# Patient Record
Sex: MALE | Race: WHITE | NOT HISPANIC OR LATINO | Employment: FULL TIME | URBAN - METROPOLITAN AREA
[De-identification: names, ages, dates, MRNs, and addresses within clinical notes are randomized per-mention and may not be internally consistent; named-entity substitution may affect disease eponyms.]

---

## 2017-02-16 ENCOUNTER — OFFICE VISIT (OUTPATIENT)
Dept: INTERNAL MEDICINE | Facility: CLINIC | Age: 57
End: 2017-02-16

## 2017-02-16 VITALS
DIASTOLIC BLOOD PRESSURE: 60 MMHG | HEIGHT: 73 IN | BODY MASS INDEX: 29.42 KG/M2 | RESPIRATION RATE: 20 BRPM | SYSTOLIC BLOOD PRESSURE: 140 MMHG | WEIGHT: 222 LBS | TEMPERATURE: 97.1 F | HEART RATE: 84 BPM

## 2017-02-16 DIAGNOSIS — L98.9 SKIN LESION OF LEFT ARM: ICD-10-CM

## 2017-02-16 DIAGNOSIS — J45.50 UNCOMPLICATED SEVERE PERSISTENT ASTHMA: ICD-10-CM

## 2017-02-16 DIAGNOSIS — Z11.59 NEED FOR HEPATITIS C SCREENING TEST: ICD-10-CM

## 2017-02-16 DIAGNOSIS — R53.83 OTHER FATIGUE: ICD-10-CM

## 2017-02-16 DIAGNOSIS — R10.31 RIGHT LOWER QUADRANT ABDOMINAL PAIN: ICD-10-CM

## 2017-02-16 DIAGNOSIS — R06.09 DYSPNEA ON EXERTION: ICD-10-CM

## 2017-02-16 DIAGNOSIS — R07.9 CHEST PAIN, EXERTIONAL: ICD-10-CM

## 2017-02-16 DIAGNOSIS — Z00.00 ENCOUNTER FOR HEALTH MAINTENANCE EXAMINATION IN ADULT: Primary | ICD-10-CM

## 2017-02-16 DIAGNOSIS — E11.43 DIABETIC AUTONOMIC NEUROPATHY ASSOCIATED WITH TYPE 2 DIABETES MELLITUS (HCC): ICD-10-CM

## 2017-02-16 DIAGNOSIS — Z12.5 SCREENING FOR PROSTATE CANCER: ICD-10-CM

## 2017-02-16 DIAGNOSIS — Z23 NEED FOR TETANUS BOOSTER: ICD-10-CM

## 2017-02-16 PROBLEM — E11.40 DIABETIC NEUROPATHY (HCC): Status: ACTIVE | Noted: 2017-02-16

## 2017-02-16 PROBLEM — M19.079 ARTHRITIS OF ANKLE: Status: ACTIVE | Noted: 2017-02-16

## 2017-02-16 PROBLEM — E11.9 TYPE 2 DIABETES MELLITUS, WITHOUT LONG-TERM CURRENT USE OF INSULIN (HCC): Status: ACTIVE | Noted: 2017-02-16

## 2017-02-16 LAB
25(OH)D3 SERPL-MCNC: 10.8 NG/ML
ALBUMIN SERPL-MCNC: 4.1 G/DL (ref 3.2–4.8)
ALBUMIN/GLOB SERPL: 1.7 G/DL (ref 1.5–2.5)
ALP SERPL-CCNC: 75 U/L (ref 25–100)
ALT SERPL W P-5'-P-CCNC: 27 U/L (ref 7–40)
ANION GAP SERPL CALCULATED.3IONS-SCNC: 3 MMOL/L (ref 3–11)
ARTICHOKE IGE QN: 210 MG/DL (ref 0–130)
AST SERPL-CCNC: 19 U/L (ref 0–33)
BASOPHILS # BLD AUTO: 0.06 10*3/MM3 (ref 0–0.2)
BASOPHILS NFR BLD AUTO: 1 % (ref 0–1)
BILIRUB SERPL-MCNC: 1 MG/DL (ref 0.3–1.2)
BUN BLD-MCNC: 13 MG/DL (ref 9–23)
BUN/CREAT SERPL: 16.3 (ref 7–25)
CALCIUM SPEC-SCNC: 9.5 MG/DL (ref 8.7–10.4)
CHLORIDE SERPL-SCNC: 103 MMOL/L (ref 99–109)
CHOLEST SERPL-MCNC: 295 MG/DL (ref 0–200)
CO2 SERPL-SCNC: 32 MMOL/L (ref 20–31)
CREAT BLD-MCNC: 0.8 MG/DL (ref 0.6–1.3)
DEPRECATED RDW RBC AUTO: 41.3 FL (ref 37–54)
EOSINOPHIL # BLD AUTO: 0.41 10*3/MM3 (ref 0.1–0.3)
EOSINOPHIL NFR BLD AUTO: 6.7 % (ref 0–3)
ERYTHROCYTE [DISTWIDTH] IN BLOOD BY AUTOMATED COUNT: 12.2 % (ref 11.3–14.5)
EXPIRATION DATE: NORMAL
GFR SERPL CREATININE-BSD FRML MDRD: 100 ML/MIN/1.73
GLOBULIN UR ELPH-MCNC: 2.4 GM/DL
GLUCOSE BLD-MCNC: 206 MG/DL (ref 70–100)
HBA1C MFR BLD: 9.3 %
HCT VFR BLD AUTO: 42.1 % (ref 38.9–50.9)
HCV AB SER DONR QL: NORMAL
HDLC SERPL-MCNC: 43 MG/DL (ref 40–60)
HGB BLD-MCNC: 14.3 G/DL (ref 13.1–17.5)
IMM GRANULOCYTES # BLD: 0.02 10*3/MM3 (ref 0–0.03)
IMM GRANULOCYTES NFR BLD: 0.3 % (ref 0–0.6)
LYMPHOCYTES # BLD AUTO: 2.26 10*3/MM3 (ref 0.6–4.8)
LYMPHOCYTES NFR BLD AUTO: 37 % (ref 24–44)
Lab: NORMAL
MCH RBC QN AUTO: 31.4 PG (ref 27–31)
MCHC RBC AUTO-ENTMCNC: 34 G/DL (ref 32–36)
MCV RBC AUTO: 92.3 FL (ref 80–99)
MONOCYTES # BLD AUTO: 0.45 10*3/MM3 (ref 0–1)
MONOCYTES NFR BLD AUTO: 7.4 % (ref 0–12)
NEUTROPHILS # BLD AUTO: 2.91 10*3/MM3 (ref 1.5–8.3)
NEUTROPHILS NFR BLD AUTO: 47.6 % (ref 41–71)
NRBC BLD MANUAL-RTO: 0 /100 WBC (ref 0–0)
PLATELET # BLD AUTO: 284 10*3/MM3 (ref 150–450)
PMV BLD AUTO: 10.8 FL (ref 6–12)
POTASSIUM BLD-SCNC: 4.4 MMOL/L (ref 3.5–5.5)
PROT SERPL-MCNC: 6.5 G/DL (ref 5.7–8.2)
PSA SERPL-MCNC: 0.8 NG/ML (ref 0–4)
RBC # BLD AUTO: 4.56 10*6/MM3 (ref 4.2–5.76)
SODIUM BLD-SCNC: 138 MMOL/L (ref 132–146)
T4 FREE SERPL-MCNC: 1.29 NG/DL (ref 0.89–1.76)
TRIGL SERPL-MCNC: 241 MG/DL (ref 0–150)
TSH SERPL DL<=0.05 MIU/L-ACNC: 1.77 MIU/ML (ref 0.35–5.35)
VIT B12 BLD-MCNC: 272 PG/ML (ref 211–911)
WBC NRBC COR # BLD: 6.11 10*3/MM3 (ref 3.5–10.8)

## 2017-02-16 PROCEDURE — 83036 HEMOGLOBIN GLYCOSYLATED A1C: CPT | Performed by: INTERNAL MEDICINE

## 2017-02-16 PROCEDURE — 84443 ASSAY THYROID STIM HORMONE: CPT | Performed by: INTERNAL MEDICINE

## 2017-02-16 PROCEDURE — 90715 TDAP VACCINE 7 YRS/> IM: CPT | Performed by: INTERNAL MEDICINE

## 2017-02-16 PROCEDURE — 99386 PREV VISIT NEW AGE 40-64: CPT | Performed by: INTERNAL MEDICINE

## 2017-02-16 PROCEDURE — 85025 COMPLETE CBC W/AUTO DIFF WBC: CPT | Performed by: INTERNAL MEDICINE

## 2017-02-16 PROCEDURE — 93000 ELECTROCARDIOGRAM COMPLETE: CPT | Performed by: INTERNAL MEDICINE

## 2017-02-16 PROCEDURE — 90471 IMMUNIZATION ADMIN: CPT | Performed by: INTERNAL MEDICINE

## 2017-02-16 PROCEDURE — 80053 COMPREHEN METABOLIC PANEL: CPT | Performed by: INTERNAL MEDICINE

## 2017-02-16 PROCEDURE — 80061 LIPID PANEL: CPT | Performed by: INTERNAL MEDICINE

## 2017-02-16 PROCEDURE — 86803 HEPATITIS C AB TEST: CPT | Performed by: INTERNAL MEDICINE

## 2017-02-16 PROCEDURE — 84425 ASSAY OF VITAMIN B-1: CPT | Performed by: INTERNAL MEDICINE

## 2017-02-16 PROCEDURE — 84153 ASSAY OF PSA TOTAL: CPT | Performed by: INTERNAL MEDICINE

## 2017-02-16 PROCEDURE — 36415 COLL VENOUS BLD VENIPUNCTURE: CPT | Performed by: INTERNAL MEDICINE

## 2017-02-16 PROCEDURE — 84439 ASSAY OF FREE THYROXINE: CPT | Performed by: INTERNAL MEDICINE

## 2017-02-16 PROCEDURE — 99204 OFFICE O/P NEW MOD 45 MIN: CPT | Performed by: INTERNAL MEDICINE

## 2017-02-16 PROCEDURE — 82306 VITAMIN D 25 HYDROXY: CPT | Performed by: INTERNAL MEDICINE

## 2017-02-16 PROCEDURE — 82607 VITAMIN B-12: CPT | Performed by: INTERNAL MEDICINE

## 2017-02-16 RX ORDER — GABAPENTIN 100 MG/1
CAPSULE ORAL
Qty: 90 CAPSULE | Refills: 0 | Status: SHIPPED | OUTPATIENT
Start: 2017-02-16 | End: 2017-03-14 | Stop reason: SDUPTHER

## 2017-02-16 NOTE — PROGRESS NOTES
Subjective      History of Present Illness    The patient is establishing care.  He states he previously saw Dr. Noonan as his PCP, but has not been seen in approximately 2 years.  He states he has continued to take his medication however.    Diabetes Follow Up: The patient is here for follow-up of Diabetes, which he states was due to chronic steroid-induced starting approximately 2008.  The patient is adherent with his medication regimen.  He denies medication side effects.  Medication: Metformin and Glimepiride.    Interval events: The patient states he has not had labs done in over 2 years.  He does check his blood sugar at home.  He states it runs in the 170s fasting and in the 250s two hours postprandial.  He has occasional symptoms of hypoglycemia when his blood sugar goes below 90.  He states he does check his feet daily.  He states he has had neuropathy in his feet for several years, consisting of pain and tingling.  Recently, he has developed some in his hands and chest.   He denies foot ulcers.  He is not on medication for neuropathy, but does think he tried a high dose Neurontin in the past.  The patient states his last ophthalmology appointment was 4 years ago and did not show retinopathy.  He had an optometry appointment, which she states was normal, in January 2016.    Symptoms: The patient is complaining of chest pain and dyspnea on exertion for the past 2 months.  He states he has had definitely 2 and possibly 3 episodes of left-sided chest pain, lasting only a few seconds, occurring only with exertion.  No radiation.  The chest pain is associated with shortness of breath, lightheadedness, headache, and nausea.  No other associated symptoms.  The pain resolves with rest.  He has gained 12 pounds recently.  He is complaining of fatigue and headaches.  He denies palpitations, but states he has an occasional skipped beat.  He denies resting shortness of breath, orthopnea, lower extremity  edema, claudication, syncope, polyuria, polydipsia, dizziness, blurred vision, double vision, memory loss, and concentration problems.    Of note, the patient had a 2-D echocardiogram on 1/28/06 which showed mild right ventricular and left ventricular dilation, but no valve abnormality, and a normal ejection fraction.    Lifestyle: The patient states he does not have a healthy diet.  The patient states he bikes in the summer, but is currently not doing any exercise.  Smoking: The patient does not use tobacco.    Asthma Follow Up: The patient is here for follow-up of Asthma.  Medication: Singulair and Prednisone daily.    History: The patient states he has had significant Asthma since childhood.  He has been hospitalized multiple times for asthma exacerbations.  He states he has seen Dr. An in the past, but not for many years.  He states he tried various inhaled steroids, which did not work.  He states the Prednisone is the only thing that has really helped him.  He has been able to decrease his dosage from 20 mg daily to 10 mg daily.  However, he is using his Pro-air MDI 2-4 times daily.  He states he has approximately 2 flare ups per year.  Symptoms: Currently there is no wheezing, cough, or hemoptysis.    Darnell Garcia is a 56 y.o. male who presents for an Annual Physical.      PMH, PSH, SocHx, FamHx, Allergies, and Medications: Reviewed and updated.    Outpatient Medications Prior to Visit   Medication Sig Dispense Refill   • albuterol (PROVENTIL HFA;VENTOLIN HFA) 108 (90 BASE) MCG/ACT inhaler Inhale 2 puffs every 4 (four) hours as needed for wheezing.     • glimepiride (AMARYL) 4 MG tablet Take 4 mg by mouth 2 (Two) Times a Day.     • montelukast (SINGULAIR) 10 MG tablet Take 10 mg by mouth every night.     • predniSONE (DELTASONE) 5 MG tablet Take 5 mg by mouth daily.     • Naproxen Sodium (ALEVE) 220 MG capsule Take 2 tablets by mouth 2 (two) times a day as needed.       No facility-administered  medications prior to visit.        Immunization History   Administered Date(s) Administered   • Influenza, Quadrivalent 12/01/2016     Patient Active Problem List   Diagnosis   • Type 2 diabetes mellitus, without long-term current use of insulin   • Severe persistent asthma   • Diabetic neuropathy   • Arthritis of ankle         Health Habits:  Dental Exam. up to date  Eye Exam. up to date  Hearing Loss:  No  Exercise: 0 times/week.  Current exercise activities include: none  Diet: Not healthy  Multivitamin: Yes, irregularly    Safe Driving:  Yes  Seat Belt:  Yes  Bike Helmet:  Yes  Skin Screening:  Yes  Sunscreen: Yes  SBE / ANIBAL: No  Sexual Activity:  No (ED);  Birth Control:  Vasectomy  STD Prevention:  N/A    Last Pap: N/A  Last Mammogram:  N/A  Last DEXA Scan: N/A  Last Colonoscopy: 2010, normal per patient  Last PSA: ? (not sure if had)    Social:    Social History     Social History   • Marital status:      Spouse name: N/A   • Number of children: N/A   • Years of education: N/A     Occupational History   •  full time     Social History Main Topics   • Smoking status: Never Smoker   • Smokeless tobacco: None   • Alcohol use 10-12 cans of beer one day every other weekend   • Drug use: No   • Sexual activity: No (ED)     Other Topics Concern   • Not on file     Social History Narrative         Current Medical Providers:    Molly Garland MD (Internal Medicine / Pediatrics)    The [facility] providers who are involved in the care of this patient are listed above.         Review of Systems   Constitutional: Positive for fatigue and unexpected weight change. Negative for appetite change, chills and fever.        No weight gain or weight loss.  No night sweats.  No generalized pain.     HENT: Negative for congestion, ear pain, facial swelling, hearing loss, nosebleeds, postnasal drip, rhinorrhea, sinus pressure, sneezing, sore throat, tinnitus, trouble swallowing and voice change.         Reports  snoring.   Eyes: Positive for visual disturbance (occasional floaters). Negative for photophobia, pain, discharge, redness and itching.   Respiratory: Negative for cough, chest tightness, shortness of breath and wheezing.         Has shortness of breath with exertion,No chest congestion.  No hemoptysis. No orthopnea or PND.   Cardiovascular: Positive for chest pain. Negative for palpitations and leg swelling.        No claudication or syncope.   Gastrointestinal: Positive for abdominal pain ( right lower quadrant ×1 year) and nausea ( with chest pain). Negative for abdominal distention, blood in stool, constipation, diarrhea and vomiting.        No melena, heartburn, odynophagia, dysphagia, belching, or bloating.   Endocrine: Negative for cold intolerance, heat intolerance, polydipsia, polyphagia and polyuria.        No hair loss, but has dry skin. No generalized weakness.   Genitourinary: Negative for decreased urine volume, difficulty urinating, discharge, dysuria, flank pain, frequency, hematuria, scrotal swelling, testicular pain and urgency.        No nocturia, incomplete emptying, or incontinence.   Musculoskeletal: Negative for arthralgias, back pain, gait problem, joint swelling, myalgias, neck pain and neck stiffness.        No joint stiffness. Has chronic ankle pain.   Skin: Negative for rash.        New skin lesions on left arm, but no changes in skin lesions.   Neurological: Positive for light-headedness (with chest pain), numbness and headaches (recent). Negative for dizziness, tremors, speech difficulty and weakness.        Has tingling. No memory loss. No decreased concentration.   Hematological: Negative for adenopathy. Does not bruise/bleed easily.   Psychiatric/Behavioral: Positive for dysphoric mood. Negative for confusion, sleep disturbance and suicidal ideas. The patient is not nervous/anxious.         No depression.           Objective     Vitals:    02/16/17 0853   BP: 140/60   BP Location:  "Right arm   Pulse: 84   Resp: 20   Temp: 97.1 °F (36.2 °C)   TempSrc: Temporal Artery    Weight: 222 lb (101 kg)   Height: 73.25\" (186.1 cm)       Body mass index is 29.09 kg/(m^2).    Physical Exam   Constitutional:   Overweight.   HENT:   Head: Normocephalic and atraumatic.   Right Ear: Tympanic membrane and ear canal normal.   Left Ear: Tympanic membrane and ear canal normal.   Mouth/Throat: Oropharynx is clear and moist.   Fundi not clearly visualized.   Eyes: Conjunctivae and EOM are normal. Pupils are equal, round, and reactive to light.   Neck: Normal range of motion. Neck supple. No thyromegaly present.   Cardiovascular: Normal rate and regular rhythm.  Exam reveals no gallop and no friction rub.    No murmur heard.  Pulses:       Radial pulses are 2+ on the right side, and 2+ on the left side.        Dorsalis pedis pulses are 1+ on the right side, and 1+ on the left side.   Pulmonary/Chest: Effort normal. He has wheezes (diffuse). He has no rhonchi.   Abdominal: Soft. Bowel sounds are normal. He exhibits no distension and no mass. There is tenderness (right lower quadrant). There is no rebound, no guarding and no CVA tenderness. A hernia (large ventral) is present.   Normal rectal tone.  No rectal masses.   Genitourinary: Rectum normal, prostate normal and penis normal. Right testis shows no mass, no swelling and no tenderness. Left testis shows no mass, no swelling and no tenderness.   Musculoskeletal: Normal range of motion.    Darnell had a diabetic foot exam performed today.   During the foot exam he had a monofilament test performed (See form).  Lymphadenopathy:     He has no cervical adenopathy.        Right: No inguinal and no supraclavicular adenopathy present.        Left: No inguinal and no supraclavicular adenopathy present.   Neurological: He is alert. He has normal strength. No cranial nerve deficit. Coordination and gait normal.   DTRs 1+ and equal throughout.   Skin: No rash noted.   There are " several scaly, erythematous, slightly raised lesions on his left arm.  There are multiple benign-appearing on his back.   Psychiatric: He has a normal mood and affect.   Nursing note and vitals reviewed.        ECG 12 Lead  Date/Time: 2/16/2017 9:31 AM  Performed by: ANGELICA HI  Authorized by: ANGELICA HI   Comparison: not compared with previous ECG   Previous ECG: no previous ECG available  Rhythm: sinus rhythm  Ectopy comments: None  Rate: normal  Conduction: right bundle branch block  ST Segments: ST segments normal  T Waves: T waves normal  QRS axis: right  Other: no other findings  Clinical impression: abnormal ECG          Lab Results   Component Value Date    HGBA1C 9.3 02/16/2017       Assessment/Plan      Darnell was seen today for annual exam.    Diagnoses and all orders for this visit:    Encounter for health maintenance examination in adult    Diabetic autonomic neuropathy associated with type 2 diabetes mellitus  -     Lipid Panel  -     Comprehensive Metabolic Panel  -     Cancel: POC Microalbumin  -     Cancel: POC Urinalysis Dipstick, Multipro  -     POC Glycosylated Hemoglobin (Hb A1C)  -     metFORMIN (GLUCOPHAGE) 500 MG tablet; Take 2 tablets by mouth 2 (Two) Times a Day With Meals.  -     Ambulatory Referral to Ophthalmology  -     gabapentin (NEURONTIN) 100 MG capsule; Take 1-3 po qhs    Uncomplicated severe persistent asthma  -     Fluticasone Furoate-Vilanterol 100-25 MCG/INH aerosol powder ; Inhale 1 puff Daily.    Chest pain, exertional  -     ECG 12 Lead  -     Stress Test With Myocardial Perfusion - One Day; Future    Dyspnea on exertion    Other fatigue  -     CBC & Differential  -     Vitamin B12  -     T4, Free  -     TSH  -     Vitamin D 25 Hydroxy  -     Vitamin B1, Whole Blood  -     CBC Auto Differential    Skin lesion of left arm  -     Ambulatory Referral to Dermatology    Screening for prostate cancer  -     PSA    Need for hepatitis C screening test  -     Hepatitis C  Antibody    Need for tetanus booster  -     Tdap Vaccine Greater Than or Equal To 8yo IM    Right lower quadrant abdominal pain  -     CT Abdomen Pelvis With Contrast; Future      The patient was unable to provide a urine specimen today.    The patient was given a Zostavax information sheet and agrees to check on insurance coverage.    Return in about 2 weeks (around 3/2/2017) for Recheck- Diabetes.

## 2017-02-19 LAB — VIT B1 BLD-SCNC: 150.5 NMOL/L (ref 66.5–200)

## 2017-02-20 ENCOUNTER — TELEPHONE (OUTPATIENT)
Dept: INTERNAL MEDICINE | Facility: CLINIC | Age: 57
End: 2017-02-20

## 2017-02-20 NOTE — TELEPHONE ENCOUNTER
Naz notified Cardiology  will be calling to schedule appt.   She states they already called and he has an appt on Monday 2/ 27 /17

## 2017-02-20 NOTE — TELEPHONE ENCOUNTER
Joelle from Dr Dur[facility]  office at  called to notify Dr garland he had a abnormal Stress Test .   Dr Garland said he could schedule a follow up appt with Dr Durán.    Joelle will call him and schedule the appt.

## 2017-02-20 NOTE — TELEPHONE ENCOUNTER
Please call patient and let him know the cardiologist office called and just told us it was abnormal.  They will be calling him to set up an appointment.

## 2017-02-20 NOTE — TELEPHONE ENCOUNTER
----- Message from Silvia Holt sent at 2/20/2017  1:07 PM EST -----  Contact: [phone]  WIFE CALLED TO GET RESULTS OF STRESS TEST-WAS TOLD I WOULD SEND A MESSAGE BACK BUT WE DO NOT HAVE A RELEASE OF INFO FOR HER.  PTS NUMBER [phone]

## 2017-02-22 ENCOUNTER — APPOINTMENT (OUTPATIENT)
Dept: CT IMAGING | Facility: HOSPITAL | Age: 57
End: 2017-02-22
Attending: INTERNAL MEDICINE

## 2017-02-23 ENCOUNTER — HOSPITAL ENCOUNTER (OUTPATIENT)
Dept: CT IMAGING | Facility: HOSPITAL | Age: 57
Discharge: HOME | End: 2017-02-23
Attending: INTERNAL MEDICINE | Admitting: INTERNAL MEDICINE

## 2017-02-23 DIAGNOSIS — R10.31 RIGHT LOWER QUADRANT ABDOMINAL PAIN: ICD-10-CM

## 2017-02-23 PROCEDURE — 25510000001 DIATRIZOATE MEGLUMINE & SODIUM PER 1 ML: Performed by: INTERNAL MEDICINE

## 2017-02-23 PROCEDURE — 74177 CT ABD & PELVIS W/CONTRAST: CPT

## 2017-02-23 PROCEDURE — 0 IOPAMIDOL 61 % SOLUTION: Performed by: INTERNAL MEDICINE

## 2017-02-23 RX ADMIN — DIATRIZOATE MEGLUMINE AND DIATRIZOATE SODIUM 15 ML: 660; 100 LIQUID ORAL; RECTAL at 09:15

## 2017-02-23 RX ADMIN — IOPAMIDOL 99 ML: 612 INJECTION, SOLUTION INTRAVENOUS at 10:45

## 2017-03-02 ENCOUNTER — OFFICE VISIT (OUTPATIENT)
Dept: INTERNAL MEDICINE | Facility: CLINIC | Age: 57
End: 2017-03-02

## 2017-03-02 VITALS
HEART RATE: 72 BPM | SYSTOLIC BLOOD PRESSURE: 130 MMHG | TEMPERATURE: 97 F | RESPIRATION RATE: 20 BRPM | WEIGHT: 226 LBS | DIASTOLIC BLOOD PRESSURE: 80 MMHG | BODY MASS INDEX: 29.95 KG/M2 | HEIGHT: 73 IN

## 2017-03-02 DIAGNOSIS — E11.9 TYPE 2 DIABETES MELLITUS WITHOUT COMPLICATION, WITHOUT LONG-TERM CURRENT USE OF INSULIN (HCC): Primary | ICD-10-CM

## 2017-03-02 DIAGNOSIS — R07.89 OTHER CHEST PAIN: ICD-10-CM

## 2017-03-02 DIAGNOSIS — E78.49 OTHER HYPERLIPIDEMIA: ICD-10-CM

## 2017-03-02 PROBLEM — E55.9 VITAMIN D DEFICIENCY: Status: ACTIVE | Noted: 2017-03-02

## 2017-03-02 PROBLEM — E78.5 HYPERLIPIDEMIA: Status: ACTIVE | Noted: 2017-03-02

## 2017-03-02 PROBLEM — I25.10 CORONARY ARTERY DISEASE: Status: ACTIVE | Noted: 2017-03-02

## 2017-03-02 PROCEDURE — 99214 OFFICE O/P EST MOD 30 MIN: CPT | Performed by: INTERNAL MEDICINE

## 2017-03-02 PROCEDURE — 93000 ELECTROCARDIOGRAM COMPLETE: CPT | Performed by: INTERNAL MEDICINE

## 2017-03-02 RX ORDER — CLOPIDOGREL BISULFATE 75 MG/1
TABLET ORAL DAILY
COMMUNITY
Start: 2017-02-27 | End: 2017-06-16

## 2017-03-02 RX ORDER — ISOSORBIDE MONONITRATE 30 MG/1
TABLET, EXTENDED RELEASE ORAL DAILY
COMMUNITY
Start: 2017-02-27 | End: 2017-06-16

## 2017-03-02 RX ORDER — PREDNISONE 10 MG/1
10 TABLET ORAL DAILY
COMMUNITY
End: 2017-06-07 | Stop reason: SDUPTHER

## 2017-03-02 NOTE — PROGRESS NOTES
Subjective       Darnell Garcia is a 56 y.o. male.     Chief Complaint   Patient presents with   • Diabetes     2 week follow up   Care Plan         History obtained from the patient.      History of Present Illness     Diabetes Follow Up:     The patient is here for follow-up of Diabetes. The condition is unstable.  The patient is adherent with his medication regimen. He denies medication side effects.  Medication: Metformin and Glimepiride.  The patient is here for follow up of newly diagnosed Hyperlipidemia. The condition is unstable.   LDL goal is < 70.  Most recent , .  Medication: None.      Interval events: The patient was seen and 2/16/17 and hemoglobin A1c was 9.3.  Metformin and Neurontin were both started.  He has not been checking his blood sugar at home, stating he does not have test strips and lancets for his glucometer.  He states he does check his feet daily. He states he has had neuropathy in his feet and hands, consisting of pain and tingling.  He denies foot ulcers.  The patient states his last ophthalmology appointment was 4 years ago and did not show retinopathy. He had an optometry appointment, which she states was normal, in January 2016.    The patient was sent for a stress test for his exertional chest pain.  It was positive.  He saw Dr. Durán at [facility].  He was started on Plavix, Imdur, and Metoprolol.  He has a heart catheterization scheduled for tomorrow.  His exertional chest pain is unchanged, but he states he has not been exerting himself at all.  This morning, he woke up with a different type of chest pain.  He is not sure whether it is related to anxiety for his upcoming test.  The pain starts in his left axilla and radiates to the left chest.  It is rated as a 4 on a scale of 10.  He denies injury, to the area.  There is no cough, chest congestion, fever, or chills.  It  is not with associated nausea, vomiting, diaphoresis, lightheadedness, dizziness, or shortness of  breath.    Of note, the patient had a 2-D echocardiogram on 1/28/06 which showed mild right ventricular and left ventricular dilation, but no valve abnormality, and a normal ejection fraction.     Symptoms: new resting chest pain, 4 pound weight gain, stable exertional chest pain,  and dyspnea on exertion.  Unchanged  fatigue and headaches. He denies palpitations, but states he has an occasional skipped beat. He denies resting shortness of breath, orthopnea, lower extremity edema, claudication, syncope, polyuria, polydipsia, dizziness, blurred vision, double vision, memory loss, and concentration problems.      Lifestyle: The patient states he does not have a healthy diet. The patient states he bikes in the summer, but is currently not doing any exercise.  Smoking: The patient does not use tobacco.    Asthma Follow Up:  Last visit the patient was given Breo-Ellipta.  He feels it is helping and he is using his Albuterol inhaler less.    Vitamin D Deficiency Follow Up:  The patient is here for follow up of newly diagnosed Vitamin D Deficiency.    Last Vitamin D level on 2/16/17, Vitamin D 25, 10.8.  Symptoms:  Denies myalgias, arthralgias, paresthesias, balance problems, and bone pain.      Current Outpatient Prescriptions on File Prior to Visit   Medication Sig Dispense Refill   • albuterol (PROVENTIL HFA;VENTOLIN HFA) 108 (90 BASE) MCG/ACT inhaler Inhale 2 puffs every 4 (four) hours as needed for wheezing.     • Fluticasone Furoate-Vilanterol 100-25 MCG/INH aerosol powder  Inhale 1 puff Daily. 2 each 0   • gabapentin (NEURONTIN) 100 MG capsule Take 1-3 po qhs (Patient taking differently: Take 2 po qhs) 90 capsule 0   • glimepiride (AMARYL) 4 MG tablet Take 4 mg by mouth 2 (Two) Times a Day.     • metFORMIN (GLUCOPHAGE) 500 MG tablet Take 2 tablets by mouth 2 (Two) Times a Day With Meals. 180 tablet 1   • montelukast (SINGULAIR) 10 MG tablet Take 10 mg by mouth every night.     • [DISCONTINUED] predniSONE (DELTASONE)  "5 MG tablet Take 5 mg by mouth daily.       No current facility-administered medications on file prior to visit.          The following portions of the patient's history were reviewed and updated as appropriate: allergies, current medications, past family history, past medical history, past social history, past surgical history and problem list.    Review of Systems   Constitutional: Positive for fatigue. Negative for chills, fever and unexpected weight change.   Eyes: Negative for visual disturbance.   Respiratory: Negative for cough, shortness of breath and wheezing.    Cardiovascular: Positive for chest pain. Negative for palpitations and leg swelling.        Stable ARAMBULA, but no  orthopnea or claudication.   Gastrointestinal: Negative for abdominal pain, blood in stool, constipation, diarrhea, nausea and vomiting.        Denies melena.     Endocrine: Negative for polydipsia and polyuria.   Musculoskeletal: Negative for arthralgias and myalgias.   Neurological: Positive for headaches. Negative for dizziness, syncope and light-headedness.        No memory issues.   Psychiatric/Behavioral: Negative for decreased concentration.         Objective       Blood pressure 130/80, pulse 72, temperature 97 °F (36.1 °C), temperature source Temporal Artery , resp. rate 20, height 73\" (185.4 cm), weight 226 lb (103 kg).        Physical Exam   Constitutional:   Overweight.     Neck: Normal range of motion. Neck supple. Carotid bruit is not present.   Cardiovascular: Normal rate, regular rhythm, normal heart sounds and intact distal pulses.  Exam reveals no gallop and no friction rub.    No murmur heard.  No peripheral edema.   Pulmonary/Chest: Effort normal and breath sounds normal.   Abdominal: Soft. Bowel sounds are normal. He exhibits no distension, no abdominal bruit and no mass. There is no hepatosplenomegaly. There is no tenderness.   Psychiatric: He has a normal mood and affect.   Nursing note and vitals reviewed.      ECG " 12 Lead  Date/Time: 3/2/2017 10:17 AM  Performed by: ANGELICA HI  Authorized by: ANGELICA HI   Comparison: compared with previous ECG   Comparison to previous ECG: Unchanged.  Rhythm: sinus rhythm  Rate: normal  Conduction: right bundle branch block  ST Segments: ST segments normal  T Waves: T waves normal  QRS axis: right  Other findings comments: [facility]  Clinical impression: abnormal ECG          Assessment / Plan:    Darnell was seen today for diabetes.    Diagnoses and all orders for this visit:    Type 2 diabetes mellitus without complication, without long-term current use of insulin    Other hyperlipidemia    Other chest pain    Other orders  -     ECG 12 Lead      The patient was sent to [facility] ER by ambulance.  I informed Dr. Durán's office.  Unable to complete Care Plan.    Return for will schedule after heart cath.

## 2017-03-14 DIAGNOSIS — E11.43 DIABETIC AUTONOMIC NEUROPATHY ASSOCIATED WITH TYPE 2 DIABETES MELLITUS (HCC): ICD-10-CM

## 2017-03-14 RX ORDER — GABAPENTIN 100 MG/1
CAPSULE ORAL
Qty: 90 CAPSULE | Refills: 1 | Status: SHIPPED | OUTPATIENT
Start: 2017-03-14 | End: 2017-05-09 | Stop reason: SDUPTHER

## 2017-03-20 RX ORDER — GLIMEPIRIDE 4 MG/1
4 TABLET ORAL 2 TIMES DAILY
Qty: 60 TABLET | Refills: 5 | Status: SHIPPED | OUTPATIENT
Start: 2017-03-20 | End: 2017-10-06 | Stop reason: SDUPTHER

## 2017-04-03 ENCOUNTER — TELEPHONE (OUTPATIENT)
Dept: INTERNAL MEDICINE | Facility: CLINIC | Age: 57
End: 2017-04-03

## 2017-04-03 DIAGNOSIS — J45.50 UNCOMPLICATED SEVERE PERSISTENT ASTHMA: ICD-10-CM

## 2017-04-03 NOTE — TELEPHONE ENCOUNTER
----- Message from Ivis Guadarrama sent at 4/3/2017 11:36 AM EDT -----  [facility] [phone]  PT WAS GIVEN A SAMPLE OF BREO INHALER AND DID REALLY WELL ON THIS AND PT NEEDS A SCRIPT FOR THIS CALLED [facility] TO [facility]

## 2017-05-09 DIAGNOSIS — E11.43 DIABETIC AUTONOMIC NEUROPATHY ASSOCIATED WITH TYPE 2 DIABETES MELLITUS (HCC): ICD-10-CM

## 2017-05-09 RX ORDER — GABAPENTIN 100 MG/1
CAPSULE ORAL
Qty: 90 CAPSULE | Refills: 11 | Status: SHIPPED | OUTPATIENT
Start: 2017-05-09 | End: 2017-11-16 | Stop reason: SDUPTHER

## 2017-06-07 ENCOUNTER — TELEPHONE (OUTPATIENT)
Dept: INTERNAL MEDICINE | Facility: CLINIC | Age: 57
End: 2017-06-07

## 2017-06-07 RX ORDER — PREDNISONE 10 MG/1
10 TABLET ORAL DAILY
Qty: 30 TABLET | Refills: 2 | Status: SHIPPED | OUTPATIENT
Start: 2017-06-07 | End: 2017-06-16 | Stop reason: SDUPTHER

## 2017-06-07 NOTE — TELEPHONE ENCOUNTER
Okay to call in #[phone] with 2 refills.  However, he is due for his follow-up, fasting labs.  Please schedule an appointment for him.

## 2017-06-07 NOTE — TELEPHONE ENCOUNTER
----- Message from Nelia Melissa sent at 6/7/2017  8:58 AM EDT -----  PT NEEDS PREDNISONE 10MG- HE HAS BEEN TAKING IT FOR ABOUT 10 YEARS FROM OTHER PROVIDER. HE NEEDS IT WRITTEN- IF YOU ARE NOT OK WITH THAT, HE CAN COME IN TO BE SEEN. HE SAYS NOTHING IS WRONG, BUT HE TAKES IT FOR ASTHMA. HE HAS BEEN 5 DAYS WITHOUT AND IS FEELING THE EFFECTS.     [facility]: [facility]    PATIENT: [phone]

## 2017-06-07 NOTE — TELEPHONE ENCOUNTER
Rx sent to pharm. LMOV for return call. Office # given. Just need to let pt know and schedule a f/u appt for him.

## 2017-06-15 ENCOUNTER — OFFICE VISIT (OUTPATIENT)
Dept: INTERNAL MEDICINE | Facility: CLINIC | Age: 57
End: 2017-06-15

## 2017-06-15 VITALS
SYSTOLIC BLOOD PRESSURE: 122 MMHG | WEIGHT: 224 LBS | DIASTOLIC BLOOD PRESSURE: 74 MMHG | BODY MASS INDEX: 29.69 KG/M2 | TEMPERATURE: 98.2 F | HEIGHT: 73 IN | HEART RATE: 80 BPM | RESPIRATION RATE: 20 BRPM

## 2017-06-15 DIAGNOSIS — E78.49 OTHER HYPERLIPIDEMIA: ICD-10-CM

## 2017-06-15 DIAGNOSIS — R22.41 FOOT MASS, RIGHT: ICD-10-CM

## 2017-06-15 DIAGNOSIS — Z23 NEED FOR PNEUMOCOCCAL VACCINE: ICD-10-CM

## 2017-06-15 DIAGNOSIS — M79.671 RIGHT FOOT PAIN: ICD-10-CM

## 2017-06-15 DIAGNOSIS — J45.50 UNCOMPLICATED SEVERE PERSISTENT ASTHMA: ICD-10-CM

## 2017-06-15 DIAGNOSIS — E55.9 VITAMIN D DEFICIENCY: ICD-10-CM

## 2017-06-15 DIAGNOSIS — E11.9 TYPE 2 DIABETES MELLITUS WITHOUT COMPLICATION, WITHOUT LONG-TERM CURRENT USE OF INSULIN (HCC): Primary | ICD-10-CM

## 2017-06-15 LAB
25(OH)D3 SERPL-MCNC: 13.1 NG/ML
A/C: NORMAL
ALBUMIN SERPL-MCNC: 4.3 G/DL (ref 3.2–4.8)
ALBUMIN/GLOB SERPL: 1.8 G/DL (ref 1.5–2.5)
ALP SERPL-CCNC: 112 U/L (ref 25–100)
ALT SERPL W P-5'-P-CCNC: 26 U/L (ref 7–40)
ANION GAP SERPL CALCULATED.3IONS-SCNC: 3 MMOL/L (ref 3–11)
ARTICHOKE IGE QN: 134 MG/DL (ref 0–130)
AST SERPL-CCNC: 22 U/L (ref 0–33)
BILIRUB SERPL-MCNC: 1 MG/DL (ref 0.3–1.2)
BUN BLD-MCNC: 14 MG/DL (ref 9–23)
BUN/CREAT SERPL: 17.5 (ref 7–25)
CALCIUM SPEC-SCNC: 9.5 MG/DL (ref 8.7–10.4)
CHLORIDE SERPL-SCNC: 102 MMOL/L (ref 99–109)
CHOLEST SERPL-MCNC: 208 MG/DL (ref 0–200)
CLARITY, POC: CLEAR
CO2 SERPL-SCNC: 33 MMOL/L (ref 20–31)
COLOR UR: YELLOW
CREAT BLD-MCNC: 0.8 MG/DL (ref 0.6–1.3)
EXPIRATION DATE: ABNORMAL
EXPIRATION DATE: NORMAL
EXPIRATION DATE: NORMAL
GFR SERPL CREATININE-BSD FRML MDRD: 100 ML/MIN/1.73
GLOBULIN UR ELPH-MCNC: 2.4 GM/DL
GLUCOSE BLD-MCNC: 285 MG/DL (ref 70–100)
GLUCOSE UR STRIP-MCNC: ABNORMAL MG/DL
HBA1C MFR BLD: 11 %
HDLC SERPL-MCNC: 40 MG/DL (ref 40–60)
KETONES UR QL: NEGATIVE
LEUKOCYTE EST, POC: NEGATIVE
Lab: ABNORMAL
Lab: NORMAL
Lab: NORMAL
NITRITE UR-MCNC: NEGATIVE MG/ML
PH UR: 5.5 [PH] (ref 5–8)
POC CREATININE URINE: 200
POC MICROALBUMIN URINE: 30
POTASSIUM BLD-SCNC: 4.2 MMOL/L (ref 3.5–5.5)
PROT SERPL-MCNC: 6.7 G/DL (ref 5.7–8.2)
PROT UR STRIP-MCNC: NEGATIVE MG/DL
PROT/CREAT UR: 200 MG/G CREA
RBC # UR STRIP: NEGATIVE /UL
SODIUM BLD-SCNC: 138 MMOL/L (ref 132–146)
SP GR UR: 1.01 (ref 1–1.03)
TRIGL SERPL-MCNC: 188 MG/DL (ref 0–150)

## 2017-06-15 PROCEDURE — 80053 COMPREHEN METABOLIC PANEL: CPT | Performed by: INTERNAL MEDICINE

## 2017-06-15 PROCEDURE — 82044 UR ALBUMIN SEMIQUANTITATIVE: CPT | Performed by: INTERNAL MEDICINE

## 2017-06-15 PROCEDURE — 80061 LIPID PANEL: CPT | Performed by: INTERNAL MEDICINE

## 2017-06-15 PROCEDURE — 81002 URINALYSIS NONAUTO W/O SCOPE: CPT | Performed by: INTERNAL MEDICINE

## 2017-06-15 PROCEDURE — 99214 OFFICE O/P EST MOD 30 MIN: CPT | Performed by: INTERNAL MEDICINE

## 2017-06-15 PROCEDURE — 82306 VITAMIN D 25 HYDROXY: CPT | Performed by: INTERNAL MEDICINE

## 2017-06-15 PROCEDURE — 83036 HEMOGLOBIN GLYCOSYLATED A1C: CPT | Performed by: INTERNAL MEDICINE

## 2017-06-15 PROCEDURE — 90732 PPSV23 VACC 2 YRS+ SUBQ/IM: CPT | Performed by: INTERNAL MEDICINE

## 2017-06-15 PROCEDURE — 90471 IMMUNIZATION ADMIN: CPT | Performed by: INTERNAL MEDICINE

## 2017-06-15 PROCEDURE — 36415 COLL VENOUS BLD VENIPUNCTURE: CPT | Performed by: INTERNAL MEDICINE

## 2017-06-15 RX ORDER — LISINOPRIL 10 MG/1
10 TABLET ORAL DAILY
Qty: 30 TABLET | Refills: 5 | Status: SHIPPED | OUTPATIENT
Start: 2017-06-15 | End: 2017-11-16 | Stop reason: SDUPTHER

## 2017-06-15 NOTE — PROGRESS NOTES
Subjective       Darnell Garcia is a 57 y.o. male.     Chief Complaint   Patient presents with   • Follow-up   • Diabetes       History obtained from the patient.      History of Present Illness     Diabetes Follow Up: The patient is here for follow-up of Diabetes, which he states was due to chronic steroid-induced starting approximately 2008, which has been unstable.  Medication: Metformin and Glimepiride  Hyperlipidemia Follow-up: The patient is here for follow-up of Hyperlipidemia, which has been unstable.    His LDL goal is less than 70.  Last LDL on 2/16/17 was 241, Triglycerides 295.    Medication: Atorvastatin.  Coronary Artery Disease follow-up: The patient is here for follow-up of Coronary Artery Disease, which has been stable.    Medications: Metoprolol and Atorvastatin.  The patient is adherent with his medication regimen. He denies medication side effects.     Interval events:   At his visit on 2/16/17, the patient was complaining of chest pain and dyspnea.  He had a stress test on 2/27/17, per Dr. Durán, which was positive.  He was admitted to [facility] on 2/27/17.  He was started on Metoprolol, Atorvastatin, Plavix and Imdur.  Plan was to do a heart catheterization while in the hospital, but the patient opted to do it as an outpatient.  He had a cardiac catheterization on 3/3/17 which showed moderate nonobstructive coronary artery disease with a 30-50% mid LAD lesion.  Medical management was advised.  He last saw Dr. Durán for follow-up on 3/21/17, and was doing well.   He was taken off the Plavix and Imdur at that time.       The patient's last Hemoglobin A1c on 2/16/17 was 9.3.  He does check his blood sugar at home. He states it runs in the 170s fasting and in the 250s two hours postprandial. He denies episodes  of hypoglycemia.   He states he does check his feet daily. He states he has had neuropathy in his feet for several years, consisting of pain and tingling, more recently in his  hands.   He denies foot ulcers. He was started on Neurontin 2/16/17. The patient states his last eye appointment was 2 months ago, at [facility], and did not show retinopathy.      Symptoms: He has lost 2 pounds recently.  Complains of polyuria.  He denies chest pain, shortness of breath, palpitations,  orthopnea, lower extremity edema, claudication, syncope,  polydipsia, dizziness, fatigue, lightheadedness, dizziness, headache, blurred vision, double vision, memory loss, and concentration problems.      Lifestyle: The patient states he is doing better with a healthy diet. The patient states he bikes in the summer, but is currently not doing any exercise, due to his foot pain.  Smoking: The patient does not use tobacco.     Asthma Follow Up: The patient is here for follow-up of Asthma.  Medication: Breo-Ellipta, Singulair,  and Prednisone daily.   Interval events: Since starting the Breo Ellipta, he has only needed his Pro-air MDI 2-4 times per month.    Symptoms: Currently there is no shortness of breath, wheezing, cough, or hemoptysis.    Vitamin D Deficiency Follow-up:  The patient is here for follow-up of Vitamin D Deficiency, diagnosed in February 2017.    Last Vitamin D level on 2/16/17, 10.8.    Symptoms: Denies myalgias, arthralgias, and paresthesias.    Medications: Multivitamin daily.    The patient is complaining of a 2-3 year history of right foot pain.  He states he has to walk on the right lateral side of his foot because he feels something in the arch, and it is too painful to walk flat-footed.  Overall, the symptoms are getting worse.   He denies edema or warmth, but does have redness and tenderness to palpation on the right lateral aspect of his foot.  He denies previous injury or trauma.  He states he saw a podiatrist in the past, and was told he just needs different shoes.  He states he has never had an x-ray of the foot.    Current Outpatient Prescriptions on File Prior to Visit   Medication  Sig Dispense Refill   • albuterol (PROVENTIL HFA;VENTOLIN HFA) 108 (90 BASE) MCG/ACT inhaler Inhale 2 puffs every 4 (four) hours as needed for wheezing.     • clopidogrel (PLAVIX) 75 MG tablet Daily.     • Fluticasone Furoate-Vilanterol (BREO ELLIPTA) 100-25 MCG/INH aerosol powder  Inhale 1 puff Daily. 1 each 5   • gabapentin (NEURONTIN) 100 MG capsule TAKE ONE TO THREE CAPSULES BY MOUTH EVERY NIGHT AT BEDTIME 90 capsule 11   • glimepiride (AMARYL) 4 MG tablet Take 1 tablet by mouth 2 (Two) Times a Day. 60 tablet 5   • isosorbide mononitrate (IMDUR) 30 MG 24 hr tablet Daily.     • metFORMIN (GLUCOPHAGE) 500 MG tablet Take 2 tablets by mouth 2 (Two) Times a Day With Meals. 180 tablet 1   • metoprolol tartrate (LOPRESSOR) 25 MG tablet 2 (Two) Times a Day.     • montelukast (SINGULAIR) 10 MG tablet Take 10 mg by mouth every night.     • predniSONE (DELTASONE) 10 MG tablet Take 1 tablet by mouth Daily. 30 tablet 2     No current facility-administered medications on file prior to visit.          The following portions of the patient's history were reviewed and updated as appropriate: allergies, current medications, past family history, past medical history, past social history, past surgical history and problem list.    Review of Systems   Constitutional: Negative for chills, fatigue, fever and unexpected weight change.   Eyes: Negative for visual disturbance.   Respiratory: Negative for cough, shortness of breath and wheezing.         Denies hemoptysis.   Cardiovascular: Negative for chest pain, palpitations and leg swelling.        No ARAMBULA, orthopnea, or claudication.   Endocrine: Positive for polyuria. Negative for polydipsia.   Musculoskeletal: Negative for arthralgias and myalgias.   Neurological: Negative for dizziness, syncope, light-headedness and headaches.        No memory issues.  Stable numbness and tingling in the hands and feet.   Psychiatric/Behavioral: Negative for decreased concentration.  "        Objective       Blood pressure 122/74, pulse 80, temperature 98.2 °F (36.8 °C), temperature source Temporal Artery , resp. rate 20, height 73\" (185.4 cm), weight 224 lb (102 kg).      Physical Exam   Constitutional:   Overweight.   Neck: Normal range of motion. Neck supple. Carotid bruit is not present. No thyromegaly present.   Cardiovascular: Normal rate, regular rhythm, normal heart sounds and intact distal pulses.  Exam reveals no gallop and no friction rub.    No murmur heard.  No peripheral edema.   Pulmonary/Chest: Effort normal and breath sounds normal.   Abdominal: Soft. Bowel sounds are normal. He exhibits no distension, no abdominal bruit and no mass. There is no hepatosplenomegaly. There is no tenderness.   Exam difficult due to obesity.   Musculoskeletal:   There is a firm, erythematous, tender mass on the lateral aspect of the right foot.   Psychiatric: He has a normal mood and affect.   Nursing note and vitals reviewed.       Results for orders placed or performed in visit on 06/15/17   POC Glycosylated Hemoglobin (Hb A1C)   Result Value Ref Range    Hemoglobin A1C 11.0 %    Lot Number [id number]     Expiration Date 11-18    POC Urinalysis Dipstick, Multipro   Result Value Ref Range    Color Yellow Yellow, Straw, Dark Yellow, Nelia    Clarity, UA Clear Clear    Glucose,  mg/dL (A) Negative, 1000 mg/dL (3+) mg/dL    Ketones, UA Negative Negative    Specific Gravity  1.015 1.005 - 1.030    Blood, UA Negative Negative    pH, Urine 5.5 5.0 - 8.0    Protein, POC Negative Negative mg/dL    Leukocytes Negative Negative    Nitrite, UA Negative Negative    Protein/Creatinine Ratio, Urine 200.0 mg/G Crea    Lot Number [id number]     Expiration Date 10-17    POC Microalbumin   Result Value Ref Range    Microalbumin, Urine 30     Creatinine, Urine 200     A/C <30mg/g NORMAL     Lot Number [id number]     Expiration Date 4-30-18          Assessment / Plan:    Darnell was seen today for follow-up and " diabetes.    Diagnoses and all orders for this visit:    Type 2 diabetes mellitus without complication, without long-term current use of insulin  -     Comprehensive Metabolic Panel  -     POC Glycosylated Hemoglobin (Hb A1C)  -     POC Urinalysis Dipstick, Multipro  -     POC Microalbumin  -     lisinopril (PRINIVIL,ZESTRIL) 10 MG tablet; Take 1 tablet by mouth Daily.    Other hyperlipidemia  -     Lipid Panel    Uncomplicated severe persistent asthma    Vitamin D deficiency  -     Vitamin D 25 Hydroxy    Foot mass, right  -     Ambulatory Referral to Orthopedic Surgery  -     MRI foot right w contrast; Future    Right foot pain  -     Ambulatory Referral to Orthopedic Surgery  -     MRI foot right w contrast; Future    Need for pneumococcal vaccine  -     Pneumococcal Polysaccharide Vaccine 23-Valent Greater Than or Equal To 1yo Subcutaneous / IM      Return in about 3 months (around 9/15/2017) for Recheck-Diabetes fasting.

## 2017-06-16 ENCOUNTER — TELEPHONE (OUTPATIENT)
Dept: INTERNAL MEDICINE | Facility: CLINIC | Age: 57
End: 2017-06-16

## 2017-06-16 DIAGNOSIS — E78.49 OTHER HYPERLIPIDEMIA: Primary | ICD-10-CM

## 2017-06-16 DIAGNOSIS — J45.30 MILD PERSISTENT ASTHMA WITHOUT COMPLICATION: ICD-10-CM

## 2017-06-16 RX ORDER — RANITIDINE 150 MG/1
150 CAPSULE ORAL 2 TIMES DAILY
Qty: 60 CAPSULE | Refills: 11
Start: 2017-06-16 | End: 2018-06-16

## 2017-06-16 RX ORDER — ATORVASTATIN CALCIUM 40 MG/1
40 TABLET, FILM COATED ORAL DAILY
Qty: 30 TABLET | Refills: 5 | Status: SHIPPED | OUTPATIENT
Start: 2017-06-16 | End: 2017-11-16

## 2017-06-16 RX ORDER — PREDNISONE 10 MG/1
5 TABLET ORAL DAILY
Qty: 30 TABLET | Refills: 2
Start: 2017-06-16 | End: 2017-11-16 | Stop reason: SDUPTHER

## 2017-06-16 NOTE — TELEPHONE ENCOUNTER
Please send a copy of the patient's recent labs to Dr. Durán, [facility].  Also inform him I have started the patient on Lisinopril 10 mg daily for his Diabetes and increased his Atorvastatin to 40 mg daily.

## 2017-06-16 NOTE — TELEPHONE ENCOUNTER
Dr. Garland please clarify, the message states that you started patient on Lisinopril 10mg daily for his diabetes. Thank you.

## 2017-06-16 NOTE — TELEPHONE ENCOUNTER
I have faxed over labs and notification of medication changes to Dr. Durán's office at the number provided by the . [phone] Fax)

## 2017-06-20 ENCOUNTER — TELEPHONE (OUTPATIENT)
Dept: INTERNAL MEDICINE | Facility: CLINIC | Age: 57
End: 2017-06-20

## 2017-06-20 DIAGNOSIS — M79.671 RIGHT FOOT PAIN: Primary | ICD-10-CM

## 2017-06-20 NOTE — TELEPHONE ENCOUNTER
----- Message from Brit Giang sent at 6/20/2017 11:06 AM EDT -----  Concerning pts foot MRI,they said they couldn't order it just with contrast, it would have to be with and without or just without. Can you reenter order?

## 2017-06-28 ENCOUNTER — HOSPITAL ENCOUNTER (OUTPATIENT)
Dept: MRI IMAGING | Facility: HOSPITAL | Age: 57
Discharge: HOME | End: 2017-06-28
Attending: INTERNAL MEDICINE | Admitting: INTERNAL MEDICINE

## 2017-06-28 DIAGNOSIS — M79.671 RIGHT FOOT PAIN: ICD-10-CM

## 2017-06-28 PROCEDURE — A9577 INJ MULTIHANCE: HCPCS | Performed by: INTERNAL MEDICINE

## 2017-06-28 PROCEDURE — 0 GADOBENATE DIMEGLUMINE 529 MG/ML SOLUTION: Performed by: INTERNAL MEDICINE

## 2017-06-28 PROCEDURE — 73720 MRI LWR EXTREMITY W/O&W/DYE: CPT

## 2017-06-28 RX ADMIN — GADOBENATE DIMEGLUMINE 20 ML: 529 INJECTION, SOLUTION INTRAVENOUS at 11:45

## 2017-08-07 ENCOUNTER — OFFICE VISIT (OUTPATIENT)
Dept: INTERNAL MEDICINE | Facility: CLINIC | Age: 57
End: 2017-08-07

## 2017-08-07 VITALS
DIASTOLIC BLOOD PRESSURE: 64 MMHG | TEMPERATURE: 97.9 F | WEIGHT: 220 LBS | RESPIRATION RATE: 18 BRPM | BODY MASS INDEX: 29.03 KG/M2 | HEART RATE: 78 BPM | SYSTOLIC BLOOD PRESSURE: 122 MMHG

## 2017-08-07 DIAGNOSIS — H93.13 TINNITUS, BILATERAL: Primary | ICD-10-CM

## 2017-08-07 PROCEDURE — 99214 OFFICE O/P EST MOD 30 MIN: CPT | Performed by: INTERNAL MEDICINE

## 2017-08-07 PROCEDURE — 92552 PURE TONE AUDIOMETRY AIR: CPT | Performed by: INTERNAL MEDICINE

## 2017-08-07 NOTE — PROGRESS NOTES
Subjective       Darnell Garcia is a 57 y.o. male.     Chief Complaint   Patient presents with   • Tinnitus     bilateral       History obtained from the patient.      Tinnitus   This is a new problem. Episode onset: 2-3 weeks ago, woke with it,  bilateral. The problem occurs constantly. The problem has been gradually worsening. Pertinent negatives include no abdominal pain, arthralgias, chest pain, chills, congestion, coughing, fatigue, fever, headaches, joint swelling, myalgias, nausea, neck pain, rash, sore throat, swollen glands, vertigo, visual change, vomiting or weakness. Nothing aggravates the symptoms. Treatments tried: alcohol  The treatment provided no relief.      The patient denies new medication.    The following portions of the patient's history were reviewed and updated as appropriate: allergies, current medications, past family history, past medical history, past social history, past surgical history and problem list.      Review of Systems   Constitutional: Negative for chills, fatigue and fever.   HENT: Positive for tinnitus. Negative for congestion, ear discharge, ear pain, postnasal drip, rhinorrhea, sinus pressure, sore throat and voice change.         He denies head trauma.   Eyes: Negative for pain, discharge, redness, itching and visual disturbance.   Respiratory: Negative for cough, shortness of breath and wheezing.    Cardiovascular: Negative for chest pain.   Gastrointestinal: Negative for abdominal pain, diarrhea, nausea and vomiting.   Musculoskeletal: Positive for neck stiffness (not new, but slightly worse). Negative for arthralgias, joint swelling, myalgias and neck pain.   Skin: Negative for rash.   Neurological: Negative for dizziness, vertigo, weakness, light-headedness and headaches.        Stable numbness and tingling in the right ankle.         Blood pressure 122/64, pulse 78, temperature 97.9 °F (36.6 °C), temperature source Temporal Artery , resp. rate 18, weight 220 lb (99.8  kg).      Objective     Physical Exam   Constitutional: He is oriented to person, place, and time. He appears well-developed and well-nourished.   HENT:   Right Ear: Tympanic membrane, external ear and ear canal normal.   Left Ear: Tympanic membrane, external ear and ear canal normal.   Mouth/Throat: Oropharynx is clear and moist.   Eyes: Conjunctivae and EOM are normal. Pupils are equal, round, and reactive to light.   Neck: Normal range of motion. Neck supple.   Cardiovascular: Normal rate, regular rhythm, normal heart sounds and intact distal pulses.    No murmur heard.  Pulmonary/Chest: Effort normal and breath sounds normal.   Musculoskeletal: Normal range of motion. He exhibits no edema.   Lymphadenopathy:     He has no cervical adenopathy.   Neurological: He is alert and oriented to person, place, and time. He has normal strength and normal reflexes. No cranial nerve deficit. He displays a negative Romberg sign. Coordination and gait normal.   Skin: No rash noted.   Psychiatric: He has a normal mood and affect.   Nursing note and vitals reviewed.        Assessment/Plan   Darnell was seen today for tinnitus.    Diagnoses and all orders for this visit:    Tinnitus, bilateral  -     MRI Brain With & Without Contrast; Future      Return for Next scheduled follow up.

## 2017-08-14 DIAGNOSIS — E11.43 DIABETIC AUTONOMIC NEUROPATHY ASSOCIATED WITH TYPE 2 DIABETES MELLITUS (HCC): ICD-10-CM

## 2017-08-16 ENCOUNTER — HOSPITAL ENCOUNTER (OUTPATIENT)
Dept: MRI IMAGING | Facility: HOSPITAL | Age: 57
Discharge: HOME | End: 2017-08-16
Attending: INTERNAL MEDICINE | Admitting: INTERNAL MEDICINE

## 2017-08-16 DIAGNOSIS — H93.13 TINNITUS, BILATERAL: ICD-10-CM

## 2017-08-16 PROCEDURE — A9577 INJ MULTIHANCE: HCPCS | Performed by: INTERNAL MEDICINE

## 2017-08-16 PROCEDURE — 70553 MRI BRAIN STEM W/O & W/DYE: CPT

## 2017-08-16 PROCEDURE — 0 GADOBENATE DIMEGLUMINE 529 MG/ML SOLUTION: Performed by: INTERNAL MEDICINE

## 2017-08-16 RX ADMIN — GADOBENATE DIMEGLUMINE 20 ML: 529 INJECTION, SOLUTION INTRAVENOUS at 12:45

## 2017-09-21 RX ORDER — PREDNISONE 10 MG/1
TABLET ORAL
Qty: 30 TABLET | Refills: 0 | Status: SHIPPED | OUTPATIENT
Start: 2017-09-21 | End: 2017-10-25 | Stop reason: SDUPTHER

## 2017-09-21 NOTE — TELEPHONE ENCOUNTER
Ok for # 30 no refill.  The patient canceled his fasting follow up appointment with me on 9/15/17.  Please reschedule.

## 2017-10-06 RX ORDER — GLIMEPIRIDE 4 MG/1
TABLET ORAL
Qty: 60 TABLET | Refills: 4 | Status: SHIPPED | OUTPATIENT
Start: 2017-10-06 | End: 2018-04-06 | Stop reason: SDUPTHER

## 2017-10-25 ENCOUNTER — LAB (OUTPATIENT)
Dept: INTERNAL MEDICINE | Facility: CLINIC | Age: 57
End: 2017-10-25

## 2017-10-25 ENCOUNTER — TELEPHONE (OUTPATIENT)
Dept: INTERNAL MEDICINE | Facility: CLINIC | Age: 57
End: 2017-10-25

## 2017-10-25 DIAGNOSIS — E78.49 OTHER HYPERLIPIDEMIA: ICD-10-CM

## 2017-10-25 LAB
ALBUMIN SERPL-MCNC: 4.2 G/DL (ref 3.2–4.8)
ALBUMIN/GLOB SERPL: 1.7 G/DL (ref 1.5–2.5)
ALP SERPL-CCNC: 97 U/L (ref 25–100)
ALT SERPL W P-5'-P-CCNC: 16 U/L (ref 7–40)
ANION GAP SERPL CALCULATED.3IONS-SCNC: 3 MMOL/L (ref 3–11)
ARTICHOKE IGE QN: 176 MG/DL (ref 0–130)
AST SERPL-CCNC: 13 U/L (ref 0–33)
BILIRUB SERPL-MCNC: 1.2 MG/DL (ref 0.3–1.2)
BUN BLD-MCNC: 12 MG/DL (ref 9–23)
BUN/CREAT SERPL: 15 (ref 7–25)
CALCIUM SPEC-SCNC: 9.5 MG/DL (ref 8.7–10.4)
CHLORIDE SERPL-SCNC: 99 MMOL/L (ref 99–109)
CHOLEST SERPL-MCNC: 294 MG/DL (ref 0–200)
CO2 SERPL-SCNC: 34 MMOL/L (ref 20–31)
CREAT BLD-MCNC: 0.8 MG/DL (ref 0.6–1.3)
GFR SERPL CREATININE-BSD FRML MDRD: 100 ML/MIN/1.73
GLOBULIN UR ELPH-MCNC: 2.5 GM/DL
GLUCOSE BLD-MCNC: 228 MG/DL (ref 70–100)
HDLC SERPL-MCNC: 45 MG/DL (ref 40–60)
POTASSIUM BLD-SCNC: 4.7 MMOL/L (ref 3.5–5.5)
PROT SERPL-MCNC: 6.7 G/DL (ref 5.7–8.2)
SODIUM BLD-SCNC: 136 MMOL/L (ref 132–146)
TRIGL SERPL-MCNC: 439 MG/DL (ref 0–150)

## 2017-10-25 PROCEDURE — 36415 COLL VENOUS BLD VENIPUNCTURE: CPT | Performed by: INTERNAL MEDICINE

## 2017-10-25 PROCEDURE — 80061 LIPID PANEL: CPT | Performed by: INTERNAL MEDICINE

## 2017-10-25 PROCEDURE — 80053 COMPREHEN METABOLIC PANEL: CPT | Performed by: INTERNAL MEDICINE

## 2017-10-25 RX ORDER — PREDNISONE 10 MG/1
TABLET ORAL
Qty: 30 TABLET | Refills: 0 | Status: SHIPPED | OUTPATIENT
Start: 2017-10-25 | End: 2017-11-16

## 2017-10-25 NOTE — TELEPHONE ENCOUNTER
----- Message from Lorri Crawford sent at 10/24/2017 12:56 PM EDT -----  10-24-17  Patients wife was notified 2 weeks ago and stated patient would be in for labs .  These labs are back from June do you want me to d/c or send a reminder letter?    ----- Message -----     From: SYSTEM     Sent: 10/24/2017  12:08 AM       To: [facility]

## 2017-10-25 NOTE — TELEPHONE ENCOUNTER
LMOVM to return call.     Rx sent to pharmacy    PT needs to schedule appt see note from Dr Garland

## 2017-10-25 NOTE — TELEPHONE ENCOUNTER
Spoke with Darnell   . He is moving to [facility] next week and could not make an appt at this time.    He states they may establish care in [place] , he is going to discuss with his wife and call back.  Advised he will need to follow up for any additional refills on medication .  Verb understanding given .

## 2017-10-25 NOTE — TELEPHONE ENCOUNTER
----- Message from Ivis Guadarrama sent at 10/25/2017  9:05 AM EDT -----  [phone]  REFILL ON PREDNISONE 10 MG AND 5 MG   MARION FULLER

## 2017-10-25 NOTE — TELEPHONE ENCOUNTER
Ok to call in Prednisone 10 mg, 1/2 daily # 30 no refill.  Please have him schedule a follow up appointment with me for his Diabetes, non-fasting, within the next month.

## 2017-11-16 ENCOUNTER — OFFICE VISIT (OUTPATIENT)
Dept: INTERNAL MEDICINE | Facility: CLINIC | Age: 57
End: 2017-11-16

## 2017-11-16 VITALS
OXYGEN SATURATION: 98 % | HEART RATE: 74 BPM | SYSTOLIC BLOOD PRESSURE: 128 MMHG | RESPIRATION RATE: 14 BRPM | BODY MASS INDEX: 28.1 KG/M2 | HEIGHT: 73 IN | TEMPERATURE: 97.5 F | WEIGHT: 212 LBS | DIASTOLIC BLOOD PRESSURE: 70 MMHG

## 2017-11-16 DIAGNOSIS — E78.49 OTHER HYPERLIPIDEMIA: ICD-10-CM

## 2017-11-16 DIAGNOSIS — E11.9 TYPE 2 DIABETES MELLITUS WITHOUT COMPLICATION, WITHOUT LONG-TERM CURRENT USE OF INSULIN (HCC): ICD-10-CM

## 2017-11-16 DIAGNOSIS — I25.10 CORONARY ARTERY DISEASE INVOLVING NATIVE CORONARY ARTERY WITHOUT ANGINA PECTORIS, UNSPECIFIED WHETHER NATIVE OR TRANSPLANTED HEART: Primary | ICD-10-CM

## 2017-11-16 DIAGNOSIS — E55.9 VITAMIN D DEFICIENCY: ICD-10-CM

## 2017-11-16 DIAGNOSIS — J45.30 MILD PERSISTENT ASTHMA WITHOUT COMPLICATION: ICD-10-CM

## 2017-11-16 DIAGNOSIS — E11.43 DIABETIC AUTONOMIC NEUROPATHY ASSOCIATED WITH TYPE 2 DIABETES MELLITUS (HCC): ICD-10-CM

## 2017-11-16 DIAGNOSIS — J45.50 SEVERE PERSISTENT ASTHMA WITHOUT COMPLICATION: ICD-10-CM

## 2017-11-16 PROCEDURE — 99204 OFFICE O/P NEW MOD 45 MIN: CPT | Performed by: INTERNAL MEDICINE

## 2017-11-16 RX ORDER — MONTELUKAST SODIUM 10 MG/1
10 TABLET ORAL NIGHTLY
Qty: 90 TABLET | Refills: 0 | Status: SHIPPED | OUTPATIENT
Start: 2017-11-16 | End: 2018-02-18 | Stop reason: SDUPTHER

## 2017-11-16 RX ORDER — GABAPENTIN 100 MG/1
100 CAPSULE ORAL 3 TIMES DAILY
Qty: 270 CAPSULE | Refills: 0 | Status: SHIPPED | OUTPATIENT
Start: 2017-11-16 | End: 2017-12-22 | Stop reason: SDUPTHER

## 2017-11-16 RX ORDER — LISINOPRIL 10 MG/1
10 TABLET ORAL DAILY
Qty: 90 TABLET | Refills: 0 | Status: SHIPPED | OUTPATIENT
Start: 2017-11-16 | End: 2018-03-14 | Stop reason: SDUPTHER

## 2017-11-16 RX ORDER — PREDNISONE 1 MG/1
5 TABLET ORAL DAILY
Qty: 90 TABLET | Refills: 0 | Status: SHIPPED | OUTPATIENT
Start: 2017-11-16 | End: 2018-02-18 | Stop reason: SDUPTHER

## 2017-11-16 NOTE — PROGRESS NOTES
Subjective   Darnell Garcia is a 57 y.o. male.     Chief Complaint   Patient presents with   • Establish Care       History of Present Illness   Patient here to establish care.  Patient has multiple medical problems.  Coronary artery disease denies any chest pain or short of breath.  Asthma stable on medication and the steroid.  Diabetic neuropathy on medication refill today.  Diabetes out of control patient ran on medication for long time.  Hyperlipidemia out of control patient ran on medication.  Vitamin D low 15.  GERD stable medication.  Ankle arthritis patient was seen by orthopedist.patient ran out all medications for long time.    Current Outpatient Prescriptions:   •  albuterol (PROVENTIL HFA;VENTOLIN HFA) 108 (90 BASE) MCG/ACT inhaler, Inhale 2 puffs every 4 (four) hours as needed for wheezing., Disp: , Rfl:   •  BREO ELLIPTA 100-25 MCG/INH aerosol powder , INHALE ONE DOSE BY MOUTH DAILY, Disp: 1 each, Rfl: 4  •  gabapentin (NEURONTIN) 100 MG capsule, Take 1 capsule by mouth 3 (Three) Times a Day., Disp: 270 capsule, Rfl: 0  •  glimepiride (AMARYL) 4 MG tablet, TAKE ONE TABLET BY MOUTH TWICE A DAY, Disp: 60 tablet, Rfl: 4  •  lisinopril (PRINIVIL,ZESTRIL) 10 MG tablet, Take 1 tablet by mouth Daily., Disp: 90 tablet, Rfl: 0  •  metFORMIN (GLUCOPHAGE) 1000 MG tablet, Take 1 tablet by mouth 2 (Two) Times a Day With Meals., Disp: 180 tablet, Rfl: 0  •  metoprolol tartrate (LOPRESSOR) 25 MG tablet, Take 1 tablet by mouth Daily., Disp: 90 tablet, Rfl: 0  •  montelukast (SINGULAIR) 10 MG tablet, Take 1 tablet by mouth Every Night., Disp: 90 tablet, Rfl: 0  •  predniSONE (DELTASONE) 5 MG tablet, Take 1 tablet by mouth Daily., Disp: 90 tablet, Rfl: 0  •  ranitidine (ZANTAC) 150 MG capsule, Take 1 capsule by mouth 2 (Two) Times a Day., Disp: 60 capsule, Rfl: 11    The following portions of the patient's history were reviewed and updated as appropriate: allergies, current medications, past family history, past medical  history, past social history, past surgical history and problem list.    Review of Systems   Constitutional: Negative.    Respiratory: Negative.    Cardiovascular: Negative.    Gastrointestinal: Negative.    Musculoskeletal: Negative.    Skin: Negative.    Neurological: Negative.    Psychiatric/Behavioral: Negative.        Objective   Physical Exam   Constitutional: He is oriented to person, place, and time. He appears well-nourished.   Neck: Neck supple.   Cardiovascular: Normal rate, regular rhythm and normal heart sounds.    Pulmonary/Chest: Effort normal and breath sounds normal.   Abdominal: Bowel sounds are normal.   Neurological: He is alert and oriented to person, place, and time.   Skin: Skin is warm.   Psychiatric: He has a normal mood and affect.       All tests have been reviewed.    Assessment/Plan   Diagnoses and all orders for this visit:    Coronary artery disease resume ASA 81    Mild persistent asthma without complication  -     predniSONE (DELTASONE) 5 MG tablet; Take 1 tablet by mouth Daily.    Diabetic autonomic neuropathy associated with type 2 diabetes mellitus  -     metFORMIN (GLUCOPHAGE) 1000 MG tablet; Take 1 tablet by mouth 2 (Two) Times a Day With Meals.  -     gabapentin (NEURONTIN) 100 MG capsule; Take 1 capsule by mouth 3 (Three) Times a Day.    Type 2 diabetes mellitus without complication, without long-term current use of insulin  -     lisinopril (PRINIVIL,ZESTRIL) 10 MG tablet; Take 1 tablet by mouth Daily. continue    Other hyperlipidemia cotninue med, refill    Vitamin D deficiency continue med    Other orders  -     montelukast (SINGULAIR) 10 MG tablet; Take 1 tablet by mouth Every Night.  -     metoprolol tartrate (LOPRESSOR) 25 MG tablet; Take 1 tablet by mouth Daily.    Right ankle arthritis not candidate for surgery by ortho due to other med problems    GERD continue med             resume and refill all medications we will repeat blood tests in 3 months.  Follow-up in 3  months

## 2017-11-20 ENCOUNTER — TELEPHONE (OUTPATIENT)
Dept: INTERNAL MEDICINE | Facility: CLINIC | Age: 57
End: 2017-11-20

## 2017-12-22 ENCOUNTER — OFFICE VISIT (OUTPATIENT)
Dept: INTERNAL MEDICINE | Facility: CLINIC | Age: 57
End: 2017-12-22

## 2017-12-22 VITALS — HEART RATE: 111 BPM | OXYGEN SATURATION: 96 % | TEMPERATURE: 99 F

## 2017-12-22 VITALS — DIASTOLIC BLOOD PRESSURE: 50 MMHG | SYSTOLIC BLOOD PRESSURE: 124 MMHG

## 2017-12-22 DIAGNOSIS — J40 BRONCHITIS: Primary | ICD-10-CM

## 2017-12-22 DIAGNOSIS — E11.43 DIABETIC AUTONOMIC NEUROPATHY ASSOCIATED WITH TYPE 2 DIABETES MELLITUS (HCC): ICD-10-CM

## 2017-12-22 DIAGNOSIS — R11.2 NAUSEA AND VOMITING, INTRACTABILITY OF VOMITING NOT SPECIFIED, UNSPECIFIED VOMITING TYPE: ICD-10-CM

## 2017-12-22 LAB
EXPIRATION DATE: NORMAL
FLUAV AG NPH QL: NORMAL
FLUBV AG NPH QL: NORMAL
INTERNAL CONTROL: NORMAL
Lab: NORMAL

## 2017-12-22 PROCEDURE — 99213 OFFICE O/P EST LOW 20 MIN: CPT | Performed by: INTERNAL MEDICINE

## 2017-12-22 PROCEDURE — 87804 INFLUENZA ASSAY W/OPTIC: CPT | Performed by: NURSE PRACTITIONER

## 2017-12-22 PROCEDURE — 96372 THER/PROPH/DIAG INJ SC/IM: CPT | Performed by: NURSE PRACTITIONER

## 2017-12-22 PROCEDURE — 99213 OFFICE O/P EST LOW 20 MIN: CPT | Performed by: NURSE PRACTITIONER

## 2017-12-22 RX ORDER — ONDANSETRON 4 MG/1
4 TABLET, ORALLY DISINTEGRATING ORAL EVERY 8 HOURS PRN
Qty: 12 TABLET | Refills: 0 | Status: SHIPPED | OUTPATIENT
Start: 2017-12-22 | End: 2019-12-06

## 2017-12-22 RX ORDER — CEFTRIAXONE 1 G/1
1 INJECTION, POWDER, FOR SOLUTION INTRAMUSCULAR; INTRAVENOUS ONCE
Status: COMPLETED | OUTPATIENT
Start: 2017-12-22 | End: 2017-12-22

## 2017-12-22 RX ADMIN — CEFTRIAXONE 1 G: 1 INJECTION, POWDER, FOR SOLUTION INTRAMUSCULAR; INTRAVENOUS at 18:54

## 2017-12-22 NOTE — PROGRESS NOTES
Chief Complaint / Reason:      Chief Complaint   Patient presents with   • Cough   • Asthma   • Sore Throat       Subjective     HPI  Patient presents today with complaints of cough and sore throat and asthma exacerbation.  Vital signs are stable with exception of low-grade fever and increased heart rate.  He has been taking shallow breaths and not taking deep breaths.  He states he has tried Mucinex, his inhaler along with over-the-counter NyQuil and DayQuil.  The medications have provided only minimal relief.  He stated that the symptoms started within the last week or so and have progressively worsened and he feels miserable.  He states that he has coughed so hard that he has thrown up and feels nauseous every time he coughs.  He states that he threw up some bile.  He has not taken anything for the nausea.  He denies chest pain, heart palpitations or diarrhea or constipation.  He has not take any of his medications this morning and does not recall his blood sugar.  He does complain of a headache but he feels that it is sinus related.  He denies vision changes or any neurological changes.  History taken from: patient    PMH/FH/Social History were reviewed and updated appropriately in the electronic medical record.     Review of Systems:   Review of Systems   Constitutional: Positive for appetite change, chills and fatigue.   HENT: Positive for congestion, ear pain, sinus pain, sinus pressure and sore throat.    Respiratory: Positive for cough, chest tightness and wheezing.    Cardiovascular: Negative.  Negative for palpitations.   Gastrointestinal: Negative.    Skin: Negative.    Neurological: Positive for headaches.   Hematological: Negative for adenopathy.     All other systems were reviewed and are negative.  Exceptions are noted in the subjective or above.      Objective     Vital Signs  Vitals:    12/22/17 1103   BP: 124/50       There is no height or weight on file to calculate BMI.    Physical Exam    Constitutional: He is oriented to person, place, and time. He appears well-developed and well-nourished. He appears distressed.   HENT:   Head: Normocephalic.   Right Ear: External ear normal.   Left Ear: External ear normal.   Mouth/Throat: Mucous membranes are dry. Posterior oropharyngeal erythema present.   Cardiovascular: Regular rhythm, normal heart sounds and intact distal pulses.  Tachycardia present.    Pulmonary/Chest: Effort normal and breath sounds normal. He exhibits no tenderness.   Abdominal: Soft. Bowel sounds are normal.   Lymphadenopathy:     He has no cervical adenopathy.   Neurological: He is alert and oriented to person, place, and time.   Skin: Skin is warm and dry.   Psychiatric: He has a normal mood and affect. His behavior is normal. Judgment and thought content normal.   Nursing note and vitals reviewed.       Results Review:    I reviewed the patient's new clinical results.   Office Visit on 12/22/2017   Component Date Value Ref Range Status   • Rapid Influenza A Ag 12/22/2017 neg   Final   • Rapid Influenza B Ag 12/22/2017 neg   Final   • Internal Control 12/22/2017 Passed  Passed Final   • Lot Number 12/22/2017 [id number]   Final   • Expiration Date 12/22/2017 09/30/2019   Final         Medication Review:     Current Outpatient Prescriptions:   •  albuterol (PROVENTIL HFA;VENTOLIN HFA) 108 (90 BASE) MCG/ACT inhaler, Inhale 2 puffs every 4 (four) hours as needed for wheezing., Disp: , Rfl:   •  BREO ELLIPTA 100-25 MCG/INH aerosol powder , INHALE ONE DOSE BY MOUTH DAILY, Disp: 1 each, Rfl: 4  •  gabapentin (NEURONTIN) 100 MG capsule, Take 1 capsule by mouth 3 (Three) Times a Day., Disp: 270 capsule, Rfl: 0  •  glimepiride (AMARYL) 4 MG tablet, TAKE ONE TABLET BY MOUTH TWICE A DAY, Disp: 60 tablet, Rfl: 4  •  lisinopril (PRINIVIL,ZESTRIL) 10 MG tablet, Take 1 tablet by mouth Daily., Disp: 90 tablet, Rfl: 0  •  metFORMIN (GLUCOPHAGE) 1000 MG tablet, Take 1 tablet by mouth 2 (Two) Times a Day  With Meals., Disp: 180 tablet, Rfl: 0  •  metoprolol tartrate (LOPRESSOR) 25 MG tablet, Take 1 tablet by mouth Daily., Disp: 90 tablet, Rfl: 0  •  montelukast (SINGULAIR) 10 MG tablet, Take 1 tablet by mouth Every Night., Disp: 90 tablet, Rfl: 0  •  ondansetron ODT (ZOFRAN ODT) 4 MG disintegrating tablet, Take 1 tablet by mouth Every 8 (Eight) Hours As Needed for Nausea or Vomiting., Disp: 12 tablet, Rfl: 0  •  predniSONE (DELTASONE) 5 MG tablet, Take 1 tablet by mouth Daily., Disp: 90 tablet, Rfl: 0  •  ranitidine (ZANTAC) 150 MG capsule, Take 1 capsule by mouth 2 (Two) Times a Day., Disp: 60 capsule, Rfl: 11    Current Facility-Administered Medications:   •  cefTRIAXone (ROCEPHIN) injection 1 g, 1 g, Intramuscular, Once, KEITH Lazo    Assessment/Plan   Darnell was seen today for cough, asthma and sore throat.    Diagnoses and all orders for this visit:    Bronchitis  •  cefTRIAXone (ROCEPHIN) injection 1 g, 1 g, Intramuscular, Once, KEITH Lazo  Recommend patient cough and deep breathe.  Advised him to increase water intake and use humidification.  Discussed worsening signs and symptoms with patient.  Recommend patient use inhaler as prescribed and he is currently on prednisone daily 5 mg.  Nausea and vomiting, intractability of vomiting not specified, unspecified vomiting type  -     ondansetron ODT (ZOFRAN ODT) 4 MG disintegrating tablet; Take 1 tablet by mouth Every 8 (Eight) Hours As Needed for Nausea or Vomiting.      Return if symptoms worsen or fail to improve.    KEITH Lazo  12/22/2017

## 2017-12-22 NOTE — PROGRESS NOTES
Chief Complaint   Patient presents with   • Cough     congestion, fever, light headed, weakness, body aches, SOA, and headaches X 3 days.      Subjective   Darnell Garcia is a 57 y.o. male.     Cough   This is a new problem. The current episode started in the past 7 days. The problem has been waxing and waning. The problem occurs every few minutes. The cough is productive of sputum. Associated symptoms include a fever, headaches, myalgias, nasal congestion and shortness of breath.        The following portions of the patient's history were reviewed and updated as appropriate: allergies, current medications, past family history, past medical history, past social history, past surgical history and problem list.    Review of Systems   Constitutional: Positive for fever.   Respiratory: Positive for cough and shortness of breath.    Musculoskeletal: Positive for myalgias.   Neurological: Positive for headaches.       Objective   Pulse 111  Temp 99 °F (37.2 °C)  SpO2 96%  There is no height or weight on file to calculate BMI.  Physical Exam    Assessment/Plan   Darnell Garcia is here today and the following problems have been addressed:      There are no diagnoses linked to this encounter.    Please note that portions of this note were completed with a voice recognition program.  Efforts were made to edit dictation, but occasionally words are mistranscribed.

## 2017-12-26 RX ORDER — GABAPENTIN 100 MG/1
100 CAPSULE ORAL 3 TIMES DAILY
Qty: 270 CAPSULE | Refills: 0 | OUTPATIENT
Start: 2017-12-26 | End: 2018-06-01 | Stop reason: SDUPTHER

## 2018-01-12 ENCOUNTER — OFFICE VISIT (OUTPATIENT)
Dept: INTERNAL MEDICINE | Facility: CLINIC | Age: 58
End: 2018-01-12

## 2018-01-12 VITALS
WEIGHT: 211 LBS | HEART RATE: 66 BPM | RESPIRATION RATE: 14 BRPM | TEMPERATURE: 97 F | BODY MASS INDEX: 27.96 KG/M2 | DIASTOLIC BLOOD PRESSURE: 70 MMHG | HEIGHT: 73 IN | OXYGEN SATURATION: 98 % | SYSTOLIC BLOOD PRESSURE: 130 MMHG

## 2018-01-12 DIAGNOSIS — E55.9 VITAMIN D DEFICIENCY: ICD-10-CM

## 2018-01-12 DIAGNOSIS — J45.30 MILD PERSISTENT ASTHMA WITHOUT COMPLICATION: ICD-10-CM

## 2018-01-12 DIAGNOSIS — I25.10 CORONARY ARTERY DISEASE INVOLVING NATIVE CORONARY ARTERY WITHOUT ANGINA PECTORIS, UNSPECIFIED WHETHER NATIVE OR TRANSPLANTED HEART: ICD-10-CM

## 2018-01-12 DIAGNOSIS — E78.49 OTHER HYPERLIPIDEMIA: ICD-10-CM

## 2018-01-12 DIAGNOSIS — J45.50 SEVERE PERSISTENT ASTHMA WITHOUT COMPLICATION: ICD-10-CM

## 2018-01-12 DIAGNOSIS — E11.9 TYPE 2 DIABETES MELLITUS WITHOUT COMPLICATION, WITHOUT LONG-TERM CURRENT USE OF INSULIN (HCC): ICD-10-CM

## 2018-01-12 DIAGNOSIS — M19.90 OSTEOARTHRITIS, UNSPECIFIED OSTEOARTHRITIS TYPE, UNSPECIFIED SITE: ICD-10-CM

## 2018-01-12 DIAGNOSIS — L08.9 ABRASION OF FINGER WITH INFECTION, INITIAL ENCOUNTER: Primary | ICD-10-CM

## 2018-01-12 DIAGNOSIS — M19.079 ARTHRITIS OF ANKLE: ICD-10-CM

## 2018-01-12 DIAGNOSIS — S60.419A ABRASION OF FINGER WITH INFECTION, INITIAL ENCOUNTER: Primary | ICD-10-CM

## 2018-01-12 PROCEDURE — 99214 OFFICE O/P EST MOD 30 MIN: CPT | Performed by: INTERNAL MEDICINE

## 2018-01-12 RX ORDER — CEPHALEXIN 500 MG/1
500 CAPSULE ORAL 3 TIMES DAILY
Qty: 21 CAPSULE | Refills: 0 | Status: SHIPPED | OUTPATIENT
Start: 2018-01-12 | End: 2018-03-14

## 2018-01-12 RX ORDER — CELECOXIB 200 MG/1
200 CAPSULE ORAL DAILY
Qty: 30 CAPSULE | Refills: 1 | Status: SHIPPED | OUTPATIENT
Start: 2018-01-12 | End: 2018-03-20 | Stop reason: SDUPTHER

## 2018-01-12 NOTE — PROGRESS NOTES
Subjective   Darnell Garcia is a 57 y.o. male.     Chief Complaint   Patient presents with   • Hand Pain     right hand pain - cant close hand all the way. - middle finger infection.        Hand Pain    Incident onset: more than a month.    Patient here for follow-up.  Complaining a month right hand joints pain gradually getting worse patient has range of motion limitation.  Also in the same time had a right middle finger infection thought to be splint related but no foreign body discovered.  Patient states pus drained some time.  Now local area pain erythematous mildly swollen.  Asthma stable medication.  Hand joints pain worse in after steroid decreased to 5 mg from 10 mg.  Diabetes on medication await for blood tests.  Hyperlipidemia on medication need a blood test next time.  CAD 30% blockage per patient unable to take ASA      Current Outpatient Prescriptions:   •  albuterol (PROVENTIL HFA;VENTOLIN HFA) 108 (90 BASE) MCG/ACT inhaler, Inhale 2 puffs every 4 (four) hours as needed for wheezing., Disp: , Rfl:   •  BREO ELLIPTA 100-25 MCG/INH aerosol powder , INHALE ONE DOSE BY MOUTH DAILY, Disp: 1 each, Rfl: 4  •  gabapentin (NEURONTIN) 100 MG capsule, Take 1 capsule by mouth 3 (Three) Times a Day., Disp: 270 capsule, Rfl: 0  •  glimepiride (AMARYL) 4 MG tablet, TAKE ONE TABLET BY MOUTH TWICE A DAY, Disp: 60 tablet, Rfl: 4  •  lisinopril (PRINIVIL,ZESTRIL) 10 MG tablet, Take 1 tablet by mouth Daily., Disp: 90 tablet, Rfl: 0  •  metFORMIN (GLUCOPHAGE) 500 MG tablet, , Disp: , Rfl:   •  metoprolol tartrate (LOPRESSOR) 25 MG tablet, Take 1 tablet by mouth Daily., Disp: 90 tablet, Rfl: 0  •  montelukast (SINGULAIR) 10 MG tablet, Take 1 tablet by mouth Every Night., Disp: 90 tablet, Rfl: 0  •  ondansetron ODT (ZOFRAN ODT) 4 MG disintegrating tablet, Take 1 tablet by mouth Every 8 (Eight) Hours As Needed for Nausea or Vomiting., Disp: 12 tablet, Rfl: 0  •  predniSONE (DELTASONE) 5 MG tablet, Take 1 tablet by mouth  Daily., Disp: 90 tablet, Rfl: 0  •  ranitidine (ZANTAC) 150 MG capsule, Take 1 capsule by mouth 2 (Two) Times a Day., Disp: 60 capsule, Rfl: 11    The following portions of the patient's history were reviewed and updated as appropriate: allergies, current medications, past family history, past medical history, past social history, past surgical history and problem list.    Review of Systems   Constitutional: Negative.    Respiratory: Negative.    Cardiovascular: Negative.    Gastrointestinal: Negative.    Musculoskeletal: Negative.    Skin: Positive for wound.   Neurological: Negative.    Psychiatric/Behavioral: Negative.        Objective   Physical Exam   Constitutional: He is oriented to person, place, and time. He appears well-nourished.   Neck: Neck supple.   Cardiovascular: Normal rate, regular rhythm and normal heart sounds.    Pulmonary/Chest: Effort normal and breath sounds normal.   Abdominal: Bowel sounds are normal.   Neurological: He is alert and oriented to person, place, and time.   Skin: Skin is warm. There is erythema.   Psychiatric: He has a normal mood and affect.       All tests have been reviewed.    Assessment/Plan   There are no diagnoses linked to this encounter.          Coronary artery disease patient is allergic to medicine aspirin.  Continue cholesterol medicine     Mild persistent asthma without complication predniSONE (DELTASONE) 5 MG tablet; Take 1 tablet by mouth Daily. Breo. continue     Diabetic autonomic neuropathy associated with DM, continue meds     Type 2 diabetes mellitus without complication, without long-term current use of insulin  -     lisinopril (PRINIVIL,ZESTRIL) 10 MG tablet; Take 1 tablet by mouth Daily. continue     hyperlipidemia cotninue med,      Vitamin D deficiency continue med    Middle finger infection start keflex and rubbing alcohol, refer to hand surgeon, patient declined to go to emergency room.     Right ankle arthritis not candidate for surgery by ortho  due to other med problems    OA hands start celebrex     GERD continue med    4 week after labs

## 2018-01-17 DIAGNOSIS — M79.641 RIGHT HAND PAIN: Primary | ICD-10-CM

## 2018-01-22 ENCOUNTER — OFFICE VISIT (OUTPATIENT)
Dept: ORTHOPEDIC SURGERY | Facility: CLINIC | Age: 58
End: 2018-01-22

## 2018-01-22 VITALS — HEIGHT: 74 IN | BODY MASS INDEX: 27.46 KG/M2 | RESPIRATION RATE: 18 BRPM | WEIGHT: 214 LBS

## 2018-01-22 DIAGNOSIS — S61.209A FINGER, OPEN WOUNDS, COMPLICATED, INITIAL ENCOUNTER: Primary | ICD-10-CM

## 2018-01-22 PROCEDURE — 99204 OFFICE O/P NEW MOD 45 MIN: CPT | Performed by: ORTHOPAEDIC SURGERY

## 2018-01-22 PROCEDURE — 29130 APPL FINGER SPLINT STATIC: CPT | Performed by: ORTHOPAEDIC SURGERY

## 2018-01-22 NOTE — PROGRESS NOTES
Subjective   Patient ID: Darnell Garcia is a 57 y.o. male  Finger Injury and Pain of the Right Hand and Consult (Is being seen today at the request of Dr. Carlos Bella MD. Patient reports 6/10 right long finger pain x's 5 weeks after he reportedly found a splinter. Patient was seen by PCP one week ago and was started on Keflex 500mg TID x's 7 days beginning on 1/12/18.)             History of Present Illness  Right long finger with poorly delayed healing wound after he attempted to remove the foreign body unsuccessfully due to continued poor healing seen for evaluation.  Has been taking Keflex with slight improvement, some pain does radiate down the flexor tendon sheath but no swelling redness or pain with range of motion.  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  Has been applying both peroxide and alcohol  with soaking to the fingertip.  No recent drainage, redness minimal, and has not noticed any redness along the flexor tendon sheath.      Review of Systems   Constitutional: Negative for diaphoresis, fever and unexpected weight change.   HENT: Negative for dental problem and sore throat.    Eyes: Negative for visual disturbance.   Respiratory: Negative for shortness of breath.    Cardiovascular: Negative for chest pain.   Gastrointestinal: Negative for abdominal pain, constipation, diarrhea, nausea and vomiting.   Genitourinary: Negative for difficulty urinating and frequency.   Neurological: Negative for headaches.   Hematological: Does not bruise/bleed easily.   All other systems reviewed and are negative.      Past Medical History:   Diagnosis Date   • Asthma    • Diabetes mellitus    • History of varicella    • Neuropathy         Past Surgical History:   Procedure Laterality Date   • NASAL SEPTUM SURGERY  2009   • VASECTOMY  1988   • VENTRAL HERNIA REPAIR      2007 and 2009 (mesh placed)   • WISDOM TOOTH EXTRACTION         Family History   Problem  "Relation Age of Onset   • Breast cancer Mother    • Osteoarthritis Mother    • Coronary artery disease Father    • Heart attack Father      late 60's   • Lymphoma Son    • Leukemia Son        Social History     Social History   • Marital status:      Spouse name: N/A   • Number of children: N/A   • Years of education: N/A     Occupational History   •       Social History Main Topics   • Smoking status: Never Smoker   • Smokeless tobacco: Never Used   • Alcohol use 6.0 oz/week     10 Cans of beer per week      Comment: one day every other weekend   • Drug use: No   • Sexual activity: No     Other Topics Concern   • Not on file     Social History Narrative       I have reviewed all of the above social hx, family hx, surgical hx, medications, allergies & ROS and confirm that it is accurate.    Allergies   Allergen Reactions   • Aspirin Shortness Of Breath       Objective   Resp 18  Ht 188 cm (74\")  Wt 97.1 kg (214 lb)  BMI 27.48 kg/m2   Physical Exam  Constitutional: Patient is oriented to person, place, and time. Patient appears well-developed and well-nourished.   HENT:Head: Normocephalic and atraumatic.   Eyes: EOM are normal. Pupils are equal, round, and reactive to light.   Neck: Normal range of motion. Neck supple.   Cardiovascular: Normal rate.    Pulmonary/Chest: Effort normal and breath sounds normal.   Abdominal: Soft.   Neurological: Patient is alert and oriented to person, place, and time.   Skin: Skin is warm and dry.   Psychiatric: Patient has a normal mood and affect.   Nursing note and vitals reviewed.       Ortho Exam   Right long finger with eschar measuring 4-5 mm at the very fingertip nail appears intact with no erythema, no pain with DIP or PIP joint range of motion, flexor tendon sheath nontender non-erythematous no pain with passive range of motion of the finger, good capillary refill the fingertip    Assessment/Plan   Review of Radiographic Studies:    Radiographic images today " of affected area I personally viewed and showed no sign of acute fracture or dislocation.  Radiographs confirm no metallic foreign body at fingertip    Dorsal static splint applied to the long fingertip  Date/Time: 1/22/2018 3:29 PM  Performed by: JAQUAN TIDWELL  Authorized by: JAQUAN TIDWELL   Consent: Verbal consent obtained.  Risks and benefits: risks, benefits and alternatives were discussed  Consent given by: patient  Patient understanding: patient states understanding of the procedure being performed  Patient identity confirmed: verbally with patient  Location details: right long finger  Splint type: static finger  Post-procedure: The splinted body part was neurovascularly unchanged following the procedure.  Patient tolerance: Patient tolerated the procedure well with no immediate complications           Darnell was seen today for consult, finger injury and pain.    Diagnoses and all orders for this visit:    Finger, open wounds, complicated, initial encounter    Other orders  -     Splint Application     Orthopedic activities reviewed and patient expressed appreciation and Risk, benefits, and merits of treatment alternatives reviewed with the patient and questions answered      Recommendations/Plan:   Work/Activity Status: May perform usual activities as tolerated    Patient agreeable to call or return sooner for any concerns.         No surgical intervention indicated at this time, no indication of flexor tenosynovitis or flexor tendon sheath infection    Impression:  Small eschar with healing wound fingertip delayed healing secondary to diabetes and repeated application of alcohol and peroxide  Plan:  Local wound care instructions given for Hibiclens daily soaks followed by iodine application, continue use of splinting, return when necessary

## 2018-02-09 ENCOUNTER — OFFICE VISIT (OUTPATIENT)
Dept: ORTHOPEDIC SURGERY | Facility: CLINIC | Age: 58
End: 2018-02-09

## 2018-02-09 VITALS — HEIGHT: 74 IN | BODY MASS INDEX: 27.47 KG/M2 | WEIGHT: 214.07 LBS | RESPIRATION RATE: 16 BRPM

## 2018-02-09 DIAGNOSIS — S61.209A FINGER, OPEN WOUNDS, COMPLICATED, INITIAL ENCOUNTER: Primary | ICD-10-CM

## 2018-02-09 PROCEDURE — 10060 I&D ABSCESS SIMPLE/SINGLE: CPT | Performed by: PHYSICIAN ASSISTANT

## 2018-02-09 RX ORDER — CEPHALEXIN 500 MG/1
500 CAPSULE ORAL 3 TIMES DAILY
Qty: 30 CAPSULE | Refills: 0 | Status: SHIPPED | OUTPATIENT
Start: 2018-02-09 | End: 2018-03-14

## 2018-02-09 RX ORDER — SULFAMETHOXAZOLE AND TRIMETHOPRIM 400; 80 MG/1; MG/1
1 TABLET ORAL 2 TIMES DAILY
Qty: 20 TABLET | Refills: 0 | Status: SHIPPED | OUTPATIENT
Start: 2018-02-09 | End: 2018-03-14

## 2018-02-09 NOTE — PROGRESS NOTES
Subjective   Patient ID: Darnell Garcia is a 57 y.o.  male   Follow-up of the Right Hand (Right 3rd finger follow up.)       History of Present Illness    Patient presents for evaluation of drainage and redness to the right third finger.  Patient initially had a splinter in his right finger tip of his third finger.  He noticed this one month prior.  He was seen by his primary care physician who placed him on cephalexin 3 times a day for 7 days and then referred him to orthopedics.  He completed antibiotics and has been performing Hibiclens soaks daily.  He states over the last 2-3 days he has noticed yellow-green drainage coming from the scab to the end of his right third finger.  He states he has had this scab for the last 8 weeks.    Past Medical History:   Diagnosis Date   • Asthma    • Diabetes mellitus    • History of varicella    • Neuropathy         Past Surgical History:   Procedure Laterality Date   • NASAL SEPTUM SURGERY  2009   • VASECTOMY  1988   • VENTRAL HERNIA REPAIR      2007 and 2009 (mesh placed)   • WISDOM TOOTH EXTRACTION          Family History   Problem Relation Age of Onset   • Breast cancer Mother    • Osteoarthritis Mother    • Coronary artery disease Father    • Heart attack Father      late 60's   • Lymphoma Son    • Leukemia Son         Social History     Social History   • Marital status:      Spouse name: N/A   • Number of children: N/A   • Years of education: N/A     Occupational History   •       Social History Main Topics   • Smoking status: Never Smoker   • Smokeless tobacco: Never Used   • Alcohol use 6.0 oz/week     10 Cans of beer per week      Comment: one day every other weekend   • Drug use: No   • Sexual activity: No     Other Topics Concern   • Not on file     Social History Narrative       Allergies   Allergen Reactions   • Aspirin Shortness Of Breath       Review of Systems   Constitutional: Positive for activity change. Negative for appetite change, chills,  "diaphoresis, fatigue and fever.   HENT: Negative.    Eyes: Negative.    Respiratory: Negative.    Cardiovascular: Negative.    Gastrointestinal: Negative.    Endocrine: Negative.    Genitourinary: Negative.    Musculoskeletal: Positive for arthralgias. Negative for back pain, gait problem, joint swelling, myalgias, neck pain and neck stiffness.   Skin: Negative.    Allergic/Immunologic: Negative.    Neurological: Negative.    Hematological: Negative.    Psychiatric/Behavioral: Negative.          Objective   Resp 16  Ht 188 cm (74.02\")  Wt 97.1 kg (214 lb 1.1 oz)  BMI 27.47 kg/m2    Physical Exam   Constitutional: He is oriented to person, place, and time. He appears well-nourished.   Pulmonary/Chest: No respiratory distress.   Musculoskeletal:        Right wrist: He exhibits normal range of motion, no tenderness, no bony tenderness, no swelling, no effusion, no crepitus and no deformity.        Right hand: He exhibits normal range of motion, no tenderness, normal capillary refill and no swelling. Normal sensation noted.        Hands:  Neurological: He is alert and oriented to person, place, and time.   Psychiatric: He has a normal mood and affect. His behavior is normal.   Vitals reviewed.    Ortho Exam    Neurologic Exam     Mental Status   Oriented to person, place, and time.     Ortho Exam    He is able to flex and extend at all ip joints of the right 3rd finger.   NO redness or ttp along the flexor or extensor tendon.      Assessment/Plan    Independent Review of Radiographic Studies:    No new imaging done today.  Laboratory and Other Studies:  No new results reviewed today.       Incision & Drainage  Date/Time: 2/9/2018 3:44 PM  Performed by: SHELLY PAPPAS  Authorized by: SHELLY PAPPAS   Consent: Verbal consent obtained.  Risks and benefits: risks, benefits and alternatives were discussed  Consent given by: patient  Patient understanding: patient states understanding of the procedure being " "performed  Site marked: the operative site was marked  Patient identity confirmed: verbally with patient  Time out: Immediately prior to procedure a \"time out\" was called to verify the correct patient, procedure, equipment, support staff and site/side marked as required.  Type: abscess  Body area: upper extremity  Anesthesia: digital block    Anesthesia:  Anesthetic total: 4 mL  Scalpel size: 11  Incision type: single straight  Complexity: simple  Drainage: purulent and  serosanguinous  Drainage amount: scant  Wound treatment: wound left open  Packing material: Vaseline gauze  Patient tolerance: Patient tolerated the procedure well with no immediate complications      I was able to remove the eschar. He tolerated this well. This was NOT deep to involve bone.     This was copious irrigated with NS  [x]  No procedures were performed in office today.    Darnell was seen today for follow-up.    Diagnoses and all orders for this visit:    Finger, open wounds, complicated, initial encounter    Other orders  -     cephalexin (KEFLEX) 500 MG capsule; Take 1 capsule by mouth 3 (Three) Times a Day.  -     sulfamethoxazole-trimethoprim (BACTRIM) 400-80 MG tablet; Take 1 tablet by mouth 2 (Two) Times a Day.  -     Incision & Drainage      Orthopedic activities reviewed and patient expressed appreciation  Discussion of orthopedic goals  Risk, benefits, and merits of treatment alternatives reviewed with the patient and questions answered  Watch for signs and symptoms of infection  Wound care instructions given    Recommendations/Plan:  Exercise, medications, injections, other patient advice, and return appointment as noted.  Patient is encouraged to call or return for any issues or concerns.     Patient to follow as soon as he get back into town.   He is going out of town next week   He is advised to keep the finger covered when not at home.  I do not feel he has an infection to the tendon sheath, therefore, we will hold off on " MRI.  Patient agreeable to call or return sooner for any concerns.

## 2018-02-18 DIAGNOSIS — J45.30 MILD PERSISTENT ASTHMA WITHOUT COMPLICATION: ICD-10-CM

## 2018-02-19 RX ORDER — MONTELUKAST SODIUM 10 MG/1
TABLET ORAL
Qty: 30 TABLET | Refills: 0 | Status: SHIPPED | OUTPATIENT
Start: 2018-02-19 | End: 2018-03-22 | Stop reason: SDUPTHER

## 2018-02-19 RX ORDER — PREDNISONE 1 MG/1
TABLET ORAL
Qty: 30 TABLET | Refills: 0 | Status: SHIPPED | OUTPATIENT
Start: 2018-02-19 | End: 2018-03-14

## 2018-03-01 DIAGNOSIS — E78.49 OTHER HYPERLIPIDEMIA: ICD-10-CM

## 2018-03-01 RX ORDER — ATORVASTATIN CALCIUM 40 MG/1
TABLET, FILM COATED ORAL
Qty: 30 TABLET | Refills: 4 | OUTPATIENT
Start: 2018-03-01

## 2018-03-10 LAB
25(OH)D3+25(OH)D2 SERPL-MCNC: 15 NG/ML
ALBUMIN SERPL-MCNC: 4 G/DL (ref 3.5–5)
ALBUMIN/GLOB SERPL: 1.5 G/DL (ref 1–2)
ALP SERPL-CCNC: 98 U/L (ref 38–126)
ALT SERPL-CCNC: 27 U/L (ref 13–69)
APPEARANCE UR: CLEAR
AST SERPL-CCNC: 14 U/L (ref 15–46)
BASOPHILS # BLD AUTO: 0.06 10*3/MM3 (ref 0–0.2)
BASOPHILS NFR BLD AUTO: 1.1 % (ref 0–2.5)
BILIRUB SERPL-MCNC: 1.5 MG/DL (ref 0.2–1.3)
BILIRUB UR QL STRIP: NEGATIVE
BUN SERPL-MCNC: 21 MG/DL (ref 7–20)
BUN/CREAT SERPL: 26.3 (ref 6.3–21.9)
CALCIUM SERPL-MCNC: 9.7 MG/DL (ref 8.4–10.2)
CHLORIDE SERPL-SCNC: 100 MMOL/L (ref 98–107)
CHOLEST SERPL-MCNC: 243 MG/DL (ref 0–199)
CK SERPL-CCNC: 44 U/L (ref 30–170)
CO2 SERPL-SCNC: 27 MMOL/L (ref 26–30)
COLOR UR: YELLOW
CREAT SERPL-MCNC: 0.8 MG/DL (ref 0.6–1.3)
EOSINOPHIL # BLD AUTO: 0.36 10*3/MM3 (ref 0–0.7)
EOSINOPHIL NFR BLD AUTO: 6.7 % (ref 0–7)
ERYTHROCYTE [DISTWIDTH] IN BLOOD BY AUTOMATED COUNT: 13.2 % (ref 11.5–14.5)
GFR SERPLBLD CREATININE-BSD FMLA CKD-EPI: 100 ML/MIN/1.73
GFR SERPLBLD CREATININE-BSD FMLA CKD-EPI: 121 ML/MIN/1.73
GLOBULIN SER CALC-MCNC: 2.6 GM/DL
GLUCOSE SERPL-MCNC: 226 MG/DL (ref 74–98)
GLUCOSE UR QL: ABNORMAL
HBA1C MFR BLD: 9.2 %
HCT VFR BLD AUTO: 39.4 % (ref 42–52)
HDLC SERPL-MCNC: 44 MG/DL (ref 40–60)
HGB BLD-MCNC: 12.9 G/DL (ref 14–18)
HGB UR QL STRIP: NEGATIVE
IMM GRANULOCYTES # BLD: 0.03 10*3/MM3 (ref 0–0.06)
IMM GRANULOCYTES NFR BLD: 0.6 % (ref 0–0.6)
KETONES UR QL STRIP: NEGATIVE
LDLC SERPL CALC-MCNC: 153 MG/DL (ref 0–99)
LEUKOCYTE ESTERASE UR QL STRIP: NEGATIVE
LYMPHOCYTES # BLD AUTO: 1.77 10*3/MM3 (ref 0.6–3.4)
LYMPHOCYTES NFR BLD AUTO: 33.1 % (ref 10–50)
MCH RBC QN AUTO: 30.6 PG (ref 27–31)
MCHC RBC AUTO-ENTMCNC: 32.7 G/DL (ref 30–37)
MCV RBC AUTO: 93.4 FL (ref 80–94)
MICROALBUMIN UR-MCNC: 9.5 UG/ML
MONOCYTES # BLD AUTO: 0.41 10*3/MM3 (ref 0–0.9)
MONOCYTES NFR BLD AUTO: 7.7 % (ref 0–12)
NEUTROPHILS # BLD AUTO: 2.72 10*3/MM3 (ref 2–6.9)
NEUTROPHILS NFR BLD AUTO: 50.8 % (ref 37–80)
NITRITE UR QL STRIP: NEGATIVE
NRBC BLD AUTO-RTO: 0 /100 WBC (ref 0–0)
PH UR STRIP: 5.5 [PH] (ref 5–8)
PLATELET # BLD AUTO: 287 10*3/MM3 (ref 130–400)
POTASSIUM SERPL-SCNC: 5.2 MMOL/L (ref 3.5–5.1)
PROT SERPL-MCNC: 6.6 G/DL (ref 6.3–8.2)
PROT UR QL STRIP: NEGATIVE
PSA SERPL-MCNC: 0.92 NG/ML (ref 0.06–4)
RBC # BLD AUTO: 4.22 10*6/MM3 (ref 4.7–6.1)
SODIUM SERPL-SCNC: 139 MMOL/L (ref 137–145)
SP GR UR: 1.02 (ref 1–1.03)
TRIGL SERPL-MCNC: 228 MG/DL
TSH SERPL DL<=0.005 MIU/L-ACNC: 2.03 MIU/ML (ref 0.47–4.68)
UROBILINOGEN UR STRIP-MCNC: ABNORMAL MG/DL
VLDLC SERPL CALC-MCNC: 45.6 MG/DL
WBC # BLD AUTO: 5.35 10*3/MM3 (ref 4.8–10.8)

## 2018-03-14 ENCOUNTER — OFFICE VISIT (OUTPATIENT)
Dept: INTERNAL MEDICINE | Facility: CLINIC | Age: 58
End: 2018-03-14

## 2018-03-14 VITALS
DIASTOLIC BLOOD PRESSURE: 78 MMHG | SYSTOLIC BLOOD PRESSURE: 138 MMHG | HEART RATE: 72 BPM | BODY MASS INDEX: 28.23 KG/M2 | OXYGEN SATURATION: 98 % | TEMPERATURE: 97.5 F | WEIGHT: 213 LBS | HEIGHT: 73 IN | RESPIRATION RATE: 14 BRPM

## 2018-03-14 DIAGNOSIS — E78.49 OTHER HYPERLIPIDEMIA: ICD-10-CM

## 2018-03-14 DIAGNOSIS — M19.90 OSTEOARTHRITIS, UNSPECIFIED OSTEOARTHRITIS TYPE, UNSPECIFIED SITE: ICD-10-CM

## 2018-03-14 DIAGNOSIS — M19.079 ARTHRITIS OF ANKLE: ICD-10-CM

## 2018-03-14 DIAGNOSIS — J45.30 MILD PERSISTENT ASTHMA WITHOUT COMPLICATION: ICD-10-CM

## 2018-03-14 DIAGNOSIS — E55.9 VITAMIN D DEFICIENCY: ICD-10-CM

## 2018-03-14 DIAGNOSIS — I25.10 CORONARY ARTERY DISEASE INVOLVING NATIVE CORONARY ARTERY WITHOUT ANGINA PECTORIS, UNSPECIFIED WHETHER NATIVE OR TRANSPLANTED HEART: Primary | ICD-10-CM

## 2018-03-14 DIAGNOSIS — J45.50 SEVERE PERSISTENT ASTHMA WITHOUT COMPLICATION: ICD-10-CM

## 2018-03-14 DIAGNOSIS — E11.9 TYPE 2 DIABETES MELLITUS WITHOUT COMPLICATION, WITHOUT LONG-TERM CURRENT USE OF INSULIN (HCC): ICD-10-CM

## 2018-03-14 DIAGNOSIS — E11.49 OTHER DIABETIC NEUROLOGICAL COMPLICATION ASSOCIATED WITH TYPE 2 DIABETES MELLITUS (HCC): ICD-10-CM

## 2018-03-14 PROBLEM — S60.419A FINGER ABRASION, INFECTED: Status: RESOLVED | Noted: 2018-01-12 | Resolved: 2018-03-14

## 2018-03-14 PROBLEM — L08.9 FINGER ABRASION, INFECTED: Status: RESOLVED | Noted: 2018-01-12 | Resolved: 2018-03-14

## 2018-03-14 PROCEDURE — 99214 OFFICE O/P EST MOD 30 MIN: CPT | Performed by: INTERNAL MEDICINE

## 2018-03-14 RX ORDER — ATORVASTATIN CALCIUM 20 MG/1
20 TABLET, FILM COATED ORAL DAILY
Qty: 30 TABLET | Refills: 3 | Status: SHIPPED | OUTPATIENT
Start: 2018-03-14 | End: 2018-09-17 | Stop reason: SDUPTHER

## 2018-03-14 RX ORDER — LISINOPRIL 20 MG/1
20 TABLET ORAL DAILY
Qty: 30 TABLET | Refills: 3 | Status: SHIPPED | OUTPATIENT
Start: 2018-03-14 | End: 2021-02-03

## 2018-03-14 RX ORDER — LISINOPRIL 10 MG/1
10 TABLET ORAL DAILY
Qty: 90 TABLET | Refills: 0 | Status: SHIPPED | OUTPATIENT
Start: 2018-03-14 | End: 2018-03-14 | Stop reason: SDUPTHER

## 2018-03-14 NOTE — PROGRESS NOTES
Subjective   Darnell Garcia is a 57 y.o. male.     Chief Complaint   Patient presents with   • Follow-up   • Labs Only   • Ankle Problem     discuss       History of Present Illness   Patient here for follow-up.  Coronary artery disease without chest pain or short of breath.  Blood pressure stable now.  Asthma stable medication.  Diabetes out of control A1c 9.2.  Patient is taking metformin 500 mg twice a day and good a bride 4 mg daily.  Hyperlipidemia  patient is not taking medicine.  Vitamin D low.  GERD stable medication. Ankle pain    Current Outpatient Prescriptions:   •  albuterol (PROVENTIL HFA;VENTOLIN HFA) 108 (90 BASE) MCG/ACT inhaler, Inhale 2 puffs every 4 (four) hours as needed for wheezing., Disp: , Rfl:   •  BREO ELLIPTA 100-25 MCG/INH aerosol powder , INHALE ONE DOSE BY MOUTH DAILY, Disp: 1 each, Rfl: 4  •  celecoxib (CELEBREX) 200 MG capsule, Take 1 capsule by mouth Daily., Disp: 30 capsule, Rfl: 1  •  gabapentin (NEURONTIN) 100 MG capsule, Take 1 capsule by mouth 3 (Three) Times a Day., Disp: 270 capsule, Rfl: 0  •  glimepiride (AMARYL) 4 MG tablet, TAKE ONE TABLET BY MOUTH TWICE A DAY, Disp: 60 tablet, Rfl: 4  •  lisinopril (PRINIVIL,ZESTRIL) 10 MG tablet, Take 1 tablet by mouth Daily., Disp: 90 tablet, Rfl: 0  •  metFORMIN (GLUCOPHAGE) 500 MG tablet, Take 1 tablet by mouth 2 (Two) Times a Day With Meals., Disp: 180 tablet, Rfl: 3  •  metoprolol tartrate (LOPRESSOR) 25 MG tablet, TAKE ONE TABLET BY MOUTH DAILY, Disp: 30 tablet, Rfl: 5  •  montelukast (SINGULAIR) 10 MG tablet, TAKE ONE TABLET BY MOUTH ONCE NIGHTLY, Disp: 30 tablet, Rfl: 0  •  ranitidine (ZANTAC) 150 MG capsule, Take 1 capsule by mouth 2 (Two) Times a Day., Disp: 60 capsule, Rfl: 11  •  ondansetron ODT (ZOFRAN ODT) 4 MG disintegrating tablet, Take 1 tablet by mouth Every 8 (Eight) Hours As Needed for Nausea or Vomiting., Disp: 12 tablet, Rfl: 0    The following portions of the patient's history were reviewed and updated as  "appropriate: allergies, current medications, past family history, past medical history, past social history, past surgical history and problem list.    Review of Systems   Constitutional: Negative.    Respiratory: Negative.    Cardiovascular: Negative.    Gastrointestinal: Negative.    Musculoskeletal: Negative.    Skin: Negative.    Neurological: Negative.    Psychiatric/Behavioral: Negative.        Objective   Physical Exam   Constitutional: He is oriented to person, place, and time. He appears well-nourished.   Neck: Neck supple.   Cardiovascular: Normal rate, regular rhythm and normal heart sounds.    Pulmonary/Chest: Effort normal and breath sounds normal.   Abdominal: Bowel sounds are normal.   Neurological: He is alert and oriented to person, place, and time.   Skin: Skin is warm.   Psychiatric: He has a normal mood and affect.       All tests have been reviewed.    Assessment/Plan   Diagnoses and all orders for this visit:    Type 2 diabetes mellitus without complication, without long-term current use of insulin  -     lisinopril (PRINIVIL,ZESTRIL) 10 MG tablet; Take 1 tablet by mouth Daily.    Other orders  -     metFORMIN (GLUCOPHAGE) 500 MG tablet; Take 1 tablet by mouth 2 (Two) Times a Day With Meals.               Coronary artery disease patient is allergic to medicine aspirin.  start atorvastatin 40--     Mild persistent asthma without complication predniSONE (DELTASONE) 5 MG tablet; Take 1 tablet by mouth Daily. Breo. continue     Diabetic autonomic neuropathy associated with DM, continue meds     Type 2 diabetes mellitus increase metformin to 1g bid, start januvia 100mg--  -     lisinopril (PRINIVIL,ZESTRIL) 10 MG tablet; increase to 20mg     hyperlipidemia start med     Vitamin D deficiency continue med increase to 2000iu     Right ankle arthritis not candidate for surgery by ortho due to other med problems, refer to dr mccarthy for second opinion--     OA hands continue celebrex     GERD continue " med     8 week after labs

## 2018-03-20 RX ORDER — CELECOXIB 200 MG/1
CAPSULE ORAL
Qty: 30 CAPSULE | Refills: 0 | Status: SHIPPED | OUTPATIENT
Start: 2018-03-20 | End: 2018-09-10 | Stop reason: SDUPTHER

## 2018-03-22 DIAGNOSIS — J45.30 MILD PERSISTENT ASTHMA WITHOUT COMPLICATION: ICD-10-CM

## 2018-03-22 RX ORDER — MONTELUKAST SODIUM 10 MG/1
TABLET ORAL
Qty: 30 TABLET | Refills: 0 | Status: SHIPPED | OUTPATIENT
Start: 2018-03-22 | End: 2018-04-28 | Stop reason: SDUPTHER

## 2018-03-22 RX ORDER — PREDNISONE 1 MG/1
TABLET ORAL
Qty: 30 TABLET | Refills: 0 | Status: SHIPPED | OUTPATIENT
Start: 2018-03-22 | End: 2018-05-06 | Stop reason: SDUPTHER

## 2018-03-27 ENCOUNTER — OFFICE VISIT (OUTPATIENT)
Dept: INTERNAL MEDICINE | Facility: CLINIC | Age: 58
End: 2018-03-27

## 2018-03-27 VITALS
WEIGHT: 209 LBS | DIASTOLIC BLOOD PRESSURE: 60 MMHG | TEMPERATURE: 97.6 F | SYSTOLIC BLOOD PRESSURE: 122 MMHG | HEART RATE: 88 BPM | BODY MASS INDEX: 27.7 KG/M2 | HEIGHT: 73 IN | RESPIRATION RATE: 14 BRPM | OXYGEN SATURATION: 97 %

## 2018-03-27 DIAGNOSIS — J45.909 UNCOMPLICATED ASTHMA, UNSPECIFIED ASTHMA SEVERITY, UNSPECIFIED WHETHER PERSISTENT: Primary | ICD-10-CM

## 2018-03-27 DIAGNOSIS — J40 BRONCHITIS: ICD-10-CM

## 2018-03-27 PROCEDURE — 99213 OFFICE O/P EST LOW 20 MIN: CPT | Performed by: INTERNAL MEDICINE

## 2018-03-27 RX ORDER — BENZONATATE 200 MG/1
200 CAPSULE ORAL 3 TIMES DAILY PRN
Qty: 45 CAPSULE | Refills: 0 | Status: SHIPPED | OUTPATIENT
Start: 2018-03-27 | End: 2019-10-03

## 2018-03-27 RX ORDER — AZITHROMYCIN 500 MG/1
500 TABLET, FILM COATED ORAL DAILY
Qty: 5 TABLET | Refills: 0 | Status: SHIPPED | OUTPATIENT
Start: 2018-03-27 | End: 2018-10-17

## 2018-03-27 RX ORDER — PREDNISONE 10 MG/1
TABLET ORAL
Qty: 48 EACH | Refills: 0 | Status: SHIPPED | OUTPATIENT
Start: 2018-03-27 | End: 2018-10-17

## 2018-03-27 NOTE — PROGRESS NOTES
Subjective   Darnell Garcia is a 57 y.o. male.     Chief Complaint   Patient presents with   • Cough   • Shortness of Breath       Cough   This is a new problem. The current episode started in the past 7 days. The problem has been gradually worsening. The problem occurs constantly. The cough is productive of purulent sputum. Associated symptoms include chills, a fever, nasal congestion, rhinorrhea, shortness of breath and wheezing. The symptoms are aggravated by lying down. Treatments tried: amox. The treatment provided no relief. His past medical history is significant for asthma.   Shortness of Breath   Associated symptoms include a fever, rhinorrhea and wheezing. His past medical history is significant for asthma.          Current Outpatient Prescriptions:   •  albuterol (PROVENTIL HFA;VENTOLIN HFA) 108 (90 BASE) MCG/ACT inhaler, Inhale 2 puffs every 4 (four) hours as needed for wheezing., Disp: , Rfl:   •  atorvastatin (LIPITOR) 20 MG tablet, Take 1 tablet by mouth Daily., Disp: 30 tablet, Rfl: 3  •  BREO ELLIPTA 100-25 MCG/INH aerosol powder , INHALE ONE DOSE BY MOUTH DAILY, Disp: 1 each, Rfl: 4  •  celecoxib (CeleBREX) 200 MG capsule, TAKE ONE CAPSULE BY MOUTH DAILY, Disp: 30 capsule, Rfl: 0  •  gabapentin (NEURONTIN) 100 MG capsule, Take 1 capsule by mouth 3 (Three) Times a Day., Disp: 270 capsule, Rfl: 0  •  glimepiride (AMARYL) 4 MG tablet, TAKE ONE TABLET BY MOUTH TWICE A DAY, Disp: 60 tablet, Rfl: 4  •  lisinopril (PRINIVIL,ZESTRIL) 20 MG tablet, Take 1 tablet by mouth Daily., Disp: 30 tablet, Rfl: 3  •  metFORMIN (GLUCOPHAGE) 1000 MG tablet, Take 1 tablet by mouth 2 (Two) Times a Day With Meals., Disp: 60 tablet, Rfl: 3  •  metoprolol tartrate (LOPRESSOR) 25 MG tablet, TAKE ONE TABLET BY MOUTH DAILY, Disp: 30 tablet, Rfl: 5  •  montelukast (SINGULAIR) 10 MG tablet, TAKE ONE TABLET BY MOUTH ONCE NIGHTLY, Disp: 30 tablet, Rfl: 0  •  ondansetron ODT (ZOFRAN ODT) 4 MG disintegrating tablet, Take 1 tablet by  mouth Every 8 (Eight) Hours As Needed for Nausea or Vomiting., Disp: 12 tablet, Rfl: 0  •  predniSONE (DELTASONE) 5 MG tablet, TAKE ONE TABLET BY MOUTH DAILY, Disp: 30 tablet, Rfl: 0  •  ranitidine (ZANTAC) 150 MG capsule, Take 1 capsule by mouth 2 (Two) Times a Day., Disp: 60 capsule, Rfl: 11  •  azithromycin (ZITHROMAX) 500 MG tablet, Take 1 tablet by mouth Daily., Disp: 5 tablet, Rfl: 0  •  benzonatate (TESSALON) 200 MG capsule, Take 1 capsule by mouth 3 (Three) Times a Day As Needed for Cough., Disp: 45 capsule, Rfl: 0  •  linagliptin (TRADJENTA) 5 MG tablet tablet, Take 1 tablet by mouth Daily., Disp: 30 tablet, Rfl: 3  •  PredniSONE (DELTASONE) 10 MG (48) tablet pack, Use as directed, Disp: 48 each, Rfl: 0    The following portions of the patient's history were reviewed and updated as appropriate: allergies, current medications, past family history, past medical history, past social history, past surgical history and problem list.    Review of Systems   Constitutional: Positive for chills and fever.   HENT: Positive for rhinorrhea.    Respiratory: Positive for cough, shortness of breath and wheezing.    Cardiovascular: Negative.    Gastrointestinal: Negative.    Musculoskeletal: Negative.    Skin: Negative.    Neurological: Negative.    Psychiatric/Behavioral: Negative.        Objective   Physical Exam   Constitutional: He is oriented to person, place, and time. He appears well-developed and well-nourished.   Neck: Neck supple.   Cardiovascular: Normal rate, regular rhythm and normal heart sounds.    Pulmonary/Chest: Effort normal. He has wheezes.   Abdominal: Soft. Bowel sounds are normal.   Neurological: He is alert and oriented to person, place, and time.   Psychiatric: He has a normal mood and affect. His behavior is normal.       All tests have been reviewed.    Assessment/Plan   Diagnoses and all orders for this visit:    Uncomplicated asthma, unspecified asthma severity, unspecified whether  persistent    Bronchitis    Other orders  -     PredniSONE (DELTASONE) 10 MG (48) tablet pack; Use as directed  -     azithromycin (ZITHROMAX) 500 MG tablet; Take 1 tablet by mouth Daily.  -     benzonatate (TESSALON) 200 MG capsule; Take 1 capsule by mouth 3 (Three) Times a Day As Needed for Cough.

## 2018-04-02 ENCOUNTER — TELEPHONE (OUTPATIENT)
Dept: INTERNAL MEDICINE | Facility: CLINIC | Age: 58
End: 2018-04-02

## 2018-04-02 RX ORDER — SULFAMETHOXAZOLE AND TRIMETHOPRIM 800; 160 MG/1; MG/1
1 TABLET ORAL 2 TIMES DAILY
Qty: 14 TABLET | Refills: 0 | Status: SHIPPED | OUTPATIENT
Start: 2018-04-02 | End: 2018-10-17

## 2018-04-02 NOTE — TELEPHONE ENCOUNTER
Patient called stating that he is having the same symptoms as what he was seen for last week. He has finished his Z-VALERIA and is wondering if another round of antibiotics can be called in. If not is there something else he can take to help him get better.

## 2018-04-06 RX ORDER — GLIMEPIRIDE 4 MG/1
TABLET ORAL
Qty: 60 TABLET | Refills: 4 | Status: SHIPPED | OUTPATIENT
Start: 2018-04-06 | End: 2018-10-02 | Stop reason: SDUPTHER

## 2018-04-17 RX ORDER — CELECOXIB 200 MG/1
CAPSULE ORAL
Qty: 30 CAPSULE | Refills: 0 | Status: SHIPPED | OUTPATIENT
Start: 2018-04-17 | End: 2018-05-06 | Stop reason: SDUPTHER

## 2018-04-20 ENCOUNTER — OFFICE VISIT (OUTPATIENT)
Dept: ORTHOPEDIC SURGERY | Facility: CLINIC | Age: 58
End: 2018-04-20

## 2018-04-20 VITALS
WEIGHT: 210.98 LBS | HEART RATE: 101 BPM | HEIGHT: 72 IN | BODY MASS INDEX: 28.58 KG/M2 | DIASTOLIC BLOOD PRESSURE: 91 MMHG | SYSTOLIC BLOOD PRESSURE: 150 MMHG

## 2018-04-20 DIAGNOSIS — I99.8 VASCULAR CALCIFICATION: ICD-10-CM

## 2018-04-20 DIAGNOSIS — M19.079 ARTHRITIS OF ANKLE: ICD-10-CM

## 2018-04-20 DIAGNOSIS — R52 PAIN: Primary | ICD-10-CM

## 2018-04-20 DIAGNOSIS — E11.49 OTHER DIABETIC NEUROLOGICAL COMPLICATION ASSOCIATED WITH TYPE 2 DIABETES MELLITUS (HCC): ICD-10-CM

## 2018-04-20 PROCEDURE — 99204 OFFICE O/P NEW MOD 45 MIN: CPT | Performed by: ORTHOPAEDIC SURGERY

## 2018-04-20 RX ORDER — SITAGLIPTIN 100 MG/1
TABLET, FILM COATED ORAL
COMMUNITY
Start: 2018-04-06 | End: 2018-10-17

## 2018-04-20 NOTE — PROGRESS NOTES
"NEW PATIENT    Patient: Darnell Garcia  : 1960    Primary Care Provider: Carlos Bella MD    Requesting Provider: As above    Pain of the Right Ankle and Pain of the Left Ankle      History    Chief Complaint: Right ankle pain    History of Present Illness: This is a pleasant 58-year-old gentleman here with a complex problem.  He has a long history of bilateral cavovarus deformity.  He reports that in the past and never stopped him from doing anything, he played soccer, he always wore down the outside of his shoes.  Over the past several years she's had increasing pain in the right.  Over the past year is gotten much worse, and he finds it very difficult to walk unless he is wearing an [place] brace.  He wears a sports brace on the left side, but that is not as painful.  He was seen by podiatry about a year and a half ago, they recommended the [place] brace.  An MRI was done at that time which showed longitudinal changes in the peroneal tendons, and probable ATFL tear.  His pain has gotten more significant over the year, and he is very tired and painful at the end of a long day.  He is wondering if something can be done to \"fix\" the problem.  He works as a , part of his job is sitting at a desk, but part of his job is walking long distances.  He has much more pain on those days.  He has been diabetic for about 15-20 years, since being treated with steroids.  He is not certain what his most recent hemoglobin A1c was, he reports it might be 7, or it might be 9.  He is been unable to exercise because of the ankle pain.    Current Outpatient Prescriptions on File Prior to Visit   Medication Sig Dispense Refill   • albuterol (PROVENTIL HFA;VENTOLIN HFA) 108 (90 BASE) MCG/ACT inhaler Inhale 2 puffs every 4 (four) hours as needed for wheezing.     • atorvastatin (LIPITOR) 20 MG tablet Take 1 tablet by mouth Daily. 30 tablet 3   • azithromycin (ZITHROMAX) 500 MG tablet Take 1 tablet by mouth Daily. " 5 tablet 0   • benzonatate (TESSALON) 200 MG capsule Take 1 capsule by mouth 3 (Three) Times a Day As Needed for Cough. 45 capsule 0   • BREO ELLIPTA 100-25 MCG/INH aerosol powder  INHALE ONE DOSE BY MOUTH DAILY 1 each 4   • celecoxib (CeleBREX) 200 MG capsule TAKE ONE CAPSULE BY MOUTH DAILY 30 capsule 0   • celecoxib (CeleBREX) 200 MG capsule TAKE ONE CAPSULE BY MOUTH DAILY 30 capsule 0   • gabapentin (NEURONTIN) 100 MG capsule Take 1 capsule by mouth 3 (Three) Times a Day. 270 capsule 0   • glimepiride (AMARYL) 4 MG tablet TAKE ONE TABLET BY MOUTH TWICE A DAY 60 tablet 4   • linagliptin (TRADJENTA) 5 MG tablet tablet Take 1 tablet by mouth Daily. 30 tablet 3   • lisinopril (PRINIVIL,ZESTRIL) 20 MG tablet Take 1 tablet by mouth Daily. 30 tablet 3   • metFORMIN (GLUCOPHAGE) 1000 MG tablet Take 1 tablet by mouth 2 (Two) Times a Day With Meals. 60 tablet 3   • metoprolol tartrate (LOPRESSOR) 25 MG tablet TAKE ONE TABLET BY MOUTH DAILY 30 tablet 5   • montelukast (SINGULAIR) 10 MG tablet TAKE ONE TABLET BY MOUTH ONCE NIGHTLY 30 tablet 0   • ondansetron ODT (ZOFRAN ODT) 4 MG disintegrating tablet Take 1 tablet by mouth Every 8 (Eight) Hours As Needed for Nausea or Vomiting. 12 tablet 0   • PredniSONE (DELTASONE) 10 MG (48) tablet pack Use as directed 48 each 0   • predniSONE (DELTASONE) 5 MG tablet TAKE ONE TABLET BY MOUTH DAILY 30 tablet 0   • ranitidine (ZANTAC) 150 MG capsule Take 1 capsule by mouth 2 (Two) Times a Day. 60 capsule 11   • sulfamethoxazole-trimethoprim (BACTRIM DS,SEPTRA DS) 800-160 MG per tablet Take 1 tablet by mouth 2 (Two) Times a Day. 14 tablet 0     No current facility-administered medications on file prior to visit.       Allergies   Allergen Reactions   • Aspirin Shortness Of Breath      Past Medical History:   Diagnosis Date   • Asthma    • Diabetes mellitus    • History of varicella    • Neuropathy      Past Surgical History:   Procedure Laterality Date   • NASAL SEPTUM SURGERY  2009   •  "VASECTOMY  1988   • VENTRAL HERNIA REPAIR      2007 and 2009 (mesh placed)   • WISDOM TOOTH EXTRACTION       Family History   Problem Relation Age of Onset   • Breast cancer Mother    • Osteoarthritis Mother    • Coronary artery disease Father    • Heart attack Father      late 60's   • Lymphoma Son    • Leukemia Son       Social History     Social History   • Marital status:      Spouse name: N/A   • Number of children: N/A   • Years of education: N/A     Occupational History   •       Social History Main Topics   • Smoking status: Never Smoker   • Smokeless tobacco: Never Used   • Alcohol use 6.0 oz/week     10 Cans of beer per week      Comment: one day every other weekend   • Drug use: No   • Sexual activity: No     Other Topics Concern   • Not on file     Social History Narrative   • No narrative on file        Review of Systems   Constitutional: Positive for activity change and fatigue.   HENT: Positive for sneezing and tinnitus.    Eyes: Negative.    Respiratory: Positive for cough and wheezing.    Cardiovascular: Negative.    Gastrointestinal: Negative.    Endocrine: Negative.    Genitourinary: Negative.    Musculoskeletal: Positive for arthralgias (ankle pain), gait problem, neck pain and neck stiffness.   Skin: Negative.    Allergic/Immunologic: Negative.    Neurological: Positive for weakness and numbness.   Hematological: Negative.    Psychiatric/Behavioral: Negative.        The following portions of the patient's history were reviewed and updated as appropriate: allergies, current medications, past family history, past medical history, past social history, past surgical history and problem list.    Physical Exam:   /91   Pulse 101   Ht 182.9 cm (72\")   Wt 95.7 kg (210 lb 15.7 oz)   BMI 28.61 kg/m²   GENERAL: Body habitus: normal weight for height    Lower extremity edema: Left: 1+ pitting; Right: 1+ pitting    Varicose veins:  Left: mild; Right: mild    Gait: antalgic     Mental " Status:  awake and alert; oriented to person, place, and time    Voice:  clear  SKIN:  Normal and warm and dry    Hair Growth:  Right:normal; Left:  normal  NAILS: Toenails: normal  HEENT: Head: Normocephalic, atraumatic,  without obvious abnormality.  eye: normal external eye, no icterus  ears: normal external ears  nose: normal external nose  pharynx: dental hygiene adequate  PULM:  Repiratory effort normal  CV:  Dorsalis Pedis:  Right: 2+; Left:2+    Posterior Tibial: Right:1+; Left:1+    Capillary Refill:  Brisk  MSK:  Hand:right handed and sensation intact      Tibia:  Right:  non tender; Left:  non tender      Ankle:  Right: Severe varus deformity of the right ankle, not correctable.  Tender to palpation over the lateral border of the foot, due to overload.  Callus on the fifth metatarsal base.  No pre-ulcerous lesions.  No signs of Charcot.  Moderate cavus arch.  Ankle tender to palpation circumferentially.  Tender over the peroneal tendons.; Left:  Nontender in the left ankle, less severe varus, similar cavus      Foot:  Right:  See above; Left:  See above      NEURO: Heel Walking:  Right:  unable to test; Left:  unable to test    Toe Walking:  Right:  unable to test; Left:  unable to test     [place] 5.07 monofilament test: Intact on the right foot, but patchy decrease on the left    Lower extremity sensation: See above     Reflexes:  Biceps:  Right:  1+; Left:  1+           Quads:  Right:  0; Left:  0           Ankle:  Right:  0; Left:  0      Calf Atrophy:none    Motor Function: all 5/5 pain with testing of the right peroneal's         Medical Decision Making    Data Review:   ordered and reviewed x-rays today, reviewed radiology images and reviewed radiology results    Assessment and Plan/ Diagnosis/Treatment options:   1. . Vascular calcification  He has vascular calcification on his x-rays, before considering any surgery at all he needs baseline noninvasive vascular studies  - Doppler Ankle  Brachial Index Multi Level CAR; Future    2. Arthritis of ankle  Primary problem is varus deformity of the ankle with arthritis and asymmetric cartilage wear.  We discussed ankle arthritis in detail.  I explained cartilage loss etc.  I explained that I do many ankle fusions, and that's what I would have to offer the patient.  The only nonoperative treatment that may help is to try the PTB brace, given that the [place] brace helps.  However he thinks she's past more bracing.  I explained ankle fusion.  I explained that the downside is that the other joints develop arthritis once the ankle is fused.  We talked about the limitations after surgery, the fact that a rocker-bottom shoe often helps.  We also talked about ankle replacement.  He is light weight, and that would put him more and the category of candidate for ankle replacement.  However, he is only 58 which makes him a little bit less of a candidate.  I explained that I do not do ankle replacements, I believe they should be done by an orthopedist with a long track record of doing them.  I think it would be in his best interests to see Dr. Davion Kilgore in [place] for an opinion as to whether he is a candidate for ankle replacement.  If he is not, and wishes to proceed with fusion, I would be happy to do this for him.  I would need to see him again and discuss.  He will see Dr. Kilgore, as above we will also get noninvasive vascular studies.  I also explained that for any surgery his hemoglobin A1c needs to be 7 or less  - Ambulatory Referral to Orthopedic Surgery    2. Other diabetic neurological complication associated with type 2 diabetes mellitus  He does have some patchy decrease in sensation, no pre-ulcerous lesions

## 2018-04-30 RX ORDER — MONTELUKAST SODIUM 10 MG/1
TABLET ORAL
Qty: 30 TABLET | Refills: 3 | Status: SHIPPED | OUTPATIENT
Start: 2018-04-30 | End: 2018-09-10 | Stop reason: SDUPTHER

## 2018-05-06 DIAGNOSIS — J45.30 MILD PERSISTENT ASTHMA WITHOUT COMPLICATION: ICD-10-CM

## 2018-05-07 RX ORDER — PREDNISONE 1 MG/1
TABLET ORAL
Qty: 30 TABLET | Refills: 0 | Status: SHIPPED | OUTPATIENT
Start: 2018-05-07 | End: 2018-06-08 | Stop reason: SDUPTHER

## 2018-05-07 RX ORDER — CELECOXIB 200 MG/1
CAPSULE ORAL
Qty: 30 CAPSULE | Refills: 0 | Status: SHIPPED | OUTPATIENT
Start: 2018-05-07 | End: 2018-06-01 | Stop reason: SDUPTHER

## 2018-06-01 DIAGNOSIS — E11.43 DIABETIC AUTONOMIC NEUROPATHY ASSOCIATED WITH TYPE 2 DIABETES MELLITUS (HCC): ICD-10-CM

## 2018-06-01 RX ORDER — CELECOXIB 200 MG/1
CAPSULE ORAL
Qty: 30 CAPSULE | Refills: 0 | Status: SHIPPED | OUTPATIENT
Start: 2018-06-01 | End: 2018-07-23 | Stop reason: SDUPTHER

## 2018-06-08 DIAGNOSIS — J45.30 MILD PERSISTENT ASTHMA WITHOUT COMPLICATION: ICD-10-CM

## 2018-06-08 RX ORDER — PREDNISONE 1 MG/1
TABLET ORAL
Qty: 30 TABLET | Refills: 0 | Status: SHIPPED | OUTPATIENT
Start: 2018-06-08 | End: 2018-07-10 | Stop reason: SDUPTHER

## 2018-07-10 DIAGNOSIS — J45.30 MILD PERSISTENT ASTHMA WITHOUT COMPLICATION: ICD-10-CM

## 2018-07-10 RX ORDER — GABAPENTIN 100 MG/1
CAPSULE ORAL
Qty: 90 CAPSULE | Refills: 0 | Status: SHIPPED | OUTPATIENT
Start: 2018-07-10 | End: 2018-10-17 | Stop reason: SDUPTHER

## 2018-07-11 RX ORDER — PREDNISONE 1 MG/1
TABLET ORAL
Qty: 30 TABLET | Refills: 0 | Status: SHIPPED | OUTPATIENT
Start: 2018-07-11 | End: 2018-08-16 | Stop reason: SDUPTHER

## 2018-07-23 RX ORDER — CELECOXIB 200 MG/1
CAPSULE ORAL
Qty: 30 CAPSULE | Refills: 0 | Status: SHIPPED | OUTPATIENT
Start: 2018-07-23 | End: 2018-10-17

## 2018-07-31 RX ORDER — SITAGLIPTIN 100 MG/1
TABLET, FILM COATED ORAL
Qty: 30 TABLET | Refills: 1 | Status: SHIPPED | OUTPATIENT
Start: 2018-07-31 | End: 2019-09-27

## 2018-08-16 ENCOUNTER — TELEPHONE (OUTPATIENT)
Dept: INTERNAL MEDICINE | Facility: CLINIC | Age: 58
End: 2018-08-16

## 2018-08-16 DIAGNOSIS — J45.30 MILD PERSISTENT ASTHMA WITHOUT COMPLICATION: ICD-10-CM

## 2018-08-16 RX ORDER — PREDNISONE 1 MG/1
TABLET ORAL
Qty: 30 TABLET | Refills: 0 | Status: SHIPPED | OUTPATIENT
Start: 2018-08-16 | End: 2018-09-17 | Stop reason: SDUPTHER

## 2018-08-16 RX ORDER — ALBUTEROL SULFATE 90 UG/1
2 AEROSOL, METERED RESPIRATORY (INHALATION) EVERY 4 HOURS PRN
Qty: 18 G | Refills: 5 | Status: SHIPPED | OUTPATIENT
Start: 2018-08-16 | End: 2019-09-27 | Stop reason: SDUPTHER

## 2018-08-16 NOTE — TELEPHONE ENCOUNTER
Advised that one of his medications has been refused. Did not specify which medication. Requesting a call back.

## 2018-09-10 RX ORDER — CELECOXIB 200 MG/1
200 CAPSULE ORAL DAILY
Qty: 30 CAPSULE | Refills: 0 | Status: SHIPPED | OUTPATIENT
Start: 2018-09-10 | End: 2018-10-12 | Stop reason: SDUPTHER

## 2018-09-10 RX ORDER — MONTELUKAST SODIUM 10 MG/1
10 TABLET ORAL NIGHTLY
Qty: 30 TABLET | Refills: 0 | Status: SHIPPED | OUTPATIENT
Start: 2018-09-10 | End: 2018-10-12 | Stop reason: SDUPTHER

## 2018-09-17 DIAGNOSIS — J45.30 MILD PERSISTENT ASTHMA WITHOUT COMPLICATION: ICD-10-CM

## 2018-09-17 RX ORDER — ATORVASTATIN CALCIUM 20 MG/1
20 TABLET, FILM COATED ORAL DAILY
Qty: 30 TABLET | Refills: 3 | Status: SHIPPED | OUTPATIENT
Start: 2018-09-17 | End: 2019-10-03 | Stop reason: ALTCHOICE

## 2018-09-17 RX ORDER — PREDNISONE 1 MG/1
5 TABLET ORAL DAILY
Qty: 30 TABLET | Refills: 0 | Status: SHIPPED | OUTPATIENT
Start: 2018-09-17 | End: 2018-10-17 | Stop reason: SDUPTHER

## 2018-10-02 RX ORDER — GLIMEPIRIDE 4 MG/1
4 TABLET ORAL 2 TIMES DAILY
Qty: 180 TABLET | Refills: 3 | Status: SHIPPED | OUTPATIENT
Start: 2018-10-02 | End: 2019-10-11 | Stop reason: SDUPTHER

## 2018-10-12 RX ORDER — MONTELUKAST SODIUM 10 MG/1
TABLET ORAL
Qty: 30 TABLET | Refills: 0 | Status: SHIPPED | OUTPATIENT
Start: 2018-10-12 | End: 2018-11-06 | Stop reason: SDUPTHER

## 2018-10-12 RX ORDER — SITAGLIPTIN 100 MG/1
TABLET, FILM COATED ORAL
Qty: 30 TABLET | Refills: 0 | Status: SHIPPED | OUTPATIENT
Start: 2018-10-12 | End: 2018-11-06 | Stop reason: SDUPTHER

## 2018-10-12 RX ORDER — CELECOXIB 200 MG/1
CAPSULE ORAL
Qty: 30 CAPSULE | Refills: 0 | Status: SHIPPED | OUTPATIENT
Start: 2018-10-12 | End: 2018-11-06 | Stop reason: SDUPTHER

## 2018-10-17 ENCOUNTER — OFFICE VISIT (OUTPATIENT)
Dept: INTERNAL MEDICINE | Facility: CLINIC | Age: 58
End: 2018-10-17

## 2018-10-17 VITALS
SYSTOLIC BLOOD PRESSURE: 142 MMHG | BODY MASS INDEX: 29.8 KG/M2 | DIASTOLIC BLOOD PRESSURE: 80 MMHG | HEIGHT: 72 IN | OXYGEN SATURATION: 97 % | HEART RATE: 64 BPM | WEIGHT: 220 LBS

## 2018-10-17 DIAGNOSIS — J45.30 MILD PERSISTENT ASTHMA WITHOUT COMPLICATION: ICD-10-CM

## 2018-10-17 DIAGNOSIS — I99.8 VASCULAR CALCIFICATION: ICD-10-CM

## 2018-10-17 DIAGNOSIS — E55.9 VITAMIN D DEFICIENCY: ICD-10-CM

## 2018-10-17 DIAGNOSIS — E78.49 OTHER HYPERLIPIDEMIA: ICD-10-CM

## 2018-10-17 DIAGNOSIS — E11.49 OTHER DIABETIC NEUROLOGICAL COMPLICATION ASSOCIATED WITH TYPE 2 DIABETES MELLITUS (HCC): ICD-10-CM

## 2018-10-17 DIAGNOSIS — I25.10 CORONARY ARTERY DISEASE INVOLVING NATIVE CORONARY ARTERY WITHOUT ANGINA PECTORIS, UNSPECIFIED WHETHER NATIVE OR TRANSPLANTED HEART: Primary | ICD-10-CM

## 2018-10-17 DIAGNOSIS — E11.9 TYPE 2 DIABETES MELLITUS WITHOUT COMPLICATION, WITHOUT LONG-TERM CURRENT USE OF INSULIN (HCC): ICD-10-CM

## 2018-10-17 DIAGNOSIS — M19.079 ARTHRITIS OF ANKLE: ICD-10-CM

## 2018-10-17 DIAGNOSIS — M19.90 OSTEOARTHRITIS, UNSPECIFIED OSTEOARTHRITIS TYPE, UNSPECIFIED SITE: ICD-10-CM

## 2018-10-17 PROBLEM — J45.50 SEVERE PERSISTENT ASTHMA: Status: RESOLVED | Noted: 2017-02-16 | Resolved: 2018-10-17

## 2018-10-17 PROBLEM — J40 BRONCHITIS: Status: RESOLVED | Noted: 2018-03-27 | Resolved: 2018-10-17

## 2018-10-17 PROCEDURE — 90674 CCIIV4 VAC NO PRSV 0.5 ML IM: CPT | Performed by: INTERNAL MEDICINE

## 2018-10-17 PROCEDURE — 90471 IMMUNIZATION ADMIN: CPT | Performed by: INTERNAL MEDICINE

## 2018-10-17 PROCEDURE — 99214 OFFICE O/P EST MOD 30 MIN: CPT | Performed by: INTERNAL MEDICINE

## 2018-10-17 RX ORDER — GABAPENTIN 300 MG/1
300 CAPSULE ORAL
Qty: 90 CAPSULE | Refills: 2 | Status: SHIPPED | OUTPATIENT
Start: 2018-10-17 | End: 2019-09-27 | Stop reason: SDUPTHER

## 2018-10-17 RX ORDER — PREDNISONE 1 MG/1
5 TABLET ORAL DAILY
Qty: 90 TABLET | Refills: 3 | Status: SHIPPED | OUTPATIENT
Start: 2018-10-17 | End: 2019-11-09 | Stop reason: SDUPTHER

## 2018-10-17 RX ORDER — HYDROCODONE BITARTRATE AND ACETAMINOPHEN 5; 325 MG/1; MG/1
1 TABLET ORAL EVERY 8 HOURS PRN
Refills: 0 | COMMUNITY
Start: 2018-09-26 | End: 2019-09-27

## 2018-10-17 RX ORDER — AMOXICILLIN AND CLAVULANATE POTASSIUM 875; 125 MG/1; MG/1
1 TABLET, FILM COATED ORAL 2 TIMES DAILY
Qty: 14 TABLET | Refills: 0 | Status: SHIPPED | OUTPATIENT
Start: 2018-10-17 | End: 2018-11-06 | Stop reason: SDUPTHER

## 2018-10-17 RX ORDER — GABAPENTIN 300 MG/1
300 CAPSULE ORAL
Refills: 2 | COMMUNITY
Start: 2018-09-17 | End: 2018-10-17 | Stop reason: SDUPTHER

## 2018-10-17 NOTE — PROGRESS NOTES
Subjective   Darnell Garcia is a 58 y.o. male.     Chief Complaint   Patient presents with   • Pain     right side of jaw - gland        History of Present Illness   Patient here for follow-up.  Diabetes is stable medication.  Coronary artery disease stable medication patient yet to do blood tests after Lipitor.  Also complains right side of throat pain especially initiate swallow first time.  Milder tenderness submandibular area.  Diabetes stable medication diabetic neuropathy refill medicine today.  Vitamin D low increased her medicine need to blood tests.  Right ankle arthritis status post surgery doing well.  The GERD stable medication.    Current Outpatient Prescriptions:   •  albuterol (PROVENTIL HFA;VENTOLIN HFA) 108 (90 Base) MCG/ACT inhaler, Inhale 2 puffs Every 4 (Four) Hours As Needed for Wheezing., Disp: 18 g, Rfl: 5  •  atorvastatin (LIPITOR) 20 MG tablet, Take 1 tablet by mouth Daily., Disp: 30 tablet, Rfl: 3  •  benzonatate (TESSALON) 200 MG capsule, Take 1 capsule by mouth 3 (Three) Times a Day As Needed for Cough., Disp: 45 capsule, Rfl: 0  •  BREO ELLIPTA 100-25 MCG/INH aerosol powder , INHALE ONE DOSE BY MOUTH DAILY, Disp: 100 puff, Rfl: 3  •  celecoxib (CeleBREX) 200 MG capsule, TAKE ONE CAPSULE BY MOUTH EVERY DAY, Disp: 30 capsule, Rfl: 0  •  gabapentin (NEURONTIN) 300 MG capsule, Take 300 mg by mouth every night at bedtime., Disp: , Rfl: 2  •  glimepiride (AMARYL) 4 MG tablet, Take 1 tablet by mouth 2 (Two) Times a Day., Disp: 180 tablet, Rfl: 3  •  HYDROcodone-acetaminophen (NORCO) 5-325 MG per tablet, Take 1 tablet by mouth Every 8 (Eight) Hours As Needed. for pain, Disp: , Rfl: 0  •  JANUVIA 100 MG tablet, TAKE ONE TABLET BY MOUTH DAILY, Disp: 30 tablet, Rfl: 1  •  JANUVIA 100 MG tablet, TAKE ONE TABLET BY MOUTH EVERY DAY, Disp: 30 tablet, Rfl: 0  •  linagliptin (TRADJENTA) 5 MG tablet tablet, Take 1 tablet by mouth Daily., Disp: 30 tablet, Rfl: 3  •  lisinopril (PRINIVIL,ZESTRIL) 20 MG  tablet, Take 1 tablet by mouth Daily., Disp: 30 tablet, Rfl: 3  •  metFORMIN (GLUCOPHAGE) 1000 MG tablet, Take 1 tablet by mouth 2 (Two) Times a Day With Meals., Disp: 180 tablet, Rfl: 3  •  metoprolol tartrate (LOPRESSOR) 25 MG tablet, TAKE ONE TABLET BY MOUTH DAILY, Disp: 30 tablet, Rfl: 5  •  montelukast (SINGULAIR) 10 MG tablet, TAKE ONE TABLET BY MOUTH AT BEDTIME, Disp: 30 tablet, Rfl: 0  •  ondansetron ODT (ZOFRAN ODT) 4 MG disintegrating tablet, Take 1 tablet by mouth Every 8 (Eight) Hours As Needed for Nausea or Vomiting., Disp: 12 tablet, Rfl: 0  •  predniSONE (DELTASONE) 5 MG tablet, Take 1 tablet by mouth Daily., Disp: 90 tablet, Rfl: 3    The following portions of the patient's history were reviewed and updated as appropriate: allergies, current medications, past family history, past medical history, past social history, past surgical history and problem list.    Review of Systems   Constitutional: Negative.    HENT:        Right submandibular near angle tenderness   Respiratory: Negative.    Cardiovascular: Negative.    Gastrointestinal: Negative.    Musculoskeletal: Positive for arthralgias.   Skin: Negative.    Neurological: Negative.    Psychiatric/Behavioral: Negative.        Objective   Physical Exam   Constitutional: He is oriented to person, place, and time. He appears well-nourished.   HENT:   Right submandibular angle tenderness   Neck: Neck supple.   Cardiovascular: Normal rate, regular rhythm and normal heart sounds.    Pulmonary/Chest: Effort normal and breath sounds normal.   Abdominal: Bowel sounds are normal.   Musculoskeletal: He exhibits tenderness.   Neurological: He is alert and oriented to person, place, and time.   Skin: Skin is warm.   Psychiatric: He has a normal mood and affect.       All tests have been reviewed.    Assessment/Plan   Diagnoses and all orders for this visit:    Mild persistent asthma without complication  -     predniSONE (DELTASONE) 5 MG tablet; Take 1 tablet by  mouth Daily.    Other orders  -     gabapentin (NEURONTIN) 300 MG capsule; Take 300 mg by mouth every night at bedtime.  -     HYDROcodone-acetaminophen (NORCO) 5-325 MG per tablet; Take 1 tablet by mouth Every 8 (Eight) Hours As Needed. for pain  -     metFORMIN (GLUCOPHAGE) 1000 MG tablet; Take 1 tablet by mouth 2 (Two) Times a Day With Meals.  -     gabapentin (NEURONTIN) 300 MG capsule; Take 1 capsule by mouth every night at bedtime.  -     Flucelvax Quad=>4Years (Vial)              Type 2 diabetes mellitus without complication, without long-term current use of insulin     Coronary artery disease patient is allergic to medicine aspirin.  started atorvastatin 40, do lab--    Right throat pain , try augmentin if no better ref ENT--     Mild persistent asthma without complication predniSONE (DELTASONE) 5 MG tablet; Take 1 tablet by mouth Daily. Breo. continue     Diabetic autonomic neuropathy associated with DM, continue meds     Type 2 diabetes mellitus increase metformin to 1g bid, started januvia 100mg do lab--     hyperlipidemia started med do lab     Vitamin D deficiency continue med increased to 2000iu, do lab      Right ankle arthritis s/p surgery     OA hands continue celebrex     GERD continue med     4 week after labs

## 2018-11-01 ENCOUNTER — TELEPHONE (OUTPATIENT)
Dept: INTERNAL MEDICINE | Facility: CLINIC | Age: 58
End: 2018-11-01

## 2018-11-01 DIAGNOSIS — J32.9 SINUSITIS, UNSPECIFIED CHRONICITY, UNSPECIFIED LOCATION: Primary | ICD-10-CM

## 2018-11-01 NOTE — TELEPHONE ENCOUNTER
Patient wants referral to ENT he is already on a new antibiotic from surgery and that is not helping either

## 2018-11-01 NOTE — TELEPHONE ENCOUNTER
Patient left voicemail asking for return call from nurse. States that the anitbiotic that was prescribed to him is not working and he is wondering if there is anything else that he can take. He would also like a referral to a ENT. Please advise.

## 2018-11-01 NOTE — TELEPHONE ENCOUNTER
I assume patient is having sinusitis.  I have not seen patient for this. I placed order for ENT referral as requested by patient. I called to ask more info about patient's illness but no one picked up phone. I left voice mail for him to see me if no better.

## 2018-11-06 RX ORDER — CELECOXIB 200 MG/1
CAPSULE ORAL
Qty: 30 CAPSULE | Refills: 2 | Status: SHIPPED | OUTPATIENT
Start: 2018-11-06 | End: 2019-02-08 | Stop reason: SDUPTHER

## 2018-11-06 RX ORDER — MONTELUKAST SODIUM 10 MG/1
TABLET ORAL
Qty: 30 TABLET | Refills: 2 | Status: SHIPPED | OUTPATIENT
Start: 2018-11-06 | End: 2019-02-08 | Stop reason: SDUPTHER

## 2018-11-06 RX ORDER — SITAGLIPTIN 100 MG/1
TABLET, FILM COATED ORAL
Qty: 30 TABLET | Refills: 2 | Status: SHIPPED | OUTPATIENT
Start: 2018-11-06 | End: 2019-02-08 | Stop reason: SDUPTHER

## 2018-11-06 RX ORDER — AMOXICILLIN AND CLAVULANATE POTASSIUM 875; 125 MG/1; MG/1
TABLET, FILM COATED ORAL
Qty: 14 TABLET | Refills: 2 | Status: SHIPPED | OUTPATIENT
Start: 2018-11-06 | End: 2019-10-03

## 2018-11-21 ENCOUNTER — TELEPHONE (OUTPATIENT)
Dept: INTERNAL MEDICINE | Facility: CLINIC | Age: 58
End: 2018-11-21

## 2018-11-21 NOTE — TELEPHONE ENCOUNTER
Pts wife left  11/21/18 at 12:25PM requesting a refill on pts breo inhaler, we haven't sent this RX before, pt is completely out. Please advise?

## 2019-02-08 RX ORDER — SITAGLIPTIN 100 MG/1
TABLET, FILM COATED ORAL
Qty: 30 TABLET | Refills: 2 | Status: SHIPPED | OUTPATIENT
Start: 2019-02-08 | End: 2021-02-03

## 2019-02-08 RX ORDER — MONTELUKAST SODIUM 10 MG/1
TABLET ORAL
Qty: 30 TABLET | Refills: 2 | Status: SHIPPED | OUTPATIENT
Start: 2019-02-08 | End: 2019-05-25 | Stop reason: SDUPTHER

## 2019-02-08 RX ORDER — CELECOXIB 200 MG/1
CAPSULE ORAL
Qty: 30 CAPSULE | Refills: 2 | Status: SHIPPED | OUTPATIENT
Start: 2019-02-08 | End: 2019-05-25 | Stop reason: SDUPTHER

## 2019-05-27 RX ORDER — MONTELUKAST SODIUM 10 MG/1
TABLET ORAL
Qty: 30 TABLET | Refills: 2 | Status: SHIPPED | OUTPATIENT
Start: 2019-05-27 | End: 2019-08-11 | Stop reason: SDUPTHER

## 2019-05-27 RX ORDER — CELECOXIB 200 MG/1
CAPSULE ORAL
Qty: 30 CAPSULE | Refills: 2 | Status: SHIPPED | OUTPATIENT
Start: 2019-05-27 | End: 2019-08-11 | Stop reason: SDUPTHER

## 2019-07-22 RX ORDER — GABAPENTIN 300 MG/1
CAPSULE ORAL
Qty: 90 CAPSULE | OUTPATIENT
Start: 2019-07-22

## 2019-07-22 NOTE — TELEPHONE ENCOUNTER
Attempted to call pt, no answer no vm. Sent pt a [email or web address] message for him to schedule an appointment for refills.

## 2019-07-30 RX ORDER — GABAPENTIN 300 MG/1
CAPSULE ORAL
Qty: 90 CAPSULE | OUTPATIENT
Start: 2019-07-30

## 2019-08-13 RX ORDER — MONTELUKAST SODIUM 10 MG/1
TABLET ORAL
Qty: 30 TABLET | Refills: 2 | Status: SHIPPED | OUTPATIENT
Start: 2019-08-13 | End: 2019-12-02 | Stop reason: SDUPTHER

## 2019-08-13 RX ORDER — CELECOXIB 200 MG/1
CAPSULE ORAL
Qty: 30 CAPSULE | Refills: 2 | Status: SHIPPED | OUTPATIENT
Start: 2019-08-13 | End: 2019-12-26

## 2019-09-26 ENCOUNTER — LAB (OUTPATIENT)
Dept: LAB | Facility: HOSPITAL | Age: 59
End: 2019-09-26

## 2019-09-26 DIAGNOSIS — I25.118 ATHEROSCLEROSIS OF NATIVE CORONARY ARTERY WITH OTHER FORM OF ANGINA PECTORIS, UNSPECIFIED WHETHER NATIVE OR TRANSPLANTED HEART (HCC): Primary | ICD-10-CM

## 2019-09-26 LAB
25(OH)D3 SERPL-MCNC: 17.7 NG/ML (ref 30–100)
ALBUMIN SERPL-MCNC: 4.1 G/DL (ref 3.5–5.2)
ALBUMIN/GLOB SERPL: 1.3 G/DL
ALP SERPL-CCNC: 121 U/L (ref 39–117)
ALT SERPL W P-5'-P-CCNC: 48 U/L (ref 1–41)
ANION GAP SERPL CALCULATED.3IONS-SCNC: 14.6 MMOL/L (ref 5–15)
AST SERPL-CCNC: 29 U/L (ref 1–40)
BILIRUB SERPL-MCNC: 0.8 MG/DL (ref 0.2–1.2)
BUN BLD-MCNC: 12 MG/DL (ref 6–20)
BUN/CREAT SERPL: 13.6 (ref 7–25)
CALCIUM SPEC-SCNC: 9.1 MG/DL (ref 8.6–10.5)
CHLORIDE SERPL-SCNC: 100 MMOL/L (ref 98–107)
CHOLEST SERPL-MCNC: 202 MG/DL (ref 0–200)
CK SERPL-CCNC: 31 U/L (ref 20–200)
CO2 SERPL-SCNC: 23.4 MMOL/L (ref 22–29)
CREAT BLD-MCNC: 0.88 MG/DL (ref 0.76–1.27)
GFR SERPL CREATININE-BSD FRML MDRD: 89 ML/MIN/1.73
GLOBULIN UR ELPH-MCNC: 3.2 GM/DL
GLUCOSE BLD-MCNC: 230 MG/DL (ref 65–99)
HBA1C MFR BLD: 9.6 % (ref 4.8–5.6)
HCV AB SER DONR QL: NORMAL
HDLC SERPL-MCNC: 28 MG/DL (ref 40–60)
LDLC SERPL CALC-MCNC: ABNORMAL MG/DL
LDLC/HDLC SERPL: ABNORMAL {RATIO}
POTASSIUM BLD-SCNC: 4.9 MMOL/L (ref 3.5–5.2)
PROT SERPL-MCNC: 7.3 G/DL (ref 6–8.5)
SODIUM BLD-SCNC: 138 MMOL/L (ref 136–145)
TRIGL SERPL-MCNC: 440 MG/DL (ref 0–150)
TSH SERPL DL<=0.05 MIU/L-ACNC: 1.48 UIU/ML (ref 0.27–4.2)
VLDLC SERPL-MCNC: ABNORMAL MG/DL

## 2019-09-26 PROCEDURE — 83721 ASSAY OF BLOOD LIPOPROTEIN: CPT | Performed by: INTERNAL MEDICINE

## 2019-09-26 PROCEDURE — 36415 COLL VENOUS BLD VENIPUNCTURE: CPT | Performed by: INTERNAL MEDICINE

## 2019-09-26 PROCEDURE — 82550 ASSAY OF CK (CPK): CPT | Performed by: INTERNAL MEDICINE

## 2019-09-26 PROCEDURE — 83036 HEMOGLOBIN GLYCOSYLATED A1C: CPT | Performed by: INTERNAL MEDICINE

## 2019-09-26 PROCEDURE — 80061 LIPID PANEL: CPT | Performed by: INTERNAL MEDICINE

## 2019-09-26 PROCEDURE — 84153 ASSAY OF PSA TOTAL: CPT | Performed by: INTERNAL MEDICINE

## 2019-09-26 PROCEDURE — 86803 HEPATITIS C AB TEST: CPT | Performed by: INTERNAL MEDICINE

## 2019-09-26 PROCEDURE — 84443 ASSAY THYROID STIM HORMONE: CPT | Performed by: INTERNAL MEDICINE

## 2019-09-26 PROCEDURE — 82306 VITAMIN D 25 HYDROXY: CPT

## 2019-09-26 PROCEDURE — 80053 COMPREHEN METABOLIC PANEL: CPT | Performed by: INTERNAL MEDICINE

## 2019-09-27 ENCOUNTER — TELEPHONE (OUTPATIENT)
Dept: INTERNAL MEDICINE | Facility: CLINIC | Age: 59
End: 2019-09-27

## 2019-09-27 ENCOUNTER — OFFICE VISIT (OUTPATIENT)
Dept: INTERNAL MEDICINE | Facility: CLINIC | Age: 59
End: 2019-09-27

## 2019-09-27 VITALS
BODY MASS INDEX: 29.5 KG/M2 | WEIGHT: 217.8 LBS | SYSTOLIC BLOOD PRESSURE: 118 MMHG | HEIGHT: 72 IN | HEART RATE: 62 BPM | RESPIRATION RATE: 18 BRPM | TEMPERATURE: 97.1 F | DIASTOLIC BLOOD PRESSURE: 62 MMHG | OXYGEN SATURATION: 97 %

## 2019-09-27 DIAGNOSIS — J45.909 UNCOMPLICATED ASTHMA, UNSPECIFIED ASTHMA SEVERITY, UNSPECIFIED WHETHER PERSISTENT: ICD-10-CM

## 2019-09-27 DIAGNOSIS — M19.90 OSTEOARTHRITIS, UNSPECIFIED OSTEOARTHRITIS TYPE, UNSPECIFIED SITE: ICD-10-CM

## 2019-09-27 DIAGNOSIS — E78.49 OTHER HYPERLIPIDEMIA: Primary | ICD-10-CM

## 2019-09-27 DIAGNOSIS — M19.079 ARTHRITIS OF ANKLE: ICD-10-CM

## 2019-09-27 DIAGNOSIS — E11.9 TYPE 2 DIABETES MELLITUS WITHOUT COMPLICATION, WITHOUT LONG-TERM CURRENT USE OF INSULIN (HCC): ICD-10-CM

## 2019-09-27 DIAGNOSIS — E55.9 VITAMIN D DEFICIENCY: ICD-10-CM

## 2019-09-27 DIAGNOSIS — I99.8 VASCULAR CALCIFICATION: ICD-10-CM

## 2019-09-27 DIAGNOSIS — I25.10 CORONARY ARTERY DISEASE INVOLVING NATIVE CORONARY ARTERY WITHOUT ANGINA PECTORIS, UNSPECIFIED WHETHER NATIVE OR TRANSPLANTED HEART: ICD-10-CM

## 2019-09-27 DIAGNOSIS — E11.49 OTHER DIABETIC NEUROLOGICAL COMPLICATION ASSOCIATED WITH TYPE 2 DIABETES MELLITUS (HCC): ICD-10-CM

## 2019-09-27 LAB
ARTICHOKE IGE QN: 103 MG/DL (ref 0–100)
PSA SERPL-MCNC: 0.8 NG/ML (ref 0–4)

## 2019-09-27 PROCEDURE — 99214 OFFICE O/P EST MOD 30 MIN: CPT | Performed by: INTERNAL MEDICINE

## 2019-09-27 RX ORDER — RANITIDINE HCL 75 MG
TABLET ORAL EVERY 12 HOURS SCHEDULED
COMMUNITY
End: 2021-02-03

## 2019-09-27 RX ORDER — ALBUTEROL SULFATE 90 UG/1
2 AEROSOL, METERED RESPIRATORY (INHALATION) EVERY 4 HOURS PRN
Qty: 18 G | Refills: 5 | Status: SHIPPED | OUTPATIENT
Start: 2019-09-27 | End: 2020-06-19

## 2019-09-27 RX ORDER — NITROGLYCERIN 0.4 MG/1
0.4 TABLET SUBLINGUAL
Qty: 12 TABLET | Refills: 1 | Status: SHIPPED | OUTPATIENT
Start: 2019-09-27 | End: 2019-11-01 | Stop reason: SDUPTHER

## 2019-09-27 RX ORDER — CLOPIDOGREL BISULFATE 75 MG/1
75 TABLET ORAL DAILY
Qty: 30 TABLET | Refills: 1 | Status: SHIPPED | OUTPATIENT
Start: 2019-09-27 | End: 2019-11-24 | Stop reason: SDUPTHER

## 2019-09-27 RX ORDER — GABAPENTIN 300 MG/1
300 CAPSULE ORAL
Qty: 90 CAPSULE | Refills: 2 | Status: SHIPPED | OUTPATIENT
Start: 2019-09-27 | End: 2019-12-13 | Stop reason: SDUPTHER

## 2019-09-27 NOTE — TELEPHONE ENCOUNTER
Patient called and stated that she missed a call from our office and wanted to know what the call was about. Patient was seen today by Dr. Bella at 11:15am.    Please advise.

## 2019-09-27 NOTE — PROGRESS NOTES
Subjective   Darnell Garcia is a 59 y.o. male.     Chief Complaint   Patient presents with   • Med Refill     refill on gabapentin, pt states he also has been coughing about a week he states.        History of Present Illness   Patient here for follow-up.  Patient skipped to my appointment last year.  Blood test recently showed her triglycerides very high.  A1c 9.6.  Coronary artery disease is stable.  Asthma stable on medication.  Ankle arthritis recently surgery doing well.  Patient yet to start cholesterol medicine.  Patient states that he had a stress test done 1/2 years ago showed unremarkable test    Current Outpatient Medications:   •  albuterol sulfate  (90 Base) MCG/ACT inhaler, Inhale 2 puffs Every 4 (Four) Hours As Needed for Wheezing., Disp: 18 g, Rfl: 5  •  amoxicillin-clavulanate (AUGMENTIN) 875-125 MG per tablet, TAKE ONE TABLET BY MOUTH TWICE DAILY, Disp: 14 tablet, Rfl: 2  •  atorvastatin (LIPITOR) 20 MG tablet, Take 1 tablet by mouth Daily., Disp: 30 tablet, Rfl: 3  •  benzonatate (TESSALON) 200 MG capsule, Take 1 capsule by mouth 3 (Three) Times a Day As Needed for Cough., Disp: 45 capsule, Rfl: 0  •  BREO ELLIPTA 100-25 MCG/INH inhaler, INHALE ONE PUFF BY MOUTH EVERY DAY, Disp: 60 each, Rfl: 3  •  celecoxib (CeleBREX) 200 MG capsule, TAKE ONE CAPSULE BY MOUTH EVERY DAY, Disp: 30 capsule, Rfl: 2  •  gabapentin (NEURONTIN) 300 MG capsule, Take 1 capsule by mouth every night at bedtime., Disp: 90 capsule, Rfl: 2  •  glimepiride (AMARYL) 4 MG tablet, Take 1 tablet by mouth 2 (Two) Times a Day., Disp: 180 tablet, Rfl: 3  •  JANUVIA 100 MG tablet, TAKE ONE TABLET BY MOUTH EVERY DAY, Disp: 30 tablet, Rfl: 2  •  lisinopril (PRINIVIL,ZESTRIL) 20 MG tablet, Take 1 tablet by mouth Daily., Disp: 30 tablet, Rfl: 3  •  metFORMIN (GLUCOPHAGE) 1000 MG tablet, Take 1 tablet by mouth 2 (Two) Times a Day With Meals., Disp: 180 tablet, Rfl: 3  •  metoprolol tartrate (LOPRESSOR) 25 MG tablet, TAKE ONE TABLET BY  MOUTH DAILY, Disp: 30 tablet, Rfl: 5  •  montelukast (SINGULAIR) 10 MG tablet, TAKE ONE TABLET BY MOUTH AT BEDTIME, Disp: 30 tablet, Rfl: 2  •  ondansetron ODT (ZOFRAN ODT) 4 MG disintegrating tablet, Take 1 tablet by mouth Every 8 (Eight) Hours As Needed for Nausea or Vomiting., Disp: 12 tablet, Rfl: 0  •  predniSONE (DELTASONE) 5 MG tablet, Take 1 tablet by mouth Daily., Disp: 90 tablet, Rfl: 3  •  raNITIdine (ZANTAC) 75 MG tablet, Every 12 (Twelve) Hours., Disp: , Rfl:   •  Empagliflozin (JARDIANCE) 25 MG tablet, Take 25 mg by mouth Daily., Disp: 30 tablet, Rfl: 3  •  Fluticasone Furoate-Vilanterol (BREO ELLIPTA) 100-25 MCG/INH inhaler, Breo Ellipta 100 mcg-25 mcg/dose powder for inhalation, Disp: , Rfl:     The following portions of the patient's history were reviewed and updated as appropriate: allergies, current medications, past family history, past medical history, past social history, past surgical history and problem list.    Review of Systems   Constitutional: Negative.    Respiratory: Negative.    Cardiovascular: Negative.    Gastrointestinal: Negative.    Musculoskeletal: Negative.    Skin: Negative.    Neurological: Negative.    Psychiatric/Behavioral: Negative.        Objective   Physical Exam   Constitutional: He is oriented to person, place, and time. He appears well-developed and well-nourished.   Neck: Neck supple.   Cardiovascular: Normal rate, regular rhythm and normal heart sounds.   Pulmonary/Chest: Effort normal and breath sounds normal.   Abdominal: Bowel sounds are normal.   Neurological: He is alert and oriented to person, place, and time.   Skin: Skin is warm.   Psychiatric: He has a normal mood and affect.       All tests have been reviewed.    Assessment/Plan   Diagnoses and all orders for this visit:    Other hyperlipidemia    Coronary artery disease involving native coronary artery without angina pectoris, unspecified whether native or transplanted heart    Reviewed the stress test he  was done February 2017 showed the patient had significant blockages for the cardiologist who recommended medication therapy versus catheterization.  At that time patient chose medication therapy.  Somehow patient is not taking any medication refill recommended by cardiologist.  I would refer patient back to cardiologist in the meantime.  Nitroglycerin and Plavix recommended by cardiologist.  -     CK  -     Comprehensive Metabolic Panel  -     Lipid Panel    Uncomplicated asthma, unspecified asthma severity, unspecified whether persistent    Vitamin D deficiency  -     Vitamin D 25 Hydroxy    Type 2 diabetes mellitus without complication, without long-term current use of insulin (CMS/[facility])  -     Empagliflozin (JARDIANCE) 25 MG tablet; Take 25 mg by mouth Daily.  -     Hemoglobin A1c    Other diabetic neurological complication associated with type 2 diabetes mellitus (CMS/[facility])  -     Compliance Drug Analysis, Ur - Urine, Clean Catch  -     gabapentin (NEURONTIN) 300 MG capsule; Take 1 capsule by mouth every night at bedtime.    Arthritis of ankle    Osteoarthritis, unspecified osteoarthritis type, unspecified site    Vascular calcification    Other orders  -     albuterol sulfate  (90 Base) MCG/ACT inhaler; Inhale 2 puffs Every 4 (Four) Hours As Needed for Wheezing.  -     raNITIdine (ZANTAC) 75 MG tablet; Every 12 (Twelve) Hours.  -     Fluticasone Furoate-Vilanterol (BREO ELLIPTA) 100-25 MCG/INH inhaler; Breo Ellipta 100 mcg-25 mcg/dose powder for inhalation    2 mo after labs    Called [facility] nitroglycerin and a cardiology referral.

## 2019-09-27 NOTE — TELEPHONE ENCOUNTER
PATIENT CALLED BACK WANTING AN UPDATE HE SAID HE RECEIVED A TRANSCRIBED MESSAGE THE SUPPOSEDLY FROM [facility] STATING THEY NEEDED TO TALK ABOUT STRESS TEST THAT IT WAS REALLY BAD WITH SIGNIFICANT BLOCKAGE TO SEE CARDIOLOGY IMMEDIATELY. BUT PATIENT WAS UNDER THE IMPRESSION THAT HE WAS FINE AND THAT HIS ISSUES WAS BEING TREATED WITH MEDICINE PLEASE ADVISE.

## 2019-09-30 ENCOUNTER — CLINICAL SUPPORT (OUTPATIENT)
Dept: INTERNAL MEDICINE | Facility: CLINIC | Age: 59
End: 2019-09-30

## 2019-09-30 ENCOUNTER — HOSPITAL ENCOUNTER (EMERGENCY)
Facility: HOSPITAL | Age: 59
Discharge: HOME | End: 2019-09-30
Attending: EMERGENCY MEDICINE | Admitting: EMERGENCY MEDICINE

## 2019-09-30 ENCOUNTER — APPOINTMENT (OUTPATIENT)
Dept: GENERAL RADIOLOGY | Facility: HOSPITAL | Age: 59
End: 2019-09-30

## 2019-09-30 ENCOUNTER — TELEPHONE (OUTPATIENT)
Dept: INTERNAL MEDICINE | Facility: CLINIC | Age: 59
End: 2019-09-30

## 2019-09-30 VITALS
HEART RATE: 64 BPM | HEIGHT: 72 IN | SYSTOLIC BLOOD PRESSURE: 130 MMHG | OXYGEN SATURATION: 95 % | TEMPERATURE: 97.9 F | RESPIRATION RATE: 20 BRPM | BODY MASS INDEX: 28.64 KG/M2 | WEIGHT: 211.42 LBS | DIASTOLIC BLOOD PRESSURE: 64 MMHG

## 2019-09-30 DIAGNOSIS — R07.9 CHEST PAIN, UNSPECIFIED TYPE: Primary | ICD-10-CM

## 2019-09-30 LAB
ALBUMIN SERPL-MCNC: 4.2 G/DL (ref 3.5–5.2)
ALBUMIN/GLOB SERPL: 1.3 G/DL
ALP SERPL-CCNC: 119 U/L (ref 39–117)
ALT SERPL W P-5'-P-CCNC: 34 U/L (ref 1–41)
ANION GAP SERPL CALCULATED.3IONS-SCNC: 17 MMOL/L (ref 5–15)
AST SERPL-CCNC: 20 U/L (ref 1–40)
BASOPHILS # BLD AUTO: 0.07 10*3/MM3 (ref 0–0.2)
BASOPHILS NFR BLD AUTO: 1 % (ref 0–1.5)
BILIRUB SERPL-MCNC: 0.7 MG/DL (ref 0.2–1.2)
BUN BLD-MCNC: 20 MG/DL (ref 6–20)
BUN/CREAT SERPL: 21.5 (ref 7–25)
CALCIUM SPEC-SCNC: 9.9 MG/DL (ref 8.6–10.5)
CHLORIDE SERPL-SCNC: 102 MMOL/L (ref 98–107)
CO2 SERPL-SCNC: 23 MMOL/L (ref 22–29)
CREAT BLD-MCNC: 0.93 MG/DL (ref 0.76–1.27)
DEPRECATED RDW RBC AUTO: 45.3 FL (ref 37–54)
EOSINOPHIL # BLD AUTO: 0.17 10*3/MM3 (ref 0–0.4)
EOSINOPHIL NFR BLD AUTO: 2.4 % (ref 0.3–6.2)
ERYTHROCYTE [DISTWIDTH] IN BLOOD BY AUTOMATED COUNT: 13.3 % (ref 12.3–15.4)
GFR SERPL CREATININE-BSD FRML MDRD: 83 ML/MIN/1.73
GLOBULIN UR ELPH-MCNC: 3.3 GM/DL
GLUCOSE BLD-MCNC: 132 MG/DL (ref 65–99)
HCT VFR BLD AUTO: 36.8 % (ref 37.5–51)
HGB BLD-MCNC: 11.9 G/DL (ref 13–17.7)
HOLD SPECIMEN: NORMAL
HOLD SPECIMEN: NORMAL
IMM GRANULOCYTES # BLD AUTO: 0.03 10*3/MM3 (ref 0–0.05)
IMM GRANULOCYTES NFR BLD AUTO: 0.4 % (ref 0–0.5)
LYMPHOCYTES # BLD AUTO: 3.75 10*3/MM3 (ref 0.7–3.1)
LYMPHOCYTES NFR BLD AUTO: 53.1 % (ref 19.6–45.3)
MCH RBC QN AUTO: 30.4 PG (ref 26.6–33)
MCHC RBC AUTO-ENTMCNC: 32.3 G/DL (ref 31.5–35.7)
MCV RBC AUTO: 94.1 FL (ref 79–97)
MONOCYTES # BLD AUTO: 0.83 10*3/MM3 (ref 0.1–0.9)
MONOCYTES NFR BLD AUTO: 11.8 % (ref 5–12)
NEUTROPHILS # BLD AUTO: 2.21 10*3/MM3 (ref 1.7–7)
NEUTROPHILS NFR BLD AUTO: 31.3 % (ref 42.7–76)
NRBC BLD AUTO-RTO: 0 /100 WBC (ref 0–0.2)
PLATELET # BLD AUTO: 293 10*3/MM3 (ref 140–450)
PMV BLD AUTO: 9.3 FL (ref 6–12)
POTASSIUM BLD-SCNC: 4.4 MMOL/L (ref 3.5–5.2)
PROT SERPL-MCNC: 7.5 G/DL (ref 6–8.5)
RBC # BLD AUTO: 3.91 10*6/MM3 (ref 4.14–5.8)
SODIUM BLD-SCNC: 142 MMOL/L (ref 136–145)
TROPONIN I SERPL-MCNC: 0.01 NG/ML (ref 0–0.05)
TROPONIN T SERPL-MCNC: <0.01 NG/ML (ref 0–0.03)
TROPONIN T SERPL-MCNC: <0.01 NG/ML (ref 0–0.03)
WBC NRBC COR # BLD: 7.06 10*3/MM3 (ref 3.4–10.8)
WHOLE BLOOD HOLD SPECIMEN: NORMAL
WHOLE BLOOD HOLD SPECIMEN: NORMAL

## 2019-09-30 PROCEDURE — 96374 THER/PROPH/DIAG INJ IV PUSH: CPT

## 2019-09-30 PROCEDURE — 93005 ELECTROCARDIOGRAM TRACING: CPT

## 2019-09-30 PROCEDURE — 71045 X-RAY EXAM CHEST 1 VIEW: CPT

## 2019-09-30 PROCEDURE — 25010000002 METHYLPREDNISOLONE PER 125 MG: Performed by: EMERGENCY MEDICINE

## 2019-09-30 PROCEDURE — 84484 ASSAY OF TROPONIN QUANT: CPT

## 2019-09-30 PROCEDURE — 99283 EMERGENCY DEPT VISIT LOW MDM: CPT

## 2019-09-30 PROCEDURE — 80053 COMPREHEN METABOLIC PANEL: CPT

## 2019-09-30 PROCEDURE — 85025 COMPLETE CBC W/AUTO DIFF WBC: CPT

## 2019-09-30 PROCEDURE — 84484 ASSAY OF TROPONIN QUANT: CPT | Performed by: EMERGENCY MEDICINE

## 2019-09-30 RX ORDER — METHYLPREDNISOLONE SODIUM SUCCINATE 125 MG/2ML
125 INJECTION, POWDER, LYOPHILIZED, FOR SOLUTION INTRAMUSCULAR; INTRAVENOUS ONCE
Status: COMPLETED | OUTPATIENT
Start: 2019-09-30 | End: 2019-09-30

## 2019-09-30 RX ORDER — SODIUM CHLORIDE 0.9 % (FLUSH) 0.9 %
10 SYRINGE (ML) INJECTION AS NEEDED
Status: DISCONTINUED | OUTPATIENT
Start: 2019-09-30 | End: 2019-09-30 | Stop reason: HOSPADM

## 2019-09-30 RX ADMIN — METHYLPREDNISOLONE SODIUM SUCCINATE 125 MG: 125 INJECTION, POWDER, FOR SOLUTION INTRAMUSCULAR; INTRAVENOUS at 06:34

## 2019-09-30 NOTE — TELEPHONE ENCOUNTER
Patient's wife (Naz on RAM[place]) called and stated that she received a voicemail about having the patient complete a urine test, but she stated the patient had a urine test in our office on Friday, 09/27/19. Wife would like a phone call to clarify what labs might be needed for the patient.    Please call and advise.

## 2019-09-30 NOTE — TELEPHONE ENCOUNTER
I don't see any telephone encounters that someone has called this pt, there is also no [facility] in this office. Do you know how his stress test looked?

## 2019-09-30 NOTE — TELEPHONE ENCOUNTER
Patients wife called in inquiring about her husbands referral to a cardiologist. I provided her the referral information, but she requested her  see someone in [place], and preferably through [facility] since she works at [employer]. Patients wife would also like to speak with nurse.

## 2019-10-03 ENCOUNTER — CONSULT (OUTPATIENT)
Dept: CARDIOLOGY | Facility: CLINIC | Age: 59
End: 2019-10-03

## 2019-10-03 VITALS
DIASTOLIC BLOOD PRESSURE: 68 MMHG | OXYGEN SATURATION: 98 % | HEART RATE: 76 BPM | SYSTOLIC BLOOD PRESSURE: 120 MMHG | WEIGHT: 209 LBS | BODY MASS INDEX: 28.31 KG/M2 | HEIGHT: 72 IN

## 2019-10-03 DIAGNOSIS — I25.118 CORONARY ARTERY DISEASE OF NATIVE HEART WITH STABLE ANGINA PECTORIS, UNSPECIFIED VESSEL OR LESION TYPE (HCC): Primary | ICD-10-CM

## 2019-10-03 DIAGNOSIS — E78.2 MIXED HYPERLIPIDEMIA: ICD-10-CM

## 2019-10-03 DIAGNOSIS — I20.8 STABLE ANGINA PECTORIS (HCC): ICD-10-CM

## 2019-10-03 DIAGNOSIS — E11.69 TYPE 2 DIABETES MELLITUS WITH OTHER SPECIFIED COMPLICATION, WITHOUT LONG-TERM CURRENT USE OF INSULIN (HCC): ICD-10-CM

## 2019-10-03 PROCEDURE — 99244 OFF/OP CNSLTJ NEW/EST MOD 40: CPT | Performed by: INTERNAL MEDICINE

## 2019-10-03 RX ORDER — ATORVASTATIN CALCIUM 40 MG/1
40 TABLET, FILM COATED ORAL DAILY
Qty: 30 TABLET | Refills: 11 | Status: SHIPPED | OUTPATIENT
Start: 2019-10-03 | End: 2021-02-03 | Stop reason: SDUPTHER

## 2019-10-03 NOTE — PROGRESS NOTES
[facility] OP Consult Note    Darnell Garcia  [id number]  10/03/2019    Referred By: Carlos Bella MD    Chief Complaint: Recurrent chest pain    History of Present Illness:   Mr. Darnell Garcia is a 59 y.o. male who presents to the Cardiology Clinic for evaluation of chest pain.  His past medical history is significant for dyslipidemia, asthma, and type 2 diabetes mellitus complicated by diabetic neuropathy.  He has a past cardiac history significant for coronary artery disease.  In 2017, the patient presented for evaluation of chest pain.  He subsequently had an abnormal pharmacologic MPS, revealed a a small reversible perfusion defect in the basal inferior wall possibly representing attenuation artifact.  He subsequently underwent coronary angiography in 3/17, which revealed a 30 to 50% stenosis in the mid LAD.  He was started on metoprolol 25 mg p.o. twice daily as an antianginal and for PVC suppression, and on review of records the patient appears to have done well with metoprolol with improvement in his symptoms.  His most recent medical history is significant for presentation to the emergency department on 9/30/2019 for evaluation of chest pain.  An ECG at that time did not show any acute ischemic changes.  His troponins were trended, and remain negative.  He was subsequently discharged home.  He presents to the cardiology clinic today as a referral for evaluation of recurrent chest pain.  Patient reports a long history of intermittent episodes of chest pain.  He describes his chest pain as dull substernal and left anterior chest discomfort.  He notes intermittent radiation to his left shoulder.  The episodes of chest discomfort appear to occur randomly.  He does report intermittent episodes with exertion, however his exertional symptoms are inconsistent.  He is currently reporting 3-4 episodes per day.  The episodes typically last several minutes, before resolving spontaneously.   "He denies any associated dyspnea or diaphoresis.  Currently denies significant palpitations.  Reports that his symptoms have been ongoing and are not significantly changed compared to the time of his coronary angiography in 2017.  He has previously discontinued his metoprolol for unclear reasons, possibly due to \"taking too many medications.\"  He remains on Plavix, however does not take a daily aspirin due to a possible allergy noted in childhood.  He is unclear as to the symptoms related to his aspirin allergy.    Past Cardiac Testin. Last Coronary Angio: 3/2/2017   1.  Mild to moderate single-vessel disease involving 30 to 50% stenosis in the mid LAD   2.  No significant disease in the LCx, left main, or RCA.   3.  Normal left ventricular systolic function, LVEF 55-60%   4.  LVEDP 50 mmHg  2. Prior Stress Testing: Pharmacologic MPS 2017   1.  Small sized reversible perfusion defect involving the basal inferior wall, which in the presence of normal wall motion and gastrointestinal uptake may represent attenuation artifact.  3. Last Echo: None  4. Prior Holter Monitor: None    Review of Systems:   Review of Systems   Constitutional: Negative for activity change, appetite change, chills, diaphoresis, fatigue, fever, unexpected weight gain and unexpected weight loss.   Respiratory: Positive for chest tightness and wheezing. Negative for cough and shortness of breath.    Cardiovascular: Positive for chest pain. Negative for palpitations and leg swelling.   Gastrointestinal: Negative for abdominal pain, anal bleeding, blood in stool and GERD.   Endocrine: Negative for cold intolerance and heat intolerance.   Genitourinary: Negative for hematuria.   Neurological: Negative for dizziness, syncope, weakness and light-headedness.   Hematological: Does not bruise/bleed easily.   Psychiatric/Behavioral: Negative for depressed mood and stress. The patient is nervous/anxious.        Past Medical History:   Past " Medical History:   Diagnosis Date   • Arthritis    • Asthma    • Diabetes mellitus (CMS/HCC)    • GERD (gastroesophageal reflux disease)    • History of varicella    • Neuropathy        Past Surgical History:   Past Surgical History:   Procedure Laterality Date   • NASAL SEPTUM SURGERY  2009   • VASECTOMY  1988   • VENTRAL HERNIA REPAIR      2007 and 2009 (mesh placed)   • WISDOM TOOTH EXTRACTION         Family History:   Family History   Problem Relation Age of Onset   • Breast cancer Mother    • Osteoarthritis Mother    • Arthritis Mother    • Coronary artery disease Father    • Heart attack Father         late 60's   • Lymphoma Son    • Leukemia Son        Social History:   Social History     Socioeconomic History   • Marital status:      Spouse name: Not on file   • Number of children: Not on file   • Years of education: Not on file   • Highest education level: Not on file   Occupational History   • Occupation:    Tobacco Use   • Smoking status: Never Smoker   • Smokeless tobacco: Never Used   Substance and Sexual Activity   • Alcohol use: Yes     Alcohol/week: 6.0 oz     Types: 10 Cans of beer per week     Comment: one day every other weekend   • Drug use: No   • Sexual activity: No     Partners: Female       Medications:     Current Outpatient Medications:   •  albuterol sulfate  (90 Base) MCG/ACT inhaler, Inhale 2 puffs Every 4 (Four) Hours As Needed for Wheezing., Disp: 18 g, Rfl: 5  •  BREO ELLIPTA 100-25 MCG/INH inhaler, INHALE ONE PUFF BY MOUTH EVERY DAY, Disp: 60 each, Rfl: 3  •  celecoxib (CeleBREX) 200 MG capsule, TAKE ONE CAPSULE BY MOUTH EVERY DAY, Disp: 30 capsule, Rfl: 2  •  clopidogrel (PLAVIX) 75 MG tablet, Take 1 tablet by mouth Daily., Disp: 30 tablet, Rfl: 1  •  Empagliflozin (JARDIANCE) 25 MG tablet, Take 25 mg by mouth Daily., Disp: 30 tablet, Rfl: 3  •  gabapentin (NEURONTIN) 300 MG capsule, Take 1 capsule by mouth every night at bedtime., Disp: 90 capsule, Rfl: 2  •   "glimepiride (AMARYL) 4 MG tablet, Take 1 tablet by mouth 2 (Two) Times a Day., Disp: 180 tablet, Rfl: 3  •  lisinopril (PRINIVIL,ZESTRIL) 20 MG tablet, Take 1 tablet by mouth Daily., Disp: 30 tablet, Rfl: 3  •  metFORMIN (GLUCOPHAGE) 1000 MG tablet, Take 1 tablet by mouth 2 (Two) Times a Day With Meals., Disp: 180 tablet, Rfl: 3  •  montelukast (SINGULAIR) 10 MG tablet, TAKE ONE TABLET BY MOUTH AT BEDTIME, Disp: 30 tablet, Rfl: 2  •  nitroglycerin (NITROSTAT) 0.4 MG SL tablet, Place 1 tablet under the tongue Every 5 (Five) Minutes As Needed for Chest Pain. Take no more than 3 doses in 15 minutes., Disp: 12 tablet, Rfl: 1  •  predniSONE (DELTASONE) 5 MG tablet, Take 1 tablet by mouth Daily., Disp: 90 tablet, Rfl: 3  •  raNITIdine (ZANTAC) 75 MG tablet, Every 12 (Twelve) Hours., Disp: , Rfl:   •  atorvastatin (LIPITOR) 40 MG tablet, Take 1 tablet by mouth Daily., Disp: 30 tablet, Rfl: 11  •  JANUVIA 100 MG tablet, TAKE ONE TABLET BY MOUTH EVERY DAY, Disp: 30 tablet, Rfl: 2  •  metoprolol tartrate (LOPRESSOR) 25 MG tablet, Take 1 tablet by mouth 2 (Two) Times a Day., Disp: 60 tablet, Rfl: 2  •  ondansetron ODT (ZOFRAN ODT) 4 MG disintegrating tablet, Take 1 tablet by mouth Every 8 (Eight) Hours As Needed for Nausea or Vomiting., Disp: 12 tablet, Rfl: 0    Allergies:   Allergies   Allergen Reactions   • Aspirin Shortness Of Breath       Physical Exam:  Vital Signs:   Vitals:    10/03/19 0926 10/03/19 0934 10/03/19 0935   BP: 130/72 116/68 120/68   BP Location: Right arm Left arm Right arm   Patient Position: Sitting Sitting Standing   Cuff Size: Adult Adult Adult   Pulse: 76     SpO2: 98%     Weight: 94.8 kg (209 lb)     Height: 182.9 cm (72\")         Physical Exam   Constitutional: He is oriented to person, place, and time. He appears well-developed and well-nourished. No distress.   HENT:   Head: Normocephalic and atraumatic.   Moist Mucous Membranes.    Eyes: EOM are normal. Pupils are equal, round, and reactive to " light. No scleral icterus.   Neck: No tracheal deviation present.   Cardiovascular: Normal rate, regular rhythm, normal heart sounds and intact distal pulses. Exam reveals no gallop and no friction rub.   No murmur heard.  Normal JVD.   Pulmonary/Chest: Effort normal. No stridor. No respiratory distress. He has wheezes. He has no rales. He exhibits no tenderness.   Abdominal: Soft. Bowel sounds are normal. He exhibits no distension. There is no tenderness. There is no rebound and no guarding.   Musculoskeletal: Normal range of motion. He exhibits no edema.   Lymphadenopathy:     He has no cervical adenopathy.   Neurological: He is alert and oriented to person, place, and time.   Skin: Skin is warm and dry. He is not diaphoretic. No erythema.   Psychiatric: His behavior is normal.   Anxious.       Results Review:   I reviewed the patient's new clinical results.  I personally viewed and interpreted the patient's EKG/Telemetry data  Labs reviewed from 9/19:  1.  Troponin x3-negative  2.  Lipid profile: Total cholesterol 202, triglycerides 440, HDL 28, unable to calculate LDL  3.  Hemoglobin A1c 9.6%  4.  CMP: Glucose 132, BUN 20, creatinine 0.93, sodium 142, potassium 4.4, chloride 102, CO2 23  5.  CBC: WBC 7, hemoglobin 11.9, platelets 293    ECG 9/30/2019: Sinus rhythm at 79 bpm.  Incomplete right bundle branch block.  Nonspecific ST and T wave abnormality.    Assessment / Plan:     1.  Coronary artery disease / chest pain  --Known history of single-vessel coronary artery disease involving 30-50% stenosis in the mid LAD on [facility] in 2017  --History of mildly abnormal MPS in 2017 with a small reversible inferior defect, possibly attenuation artifact  --Multiple cardiovascular risk factors, with risk for progression of CAD  --Current symptoms have both typical and atypical features, however do not appear to be significantly changed compared to the time of his [facility] in 2017  --ECG without new or ischemic changes compared to  2017  --Given the patient's risk factors and known CAD, will repeat pharmacologic MPS (due to inability to exercise secondary to knee pain) to evaluate for ischemia contributing to current symptoms  --Will restart metoprolol as antianginal  --Continue Plavix, given possible history of aspirin allergy  --Will increase to high intensity statin dosing  --Will schedule follow-up in 1 month to review results of his MPS follow-up symptoms, or sooner if indicated based on the results of his MPS    2.  Dyslipidemia  --Recent lipid profile with total cholesterol 202, triglycerides 440, HDL 28  --Increase atorvastatin to 40 mg nightly    3.  Type 2 diabetes mellitus   --Uncontrolled, with most recent HbA1c 9.6%  --Given CAD, would consider transitioning to Jardiance, however will defer to PCP      Preventative Cardiology:   Tobacco Cessation: N/A  Obstructive Sleep Apnea Screening: Deffered  AAA Screening: N/A  Peripheral Arterial Disease Screening: N/A    Follow Up:   Return in about 1 month (around 11/3/2019).    Thank you for allowing me to participate in the care of your patient. Please to not hesitate to contact me with additional questions or concerns.       RANDY Ríos MD  Interventional Cardiology   10/03/2019  9:31 AM

## 2019-10-04 LAB — DRUGS UR: NORMAL

## 2019-10-11 ENCOUNTER — HOSPITAL ENCOUNTER (OUTPATIENT)
Dept: NUCLEAR MEDICINE | Facility: HOSPITAL | Age: 59
Discharge: HOME | End: 2019-10-11

## 2019-10-11 DIAGNOSIS — I20.8 STABLE ANGINA PECTORIS (HCC): ICD-10-CM

## 2019-10-11 DIAGNOSIS — I25.118 CORONARY ARTERY DISEASE OF NATIVE HEART WITH STABLE ANGINA PECTORIS, UNSPECIFIED VESSEL OR LESION TYPE (HCC): ICD-10-CM

## 2019-10-11 LAB
BH CV STRESS COMMENTS STAGE 1: NORMAL
BH CV STRESS DOSE REGADENOSON STAGE 1: 0.4
BH CV STRESS DURATION MIN STAGE 1: 0
BH CV STRESS DURATION SEC STAGE 1: 10
BH CV STRESS PROTOCOL 1: NORMAL
BH CV STRESS RECOVERY BP: NORMAL MMHG
BH CV STRESS RECOVERY HR: 65 BPM
BH CV STRESS STAGE 1: 1
LV EF NUC BP: 77 %
MAXIMAL PREDICTED HEART RATE: 161 BPM
PERCENT MAX PREDICTED HR: 42.86 %
STRESS BASELINE BP: NORMAL MMHG
STRESS BASELINE HR: 61 BPM
STRESS PERCENT HR: 50 %
STRESS POST PEAK BP: NORMAL MMHG
STRESS POST PEAK HR: 69 BPM
STRESS TARGET HR: 137 BPM

## 2019-10-11 PROCEDURE — 25010000002 REGADENOSON 0.4 MG/5ML SOLUTION: Performed by: INTERNAL MEDICINE

## 2019-10-11 PROCEDURE — A9500 TC99M SESTAMIBI: HCPCS | Performed by: INTERNAL MEDICINE

## 2019-10-11 PROCEDURE — 93017 CV STRESS TEST TRACING ONLY: CPT

## 2019-10-11 PROCEDURE — 0 TECHNETIUM SESTAMIBI: Performed by: INTERNAL MEDICINE

## 2019-10-11 PROCEDURE — 93018 CV STRESS TEST I&R ONLY: CPT | Performed by: INTERNAL MEDICINE

## 2019-10-11 PROCEDURE — 78452 HT MUSCLE IMAGE SPECT MULT: CPT

## 2019-10-11 PROCEDURE — 78452 HT MUSCLE IMAGE SPECT MULT: CPT | Performed by: INTERNAL MEDICINE

## 2019-10-11 RX ADMIN — TECHNETIUM TC 99M SESTAMIBI 1 DOSE: 1 INJECTION INTRAVENOUS at 08:27

## 2019-10-11 RX ADMIN — TECHNETIUM TC 99M SESTAMIBI 1 DOSE: 1 INJECTION INTRAVENOUS at 06:55

## 2019-10-11 RX ADMIN — REGADENOSON 0.4 MG: 0.08 INJECTION, SOLUTION INTRAVENOUS at 08:27

## 2019-10-14 RX ORDER — GLIMEPIRIDE 4 MG/1
TABLET ORAL
Qty: 180 TABLET | Refills: 0 | Status: SHIPPED | OUTPATIENT
Start: 2019-10-14 | End: 2020-01-24

## 2019-10-15 ENCOUNTER — TELEPHONE (OUTPATIENT)
Dept: CARDIOLOGY | Facility: CLINIC | Age: 59
End: 2019-10-15

## 2019-10-15 NOTE — TELEPHONE ENCOUNTER
----- Message from Ravin Ríos MD sent at 10/14/2019  3:41 PM EDT -----  Please let the patient now his stress test did not show any evidence of significant blockage in his heart function looks normal.  We will discuss the results in more detail at the time of his follow-up appointment.    Thanks.

## 2019-11-01 RX ORDER — NITROGLYCERIN 0.4 MG/1
TABLET SUBLINGUAL
Qty: 25 TABLET | Refills: 2 | Status: SHIPPED | OUTPATIENT
Start: 2019-11-01

## 2019-11-09 DIAGNOSIS — J45.30 MILD PERSISTENT ASTHMA WITHOUT COMPLICATION: ICD-10-CM

## 2019-11-11 RX ORDER — PREDNISONE 1 MG/1
TABLET ORAL
Qty: 90 TABLET | Refills: 3 | Status: SHIPPED | OUTPATIENT
Start: 2019-11-11 | End: 2020-11-23

## 2019-11-25 RX ORDER — CLOPIDOGREL BISULFATE 75 MG/1
TABLET ORAL
Qty: 30 TABLET | Refills: 1 | Status: SHIPPED | OUTPATIENT
Start: 2019-11-25 | End: 2020-01-28

## 2019-12-02 RX ORDER — MONTELUKAST SODIUM 10 MG/1
TABLET ORAL
Qty: 30 TABLET | Refills: 2 | Status: SHIPPED | OUTPATIENT
Start: 2019-12-02 | End: 2020-03-05

## 2019-12-06 ENCOUNTER — OFFICE VISIT (OUTPATIENT)
Dept: INTERNAL MEDICINE | Facility: CLINIC | Age: 59
End: 2019-12-06

## 2019-12-06 VITALS
HEIGHT: 72 IN | TEMPERATURE: 97.4 F | RESPIRATION RATE: 18 BRPM | WEIGHT: 212.4 LBS | HEART RATE: 60 BPM | SYSTOLIC BLOOD PRESSURE: 102 MMHG | DIASTOLIC BLOOD PRESSURE: 72 MMHG | BODY MASS INDEX: 28.77 KG/M2 | OXYGEN SATURATION: 96 %

## 2019-12-06 DIAGNOSIS — I25.10 CORONARY ARTERY DISEASE INVOLVING NATIVE CORONARY ARTERY WITHOUT ANGINA PECTORIS, UNSPECIFIED WHETHER NATIVE OR TRANSPLANTED HEART: Primary | ICD-10-CM

## 2019-12-06 DIAGNOSIS — E78.2 MIXED HYPERLIPIDEMIA: ICD-10-CM

## 2019-12-06 DIAGNOSIS — E11.49 OTHER DIABETIC NEUROLOGICAL COMPLICATION ASSOCIATED WITH TYPE 2 DIABETES MELLITUS (HCC): ICD-10-CM

## 2019-12-06 DIAGNOSIS — N40.1 BENIGN PROSTATIC HYPERPLASIA WITH LOWER URINARY TRACT SYMPTOMS, SYMPTOM DETAILS UNSPECIFIED: ICD-10-CM

## 2019-12-06 DIAGNOSIS — E11.69 TYPE 2 DIABETES MELLITUS WITH OTHER SPECIFIED COMPLICATION, WITHOUT LONG-TERM CURRENT USE OF INSULIN (HCC): ICD-10-CM

## 2019-12-06 DIAGNOSIS — E55.9 VITAMIN D DEFICIENCY: ICD-10-CM

## 2019-12-06 DIAGNOSIS — J45.909 UNCOMPLICATED ASTHMA, UNSPECIFIED ASTHMA SEVERITY, UNSPECIFIED WHETHER PERSISTENT: ICD-10-CM

## 2019-12-06 DIAGNOSIS — M19.079 ARTHRITIS OF ANKLE: ICD-10-CM

## 2019-12-06 DIAGNOSIS — J40 BRONCHITIS: ICD-10-CM

## 2019-12-06 DIAGNOSIS — I99.8 VASCULAR CALCIFICATION: ICD-10-CM

## 2019-12-06 DIAGNOSIS — M19.90 OSTEOARTHRITIS, UNSPECIFIED OSTEOARTHRITIS TYPE, UNSPECIFIED SITE: ICD-10-CM

## 2019-12-06 PROCEDURE — 99214 OFFICE O/P EST MOD 30 MIN: CPT | Performed by: INTERNAL MEDICINE

## 2019-12-06 RX ORDER — BENZONATATE 200 MG/1
200 CAPSULE ORAL 3 TIMES DAILY PRN
Qty: 40 CAPSULE | Refills: 0 | Status: SHIPPED | OUTPATIENT
Start: 2019-12-06 | End: 2021-02-03

## 2019-12-06 RX ORDER — GUAIFENESIN AND CODEINE PHOSPHATE 100; 10 MG/5ML; MG/5ML
5 SOLUTION ORAL 3 TIMES DAILY PRN
Qty: 118 ML | Refills: 0 | Status: SHIPPED | OUTPATIENT
Start: 2019-12-06 | End: 2021-02-03

## 2019-12-06 RX ORDER — DOXYCYCLINE HYCLATE 100 MG/1
100 CAPSULE ORAL 2 TIMES DAILY
Qty: 20 CAPSULE | Refills: 0 | Status: SHIPPED | OUTPATIENT
Start: 2019-12-06 | End: 2021-02-03

## 2019-12-06 NOTE — PROGRESS NOTES
Subjective   Darnell Garcia is a 59 y.o. male.     Chief Complaint   Patient presents with   • Follow-up     2 mo f/u labs    • Cough     pt states he has been coughing, and he has a lot of congestion in his lungs. onset couple wks.        History of Present Illness   Patient here for follow-up.  Diabetes is stable on medication.  History of asthma recently developed coughing for 2 weeks white sputum wheezing no significant short of breath.  No fever chills no significant sinus congestion.  Cough worse at nighttime.  Coronary artery disease on Plavix.  Hyperlipidemia stable on medication.  BPH symptoms stable.    Current Outpatient Medications:   •  albuterol sulfate  (90 Base) MCG/ACT inhaler, Inhale 2 puffs Every 4 (Four) Hours As Needed for Wheezing., Disp: 18 g, Rfl: 5  •  atorvastatin (LIPITOR) 40 MG tablet, Take 1 tablet by mouth Daily., Disp: 30 tablet, Rfl: 11  •  BREO ELLIPTA 100-25 MCG/INH inhaler, INHALE 1 PUFF BY MOUTH EVERY DAY, Disp: 60 each, Rfl: 3  •  celecoxib (CeleBREX) 200 MG capsule, TAKE ONE CAPSULE BY MOUTH EVERY DAY, Disp: 30 capsule, Rfl: 2  •  clopidogrel (PLAVIX) 75 MG tablet, TAKE ONE TABLET BY MOUTH EVERY DAY, Disp: 30 tablet, Rfl: 1  •  Empagliflozin (JARDIANCE) 25 MG tablet, Take 25 mg by mouth Daily., Disp: 30 tablet, Rfl: 3  •  gabapentin (NEURONTIN) 300 MG capsule, Take 1 capsule by mouth every night at bedtime., Disp: 90 capsule, Rfl: 2  •  glimepiride (AMARYL) 4 MG tablet, TAKE ONE TABLET BY MOUTH TWICE DAILY, Disp: 180 tablet, Rfl: 0  •  JANUVIA 100 MG tablet, TAKE ONE TABLET BY MOUTH EVERY DAY, Disp: 30 tablet, Rfl: 2  •  lisinopril (PRINIVIL,ZESTRIL) 20 MG tablet, Take 1 tablet by mouth Daily., Disp: 30 tablet, Rfl: 3  •  metFORMIN (GLUCOPHAGE) 1000 MG tablet, Take 1 tablet by mouth 2 (Two) Times a Day With Meals., Disp: 180 tablet, Rfl: 3  •  metoprolol tartrate (LOPRESSOR) 25 MG tablet, Take 1 tablet by mouth 2 (Two) Times a Day., Disp: 60 tablet, Rfl: 2  •  montelukast  (SINGULAIR) 10 MG tablet, TAKE ONE TABLET BY MOUTH AT BEDTIME, Disp: 30 tablet, Rfl: 2  •  nitroglycerin (NITROSTAT) 0.4 MG SL tablet, Place 1 tablet under the tongue Every FIVE Minutes As Needed for Chest Pain. Take no more than 3 doses in 15 minutes., Disp: 25 tablet, Rfl: 2  •  predniSONE (DELTASONE) 5 MG tablet, TAKE ONE TABLET BY MOUTH EVERY DAY, Disp: 90 tablet, Rfl: 3  •  raNITIdine (ZANTAC) 75 MG tablet, Every 12 (Twelve) Hours., Disp: , Rfl:   •  benzonatate (TESSALON) 200 MG capsule, Take 1 capsule by mouth 3 (Three) Times a Day As Needed for Cough., Disp: 40 capsule, Rfl: 0  •  doxycycline (VIBRAMYCIN) 100 MG capsule, Take 1 capsule by mouth 2 (Two) Times a Day., Disp: 20 capsule, Rfl: 0    The following portions of the patient's history were reviewed and updated as appropriate: allergies, current medications, past family history, past medical history, past social history, past surgical history and problem list.    Review of Systems   Constitutional: Negative.    Respiratory: Negative.    Cardiovascular: Negative.    Gastrointestinal: Negative.    Musculoskeletal: Negative.    Skin: Negative.    Neurological: Negative.    Psychiatric/Behavioral: Negative.        Objective   Physical Exam   Constitutional: He is oriented to person, place, and time. He appears well-nourished.   Neck: Neck supple.   Cardiovascular: Normal rate, regular rhythm and normal heart sounds.   Pulmonary/Chest: Effort normal and breath sounds normal.   Abdominal: Bowel sounds are normal.   Neurological: He is alert and oriented to person, place, and time.   Skin: Skin is warm.   Psychiatric: He has a normal mood and affect.       All tests have been reviewed.    Assessment/Plan   Diagnoses and all orders for this visit:    Coronary artery disease involving native coronary artery without angina pectoris, unspecified whether native or transplanted heart continue Plavix  -     CBC & Differential    Mixed hyperlipidemia continue  medication  -     Comprehensive Metabolic Panel  -     Lipid Panel  -     CK  -     TSH    Vascular calcification continue cholesterol medicine    Uncomplicated asthma, unspecified asthma severity, unspecified whether persistent continue inhaler    Vitamin D deficiency continue supplement  -     Vitamin D 25 Hydroxy    Type 2 diabetes mellitus with other specified complication, without long-term current use of insulin (CMS/[facility]) continue medication  -     Hemoglobin A1c    Other diabetic neurological complication associated with type 2 diabetes mellitus (CMS/[facility]) continue medication    Arthritis of ankle    Osteoarthritis, unspecified osteoarthritis type, unspecified site    Benign prostatic hyperplasia with lower urinary tract symptoms, symptom details unspecified  -     PSA DIAGNOSTIC    Bronchitis initiate doxycycline, inhaler  -     benzonatate (TESSALON) 200 MG capsule; Take 1 capsule by mouth 3 (Three) Times a Day As Needed for Cough. Cont inhaler, tylenol, robitussin with codeine all      2 mo after labs.

## 2019-12-13 ENCOUNTER — TELEPHONE (OUTPATIENT)
Dept: INTERNAL MEDICINE | Facility: CLINIC | Age: 59
End: 2019-12-13

## 2019-12-13 DIAGNOSIS — E11.49 OTHER DIABETIC NEUROLOGICAL COMPLICATION ASSOCIATED WITH TYPE 2 DIABETES MELLITUS (HCC): ICD-10-CM

## 2019-12-13 RX ORDER — GABAPENTIN 300 MG/1
300 CAPSULE ORAL
Qty: 90 CAPSULE | Refills: 2 | Status: SHIPPED | OUTPATIENT
Start: 2019-12-13 | End: 2020-06-15

## 2019-12-13 NOTE — TELEPHONE ENCOUNTER
Patient called requesting a refill, but is going out of town so needs to have approval to pick it up early.     Pt call back  [phone]  [facility] confirmed

## 2019-12-26 RX ORDER — CELECOXIB 200 MG/1
CAPSULE ORAL
Qty: 30 CAPSULE | Refills: 2 | Status: SHIPPED | OUTPATIENT
Start: 2019-12-26 | End: 2020-04-06

## 2020-01-24 RX ORDER — GLIMEPIRIDE 4 MG/1
TABLET ORAL
Qty: 180 TABLET | Refills: 0 | Status: SHIPPED | OUTPATIENT
Start: 2020-01-24 | End: 2020-05-12 | Stop reason: SDUPTHER

## 2020-01-26 DIAGNOSIS — E11.9 TYPE 2 DIABETES MELLITUS WITHOUT COMPLICATION, WITHOUT LONG-TERM CURRENT USE OF INSULIN (HCC): ICD-10-CM

## 2020-01-28 RX ORDER — CLOPIDOGREL BISULFATE 75 MG/1
TABLET ORAL
Qty: 90 TABLET | Refills: 3 | Status: SHIPPED | OUTPATIENT
Start: 2020-01-28 | End: 2021-02-03 | Stop reason: SDUPTHER

## 2020-01-28 RX ORDER — EMPAGLIFLOZIN 25 MG/1
TABLET, FILM COATED ORAL
Qty: 90 TABLET | Refills: 3 | Status: SHIPPED | OUTPATIENT
Start: 2020-01-28 | End: 2021-02-03 | Stop reason: SDUPTHER

## 2020-03-05 RX ORDER — MONTELUKAST SODIUM 10 MG/1
TABLET ORAL
Qty: 30 TABLET | Refills: 2 | Status: SHIPPED | OUTPATIENT
Start: 2020-03-05 | End: 2020-05-18

## 2020-04-06 RX ORDER — CELECOXIB 200 MG/1
CAPSULE ORAL
Qty: 30 CAPSULE | Refills: 2 | Status: SHIPPED | OUTPATIENT
Start: 2020-04-06 | End: 2020-07-06

## 2020-05-12 RX ORDER — GLIMEPIRIDE 4 MG/1
4 TABLET ORAL 2 TIMES DAILY
Qty: 180 TABLET | Refills: 0 | Status: SHIPPED | OUTPATIENT
Start: 2020-05-12 | End: 2020-09-02

## 2020-05-12 NOTE — TELEPHONE ENCOUNTER
Patient's wife called and stated that refills are needed for the following prescription(s):    glimepiride (AMARYL) 4 MG tablet    Pharmacy: [facility]  PH: [phone]  FX: [phone]    Patient's wife callback:  [phone]    Please advise.

## 2020-05-18 RX ORDER — MONTELUKAST SODIUM 10 MG/1
TABLET ORAL
Qty: 30 TABLET | Refills: 2 | Status: SHIPPED | OUTPATIENT
Start: 2020-05-18 | End: 2020-08-24

## 2020-06-12 DIAGNOSIS — E11.49 OTHER DIABETIC NEUROLOGICAL COMPLICATION ASSOCIATED WITH TYPE 2 DIABETES MELLITUS (HCC): ICD-10-CM

## 2020-06-15 RX ORDER — GABAPENTIN 300 MG/1
CAPSULE ORAL
Qty: 90 CAPSULE | Refills: 2 | Status: SHIPPED | OUTPATIENT
Start: 2020-06-15 | End: 2021-02-03

## 2020-06-19 RX ORDER — ALBUTEROL SULFATE 90 UG/1
AEROSOL, METERED RESPIRATORY (INHALATION)
Qty: 8.5 G | Refills: 5 | Status: SHIPPED | OUTPATIENT
Start: 2020-06-19 | End: 2023-03-22 | Stop reason: SDUPTHER

## 2020-07-06 RX ORDER — CELECOXIB 200 MG/1
CAPSULE ORAL
Qty: 30 CAPSULE | Refills: 2 | Status: SHIPPED | OUTPATIENT
Start: 2020-07-06 | End: 2020-09-28

## 2020-08-24 RX ORDER — MONTELUKAST SODIUM 10 MG/1
TABLET ORAL
Qty: 30 TABLET | Refills: 2 | Status: SHIPPED | OUTPATIENT
Start: 2020-08-24 | End: 2020-11-23

## 2020-08-27 RX ORDER — GLIMEPIRIDE 4 MG/1
TABLET ORAL
Qty: 180 TABLET | Refills: 0 | OUTPATIENT
Start: 2020-08-27

## 2020-09-02 RX ORDER — GLIMEPIRIDE 4 MG/1
TABLET ORAL
Qty: 60 TABLET | Refills: 0 | Status: SHIPPED | OUTPATIENT
Start: 2020-09-02 | End: 2020-10-02

## 2020-09-28 RX ORDER — CELECOXIB 200 MG/1
CAPSULE ORAL
Qty: 30 CAPSULE | Refills: 2 | Status: SHIPPED | OUTPATIENT
Start: 2020-09-28 | End: 2020-12-30

## 2020-09-28 RX ORDER — ATORVASTATIN CALCIUM 40 MG/1
TABLET, FILM COATED ORAL
Qty: 30 TABLET | Refills: 11 | OUTPATIENT
Start: 2020-09-28

## 2020-10-01 ENCOUNTER — EPISODE CHANGES (OUTPATIENT)
Dept: CASE MANAGEMENT | Facility: OTHER | Age: 60
End: 2020-10-01

## 2020-10-02 NOTE — TELEPHONE ENCOUNTER
PATIENTS WIFE CALLED TO CHECK THE STATUS OF THIS REFILL. STATED THAT THE PATIENT IS GOING OUT OF TOWN Sunday.     PHARMACY- [facility] [phone]    PLEASE CALL AND ADVISE -[phone]

## 2020-10-03 RX ORDER — GLIMEPIRIDE 4 MG/1
4 TABLET ORAL
Qty: 30 TABLET | Refills: 0 | Status: SHIPPED | OUTPATIENT
Start: 2020-10-03 | End: 2020-11-06

## 2020-10-21 ENCOUNTER — TELEPHONE (OUTPATIENT)
Dept: CARDIOLOGY | Facility: CLINIC | Age: 60
End: 2020-10-21

## 2020-10-21 NOTE — TELEPHONE ENCOUNTER
Fax was received from pharmacy stating that Pt needs refills on Lipitor. Pt has not been seen in over a year. LVM for Pt stating that he needs an appt or can get refills from PCP. Pharmacy was notified also.

## 2020-11-06 RX ORDER — GLIMEPIRIDE 4 MG/1
TABLET ORAL
Qty: 20 TABLET | Refills: 0 | Status: SHIPPED | OUTPATIENT
Start: 2020-11-06 | End: 2020-11-24

## 2020-11-09 ENCOUNTER — TELEPHONE (OUTPATIENT)
Dept: INTERNAL MEDICINE | Facility: CLINIC | Age: 60
End: 2020-11-09

## 2020-11-09 NOTE — TELEPHONE ENCOUNTER
Left detailed vm with patient and encouraged him to give the office a return call so we can get him scheduled since it has been 12/6/2019 that Dr. Bella has seen patient.    Hub if patient calls, please schedule him as soon as possible.

## 2020-11-21 DIAGNOSIS — J45.30 MILD PERSISTENT ASTHMA WITHOUT COMPLICATION: ICD-10-CM

## 2020-11-23 RX ORDER — MONTELUKAST SODIUM 10 MG/1
TABLET ORAL
Qty: 30 TABLET | Refills: 2 | Status: SHIPPED | OUTPATIENT
Start: 2020-11-23 | End: 2021-02-03 | Stop reason: SDUPTHER

## 2020-11-23 RX ORDER — PREDNISONE 1 MG/1
TABLET ORAL
Qty: 90 TABLET | Refills: 3 | Status: SHIPPED | OUTPATIENT
Start: 2020-11-23 | End: 2021-05-20

## 2020-11-24 RX ORDER — GLIMEPIRIDE 4 MG/1
TABLET ORAL
Qty: 20 TABLET | Refills: 0 | Status: SHIPPED | OUTPATIENT
Start: 2020-11-24 | End: 2021-02-03 | Stop reason: SDUPTHER

## 2020-12-23 RX ORDER — GLIMEPIRIDE 4 MG/1
TABLET ORAL
Qty: 90 TABLET | Refills: 3 | OUTPATIENT
Start: 2020-12-23

## 2020-12-23 NOTE — TELEPHONE ENCOUNTER
Spoke with patient and informed that he will need an appointment before refills can be given. He said that he would call back to schedule once he checks his calendar.

## 2020-12-30 RX ORDER — CELECOXIB 200 MG/1
CAPSULE ORAL
Qty: 30 CAPSULE | Refills: 2 | Status: SHIPPED | OUTPATIENT
Start: 2020-12-30 | End: 2021-02-03 | Stop reason: SDUPTHER

## 2021-02-03 ENCOUNTER — HOSPITAL ENCOUNTER (INPATIENT)
Facility: HOSPITAL | Age: 61
LOS: 3 days | Discharge: HOME | End: 2021-02-06
Attending: EMERGENCY MEDICINE | Admitting: FAMILY MEDICINE

## 2021-02-03 ENCOUNTER — APPOINTMENT (OUTPATIENT)
Dept: GENERAL RADIOLOGY | Facility: HOSPITAL | Age: 61
End: 2021-02-03

## 2021-02-03 ENCOUNTER — OFFICE VISIT (OUTPATIENT)
Dept: INTERNAL MEDICINE | Facility: CLINIC | Age: 61
End: 2021-02-03

## 2021-02-03 VITALS
WEIGHT: 212.4 LBS | SYSTOLIC BLOOD PRESSURE: 124 MMHG | DIASTOLIC BLOOD PRESSURE: 90 MMHG | TEMPERATURE: 98.4 F | BODY MASS INDEX: 28.77 KG/M2 | HEIGHT: 72 IN | HEART RATE: 138 BPM | OXYGEN SATURATION: 96 %

## 2021-02-03 DIAGNOSIS — M19.90 OSTEOARTHRITIS, UNSPECIFIED OSTEOARTHRITIS TYPE, UNSPECIFIED SITE: ICD-10-CM

## 2021-02-03 DIAGNOSIS — E55.9 VITAMIN D DEFICIENCY: Primary | ICD-10-CM

## 2021-02-03 DIAGNOSIS — I48.91 ATRIAL FIBRILLATION, UNSPECIFIED TYPE (HCC): ICD-10-CM

## 2021-02-03 DIAGNOSIS — I48.91 ATRIAL FIBRILLATION WITH RVR (HCC): Primary | ICD-10-CM

## 2021-02-03 DIAGNOSIS — I25.10 CORONARY ARTERY DISEASE INVOLVING NATIVE CORONARY ARTERY WITHOUT ANGINA PECTORIS, UNSPECIFIED WHETHER NATIVE OR TRANSPLANTED HEART: ICD-10-CM

## 2021-02-03 DIAGNOSIS — E78.2 MIXED HYPERLIPIDEMIA: ICD-10-CM

## 2021-02-03 DIAGNOSIS — M19.079 ARTHRITIS OF ANKLE: ICD-10-CM

## 2021-02-03 DIAGNOSIS — E11.9 TYPE 2 DIABETES MELLITUS WITHOUT COMPLICATION, WITHOUT LONG-TERM CURRENT USE OF INSULIN (HCC): ICD-10-CM

## 2021-02-03 DIAGNOSIS — N40.1 BENIGN PROSTATIC HYPERPLASIA WITH LOWER URINARY TRACT SYMPTOMS, SYMPTOM DETAILS UNSPECIFIED: ICD-10-CM

## 2021-02-03 DIAGNOSIS — E11.49 OTHER DIABETIC NEUROLOGICAL COMPLICATION ASSOCIATED WITH TYPE 2 DIABETES MELLITUS (HCC): ICD-10-CM

## 2021-02-03 DIAGNOSIS — J45.909 UNCOMPLICATED ASTHMA, UNSPECIFIED ASTHMA SEVERITY, UNSPECIFIED WHETHER PERSISTENT: ICD-10-CM

## 2021-02-03 DIAGNOSIS — M54.2 NECK PAIN: ICD-10-CM

## 2021-02-03 PROBLEM — Z79.52 LONG TERM CURRENT USE OF SYSTEMIC STEROIDS: Status: ACTIVE | Noted: 2021-02-03

## 2021-02-03 PROBLEM — J40 BRONCHITIS: Status: RESOLVED | Noted: 2018-03-27 | Resolved: 2021-02-03

## 2021-02-03 LAB
ALBUMIN SERPL-MCNC: 4.2 G/DL (ref 3.5–5.2)
ALBUMIN/GLOB SERPL: 1.6 G/DL
ALP SERPL-CCNC: 96 U/L (ref 39–117)
ALT SERPL W P-5'-P-CCNC: 9 U/L (ref 1–41)
ANION GAP SERPL CALCULATED.3IONS-SCNC: 11.9 MMOL/L (ref 5–15)
AST SERPL-CCNC: 10 U/L (ref 1–40)
BASOPHILS # BLD AUTO: 0.04 10*3/MM3 (ref 0–0.2)
BASOPHILS NFR BLD AUTO: 0.7 % (ref 0–1.5)
BILIRUB SERPL-MCNC: 1.1 MG/DL (ref 0–1.2)
BUN SERPL-MCNC: 14 MG/DL (ref 8–23)
BUN/CREAT SERPL: 13.7 (ref 7–25)
CALCIUM SPEC-SCNC: 9.4 MG/DL (ref 8.6–10.5)
CHLORIDE SERPL-SCNC: 97 MMOL/L (ref 98–107)
CO2 SERPL-SCNC: 24.1 MMOL/L (ref 22–29)
CREAT SERPL-MCNC: 1.02 MG/DL (ref 0.76–1.27)
DEPRECATED RDW RBC AUTO: 40.2 FL (ref 37–54)
EOSINOPHIL # BLD AUTO: 0.12 10*3/MM3 (ref 0–0.4)
EOSINOPHIL NFR BLD AUTO: 2.2 % (ref 0.3–6.2)
ERYTHROCYTE [DISTWIDTH] IN BLOOD BY AUTOMATED COUNT: 12 % (ref 12.3–15.4)
GFR SERPL CREATININE-BSD FRML MDRD: 74 ML/MIN/1.73
GLOBULIN UR ELPH-MCNC: 2.7 GM/DL
GLUCOSE BLDC GLUCOMTR-MCNC: 161 MG/DL (ref 70–130)
GLUCOSE SERPL-MCNC: 330 MG/DL (ref 65–99)
HCT VFR BLD AUTO: 40.6 % (ref 37.5–51)
HGB BLD-MCNC: 14.2 G/DL (ref 13–17.7)
HOLD SPECIMEN: NORMAL
HOLD SPECIMEN: NORMAL
IMM GRANULOCYTES # BLD AUTO: 0.01 10*3/MM3 (ref 0–0.05)
IMM GRANULOCYTES NFR BLD AUTO: 0.2 % (ref 0–0.5)
LYMPHOCYTES # BLD AUTO: 1.58 10*3/MM3 (ref 0.7–3.1)
LYMPHOCYTES NFR BLD AUTO: 29 % (ref 19.6–45.3)
MAGNESIUM SERPL-MCNC: 2.1 MG/DL (ref 1.6–2.4)
MCH RBC QN AUTO: 31.8 PG (ref 26.6–33)
MCHC RBC AUTO-ENTMCNC: 35 G/DL (ref 31.5–35.7)
MCV RBC AUTO: 91 FL (ref 79–97)
MONOCYTES # BLD AUTO: 0.24 10*3/MM3 (ref 0.1–0.9)
MONOCYTES NFR BLD AUTO: 4.4 % (ref 5–12)
NEUTROPHILS NFR BLD AUTO: 3.46 10*3/MM3 (ref 1.7–7)
NEUTROPHILS NFR BLD AUTO: 63.5 % (ref 42.7–76)
NRBC BLD AUTO-RTO: 0 /100 WBC (ref 0–0.2)
PLATELET # BLD AUTO: 225 10*3/MM3 (ref 140–450)
PMV BLD AUTO: 9.7 FL (ref 6–12)
POTASSIUM SERPL-SCNC: 4.7 MMOL/L (ref 3.5–5.2)
PROT SERPL-MCNC: 6.9 G/DL (ref 6–8.5)
RBC # BLD AUTO: 4.46 10*6/MM3 (ref 4.14–5.8)
SARS-COV-2 RNA PNL SPEC NAA+PROBE: NOT DETECTED
SODIUM SERPL-SCNC: 133 MMOL/L (ref 136–145)
TROPONIN T SERPL-MCNC: <0.01 NG/ML (ref 0–0.03)
TSH SERPL DL<=0.05 MIU/L-ACNC: 1.19 UIU/ML (ref 0.27–4.2)
WBC # BLD AUTO: 5.45 10*3/MM3 (ref 3.4–10.8)
WHOLE BLOOD HOLD SPECIMEN: NORMAL
WHOLE BLOOD HOLD SPECIMEN: NORMAL

## 2021-02-03 PROCEDURE — 25010000002 DIGOXIN PER 500 MCG: Performed by: EMERGENCY MEDICINE

## 2021-02-03 PROCEDURE — 87635 SARS-COV-2 COVID-19 AMP PRB: CPT | Performed by: EMERGENCY MEDICINE

## 2021-02-03 PROCEDURE — G0378 HOSPITAL OBSERVATION PER HR: HCPCS

## 2021-02-03 PROCEDURE — 93005 ELECTROCARDIOGRAM TRACING: CPT | Performed by: NURSE PRACTITIONER

## 2021-02-03 PROCEDURE — 93005 ELECTROCARDIOGRAM TRACING: CPT | Performed by: INTERNAL MEDICINE

## 2021-02-03 PROCEDURE — 85025 COMPLETE CBC W/AUTO DIFF WBC: CPT | Performed by: EMERGENCY MEDICINE

## 2021-02-03 PROCEDURE — 71045 X-RAY EXAM CHEST 1 VIEW: CPT

## 2021-02-03 PROCEDURE — 25010000002 ENOXAPARIN PER 10 MG: Performed by: EMERGENCY MEDICINE

## 2021-02-03 PROCEDURE — 63710000001 PREDNISONE PER 5 MG: Performed by: NURSE PRACTITIONER

## 2021-02-03 PROCEDURE — 99254 IP/OBS CNSLTJ NEW/EST MOD 60: CPT | Performed by: INTERNAL MEDICINE

## 2021-02-03 PROCEDURE — 80053 COMPREHEN METABOLIC PANEL: CPT | Performed by: EMERGENCY MEDICINE

## 2021-02-03 PROCEDURE — 99215 OFFICE O/P EST HI 40 MIN: CPT | Performed by: INTERNAL MEDICINE

## 2021-02-03 PROCEDURE — 84443 ASSAY THYROID STIM HORMONE: CPT | Performed by: NURSE PRACTITIONER

## 2021-02-03 PROCEDURE — 93005 ELECTROCARDIOGRAM TRACING: CPT | Performed by: EMERGENCY MEDICINE

## 2021-02-03 PROCEDURE — 99222 1ST HOSP IP/OBS MODERATE 55: CPT | Performed by: NURSE PRACTITIONER

## 2021-02-03 PROCEDURE — 94640 AIRWAY INHALATION TREATMENT: CPT

## 2021-02-03 PROCEDURE — 84484 ASSAY OF TROPONIN QUANT: CPT | Performed by: EMERGENCY MEDICINE

## 2021-02-03 PROCEDURE — 82962 GLUCOSE BLOOD TEST: CPT

## 2021-02-03 PROCEDURE — 83735 ASSAY OF MAGNESIUM: CPT | Performed by: NURSE PRACTITIONER

## 2021-02-03 PROCEDURE — 99284 EMERGENCY DEPT VISIT MOD MDM: CPT

## 2021-02-03 RX ORDER — CLOPIDOGREL BISULFATE 75 MG/1
75 TABLET ORAL DAILY
Status: DISCONTINUED | OUTPATIENT
Start: 2021-02-03 | End: 2021-02-03

## 2021-02-03 RX ORDER — SODIUM CHLORIDE 0.9 % (FLUSH) 0.9 %
10 SYRINGE (ML) INJECTION EVERY 12 HOURS SCHEDULED
Status: DISCONTINUED | OUTPATIENT
Start: 2021-02-03 | End: 2021-02-06 | Stop reason: HOSPADM

## 2021-02-03 RX ORDER — CELECOXIB 200 MG/1
200 CAPSULE ORAL DAILY
Qty: 90 CAPSULE | Refills: 1 | Status: SHIPPED | OUTPATIENT
Start: 2021-02-03 | End: 2021-02-06 | Stop reason: HOSPADM

## 2021-02-03 RX ORDER — EMPAGLIFLOZIN 25 MG/1
1 TABLET, FILM COATED ORAL DAILY
Qty: 90 TABLET | Refills: 1 | Status: SHIPPED | OUTPATIENT
Start: 2021-02-03 | End: 2021-07-22

## 2021-02-03 RX ORDER — GLIMEPIRIDE 4 MG/1
4 TABLET ORAL
Qty: 90 TABLET | Refills: 1 | Status: SHIPPED | OUTPATIENT
Start: 2021-02-03 | End: 2021-07-22

## 2021-02-03 RX ORDER — MONTELUKAST SODIUM 10 MG/1
10 TABLET ORAL
Qty: 90 TABLET | Refills: 1 | Status: SHIPPED | OUTPATIENT
Start: 2021-02-03 | End: 2021-08-05

## 2021-02-03 RX ORDER — ONDANSETRON 2 MG/ML
4 INJECTION INTRAMUSCULAR; INTRAVENOUS EVERY 6 HOURS PRN
Status: DISCONTINUED | OUTPATIENT
Start: 2021-02-03 | End: 2021-02-06 | Stop reason: HOSPADM

## 2021-02-03 RX ORDER — SODIUM CHLORIDE 0.9 % (FLUSH) 0.9 %
10 SYRINGE (ML) INJECTION AS NEEDED
Status: DISCONTINUED | OUTPATIENT
Start: 2021-02-03 | End: 2021-02-06 | Stop reason: HOSPADM

## 2021-02-03 RX ORDER — ATORVASTATIN CALCIUM 40 MG/1
40 TABLET, FILM COATED ORAL DAILY
Qty: 90 TABLET | Refills: 1 | Status: SHIPPED | OUTPATIENT
Start: 2021-02-03 | End: 2021-07-22

## 2021-02-03 RX ORDER — ACETAMINOPHEN 325 MG/1
650 TABLET ORAL EVERY 4 HOURS PRN
Status: DISCONTINUED | OUTPATIENT
Start: 2021-02-03 | End: 2021-02-06 | Stop reason: HOSPADM

## 2021-02-03 RX ORDER — DEXTROSE MONOHYDRATE 25 G/50ML
25 INJECTION, SOLUTION INTRAVENOUS
Status: DISCONTINUED | OUTPATIENT
Start: 2021-02-03 | End: 2021-02-06 | Stop reason: HOSPADM

## 2021-02-03 RX ORDER — ONDANSETRON 4 MG/1
4 TABLET, FILM COATED ORAL EVERY 6 HOURS PRN
Status: DISCONTINUED | OUTPATIENT
Start: 2021-02-03 | End: 2021-02-06 | Stop reason: HOSPADM

## 2021-02-03 RX ORDER — BUDESONIDE AND FORMOTEROL FUMARATE DIHYDRATE 160; 4.5 UG/1; UG/1
2 AEROSOL RESPIRATORY (INHALATION)
Status: DISCONTINUED | OUTPATIENT
Start: 2021-02-03 | End: 2021-02-06 | Stop reason: HOSPADM

## 2021-02-03 RX ORDER — PREDNISONE 1 MG/1
5 TABLET ORAL DAILY
Status: DISCONTINUED | OUTPATIENT
Start: 2021-02-03 | End: 2021-02-06 | Stop reason: HOSPADM

## 2021-02-03 RX ORDER — ALBUTEROL SULFATE 90 UG/1
2 AEROSOL, METERED RESPIRATORY (INHALATION) EVERY 4 HOURS PRN
Status: DISCONTINUED | OUTPATIENT
Start: 2021-02-03 | End: 2021-02-06 | Stop reason: HOSPADM

## 2021-02-03 RX ORDER — GABAPENTIN 300 MG/1
300 CAPSULE ORAL NIGHTLY
Status: DISCONTINUED | OUTPATIENT
Start: 2021-02-03 | End: 2021-02-06 | Stop reason: HOSPADM

## 2021-02-03 RX ORDER — NICOTINE POLACRILEX 4 MG
1 LOZENGE BUCCAL
Status: DISCONTINUED | OUTPATIENT
Start: 2021-02-03 | End: 2021-02-06 | Stop reason: HOSPADM

## 2021-02-03 RX ORDER — CLOPIDOGREL BISULFATE 75 MG/1
75 TABLET ORAL DAILY
Qty: 90 TABLET | Refills: 1 | Status: SHIPPED | OUTPATIENT
Start: 2021-02-03 | End: 2021-02-06 | Stop reason: HOSPADM

## 2021-02-03 RX ORDER — DIGOXIN 0.25 MG/ML
125 INJECTION INTRAMUSCULAR; INTRAVENOUS ONCE
Status: COMPLETED | OUTPATIENT
Start: 2021-02-03 | End: 2021-02-03

## 2021-02-03 RX ORDER — ATORVASTATIN CALCIUM 40 MG/1
40 TABLET, FILM COATED ORAL DAILY
Status: DISCONTINUED | OUTPATIENT
Start: 2021-02-03 | End: 2021-02-06 | Stop reason: HOSPADM

## 2021-02-03 RX ORDER — CHOLECALCIFEROL (VITAMIN D3) 125 MCG
5 CAPSULE ORAL NIGHTLY PRN
Status: DISCONTINUED | OUTPATIENT
Start: 2021-02-03 | End: 2021-02-06 | Stop reason: HOSPADM

## 2021-02-03 RX ADMIN — SODIUM CHLORIDE 1000 ML: 9 INJECTION, SOLUTION INTRAVENOUS at 14:43

## 2021-02-03 RX ADMIN — DILTIAZEM HYDROCHLORIDE 5 MG/HR: 100 INJECTION, POWDER, LYOPHILIZED, FOR SOLUTION INTRAVENOUS at 13:51

## 2021-02-03 RX ADMIN — ACETAMINOPHEN 650 MG: 325 TABLET, FILM COATED ORAL at 18:38

## 2021-02-03 RX ADMIN — ATORVASTATIN CALCIUM 40 MG: 40 TABLET, FILM COATED ORAL at 18:34

## 2021-02-03 RX ADMIN — ENOXAPARIN SODIUM 100 MG: 100 INJECTION SUBCUTANEOUS at 15:00

## 2021-02-03 RX ADMIN — SODIUM CHLORIDE, PRESERVATIVE FREE 10 ML: 5 INJECTION INTRAVENOUS at 22:27

## 2021-02-03 RX ADMIN — BUDESONIDE AND FORMOTEROL FUMARATE DIHYDRATE 2 PUFF: 160; 4.5 AEROSOL RESPIRATORY (INHALATION) at 21:33

## 2021-02-03 RX ADMIN — PREDNISONE 5 MG: 5 TABLET ORAL at 18:34

## 2021-02-03 RX ADMIN — DIGOXIN 125 MCG: 0.25 INJECTION INTRAMUSCULAR; INTRAVENOUS at 14:58

## 2021-02-03 RX ADMIN — GABAPENTIN 300 MG: 300 CAPSULE ORAL at 22:26

## 2021-02-03 NOTE — ED NOTES
was called at this time for Dr. Mar. No answer awaiting call back.     [facility]  02/03/21 1735

## 2021-02-03 NOTE — ED NOTES
At this time Dr. Gan called back for Dr. Mar. Call transferred.      Agatha Carrizales  02/03/21 1944

## 2021-02-03 NOTE — ED PROVIDER NOTES
Subjective   60-year-old male presents to the ED with a chief complaint of abnormal EKG.  The patient was at his primary care physician's office where they noticed that his heart rate was high.  They did an EKG and noticed that he was in A. fib with RVR so they sent him to the ED to be evaluated.  The patient does note that he has chronic shortness of breath and it has not been any worse lately than it normally is.  He also has chest tightness/chest pressure that is present more often than not.  He has a history of coronary artery disease and states that he had some testing last year and they told him that his CAD was not enough to need a stent.  They started him on Plavix at that time.  He presents to the ED today after having the outpatient EKG show atrial fibrillation with rapid ventricular response and a rate 139 bpm.  He states that he is unsure how long his heart has been beating fast secondary to not really paying attention to the pressure or shortness of breath.  No cough.  No nausea vomiting diarrhea or abdominal pain.  No other complaints at this time.          Review of Systems   Respiratory: Positive for chest tightness and shortness of breath.    Cardiovascular: Positive for palpitations.   All other systems reviewed and are negative.      Past Medical History:   Diagnosis Date   • Arthritis    • Asthma    • Diabetes mellitus (CMS/[facility])    • GERD (gastroesophageal reflux disease)    • History of varicella    • Neuropathy        Allergies   Allergen Reactions   • Aspirin Shortness Of Breath       Past Surgical History:   Procedure Laterality Date   • NASAL SEPTUM SURGERY  2009   • VASECTOMY  1988   • VENTRAL HERNIA REPAIR      2007 and 2009 (mesh placed)   • WISDOM TOOTH EXTRACTION         Family History   Problem Relation Age of Onset   • Breast cancer Mother    • Osteoarthritis Mother    • Arthritis Mother    • Coronary artery disease Father    • Heart attack Father         late 60's   • Lymphoma Son     • Leukemia Son        Social History     Socioeconomic History   • Marital status:      Spouse name: Not on file   • Number of children: Not on file   • Years of education: Not on file   • Highest education level: Not on file   Occupational History   • Occupation:    Tobacco Use   • Smoking status: Never Smoker   • Smokeless tobacco: Never Used   Substance and Sexual Activity   • Alcohol use: Yes     Alcohol/week: 10.0 standard drinks     Types: 10 Cans of beer per week     Comment: one day every other weekend   • Drug use: No   • Sexual activity: Never     Partners: Female           Objective   Physical Exam  Vitals signs and nursing note reviewed.   Constitutional:       General: He is not in acute distress.     Appearance: He is well-developed. He is not diaphoretic.   HENT:      Head: Normocephalic and atraumatic.      Nose: Nose normal.   Eyes:      Conjunctiva/sclera: Conjunctivae normal.      Pupils: Pupils are equal, round, and reactive to light.   Cardiovascular:      Rate and Rhythm: Tachycardia present. Rhythm irregular.      Pulses: Normal pulses.   Pulmonary:      Effort: Pulmonary effort is normal. No respiratory distress.      Breath sounds: Normal breath sounds.   Abdominal:      General: There is no distension.      Palpations: Abdomen is soft.      Tenderness: There is no abdominal tenderness.   Musculoskeletal:         General: No deformity.   Neurological:      Mental Status: He is alert and oriented to person, place, and time.      Cranial Nerves: No cranial nerve deficit.      Coordination: Coordination normal.         Procedures           ED Course  ED Course as of Feb 04 2012 Wed Feb 03, 2021   1329 EKG interpreted by me.  Atrial fibrillation rapid ventricular response.  Rate of 139.  Nonspecific ST segment depressions.  Abnormal EKG     [CG]   1456 Spoke with Dr. Ríos, cardiology on-call.  Recommends dose of digoxin.  Also recommends Lovenox.  Will evaluate this afternoon  if resting in the morning.  If the patient does not convert.  Plan will be for GENESIS and cardioversion tomorrow    [CG]      ED Course User Index  [CG] Lakhwinder Mar DO           PULSE OXIMETRY INTERPRETATION  Patient had a pulse ox of 97% on room air. This is a normal pulse oximetry reading.                                [facility]  Critical Care  Performed by: Lakhwinder Mar DO  Authorized by: Lakhwinder Mar DO     Critical care provider statement:     Critical care time (minutes): 38    Critical care time was exclusive of:  Separately billable procedures and treating other patients    Critical care was necessary to treat or prevent imminent or life-threatening deterioration of the following conditions: Atrial fibrillation with rapid ventricular response, rapid heart rate, cardiac drips, multiple reevaluations and consultations    Critical care was time spent personally by me on the following activities:  Ordering and performing treatments and interventions, development of treatment plan with patient or surrogate, discussions with consultants, evaluation of patient's response to treatment, examination of patient, ordering and review of laboratory studies, ordering and review of radiographic studies, pulse oximetry, re-evaluation of patient's condition and review of old charts    60-year-old male presented to the ED with rapid heart rate noticed at his primary care physician's office.  EKG there an EKG on arrival here shows atrial fibrillation with rapid ventricular response.  Patient was started on Cardizem drip immediately.  Laboratories also reassuring.  Despite increasing doses on the Cardizem the patient's heart rate did not improve.  Discussed with Dr. Ríos, cardiology on-call, please see ED course note above.  Discussed the case with Dr. Gan.  Patient be admitted for further evaluation medical management.      Final diagnoses:   Atrial fibrillation with RVR (CMS/HCC)            Lakhwinder Mar,  DO  02/04/21 2012

## 2021-02-03 NOTE — CONSULTS
[facility] Cardiology Consult Note    Darnell Garcia  1960  [id number]  02/03/21    Requesting Physician: Lakhwinder Mar DO    Chief Complaint: Tachycardia    History of Present Illness:   Mr. Darnell Garcia is a 60 y.o. male who is being seen by Cardiology for evaluation of atrial fibrillation with rapid ventricular rates.  The patient has a past medical history significant for type 2 diabetes mellitus, GERD, and asthma.  He does not have any significant past cardiac history. He has a past cardiac history significant for coronary artery disease.  In 2017, the patient presented for evaluation of chest pain.  He subsequently had an abnormal pharmacologic MPS, revealed a a small reversible perfusion defect in the basal inferior wall possibly representing attenuation artifact.  He subsequently underwent coronary angiography in 3/17, which revealed a 30 to 50% stenosis in the mid LAD.  Most recently, the patient was evaluated in the cardiology clinic in 10/19 for chest pain.  At that time, a repeat pharmacologic MPS showed no evidence of inducible ischemia.  He presented to primary care physician's office today for evaluation of shortness of breath.  The patient reports chronic exertional dyspnea, however he has noted worsening of his dyspnea over the past month.  An ECG obtained in his PCPs office revealed atrial fibrillation with rapid ventricular rates.  He was subsequent sent to the emergency department for further evaluation.  He denies any palpitations or sensation of tachycardia.  He does report mild substernal chest discomfort, which is also chronic and present at the time of his last MPS in 2019.  He denies any recent worsening of his chest pain.  No association with exertion.  He denies any orthopnea, PND, or lower extremity swelling.  No other specific complaints at this time.    An ECG upon presentation emergency department showed atrial fibrillation with a ventricular rate of 139 bpm, as  well as a right bundle branch block.  He was noted to have T wave inversion in inferior leads, consistent with prior ECGs.  An initial laboratory evaluation including a troponin level was unremarkable.  He was given a bolus of diltiazem as well as started on a diltiazem drip, without any significant improvement in his heart rate.  He was also given a dose of digoxin, however he remains tachycardic with heart rates in the 130s.  Cardiology has been consulted for further recommendations and management.    Prior Cardiac Testin. Last Coronary Angio: 3/2/2017              1.  Mild to moderate single-vessel disease involving 30 to 50% stenosis in the mid LAD              2.  No significant disease in the LCx, left main, or RCA.              3.  Normal left ventricular systolic function, LVEF 55-60%              4.  LVEDP 50 mmHg  2. Prior Stress Testing: Pharmacologic    1.  MPS 2017               1.  Small sized reversible perfusion defect involving the basal inferior wall, which in the presence of normal wall motion and gastrointestinal uptake may represent attenuation artifact.   2.  MPS 10/19    1.  No evidence of inducible ischemia  3. Last Echo: None  4. Prior Holter Monitor: None    Review of Systems:   Review of Systems   Constitutional: Negative for activity change, appetite change, chills, diaphoresis, fatigue, fever, unexpected weight gain and unexpected weight loss.   Eyes: Negative for blurred vision and double vision.   Respiratory: Positive for shortness of breath. Negative for cough, chest tightness and wheezing.    Cardiovascular: Positive for chest pain. Negative for palpitations and leg swelling.   Gastrointestinal: Negative for abdominal pain, anal bleeding, blood in stool and GERD.   Endocrine: Negative for cold intolerance and heat intolerance.   Genitourinary: Negative for hematuria.   Neurological: Negative for dizziness, syncope, weakness and light-headedness.   Hematological: Does not  bruise/bleed easily.   Psychiatric/Behavioral: Negative for depressed mood and stress. The patient is not nervous/anxious.        Past Medical History:   Past Medical History:   Diagnosis Date   • Arthritis    • Asthma    • Diabetes mellitus (CMS/HCC)    • GERD (gastroesophageal reflux disease)    • History of varicella    • Neuropathy        Past Surgical History:   Past Surgical History:   Procedure Laterality Date   • NASAL SEPTUM SURGERY  2009   • VASECTOMY  1988   • VENTRAL HERNIA REPAIR      2007 and 2009 (mesh placed)   • WISDOM TOOTH EXTRACTION         Family History:   Family History   Problem Relation Age of Onset   • Breast cancer Mother    • Osteoarthritis Mother    • Arthritis Mother    • Coronary artery disease Father    • Heart attack Father         late 60's   • Lymphoma Son    • Leukemia Son        Social History:   Social History     Socioeconomic History   • Marital status:      Spouse name: Not on file   • Number of children: Not on file   • Years of education: Not on file   • Highest education level: Not on file   Occupational History   • Occupation:    Tobacco Use   • Smoking status: Never Smoker   • Smokeless tobacco: Never Used   Substance and Sexual Activity   • Alcohol use: Yes     Alcohol/week: 10.0 standard drinks     Types: 10 Cans of beer per week     Comment: one day every other weekend   • Drug use: No   • Sexual activity: Never     Partners: Female       Medications:     Current Facility-Administered Medications:   •  dilTIAZem (CARDIZEM) 100 mg in 100 mL NS infusion (ADV), 5-15 mg/hr, Intravenous, Continuous, Lakhwinder Mar DO, Last Rate: 15 mL/hr at 02/03/21 1529, 15 mg/hr at 02/03/21 1529  •  sodium chloride 0.9 % flush 10 mL, 10 mL, Intravenous, PRN, Lakhwinder Mar, DO    Allergies:   Allergies   Allergen Reactions   • Aspirin Shortness Of Breath       Physical Exam:  Vital Signs:   Vitals:    02/03/21 1317 02/03/21 1442 02/03/21 1529   BP: 113/85 106/84 122/96  "  BP Location: Left arm Left arm    Patient Position: Sitting Sitting    Pulse: (!) 138 (!) 141 (!) 137   Resp: 16 16 16   Temp: 98.5 °F (36.9 °C)     TempSrc: Oral     SpO2: 97% 95% 96%   Weight: 96.2 kg (212 lb)     Height: 182.9 cm (72\")         Physical Exam  Vitals signs and nursing note reviewed.   Constitutional:       General: He is not in acute distress.     Appearance: Normal appearance. He is well-developed. He is not diaphoretic.   HENT:      Head: Normocephalic and atraumatic.   Eyes:      General: No scleral icterus.     Pupils: Pupils are equal, round, and reactive to light.   Neck:      Musculoskeletal: Neck supple.      Trachea: No tracheal deviation.   Cardiovascular:      Rate and Rhythm: Tachycardia present. Rhythm irregular.      Heart sounds: Normal heart sounds. No murmur. No friction rub. No gallop.       Comments: Normal JVD.  Pulmonary:      Effort: Pulmonary effort is normal. No respiratory distress.      Breath sounds: Normal breath sounds. No stridor. No wheezing or rales.   Chest:      Chest wall: No tenderness.   Abdominal:      General: Bowel sounds are normal. There is no distension.      Palpations: Abdomen is soft.      Tenderness: There is no abdominal tenderness. There is no guarding or rebound.   Musculoskeletal: Normal range of motion.         General: No swelling.   Lymphadenopathy:      Cervical: No cervical adenopathy.   Skin:     General: Skin is warm and dry.      Findings: No erythema.   Neurological:      General: No focal deficit present.      Mental Status: He is alert and oriented to person, place, and time.   Psychiatric:         Mood and Affect: Mood normal.         Behavior: Behavior normal.         Results Review:   Results from last 7 days   Lab Units 02/03/21  1324   SODIUM mmol/L 133*   POTASSIUM mmol/L 4.7   CHLORIDE mmol/L 97*   CO2 mmol/L 24.1   BUN mg/dL 14   CREATININE mg/dL 1.02   CALCIUM mg/dL 9.4   BILIRUBIN mg/dL 1.1   ALK PHOS U/L 96   ALT (SGPT) U/L " 9   AST (SGOT) U/L 10   GLUCOSE mg/dL 330*     Results from last 7 days   Lab Units 02/03/21  1324   TROPONIN T ng/mL <0.010     Results from last 7 days   Lab Units 02/03/21  1324   WBC 10*3/mm3 5.45   HEMOGLOBIN g/dL 14.2   HEMATOCRIT % 40.6   PLATELETS 10*3/mm3 225         Results from last 7 days   Lab Units 02/03/21  1324   MAGNESIUM mg/dL 2.1           I personally viewed and interpreted the patient's EKG/Telemetry data     Assessment / Plan:     1.  Atrial fibrillation with rapid ventricular rates  --Newly diagnosed atrial fibrillation, however onset unclear with the possibility of patient being in A. fib for approximately in 1 month  --Ventricular rate remains uncontrolled despite diltiazem and digoxin  --Suspect atrial fibrillation likely contributing to dyspnea  --Discussed the risks and benefits of GENESIS with cardioversion, and the patient is agreeable to proceed.  Will make n.p.o. and plan for GENESIS with cardioversion tomorrow  --Will consider initiation of sotalol following cardioversion for rhythm control  --[id number] score is 2, will transition from Lovenox to Eliquis tomorrow for CVA prophylaxis  --TSH is pending    2.  Coronary artery disease  --Known history of single-vessel coronary artery disease involving 30-50% stenosis in the mid LAD on [facility] in 2017  --MPS in 2019 with no evidence of inducible ischemia at that time  --No acute ischemic changes on ECG, and troponin remains within normal limits with no evidence of ACS  --Will restart metoprolol as antianginal  --Previously on Plavix given possible history of aspirin allergy  --Discontinue Plavix, given starting Eliquis  --Continue high intensity statin     3. Dyslipidemia  --Continue high intensity statin     3.  Type 2 diabetes mellitus   --Management per primary service        Thank you for allowing me to participate in the care of your patient. Please do not hesitate to contact me with additional questions or concerns.     RANDY Ríos,  MD  Interventional Cardiology   02/03/21  16:08 EST

## 2021-02-03 NOTE — H&P
[facility]      Name:  Darnell Garcia   Age:  60 y.o.  Sex:  male  :  1960  MRN:  [id number]   Visit Number:  [id number]  Admission Date:  2/3/2021  Date Of Service:  21  Primary Care Physician:  Carlos Bella MD    Chief Complaint:     Sent by PCP for abnormal EKG    History Of Presenting Illness:      Patient is a 60 year old male who presented to the ED today after going to a follow-up appointment with his PCP for medication refills.  He was noted to have tachycardia and a subsequent EKG found patient to be in Atrial Fibrillation with RVR.  He was sent to the ED for further workup.  Patient states that he is always somewhat short of breath due to asthma and also has chest tightness routinely.  Patient does state that he has had increased chest pressure from his usual over the past few weeks.  Patient also relays a cough worse than usual asthma cough with clear sputum.  Patient denies any feeling of palpitations or heart racing.  Patient tells me that he has seen a cardiologist in the past at [facility] and was told he had CAD but was not necessary to have a stent placed at time of evaluation.  He was started on Plavix at that time.  Patient is unsure how long his heart has been out of rhythm due to underlying asthma symptoms at baseline.  Patient with health history significant for asthma (which he is on chronic steroids for, and has been for at least 20 years), Diabetes Mellitus (which he tells me is steroid induced) and neuropathy from previous ankle surgeries.  Patient is a nonsmoker and drinks alcohol occasionally on the weekends.   with grown children.  His wife is present at the bedside.    Work up in the ED significant for EKG noted with Atrial Fibrillation with RVR at rate of 139.   CBC is unremarkable.  CMP noted glucose-330.  CXR with no acute processes noted.  Patient was placed on Cardizem drip and given a dose of Digoxin IV.  Full  Anticoagulation with Lovenox 100mg.  Hospitalist service to admit patient with Cardiology following.  Dr. Ríos has been consulted in the ED.     Review Of Systems:     General ROS: Patient denies any fevers, chills or loss of consciousness. Complains of generalized weakness.   Psychological ROS: No history of any hallucinations and delusions.  Ophthalmic ROS: No history of any diplopia or transient loss of vision.  ENT ROS: No history of sore throat, nasal congestion or ear pain.   Allergy and Immunology ROS: No history of rash or itching.  Hematological and Lymphatic ROS: No history of neck swelling or easy bleeding.  Endocrine ROS: No history of any recent unintentional weight gain or loss.  Respiratory ROS: + cough or shortness of breath.   Cardiovascular ROS: + chest pain/ pressure    Gastrointestinal ROS: No history of nausea and vomiting. Denies any abdominal pain. No diarrhea.   GenitoUrinary ROS: No history of dysuria or hematuria.  Musculoskeletal ROS: No muscle pain. No calf pain. Complains of chronic back pain.   Neurological ROS: No history of any focal weakness. No loss of consciousness. Denies any numbness.  Dermatological ROS: No history of any redness or pruritis.     Past Medical History:  Reviewed    Past Medical History:   Diagnosis Date   • Arthritis    • Asthma    • Diabetes mellitus (CMS/HCC)    • GERD (gastroesophageal reflux disease)    • History of varicella    • Neuropathy        Past Surgical history:  Reviewed    Past Surgical History:   Procedure Laterality Date   • NASAL SEPTUM SURGERY  2009   • VASECTOMY  1988   • VENTRAL HERNIA REPAIR      2007 and 2009 (mesh placed)   • WISDOM TOOTH EXTRACTION         Social History:  Reviewed    Social History     Socioeconomic History   • Marital status:      Spouse name: Not on file   • Number of children: Not on file   • Years of education: Not on file   • Highest education level: Not on file   Occupational History   • Occupation:     Tobacco Use   • Smoking status: Never Smoker   • Smokeless tobacco: Never Used   Substance and Sexual Activity   • Alcohol use: Yes     Alcohol/week: 10.0 standard drinks     Types: 10 Cans of beer per week     Comment: one day every other weekend   • Drug use: No   • Sexual activity: Never     Partners: Female       Family History:  Reviewed    Family History   Problem Relation Age of Onset   • Breast cancer Mother    • Osteoarthritis Mother    • Arthritis Mother    • Coronary artery disease Father    • Heart attack Father         late 60's   • Lymphoma Son    • Leukemia Son        Allergies:  Reviewed    Aspirin    Home Medications:    Prior to Admission Medications     Prescriptions Last Dose Informant Patient Reported? Taking?    ALBUTEROL SULFATE  (90 Base) MCG/ACT inhaler   No No    INHALE TWO PUFFS BY MOUTH EVERY 4 HOURS AS NEEDED FOR WHEEZING    atorvastatin (LIPITOR) 40 MG tablet   No No    Take 1 tablet by mouth Daily.    celecoxib (CeleBREX) 200 MG capsule   No No    Take 1 capsule by mouth Daily.    clopidogrel (PLAVIX) 75 MG tablet   No No    Take 1 tablet by mouth Daily.    Empagliflozin (Jardiance) 25 MG tablet   No No    Take 25 mg by mouth Daily.    Fluticasone Furoate-Vilanterol (BREO ELLIPTA) 100-25 MCG/INH inhaler   No No    Inhale 1 puff by mouth once daily    glimepiride (AMARYL) 4 MG tablet   No No    Take 1 tablet by mouth Every Morning Before Breakfast.    metFORMIN (GLUCOPHAGE) 1000 MG tablet   No No    Take 1 tablet by mouth 2 (Two) Times a Day With Meals.    montelukast (SINGULAIR) 10 MG tablet   No No    Take 1 tablet by mouth every night at bedtime.    nitroglycerin (NITROSTAT) 0.4 MG SL tablet   No No    Place 1 tablet under the tongue Every FIVE Minutes As Needed for Chest Pain. Take no more than 3 doses in 15 minutes.    predniSONE (DELTASONE) 5 MG tablet   No No    TAKE ONE TABLET BY MOUTH EVERY DAY             ED Medications:    Medications   sodium chloride 0.9 % flush 10  mL (has no administration in time range)   dilTIAZem (CARDIZEM) 100 mg in 100 mL NS infusion (ADV) (15 mg/hr Intravenous Rate/Dose Change 2/3/21 1529)   dilTIAZem (CARDIZEM) bolus from bag 1 mg/mL 10 mg (10 mg Intravenous Bolus from Bag 2/3/21 1350)   sodium chloride 0.9 % bolus 1,000 mL (1,000 mL Intravenous New Bag 2/3/21 1443)   digoxin (LANOXIN) injection 125 mcg (125 mcg Intravenous Given 2/3/21 1458)   enoxaparin (LOVENOX) syringe 100 mg (100 mg Subcutaneous Given 2/3/21 1500)       Vital Signs:    Temp:  [98.4 °F (36.9 °C)-98.5 °F (36.9 °C)] 98.5 °F (36.9 °C)  Heart Rate:  [137-141] 137  Resp:  [16] 16  BP: (106-124)/(84-96) 122/96        02/03/21  1317   Weight: 96.2 kg (212 lb)       Body mass index is 28.75 kg/m².    Physical Exam:    General Appearance:  Alert and cooperative, not in any acute distress, resting in bed with wife present.   Head:  Atraumatic and normocephalic, without obvious abnormality.   Eyes:          Conjunctivae and sclerae normal, no Icterus. No pallor. Extraocular movements are within normal limits.   Ears:  Ears appear intact with no abnormalities noted.   Throat: No oral lesions, no thrush, oral mucosa moist.   Neck: Supple, trachea midline   Back:   No kyphoscoliosis present. No tenderness to palpation,   range of motion normal.   Lungs:   Chest shape is normal. Breath sounds heard bilaterally equally.  No crackles or wheezing. No Pleural rub or bronchial breathing.   Heart:  Tachycardia noted, Irregular rhythm, no murmur, no gallop, no rub. No JVD.   Abdomen:   Normal bowel sounds, no masses, no organomegaly. Soft, nontender, nondistended, no guarding, no rebound tenderness.   Extremities: Moves all extremities, no edema, no cyanosis, no clubbing.   Pulses: Pulses palpable and equal bilaterally.   Skin: No bleeding, bruising or rash.   Neurologic: Alert and oriented x 3. Moves all four limbs equally. No tremors. No facial asymmetry.     Laboratory data:    I have reviewed the  labs done in the emergency room.    Results from last 7 days   Lab Units 02/03/21  1324   SODIUM mmol/L 133*   POTASSIUM mmol/L 4.7   CHLORIDE mmol/L 97*   CO2 mmol/L 24.1   BUN mg/dL 14   CREATININE mg/dL 1.02   CALCIUM mg/dL 9.4   BILIRUBIN mg/dL 1.1   ALK PHOS U/L 96   ALT (SGPT) U/L 9   AST (SGOT) U/L 10   GLUCOSE mg/dL 330*     Results from last 7 days   Lab Units 02/03/21  1324   WBC 10*3/mm3 5.45   HEMOGLOBIN g/dL 14.2   HEMATOCRIT % 40.6   PLATELETS 10*3/mm3 225         Results from last 7 days   Lab Units 02/03/21  1324   TROPONIN T ng/mL <0.010                           Invalid input(s): USDES,  BLOODU, NITRITITE, BACT, EP  Pain Management Panel     Pain Management Panel 6/15/2017    CREATININE               EKG:      Atrial Fibrillation with RVR at rate of 139    Radiology:    Imaging Results (Last 72 Hours)     Procedure Component Value Units Date/Time    XR Chest 1 View [[id number]] Collected: 02/03/21 1337     Updated: 02/03/21 1339    Narrative:      PROCEDURE: XR CHEST 1 VW-     HISTORY: Chest Pain Triage Protocol     COMPARISON: None.     FINDINGS: The heart is normal in size. The mediastinum is unremarkable.  The lungs are clear. There is no pneumothorax.  There are no acute  osseous abnormalities.       Impression:      No acute cardiopulmonary process.     Continued followup is recommended.     This report was finalized on 2/3/2021 1:37 PM by Walter Harrison M.D..          Assessment:    Atrial Fibrillation with RVR, present on admission  Asthma  Chronic Steroid Use  Diabetes Mellitus      Plan:    Admit patient to the Hospitalist service with Cardiology consulting.  Continue patient on Cardizem drip on admission.  Continue full anticoagulation with Lovenox 100mg daily.  NPO after midnight.  Added on Magnesium and TSH labs today.  Will recheck basic labs tomorrow.  Patient and spouse updated on plan of care and agreeable.  Monitor vital signs, pain, and provide supportive care as  ordered.  Telemetry monitoring.  Hold antidiabetics, check A1C, sliding scale for coverage with addition of long acting tomorrow if needed.        Advance Care Planning   ACP discussion was held with the patient during this visit. Patient has an advance directive in EMR which is still valid.       Nancy Gay, KEITH  02/03/21  16:03 EST    Dictated utilizing Dragon dictation.

## 2021-02-03 NOTE — PROGRESS NOTES
Subjective   Darnell Garcia is a 60 y.o. male.     Chief Complaint   Patient presents with   • Med Refill     pt not been seen in 1 year, pt requesting med refills        History of Present Illness   Patient presents for follow up. Patient states he has not had his A1C checked in over a year and has not been checking his glucose at home. Complains of continued symptoms of neuropathy but denies any changes in his symptoms. Patient complains of chronic neck pain that has worsened over the last couple of weeks. He takes tylenol daily with mild improvement of symptoms. Pain is worsened with movement. He denies any injury or trauma. Asthma has been controlled with medications. Pt states he has run out of his Breo inhaler and is no longer able to afford the cost. Pain in ankles has improved since surgery. Benign prostatic hyperplasia, patient has no urinary complaints at this time.        Current Outpatient Medications:   •  ALBUTEROL SULFATE  (90 Base) MCG/ACT inhaler, INHALE TWO PUFFS BY MOUTH EVERY 4 HOURS AS NEEDED FOR WHEEZING, Disp: 8.5 g, Rfl: 5  •  atorvastatin (LIPITOR) 40 MG tablet, Take 1 tablet by mouth Daily., Disp: 30 tablet, Rfl: 11  •  celecoxib (CeleBREX) 200 MG capsule, TAKE ONE CAPSULE BY MOUTH EVERY DAY, Disp: 30 capsule, Rfl: 2  •  clopidogrel (PLAVIX) 75 MG tablet, TAKE ONE TABLET BY MOUTH EVERY DAY, Disp: 90 tablet, Rfl: 3  •  Fluticasone Furoate-Vilanterol (BREO ELLIPTA) 100-25 MCG/INH inhaler, Inhale 1 puff by mouth once daily, Disp: 60 each, Rfl: 5  •  glimepiride (AMARYL) 4 MG tablet, TAKE ONE TABLET BY MOUTH EVERY MORNING BEFORE BREAKFAST, Disp: 20 tablet, Rfl: 0  •  JARDIANCE 25 MG tablet, TAKE ONE TABLET BY MOUTH EVERY DAY, Disp: 90 tablet, Rfl: 3  •  metFORMIN (GLUCOPHAGE) 1000 MG tablet, TAKE ONE TABLET BY MOUTH TWICE DAILY WITH MEALS, Disp: 180 tablet, Rfl: 3  •  montelukast (SINGULAIR) 10 MG tablet, TAKE ONE TABLET BY MOUTH AT BEDTIME, Disp: 30 tablet, Rfl: 2  •  nitroglycerin  (NITROSTAT) 0.4 MG SL tablet, Place 1 tablet under the tongue Every FIVE Minutes As Needed for Chest Pain. Take no more than 3 doses in 15 minutes., Disp: 25 tablet, Rfl: 2  •  predniSONE (DELTASONE) 5 MG tablet, TAKE ONE TABLET BY MOUTH EVERY DAY, Disp: 90 tablet, Rfl: 3    The following portions of the patient's history were reviewed and updated as appropriate: allergies, current medications, past family history, past medical history, past social history, past surgical history and problem list.    Review of Systems   Constitutional: Negative for chills and fever.   Respiratory: Negative for cough and shortness of breath.    Cardiovascular: Negative for chest pain and palpitations.   Gastrointestinal: Negative for abdominal pain, constipation, diarrhea and vomiting.   Musculoskeletal: Positive for neck pain.   Neurological: Positive for headaches. Negative for dizziness and light-headedness.       Objective   Physical Exam  HENT:      Head: Normocephalic.   Neck:      Musculoskeletal: Normal range of motion.   Cardiovascular:      Rate and Rhythm: Tachycardia present. Rhythm irregular.   Pulmonary:      Effort: Pulmonary effort is normal.      Breath sounds: Normal breath sounds.   Abdominal:      Palpations: Abdomen is soft.   Skin:     General: Skin is warm.   Neurological:      Mental Status: He is alert and oriented to person, place, and time.   Psychiatric:         Mood and Affect: Mood normal.         Behavior: Behavior normal.         All tests have been reviewed.    Assessment/Plan   Diagnoses and all orders for this visit:    Type 2 diabetes mellitus without complication, without long-term current use of insulin (CMS/[facility]) will get bloodwork and schedule follow up      Coronary artery disease involving native coronary artery without angina pectoris, unspecified whether native or transplanted heart continue Plavix  -     CBC & Differential     Mixed hyperlipidemia continue medication  -     Comprehensive  Metabolic Panel  -     Lipid Panel  -     CK  -     TSH     Vascular calcification continue cholesterol medicine     Uncomplicated asthma, unspecified asthma severity, unspecified whether persistent continue inhaler     Vitamin D deficiency continue supplement  -     Vitamin D 25 Hydroxy     Type 2 diabetes mellitus with other specified complication, without long-term current use of insulin (CMS/[facility]) continue medication  -     Hemoglobin A1c     Other diabetic neurological complication associated with type 2 diabetes mellitus (CMS/[facility]) continue medication     Arthritis of ankle improved with ankle surgery     Neck pain will get xray, continue tylenol for pain control    Osteoarthritis, unspecified osteoarthritis type, unspecified site     Benign prostatic hyperplasia with lower urinary tract symptoms, symptom details unspecified  -     PSA DIAGNOSTIC    Due to patients tachycardia on arrival EKG was performed showing atrial fibrillation with rapid ventricular response. Patient was instructed to call his wife and go to the ER for treatment to lower his heart rate.  Patient refused to go with EMS and wife came to pick him up.  Called the ER update the patient condition    EGD plus charting More than 40 minutes   2 weeks after labs      ECG 12 Lead    Date/Time: 2/3/2021 12:59 PM  Performed by: Carlos Bella MD  Authorized by: Carlos Bella MD   Comparison: not compared with previous ECG   Rhythm: atrial fibrillation  Rate: tachycardic  Conduction: right bundle branch block  Q waves: II and III    ST Segments: ST segments normal  T Waves: T waves normal  QRS axis: normal  Other: no other findings    Clinical impression: abnormal EKG

## 2021-02-04 ENCOUNTER — ANESTHESIA (OUTPATIENT)
Dept: CARDIOLOGY | Facility: HOSPITAL | Age: 61
End: 2021-02-04

## 2021-02-04 ENCOUNTER — ANESTHESIA EVENT (OUTPATIENT)
Dept: CARDIOLOGY | Facility: HOSPITAL | Age: 61
End: 2021-02-04

## 2021-02-04 ENCOUNTER — APPOINTMENT (OUTPATIENT)
Dept: CARDIOLOGY | Facility: HOSPITAL | Age: 61
End: 2021-02-04

## 2021-02-04 LAB
ALBUMIN SERPL-MCNC: 3.9 G/DL (ref 3.5–5.2)
ALBUMIN/GLOB SERPL: 1.6 G/DL
ALP SERPL-CCNC: 88 U/L (ref 39–117)
ALT SERPL W P-5'-P-CCNC: 8 U/L (ref 1–41)
ANION GAP SERPL CALCULATED.3IONS-SCNC: 12.2 MMOL/L (ref 5–15)
AST SERPL-CCNC: <5 U/L (ref 1–40)
BASOPHILS # BLD AUTO: 0.06 10*3/MM3 (ref 0–0.2)
BASOPHILS NFR BLD AUTO: 1 % (ref 0–1.5)
BILIRUB SERPL-MCNC: 0.8 MG/DL (ref 0–1.2)
BUN SERPL-MCNC: 18 MG/DL (ref 8–23)
BUN/CREAT SERPL: 17.8 (ref 7–25)
CALCIUM SPEC-SCNC: 9.1 MG/DL (ref 8.6–10.5)
CHLORIDE SERPL-SCNC: 101 MMOL/L (ref 98–107)
CO2 SERPL-SCNC: 25.8 MMOL/L (ref 22–29)
CREAT SERPL-MCNC: 1.01 MG/DL (ref 0.76–1.27)
DEPRECATED RDW RBC AUTO: 41.1 FL (ref 37–54)
EOSINOPHIL # BLD AUTO: 0.17 10*3/MM3 (ref 0–0.4)
EOSINOPHIL NFR BLD AUTO: 2.9 % (ref 0.3–6.2)
ERYTHROCYTE [DISTWIDTH] IN BLOOD BY AUTOMATED COUNT: 12 % (ref 12.3–15.4)
GFR SERPL CREATININE-BSD FRML MDRD: 75 ML/MIN/1.73
GLOBULIN UR ELPH-MCNC: 2.5 GM/DL
GLUCOSE BLDC GLUCOMTR-MCNC: 148 MG/DL (ref 70–130)
GLUCOSE BLDC GLUCOMTR-MCNC: 237 MG/DL (ref 70–130)
GLUCOSE BLDC GLUCOMTR-MCNC: 356 MG/DL (ref 70–130)
GLUCOSE BLDC GLUCOMTR-MCNC: 399 MG/DL (ref 70–130)
GLUCOSE SERPL-MCNC: 211 MG/DL (ref 65–99)
HBA1C MFR BLD: 11.7 % (ref 4.8–5.6)
HCT VFR BLD AUTO: 39.5 % (ref 37.5–51)
HGB BLD-MCNC: 13.5 G/DL (ref 13–17.7)
IMM GRANULOCYTES # BLD AUTO: 0.02 10*3/MM3 (ref 0–0.05)
IMM GRANULOCYTES NFR BLD AUTO: 0.3 % (ref 0–0.5)
LYMPHOCYTES # BLD AUTO: 2.07 10*3/MM3 (ref 0.7–3.1)
LYMPHOCYTES NFR BLD AUTO: 35.1 % (ref 19.6–45.3)
MCH RBC QN AUTO: 31.8 PG (ref 26.6–33)
MCHC RBC AUTO-ENTMCNC: 34.2 G/DL (ref 31.5–35.7)
MCV RBC AUTO: 92.9 FL (ref 79–97)
MONOCYTES # BLD AUTO: 0.32 10*3/MM3 (ref 0.1–0.9)
MONOCYTES NFR BLD AUTO: 5.4 % (ref 5–12)
NEUTROPHILS NFR BLD AUTO: 3.25 10*3/MM3 (ref 1.7–7)
NEUTROPHILS NFR BLD AUTO: 55.3 % (ref 42.7–76)
NRBC BLD AUTO-RTO: 0 /100 WBC (ref 0–0.2)
PLATELET # BLD AUTO: 213 10*3/MM3 (ref 140–450)
PMV BLD AUTO: 10.1 FL (ref 6–12)
POTASSIUM SERPL-SCNC: 4.5 MMOL/L (ref 3.5–5.2)
PROT SERPL-MCNC: 6.4 G/DL (ref 6–8.5)
RBC # BLD AUTO: 4.25 10*6/MM3 (ref 4.14–5.8)
SODIUM SERPL-SCNC: 139 MMOL/L (ref 136–145)
WBC # BLD AUTO: 5.89 10*3/MM3 (ref 3.4–10.8)

## 2021-02-04 PROCEDURE — 83036 HEMOGLOBIN GLYCOSYLATED A1C: CPT | Performed by: NURSE PRACTITIONER

## 2021-02-04 PROCEDURE — 80053 COMPREHEN METABOLIC PANEL: CPT | Performed by: NURSE PRACTITIONER

## 2021-02-04 PROCEDURE — G0378 HOSPITAL OBSERVATION PER HR: HCPCS

## 2021-02-04 PROCEDURE — 99232 SBSQ HOSP IP/OBS MODERATE 35: CPT | Performed by: NURSE PRACTITIONER

## 2021-02-04 PROCEDURE — 93325 DOPPLER ECHO COLOR FLOW MAPG: CPT

## 2021-02-04 PROCEDURE — 63710000001 PREDNISONE PER 5 MG: Performed by: NURSE PRACTITIONER

## 2021-02-04 PROCEDURE — 92960 CARDIOVERSION ELECTRIC EXT: CPT

## 2021-02-04 PROCEDURE — 99233 SBSQ HOSP IP/OBS HIGH 50: CPT | Performed by: INTERNAL MEDICINE

## 2021-02-04 PROCEDURE — 93320 DOPPLER ECHO COMPLETE: CPT | Performed by: INTERNAL MEDICINE

## 2021-02-04 PROCEDURE — 25010000002 PROPOFOL 10 MG/ML EMULSION: Performed by: NURSE ANESTHETIST, CERTIFIED REGISTERED

## 2021-02-04 PROCEDURE — 94799 UNLISTED PULMONARY SVC/PX: CPT

## 2021-02-04 PROCEDURE — 93325 DOPPLER ECHO COLOR FLOW MAPG: CPT | Performed by: INTERNAL MEDICINE

## 2021-02-04 PROCEDURE — 85025 COMPLETE CBC W/AUTO DIFF WBC: CPT | Performed by: NURSE PRACTITIONER

## 2021-02-04 PROCEDURE — 5A2204Z RESTORATION OF CARDIAC RHYTHM, SINGLE: ICD-10-PCS | Performed by: INTERNAL MEDICINE

## 2021-02-04 PROCEDURE — B24BZZ4 ULTRASONOGRAPHY OF HEART WITH AORTA, TRANSESOPHAGEAL: ICD-10-PCS | Performed by: INTERNAL MEDICINE

## 2021-02-04 PROCEDURE — 25010000002 ENOXAPARIN PER 10 MG: Performed by: NURSE PRACTITIONER

## 2021-02-04 PROCEDURE — 93312 ECHO TRANSESOPHAGEAL: CPT | Performed by: INTERNAL MEDICINE

## 2021-02-04 PROCEDURE — 82962 GLUCOSE BLOOD TEST: CPT

## 2021-02-04 PROCEDURE — 93312 ECHO TRANSESOPHAGEAL: CPT

## 2021-02-04 PROCEDURE — 93005 ELECTROCARDIOGRAM TRACING: CPT | Performed by: INTERNAL MEDICINE

## 2021-02-04 PROCEDURE — 63710000001 INSULIN ASPART PER 5 UNITS: Performed by: NURSE PRACTITIONER

## 2021-02-04 PROCEDURE — 93320 DOPPLER ECHO COMPLETE: CPT

## 2021-02-04 RX ORDER — PROPOFOL 10 MG/ML
VIAL (ML) INTRAVENOUS AS NEEDED
Status: DISCONTINUED | OUTPATIENT
Start: 2021-02-04 | End: 2021-02-04 | Stop reason: SURG

## 2021-02-04 RX ORDER — GABAPENTIN 300 MG/1
300 CAPSULE ORAL NIGHTLY
COMMUNITY
End: 2021-03-01

## 2021-02-04 RX ORDER — LIDOCAINE HYDROCHLORIDE 20 MG/ML
INJECTION, SOLUTION INTRAVENOUS AS NEEDED
Status: DISCONTINUED | OUTPATIENT
Start: 2021-02-04 | End: 2021-02-04 | Stop reason: SURG

## 2021-02-04 RX ORDER — SODIUM CHLORIDE, SODIUM LACTATE, POTASSIUM CHLORIDE, CALCIUM CHLORIDE 600; 310; 30; 20 MG/100ML; MG/100ML; MG/100ML; MG/100ML
INJECTION, SOLUTION INTRAVENOUS CONTINUOUS PRN
Status: DISCONTINUED | OUTPATIENT
Start: 2021-02-04 | End: 2021-02-04 | Stop reason: SURG

## 2021-02-04 RX ORDER — SOTALOL HYDROCHLORIDE 80 MG/1
80 TABLET ORAL EVERY 12 HOURS
Status: DISCONTINUED | OUTPATIENT
Start: 2021-02-04 | End: 2021-02-06 | Stop reason: HOSPADM

## 2021-02-04 RX ADMIN — LIDOCAINE HYDROCHLORIDE 100 MG: 20 INJECTION, SOLUTION INTRAVENOUS at 09:46

## 2021-02-04 RX ADMIN — BUDESONIDE AND FORMOTEROL FUMARATE DIHYDRATE 2 PUFF: 160; 4.5 AEROSOL RESPIRATORY (INHALATION) at 12:45

## 2021-02-04 RX ADMIN — GABAPENTIN 300 MG: 300 CAPSULE ORAL at 21:22

## 2021-02-04 RX ADMIN — PROPOFOL 100 MG: 10 INJECTION, EMULSION INTRAVENOUS at 09:46

## 2021-02-04 RX ADMIN — SODIUM CHLORIDE, PRESERVATIVE FREE 10 ML: 5 INJECTION INTRAVENOUS at 21:22

## 2021-02-04 RX ADMIN — DILTIAZEM HYDROCHLORIDE 15 MG/HR: 100 INJECTION, POWDER, LYOPHILIZED, FOR SOLUTION INTRAVENOUS at 03:20

## 2021-02-04 RX ADMIN — SOTALOL HYDROCHLORIDE 80 MG: 80 TABLET ORAL at 18:20

## 2021-02-04 RX ADMIN — APIXABAN 5 MG: 5 TABLET, FILM COATED ORAL at 12:36

## 2021-02-04 RX ADMIN — SODIUM CHLORIDE, POTASSIUM CHLORIDE, SODIUM LACTATE AND CALCIUM CHLORIDE: 600; 310; 30; 20 INJECTION, SOLUTION INTRAVENOUS at 09:40

## 2021-02-04 RX ADMIN — INSULIN ASPART 8 UNITS: 100 INJECTION, SOLUTION INTRAVENOUS; SUBCUTANEOUS at 18:20

## 2021-02-04 RX ADMIN — ENOXAPARIN SODIUM 100 MG: 100 INJECTION SUBCUTANEOUS at 02:24

## 2021-02-04 RX ADMIN — LIDOCAINE HYDROCHLORIDE 2 ML: 20 JELLY TOPICAL at 09:45

## 2021-02-04 RX ADMIN — BUDESONIDE AND FORMOTEROL FUMARATE DIHYDRATE 2 PUFF: 160; 4.5 AEROSOL RESPIRATORY (INHALATION) at 20:58

## 2021-02-04 RX ADMIN — SODIUM CHLORIDE, PRESERVATIVE FREE 10 ML: 5 INJECTION INTRAVENOUS at 09:11

## 2021-02-04 RX ADMIN — INSULIN ASPART 8 UNITS: 100 INJECTION, SOLUTION INTRAVENOUS; SUBCUTANEOUS at 06:50

## 2021-02-04 RX ADMIN — APIXABAN 5 MG: 5 TABLET, FILM COATED ORAL at 21:22

## 2021-02-04 RX ADMIN — ATORVASTATIN CALCIUM 40 MG: 40 TABLET, FILM COATED ORAL at 10:44

## 2021-02-04 RX ADMIN — PROPOFOL 50 MG: 10 INJECTION, EMULSION INTRAVENOUS at 09:51

## 2021-02-04 RX ADMIN — PREDNISONE 5 MG: 5 TABLET ORAL at 09:11

## 2021-02-04 NOTE — PROGRESS NOTES
"     [facility] Cardiology IP Progress Note    Darnell Garcia  1960  [id number]  02/04/21    Subjective:   Mr. Darnell Garcia is a 60 y.o. male seen in follow-up for atrial fibrillation with rapid ventricular rates.  Remained in atrial fibrillation with ventricular rates in the 120s to 130s overnight.  Continues to report dyspnea, with no significant progression in symptoms.  Tolerated GENESIS with subsequent cardioversion this morning with restoration of sinus rhythm.  No other specific complaints at this time.    Review of Systems:   Review of Systems   Constitutional: Negative for chills, diaphoresis, fatigue and fever.   Respiratory: Positive for shortness of breath. Negative for cough, chest tightness and wheezing.    Cardiovascular: Negative for chest pain, palpitations and leg swelling.   Gastrointestinal: Negative for abdominal pain and GERD.   Neurological: Negative for dizziness, syncope, weakness and light-headedness.       I have reviewed and/or updated the patient's past medical, past surgical, family history, social history, problem list and allergies as appropriate in the chart.     Physical Exam:  Vital Signs:   Vitals:    02/04/21 1010 02/04/21 1106 02/04/21 1245 02/04/21 1410   BP: 93/62      BP Location:       Patient Position:       Pulse: 56 81 67    Resp: 16 18 18    Temp:  98 °F (36.7 °C)     TempSrc:  Oral     SpO2: 96% 97% 98%    Weight:       Height:    182.9 cm (72\")       Physical Exam  Vitals signs and nursing note reviewed.   Constitutional:       General: He is not in acute distress.     Appearance: Normal appearance. He is not diaphoretic.   HENT:      Head: Normocephalic and atraumatic.   Cardiovascular:      Rate and Rhythm: Normal rate and regular rhythm.      Heart sounds: No murmur.   Pulmonary:      Effort: Pulmonary effort is normal. No respiratory distress.      Breath sounds: Normal breath sounds. No stridor. No wheezing, rhonchi or rales.   Abdominal:      " General: Bowel sounds are normal. There is no distension.      Palpations: Abdomen is soft.      Tenderness: There is no abdominal tenderness. There is no guarding or rebound.   Musculoskeletal: Normal range of motion.         General: No swelling.   Skin:     General: Skin is warm and dry.   Neurological:      General: No focal deficit present.      Mental Status: He is alert and oriented to person, place, and time.   Psychiatric:         Mood and Affect: Mood normal.         Behavior: Behavior normal.         Results Review:   Results from last 7 days   Lab Units 02/04/21  0520   SODIUM mmol/L 139   POTASSIUM mmol/L 4.5   CHLORIDE mmol/L 101   CO2 mmol/L 25.8   BUN mg/dL 18   CREATININE mg/dL 1.01   CALCIUM mg/dL 9.1   BILIRUBIN mg/dL 0.8   ALK PHOS U/L 88   ALT (SGPT) U/L 8   AST (SGOT) U/L <5   GLUCOSE mg/dL 211*     Results from last 7 days   Lab Units 02/03/21  1324   TROPONIN T ng/mL <0.010     Results from last 7 days   Lab Units 02/04/21  0520 02/03/21  1324   WBC 10*3/mm3 5.89 5.45   HEMOGLOBIN g/dL 13.5 14.2   HEMATOCRIT % 39.5 40.6   PLATELETS 10*3/mm3 213 225         Results from last 7 days   Lab Units 02/03/21  1324   MAGNESIUM mg/dL 2.1           I personally viewed and interpreted the patient's EKG/Telemetry data     Medications:   )  Current Facility-Administered Medications:   •  acetaminophen (TYLENOL) tablet 650 mg, 650 mg, Oral, Q4H PRN, Nancy Gay, APRN, 650 mg at 02/03/21 1838  •  albuterol sulfate HFA (PROVENTIL HFA;VENTOLIN HFA;PROAIR HFA) inhaler 2 puff, 2 puff, Inhalation, Q4H PRN, Nancy Gay B, APRN  •  apixaban (ELIQUIS) tablet 5 mg, 5 mg, Oral, Q12H, Nancy Gay, APRN, 5 mg at 02/04/21 1236  •  atorvastatin (LIPITOR) tablet 40 mg, 40 mg, Oral, Daily, Nancy Gay, APRN, 40 mg at 02/04/21 1044  •  budesonide-formoterol (SYMBICORT) 160-4.5 MCG/ACT inhaler 2 puff, 2 puff, Inhalation, BID - RT, Naya Gan, , 2 puff at 02/04/21 1245  •  dextrose (D50W)  25 g/ 50mL Intravenous Solution 25 g, 25 g, Intravenous, Q15 Min PRN, Nancy Gay, APRN  •  dextrose (GLUTOSE) oral gel 1 tube, 1 tube, Oral, Q15 Min PRN, Nancy Gay, APRN  •  dilTIAZem (CARDIZEM) 100 mg in 100 mL NS infusion (ADV), 5-15 mg/hr, Intravenous, Continuous, Nancy Gay, APRN, Stopped at 02/04/21 1000  •  gabapentin (NEURONTIN) capsule 300 mg, 300 mg, Oral, Nightly, Neo Bruno MD, 300 mg at 02/03/21 2226  •  glucagon (human recombinant) (GLUCAGEN DIAGNOSTIC) injection 1 mg, 1 mg, Subcutaneous, PRN, Nancy Gay, APRN  •  insulin aspart (novoLOG) injection 0-9 Units, 0-9 Units, Subcutaneous, TID AC, Nancy Gay, APRN, 8 Units at 02/04/21 0650  •  melatonin tablet 5 mg, 5 mg, Oral, Nightly PRN, Nancy Gay, APRN  •  ondansetron (ZOFRAN) tablet 4 mg, 4 mg, Oral, Q6H PRN **OR** ondansetron (ZOFRAN) injection 4 mg, 4 mg, Intravenous, Q6H PRN, Nancy Gay, APRVEE  •  Pharmacy Consult, , Does not apply, Once PRN, Ravin Ríso MD  •  predniSONE (DELTASONE) tablet 5 mg, 5 mg, Oral, Daily, Nancy Gay, APRN, 5 mg at 02/04/21 0911  •  sodium chloride 0.9 % flush 10 mL, 10 mL, Intravenous, PRN, Lakhwinder Mar B, DO  •  sodium chloride 0.9 % flush 10 mL, 10 mL, Intravenous, Q12H, Nancy Gay, APRN, 10 mL at 02/04/21 0911  •  sodium chloride 0.9 % flush 10 mL, 10 mL, Intravenous, PRN, Nancy Gay, APRN  •  sodium chloride 0.9 % flush 10 mL, 10 mL, Intravenous, Q12H, Nancy Gay, APRN, 10 mL at 02/04/21 0911  •  sodium chloride 0.9 % flush 10 mL, 10 mL, Intravenous, PRN, Nancy Gay APRN  •  sotalol (BETAPACE) tablet 80 mg, 80 mg, Oral, Q12H, Ravin Ríos MD    Assessment / Plan:     1.  Atrial fibrillation with rapid ventricular rates  --Newly diagnosed atrial fibrillation, however onset unclear with the possibility of patient being in A. fib for approximately in 1 month  --Underwent GENESIS with successful cardioversion this  morning with restoration of sinus rhythm  --Will start sotalol loading for rhythm control, requiring 5 doses being given as inpatient with ECG monitoring (baseline ECG with QTC <450 ms)  --[id number] score is 2, now on Eliquis for CVA prophylaxis     2.  Coronary artery disease  --Known history of single-vessel coronary artery disease involving 30-50% stenosis in the mid LAD on [facility] in 2017  --MPS in 2019 with no evidence of inducible ischemia at that time  --No acute ischemic changes on ECG, and troponin remains within normal limits with no evidence of ACS  --Continue high intensity statin  --Not on aspirin given reported history of aspirin allergy     3. Dyslipidemia  --Continue high intensity statin     3.  Type 2 diabetes mellitus   --Management per primary service       Thank you for allowing me to participate in the care of your patient. Please to not hesitate to contact me with additional questions or concerns.     RANDY Ríos MD  Interventional Cardiology   02/04/21  16:49 EST

## 2021-02-04 NOTE — CONSULTS
"Adult Nutrition  Assessment/PES    Patient Name:  Darnell Garcia  YOB: 1960  MRN: [id number]  Admit Date:  2/3/2021    Assessment Date:  2/4/2021    Comments:    Recommend:  1. Consider adding consistent carbohydrate modification to current diet order as medically appropriate and tolerated.  2. Encourage PO intake. PO intake average ~100% x 1 meal.  3. Consider a multivitamin with minerals daily.     RD to follow pt and available PRN.      Reason for Assessment     Row Name 02/04/21 1405          Reason for Assessment    Reason For Assessment  diagnosis/disease state     Diagnosis  cardiac disease;pulmonary disease;diabetes diagnosis/complications Afib, Asthma, DM 2, CAD           Anthropometrics     Row Name 02/04/21 1410          Anthropometrics    Height  182.9 cm (72\")        Ideal Body Weight (IBW)    Ideal Body Weight (IBW) (kg)  82.07         Labs/Tests/Procedures/Meds     Row Name 02/04/21 1410          Labs/Procedures/Meds    Lab Results Reviewed  reviewed, pertinent     Lab Results Comments  High: Gluc, HgbA1c        Medications    Pertinent Medications Reviewed  reviewed, pertinent     Pertinent Medications Comments  Lipitor, Novolog         Physical Findings     Row Name 02/04/21 1410          Physical Findings    Overall Physical Appearance  overweight         Estimated/Assessed Needs     Row Name 02/04/21 1410          Calculation Measurements    Weight Used For Calculations  93.9 kg (207 lb 0.2 oz) Actual BW     Height  182.9 cm (72\")        Estimated/Assessed Needs    Additional Documentation  Calorie Requirements (Group);Protein Requirements (Group);Rush-St. Jeor Equation (Group);Fluid Requirements (Group)        Calorie Requirements    Estimated Calorie Need Method  Rush-St Mary     Estimated Calorie Requirement Comment  8551 - 0381        Rush-St. Jeor Equation    RMR (Rush-St. Jeor Equation)  1787     Rush-St. Mary Activity Factors  -- AF 1.3        Protein " Requirements    Weight Used For Protein Calculations  93.9 kg (207 lb 0.2 oz) Actual BW     Est Protein Requirement Amount (gms/kg)  1.0 gm protein 76 - 93 gm     Estimated Protein Requirements (gms/day)  93.9        Fluid Requirements    Estimated Fluid Requirement Method  Farooq-Segar Formula     Cora-Segar Method (over 20 kg)  3378         Nutrition Prescription Ordered     Row Name 02/04/21 1411          Nutrition Prescription PO    Current PO Diet  Regular     Common Modifiers  Cardiac         Evaluation of Received Nutrient/Fluid Intake     Row Name 02/04/21 1412          PO Evaluation    Number of Days PO Intake Evaluated  1 day     Number of Meals  1     % PO Intake  100               Problem/Interventions:  Problem 1     Row Name 02/04/21 1412          Nutrition Diagnoses Problem 1    Problem 1  Impaired Nutrient Utilization     Etiology (related to)  Medical Diagnosis     Endocrine  DM2     Signs/Symptoms (evidenced by)  Biochemical     Specific Labs Noted  HgbA1C               Intervention Goal     Row Name 02/04/21 1412          Intervention Goal    General  Meet nutritional needs for age/condition;Improved nutrition related lab(s)     PO  Meet estimated needs;Maintain intake;PO intake (%)     PO Intake %  -- 75 - 100%     Weight  Maintain weight         Nutrition Intervention     Row Name 02/04/21 1412          Nutrition Intervention    RD/Tech Action  Follow Tx progress;Encourage intake;Recommend/ordered     Recommended/Ordered  Diet         Nutrition Prescription     Row Name 02/04/21 1412          Nutrition Prescription PO    PO Prescription  Begin/change diet     Begin/Change Diet to  Regular     Common Modifiers  Cardiac;Consistent Carbohydrate     New PO Prescription Ordered?  No, recommended        Other Orders    Obtain Weight  Daily     Obtain Weight Ordered?  No, recommended     Supplement  Vitamin mineral supplement     Supplement Ordered?  No, recommended         Education/Evaluation      Row Name 02/04/21 1413          Education    Education  Will Instruct as appropriate        Monitor/Evaluation    Monitor  Per protocol;PO intake;I&O;Pertinent labs;Weight;Skin status           Electronically signed by:  Debbie Yee RD  02/04/21 14:14 EST

## 2021-02-04 NOTE — PROGRESS NOTES
Discharge Planning Assessment  [facility]     Patient Name: Darnell Garcia  MRN: [id number]  Today's Date: 2/4/2021    Admit Date: 2/3/2021    Discharge Needs Assessment     Row Name 02/04/21 1322       Living Environment    Lives With  spouse    Current Living Arrangements  home/apartment/condo    Primary Care Provided by  self    Provides Primary Care For  no one    Family Caregiver if Needed  spouse    Quality of Family Relationships  involved       Resource/Environmental Concerns    Resource/Environmental Concerns  none    Transportation Concerns  car, none       Transition Planning    Patient/Family Anticipates Transition to  home with family       Discharge Needs Assessment    Readmission Within the Last 30 Days  no previous admission in last 30 days    Equipment Currently Used at Home  none    Concerns to be Addressed  no discharge needs identified    Equipment Needed After Discharge  none    Provided Post Acute Provider List?  N/A    Provided Post Acute Provider Quality & Resource List?  N/A        Discharge Plan     Row Name 02/04/21 1341       Plan    Plan  Case Management spoke to pt regarding discharge plans  Confirmed address and phone number Independent with ADLS Did have some difficulty with a Medication but the doctor is changing  the medicine  .Has Living will scanned into the chart        Continued Care and Services - Admitted Since 2/3/2021    Coordination has not been started for this encounter.         Demographic Summary     Row Name 02/04/21 1320       General Information    Admission Type  observation    Referral Source  admission list    Reason for Consult  discharge planning        Functional Status     Row Name 02/04/21 1321       Functional Status    Usual Activity Tolerance  excellent       Functional Status, IADL    Medications  independent    Meal Preparation  independent    Housekeeping  independent    Laundry  independent    Shopping  independent       Mental Status    General  Appearance WDL  WDL        Psychosocial    No documentation.       Abuse/Neglect    No documentation.       Legal     Row Name 02/04/21 1686       Financial/Legal    Finance Comments  had difficulty with one of his medicines but the medication is being changed by cardiology        Substance Abuse    No documentation.       Patient Forms    No documentation.           Apurva Espino RN

## 2021-02-04 NOTE — PLAN OF CARE
Goal Outcome Evaluation:  Plan of Care Reviewed With: patient  Progress: no change   Pt on cardizem drip. Plan for GENESIS and cardioversion today, NPO since midnight. No complaints. Will continue to monitor.

## 2021-02-04 NOTE — ANESTHESIA POSTPROCEDURE EVALUATION
Patient: Darnell Garcia    Procedure Summary     Date: 02/04/21 Room / Location: [facility] CATH LAB    Anesthesia Start: 0940 Anesthesia Stop: 0957    Procedures:       ADULT TRANSESOPHAGEAL ECHO (GENESIS) W/ CONT IF NECESSARY PER PROTOCOL      CARDIOVERSION EXTERNAL IN CARDIOLOGY DEPARTMENT Diagnosis:       (Arrhythmia)      (A fib with RVR)    Scheduled Providers: Ravin Ríos MD Provider: Chucho Gómez CRNA    Anesthesia Type: MAC ASA Status: 2          Anesthesia Type: MAC    Vitals  Vitals Value Taken Time   BP 90/61 02/04/21 1130   Temp 98 °F (36.7 °C) 02/04/21 1106   Pulse 54 02/04/21 1134   Resp 18 02/04/21 1106   SpO2 94 % 02/04/21 1134   Vitals shown include unvalidated device data.        Post Anesthesia Care and Evaluation    Patient location during evaluation: bedside  Patient participation: complete - patient participated  Level of consciousness: awake and alert  Pain score: 0  Pain management: satisfactory to patient  Airway patency: patent  Anesthetic complications: No anesthetic complications  PONV Status: none  Cardiovascular status: acceptable and hemodynamically stable  Respiratory status: acceptable  Hydration status: acceptable    Comments: Hand off to cath lab LANI [facility].    No anesthesia care post op

## 2021-02-04 NOTE — ANESTHESIA PREPROCEDURE EVALUATION
Anesthesia Evaluation     Patient summary reviewed and Nursing notes reviewed   NPO Solid Status: > 8 hours  NPO Liquid Status: > 8 hours           Airway   Mallampati: I  TM distance: >3 FB  Neck ROM: full  No difficulty expected  Dental - normal exam     Pulmonary - normal exam   (+) asthma,  Cardiovascular - normal exam    (+) CAD, dysrhythmias Atrial Fib, Tachycardia, hyperlipidemia,       Neuro/Psych- negative ROS  GI/Hepatic/Renal/Endo    (+)  GERD,  diabetes mellitus type 2,     Musculoskeletal     (+) neck pain,   Abdominal  - normal exam    Bowel sounds: normal.   Substance History - negative use     OB/GYN negative ob/gyn ROS         Other   arthritis,                      Anesthesia Plan    ASA 2     MAC     intravenous induction     Anesthetic plan, all risks, benefits, and alternatives have been provided, discussed and informed consent has been obtained with: patient.    Plan discussed with attending.

## 2021-02-04 NOTE — PROGRESS NOTES
[facility]    Name:  Darnell Garcia   Age:  60 y.o.  Sex:  male  :  1960  MRN:  [id number]   Visit Number:  [id number]  Admission Date:  2/3/2021  Date Of Service:  21  Primary Care Physician:  Carlos Bella MD     LOS: 0 days :  Patient Care Team:  Carlos Bella MD as PCP - General (Internal Medicine)  Radha Klein RN as Ambulatory  ([facility]):    Chief Complaint:      Atrial Fibrillation with RVR/ Diabetes    Subjective / Interval History:     Patient was seen and evaluated today after being admitted for Atrial Fibrillation with RVR of which the patient was asymptomatic.  Patient has chest tightness and shortness of breath at baseline due to chronic asthma.  Patient states he feels about the same this morning after getting a GENESIS and cardioversion by Dr. Ríos.  On evaluation patient is eating a Quarter Pounder and large fries from [employer].  Patient tells me that he needs a glucometer at discharge.    Review of Systems:     General ROS: Patient denies any fevers, chills or loss of consciousness.  Respiratory ROS: + cough or shortness of breath.  Cardiovascular ROS: + chest tightness.  No history of exertional chest pain.  Gastrointestinal ROS: Denies nausea and vomiting. Denies any abdominal pain. No diarrhea.  Neurological ROS: Denies any focal weakness. No loss of consciousness. Denies any numbness.  Dermatological ROS: Denies any redness or pruritis.    Vital Signs:    Temp:  [97.6 °F (36.4 °C)-98.5 °F (36.9 °C)] 98 °F (36.7 °C)  Heart Rate:  [] 67  Resp:  [16-20] 18  BP: ()/(55-98) 93/62    Intake and output:    I/O last 3 completed shifts:  In: -   Out: 1475 [Urine:1475]  I/O this shift:  In: 250 [I.V.:250]  Out: 800 [Urine:800]    Physical Examination:    General Appearance:  Alert and cooperative, not in any acute distress, resting in bed with wife at bedside.   Head:  Atraumatic and normocephalic, without obvious  abnormality.   Eyes:          Conjunctivae and sclerae normal, no Icterus. No pallor. Extraocular movements are within normal limits.   Neck: Supple, trachea midline   Lungs:   Chest shape is normal. Breath sounds heard bilaterally equally.  No crackles or wheezing. No pleural rub or bronchial breathing.   Heart:  Normal S1 and S2, no murmur, no gallop, no rub. No JVD   Abdomen:   Normal bowel sounds, no masses, no organomegaly. Soft, nontender, nondistended, no guarding, no rebound tenderness.   Extremities: Moves all extremities, no edema, no cyanosis, no clubbing.   Skin: No bleeding, bruising or rash.   Neurologic: Awake, alert and oriented times 3. Moves all 4 extremities equally.     Laboratory results:    Results from last 7 days   Lab Units 02/04/21  0520 02/03/21  1324   SODIUM mmol/L 139 133*   POTASSIUM mmol/L 4.5 4.7   CHLORIDE mmol/L 101 97*   CO2 mmol/L 25.8 24.1   BUN mg/dL 18 14   CREATININE mg/dL 1.01 1.02   CALCIUM mg/dL 9.1 9.4   BILIRUBIN mg/dL 0.8 1.1   ALK PHOS U/L 88 96   ALT (SGPT) U/L 8 9   AST (SGOT) U/L <5 10   GLUCOSE mg/dL 211* 330*     Results from last 7 days   Lab Units 02/04/21  0520 02/03/21  1324   WBC 10*3/mm3 5.89 5.45   HEMOGLOBIN g/dL 13.5 14.2   HEMATOCRIT % 39.5 40.6   PLATELETS 10*3/mm3 213 225         Results from last 7 days   Lab Units 02/03/21  1324   TROPONIN T ng/mL <0.010               I have reviewed the patient's laboratory results.    Radiology results:    Imaging Results (Last 24 Hours)     Procedure Component Value Units Date/Time    XR Chest 1 View [[id number]] Collected: 02/03/21 1337     Updated: 02/03/21 1339    Narrative:      PROCEDURE: XR CHEST 1 VW-     HISTORY: Chest Pain Triage Protocol     COMPARISON: None.     FINDINGS: The heart is normal in size. The mediastinum is unremarkable.  The lungs are clear. There is no pneumothorax.  There are no acute  osseous abnormalities.       Impression:      No acute cardiopulmonary process.     Continued followup is  recommended.     This report was finalized on 2/3/2021 1:37 PM by Walter Harrison M.D..          I have reviewed the patient's radiology reports.    Medication Review:     I have reviewed the patient's active and prn medications.     Problem List:      Type 2 diabetes mellitus, without long-term current use of insulin (CMS/[facility])    Coronary artery disease    Uncomplicated asthma    Atrial fibrillation with RVR (CMS/[facility])    Long term current use of systemic steroids      Assessment:  Atrial Fibrillation with RVR, present on admission  Asthma  Chronic Steroid Use  Diabetes Mellitus      Plan:    Admitted to the Hospitalist service yesterday with cardiology consulting.  Dr. Ríos advised post-procedure that patient would be started on Sotalol which requires a couple days of hospitalization for monitoring.  Lovenox transitioned to Eliquis.  Diabetes Educator consulted for teaching and glucometer.  Patient's A1C noted to be 11.7.  TSH and Magnesium noted to be WNL.  Continue supportive care as ordered monitoring vital signs, pain, and lab work as warranted.  Likely stable for discharge Saturday after initiation of Sotalol.      Nancy Gay, APRN  02/04/21  12:51 EST    Dictated utilizing Dragon dictation.

## 2021-02-04 NOTE — PLAN OF CARE
"Goal Outcome Evaluation:  Plan of Care Reviewed With: patient, spouse  Progress: improving   \"Carlos Manuel\" was cadioverted after GENESIS. He is resting well in room. Medications changed that require 12 lead EKG after first doses so he will be staying until at least 2-3rd dose. VSS. SR/SA with PAC's and BBB pattern. Denies pain at this time. Wife at bedside today.  "

## 2021-02-04 NOTE — PLAN OF CARE
Goal Outcome Evaluation:         Remains in A-fib with RVR. Continues on Cardizem drip. Alert and oriented x4. Able to ambulate to bathroom with stand-by-assist.

## 2021-02-05 LAB
GLUCOSE BLDC GLUCOMTR-MCNC: 170 MG/DL (ref 70–130)
GLUCOSE BLDC GLUCOMTR-MCNC: 213 MG/DL (ref 70–130)
GLUCOSE BLDC GLUCOMTR-MCNC: 327 MG/DL (ref 70–130)
GLUCOSE BLDC GLUCOMTR-MCNC: 347 MG/DL (ref 70–130)
POTASSIUM SERPL-SCNC: 4.3 MMOL/L (ref 3.5–5.2)
QT INTERVAL: 370 MS
QT INTERVAL: 436 MS
QT INTERVAL: 454 MS
QT INTERVAL: 470 MS
QTC INTERVAL: 441 MS
QTC INTERVAL: 449 MS
QTC INTERVAL: 467 MS
QTC INTERVAL: 509 MS

## 2021-02-05 PROCEDURE — 84132 ASSAY OF SERUM POTASSIUM: CPT | Performed by: INTERNAL MEDICINE

## 2021-02-05 PROCEDURE — 25010000002 ONDANSETRON PER 1 MG: Performed by: NURSE PRACTITIONER

## 2021-02-05 PROCEDURE — 94799 UNLISTED PULMONARY SVC/PX: CPT

## 2021-02-05 PROCEDURE — 99232 SBSQ HOSP IP/OBS MODERATE 35: CPT | Performed by: NURSE PRACTITIONER

## 2021-02-05 PROCEDURE — 93005 ELECTROCARDIOGRAM TRACING: CPT | Performed by: INTERNAL MEDICINE

## 2021-02-05 PROCEDURE — 63710000001 INSULIN DETEMIR PER 5 UNITS: Performed by: NURSE PRACTITIONER

## 2021-02-05 PROCEDURE — 99233 SBSQ HOSP IP/OBS HIGH 50: CPT | Performed by: INTERNAL MEDICINE

## 2021-02-05 PROCEDURE — 63710000001 INSULIN ASPART PER 5 UNITS: Performed by: NURSE PRACTITIONER

## 2021-02-05 PROCEDURE — 82962 GLUCOSE BLOOD TEST: CPT

## 2021-02-05 PROCEDURE — 63710000001 PREDNISONE PER 5 MG: Performed by: NURSE PRACTITIONER

## 2021-02-05 RX ADMIN — ACETAMINOPHEN 650 MG: 325 TABLET, FILM COATED ORAL at 01:17

## 2021-02-05 RX ADMIN — APIXABAN 5 MG: 5 TABLET, FILM COATED ORAL at 20:35

## 2021-02-05 RX ADMIN — BUDESONIDE AND FORMOTEROL FUMARATE DIHYDRATE 2 PUFF: 160; 4.5 AEROSOL RESPIRATORY (INHALATION) at 07:23

## 2021-02-05 RX ADMIN — INSULIN DETEMIR 10 UNITS: 100 INJECTION, SOLUTION SUBCUTANEOUS at 20:37

## 2021-02-05 RX ADMIN — INSULIN ASPART 4 UNITS: 100 INJECTION, SOLUTION INTRAVENOUS; SUBCUTANEOUS at 07:35

## 2021-02-05 RX ADMIN — INSULIN ASPART 7 UNITS: 100 INJECTION, SOLUTION INTRAVENOUS; SUBCUTANEOUS at 11:15

## 2021-02-05 RX ADMIN — SOTALOL HYDROCHLORIDE 80 MG: 80 TABLET ORAL at 06:37

## 2021-02-05 RX ADMIN — BUDESONIDE AND FORMOTEROL FUMARATE DIHYDRATE 2 PUFF: 160; 4.5 AEROSOL RESPIRATORY (INHALATION) at 20:15

## 2021-02-05 RX ADMIN — SODIUM CHLORIDE, PRESERVATIVE FREE 10 ML: 5 INJECTION INTRAVENOUS at 09:03

## 2021-02-05 RX ADMIN — ONDANSETRON 4 MG: 2 INJECTION INTRAMUSCULAR; INTRAVENOUS at 11:15

## 2021-02-05 RX ADMIN — SODIUM CHLORIDE, PRESERVATIVE FREE 10 ML: 5 INJECTION INTRAVENOUS at 20:37

## 2021-02-05 RX ADMIN — INSULIN ASPART 2 UNITS: 100 INJECTION, SOLUTION INTRAVENOUS; SUBCUTANEOUS at 17:38

## 2021-02-05 RX ADMIN — GABAPENTIN 300 MG: 300 CAPSULE ORAL at 20:35

## 2021-02-05 RX ADMIN — APIXABAN 5 MG: 5 TABLET, FILM COATED ORAL at 09:03

## 2021-02-05 RX ADMIN — ACETAMINOPHEN 650 MG: 325 TABLET, FILM COATED ORAL at 20:44

## 2021-02-05 RX ADMIN — PREDNISONE 5 MG: 5 TABLET ORAL at 09:03

## 2021-02-05 RX ADMIN — ATORVASTATIN CALCIUM 40 MG: 40 TABLET, FILM COATED ORAL at 09:03

## 2021-02-05 RX ADMIN — SOTALOL HYDROCHLORIDE 80 MG: 80 TABLET ORAL at 17:38

## 2021-02-05 RX ADMIN — SODIUM CHLORIDE, PRESERVATIVE FREE 10 ML: 5 INJECTION INTRAVENOUS at 20:36

## 2021-02-05 NOTE — PAYOR COMM NOTE
"  TO:BC  FROM:KEVIN JOHNSON, RN PHONE [phone]  IN CLINICALS REF# [id number]    Darnell Padgett (60 y.o. Male)     Date of Birth Social Security Number Address Home Phone MRN    1960  [address] [place] [address] [phone] [id number]    Taoist Marital Status          None        Admission Date Admission Type Admitting Provider Attending Provider Department, Room/Bed    2/3/21 Emergency Naya aGn DO Karrick, Shandy Marcus, DO [facility] MED SURG  3, 330/1    Discharge Date Discharge Disposition Discharge Destination                       Attending Provider: Naya Gan DO    Allergies: Aspirin    Isolation: None   Infection: None   Code Status: CPR    Ht: 182.9 cm (72\")   Wt: 93.9 kg (207 lb 0.2 oz)    Admission Cmt: None   Principal Problem: None                Active Insurance as of 2/3/2021     Primary Coverage     Payor Plan Insurance Group Employer/Plan Group    [employer]HEM BLUE CROSS ANTHEM BLUE CROSS BLUE SHIELD PPO [id number]     Payor Plan Address Payor Plan Phone Number Payor Plan Fax Number Effective Dates    [address] [phone]  2/1/2021 - None Entered    [place] [address]       Subscriber Name Subscriber Birth Date Member ID       DARNELL PADGETT 1960 [id number]                 Emergency Contacts      (Rel.) Home Phone Work Phone Mobile Phone    Naz Padgett (Spouse) [phone] -- --    MANOLO PADGETT (Mother) [phone] -- --               History & Physical      Nancy Gay APRN at 02/03/21 1603     Attestation signed by Naya Gan DO at 02/03/21 7982    I have reviewed the attached note including labs, medications, radiology testing. I agree with the assessment and treatment plan as described by extender provider.  Please see the above note for further details.                       [facility] HOSPITALIST   HISTORY AND PHYSICAL      Name:  Darnell Padgett   Age: "  60 y.o.  Sex:  male  :  1960  MRN:  [id number]   Visit Number:  [id number]  Admission Date:  2/3/2021  Date Of Service:  21  Primary Care Physician:  Carlos Bella MD    Chief Complaint:     Sent by PCP for abnormal EKG    History Of Presenting Illness:      Patient is a 60 year old male who presented to the ED today after going to a follow-up appointment with his PCP for medication refills.  He was noted to have tachycardia and a subsequent EKG found patient to be in Atrial Fibrillation with RVR.  He was sent to the ED for further workup.  Patient states that he is always somewhat short of breath due to asthma and also has chest tightness routinely.  Patient does state that he has had increased chest pressure from his usual over the past few weeks.  Patient also relays a cough worse than usual asthma cough with clear sputum.  Patient denies any feeling of palpitations or heart racing.  Patient tells me that he has seen a cardiologist in the past at [facility] [place] and was told he had CAD but was not necessary to have a stent placed at time of evaluation.  He was started on Plavix at that time.  Patient is unsure how long his heart has been out of rhythm due to underlying asthma symptoms at baseline.  Patient with health history significant for asthma (which he is on chronic steroids for, and has been for at least 20 years), Diabetes Mellitus (which he tells me is steroid induced) and neuropathy from previous ankle surgeries.  Patient is a nonsmoker and drinks alcohol occasionally on the weekends.   with grown children.  His wife is present at the bedside.    Work up in the ED significant for EKG noted with Atrial Fibrillation with RVR at rate of 139.   CBC is unremarkable.  CMP noted glucose-330.  CXR with no acute processes noted.  Patient was placed on Cardizem drip and given a dose of Digoxin IV.  Full Anticoagulation with Lovenox 100mg.  Hospitalist service to admit patient with Cardiology  following.  Dr. Ríos has been consulted in the ED.     Review Of Systems:     General ROS: Patient denies any fevers, chills or loss of consciousness. Complains of generalized weakness.   Psychological ROS: No history of any hallucinations and delusions.  Ophthalmic ROS: No history of any diplopia or transient loss of vision.  ENT ROS: No history of sore throat, nasal congestion or ear pain.   Allergy and Immunology ROS: No history of rash or itching.  Hematological and Lymphatic ROS: No history of neck swelling or easy bleeding.  Endocrine ROS: No history of any recent unintentional weight gain or loss.  Respiratory ROS: + cough or shortness of breath.   Cardiovascular ROS: + chest pain/ pressure    Gastrointestinal ROS: No history of nausea and vomiting. Denies any abdominal pain. No diarrhea.   GenitoUrinary ROS: No history of dysuria or hematuria.  Musculoskeletal ROS: No muscle pain. No calf pain. Complains of chronic back pain.   Neurological ROS: No history of any focal weakness. No loss of consciousness. Denies any numbness.  Dermatological ROS: No history of any redness or pruritis.     Past Medical History:  Reviewed    Past Medical History:   Diagnosis Date   • Arthritis    • Asthma    • Diabetes mellitus (CMS/HCC)    • GERD (gastroesophageal reflux disease)    • History of varicella    • Neuropathy        Past Surgical history:  Reviewed    Past Surgical History:   Procedure Laterality Date   • NASAL SEPTUM SURGERY  2009   • VASECTOMY  1988   • VENTRAL HERNIA REPAIR      2007 and 2009 (mesh placed)   • WISDOM TOOTH EXTRACTION         Social History:  Reviewed    Social History     Socioeconomic History   • Marital status:      Spouse name: Not on file   • Number of children: Not on file   • Years of education: Not on file   • Highest education level: Not on file   Occupational History   • Occupation:    Tobacco Use   • Smoking status: Never Smoker   • Smokeless tobacco: Never Used    Substance and Sexual Activity   • Alcohol use: Yes     Alcohol/week: 10.0 standard drinks     Types: 10 Cans of beer per week     Comment: one day every other weekend   • Drug use: No   • Sexual activity: Never     Partners: Female       Family History:  Reviewed    Family History   Problem Relation Age of Onset   • Breast cancer Mother    • Osteoarthritis Mother    • Arthritis Mother    • Coronary artery disease Father    • Heart attack Father         late 60's   • Lymphoma Son    • Leukemia Son        Allergies:  Reviewed    Aspirin    Home Medications:    Prior to Admission Medications     Prescriptions Last Dose Informant Patient Reported? Taking?    ALBUTEROL SULFATE  (90 Base) MCG/ACT inhaler   No No    INHALE TWO PUFFS BY MOUTH EVERY 4 HOURS AS NEEDED FOR WHEEZING    atorvastatin (LIPITOR) 40 MG tablet   No No    Take 1 tablet by mouth Daily.    celecoxib (CeleBREX) 200 MG capsule   No No    Take 1 capsule by mouth Daily.    clopidogrel (PLAVIX) 75 MG tablet   No No    Take 1 tablet by mouth Daily.    Empagliflozin (Jardiance) 25 MG tablet   No No    Take 25 mg by mouth Daily.    Fluticasone Furoate-Vilanterol (BREO ELLIPTA) 100-25 MCG/INH inhaler   No No    Inhale 1 puff by mouth once daily    glimepiride (AMARYL) 4 MG tablet   No No    Take 1 tablet by mouth Every Morning Before Breakfast.    metFORMIN (GLUCOPHAGE) 1000 MG tablet   No No    Take 1 tablet by mouth 2 (Two) Times a Day With Meals.    montelukast (SINGULAIR) 10 MG tablet   No No    Take 1 tablet by mouth every night at bedtime.    nitroglycerin (NITROSTAT) 0.4 MG SL tablet   No No    Place 1 tablet under the tongue Every FIVE Minutes As Needed for Chest Pain. Take no more than 3 doses in 15 minutes.    predniSONE (DELTASONE) 5 MG tablet   No No    TAKE ONE TABLET BY MOUTH EVERY DAY             ED Medications:    Medications   sodium chloride 0.9 % flush 10 mL (has no administration in time range)   dilTIAZem (CARDIZEM) 100 mg in 100 mL  NS infusion (ADV) (15 mg/hr Intravenous Rate/Dose Change 2/3/21 1529)   dilTIAZem (CARDIZEM) bolus from bag 1 mg/mL 10 mg (10 mg Intravenous Bolus from Bag 2/3/21 1350)   sodium chloride 0.9 % bolus 1,000 mL (1,000 mL Intravenous New Bag 2/3/21 1443)   digoxin (LANOXIN) injection 125 mcg (125 mcg Intravenous Given 2/3/21 1458)   enoxaparin (LOVENOX) syringe 100 mg (100 mg Subcutaneous Given 2/3/21 1500)       Vital Signs:    Temp:  [98.4 °F (36.9 °C)-98.5 °F (36.9 °C)] 98.5 °F (36.9 °C)  Heart Rate:  [137-141] 137  Resp:  [16] 16  BP: (106-124)/(84-96) 122/96        02/03/21  1317   Weight: 96.2 kg (212 lb)       Body mass index is 28.75 kg/m².    Physical Exam:    General Appearance:  Alert and cooperative, not in any acute distress, resting in bed with wife present.   Head:  Atraumatic and normocephalic, without obvious abnormality.   Eyes:          Conjunctivae and sclerae normal, no Icterus. No pallor. Extraocular movements are within normal limits.   Ears:  Ears appear intact with no abnormalities noted.   Throat: No oral lesions, no thrush, oral mucosa moist.   Neck: Supple, trachea midline   Back:   No kyphoscoliosis present. No tenderness to palpation,   range of motion normal.   Lungs:   Chest shape is normal. Breath sounds heard bilaterally equally.  No crackles or wheezing. No Pleural rub or bronchial breathing.   Heart:  Tachycardia noted, Irregular rhythm, no murmur, no gallop, no rub. No JVD.   Abdomen:   Normal bowel sounds, no masses, no organomegaly. Soft, nontender, nondistended, no guarding, no rebound tenderness.   Extremities: Moves all extremities, no edema, no cyanosis, no clubbing.   Pulses: Pulses palpable and equal bilaterally.   Skin: No bleeding, bruising or rash.   Neurologic: Alert and oriented x 3. Moves all four limbs equally. No tremors. No facial asymmetry.     Laboratory data:    I have reviewed the labs done in the emergency room.    Results from last 7 days   Lab Units  02/03/21  1324   SODIUM mmol/L 133*   POTASSIUM mmol/L 4.7   CHLORIDE mmol/L 97*   CO2 mmol/L 24.1   BUN mg/dL 14   CREATININE mg/dL 1.02   CALCIUM mg/dL 9.4   BILIRUBIN mg/dL 1.1   ALK PHOS U/L 96   ALT (SGPT) U/L 9   AST (SGOT) U/L 10   GLUCOSE mg/dL 330*     Results from last 7 days   Lab Units 02/03/21  1324   WBC 10*3/mm3 5.45   HEMOGLOBIN g/dL 14.2   HEMATOCRIT % 40.6   PLATELETS 10*3/mm3 225         Results from last 7 days   Lab Units 02/03/21  1324   TROPONIN T ng/mL <0.010                           Invalid input(s): USDES,  BLOODU, NITRITITE, BACT, EP  Pain Management Panel     Pain Management Panel 6/15/2017    CREATININE               EKG:      Atrial Fibrillation with RVR at rate of 139    Radiology:    Imaging Results (Last 72 Hours)     Procedure Component Value Units Date/Time    XR Chest 1 View [[id number]] Collected: 02/03/21 1337     Updated: 02/03/21 1339    Narrative:      PROCEDURE: XR CHEST 1 VW-     HISTORY: Chest Pain Triage Protocol     COMPARISON: None.     FINDINGS: The heart is normal in size. The mediastinum is unremarkable.  The lungs are clear. There is no pneumothorax.  There are no acute  osseous abnormalities.       Impression:      No acute cardiopulmonary process.     Continued followup is recommended.     This report was finalized on 2/3/2021 1:37 PM by Walter Harrison M.D..          Assessment:    Atrial Fibrillation with RVR, present on admission  Asthma  Chronic Steroid Use  Diabetes Mellitus      Plan:    Admit patient to the Hospitalist service with Cardiology consulting.  Continue patient on Cardizem drip on admission.  Continue full anticoagulation with Lovenox 100mg daily.  NPO after midnight.  Added on Magnesium and TSH labs today.  Will recheck basic labs tomorrow.  Patient and spouse updated on plan of care and agreeable.  Monitor vital signs, pain, and provide supportive care as ordered.  Telemetry monitoring.  Hold antidiabetics, check A1C, sliding scale  for coverage with addition of long acting tomorrow if needed.        Advance Care Planning   ACP discussion was held with the patient during this visit. Patient has an advance directive in EMR which is still valid.       KEITH Wylie  02/03/21  16:03 EST    Dictated utilizing Dragon dictation.      Electronically signed by Naya Gan DO at 02/03/21 1754         Current Facility-Administered Medications   Medication Dose Route Frequency Provider Last Rate Last Admin   • acetaminophen (TYLENOL) tablet 650 mg  650 mg Oral Q4H PRN Nancy Gay APRN   650 mg at 02/05/21 0117   • albuterol sulfate HFA (PROVENTIL HFA;VENTOLIN HFA;PROAIR HFA) inhaler 2 puff  2 puff Inhalation Q4H PRN Nancy Gay APRN       • apixaban (ELIQUIS) tablet 5 mg  5 mg Oral Q12H Nancy Gay APRN   5 mg at 02/05/21 0903   • atorvastatin (LIPITOR) tablet 40 mg  40 mg Oral Daily Nancy Gay APRN   40 mg at 02/05/21 0903   • budesonide-formoterol (SYMBICORT) 160-4.5 MCG/ACT inhaler 2 puff  2 puff Inhalation BID - RT Naya Gan DO   2 puff at 02/05/21 0723   • dextrose (D50W) 25 g/ 50mL Intravenous Solution 25 g  25 g Intravenous Q15 Min PRN Nancy Gay APRN       • dextrose (GLUTOSE) oral gel 1 tube  1 tube Oral Q15 Min PRN Nancy Gay APRN       • gabapentin (NEURONTIN) capsule 300 mg  300 mg Oral Nightly Neo Bruno MD   300 mg at 02/04/21 2122   • glucagon (human recombinant) (GLUCAGEN DIAGNOSTIC) injection 1 mg  1 mg Subcutaneous PRN Nancy Gay APRN       • insulin aspart (novoLOG) injection 0-9 Units  0-9 Units Subcutaneous TID AC Nancy Gay APRN   7 Units at 02/05/21 1115   • insulin detemir (LEVEMIR) injection 10 Units  10 Units Subcutaneous Nightly Nancy Gay APRN       • melatonin tablet 5 mg  5 mg Oral Nightly PRN Nancy Gay APRN       • ondansetron (ZOFRAN) tablet 4 mg  4 mg Oral Q6H PRN Nancy Gay, KEITH        Or   •  ondansetron (ZOFRAN) injection 4 mg  4 mg Intravenous Q6H PRN Nancy Gay B, APRN   4 mg at 02/05/21 1115   • Pharmacy Consult   Does not apply Once PRN Ravin Ríos MD       • predniSONE (DELTASONE) tablet 5 mg  5 mg Oral Daily Victor Hugo, Nancy B, APRN   5 mg at 02/05/21 0903   • sodium chloride 0.9 % flush 10 mL  10 mL Intravenous PRN Lakhwinder Mar, DO       • sodium chloride 0.9 % flush 10 mL  10 mL Intravenous Q12H Victor Hugo, Nancy B, APRN   10 mL at 02/05/21 0903   • sodium chloride 0.9 % flush 10 mL  10 mL Intravenous PRN Victor Hugo, Nancy B, APRN       • sodium chloride 0.9 % flush 10 mL  10 mL Intravenous Q12H Victor Hugo, Nancy B, APRN   10 mL at 02/05/21 0903   • sodium chloride 0.9 % flush 10 mL  10 mL Intravenous PRN Victor Hugo Nancy B, APRN       • sotalol (BETAPACE) tablet 80 mg  80 mg Oral Q12H Ravin Ríos MD   80 mg at 02/05/21 0637       Lab Results (last 24 hours)     Procedure Component Value Units Date/Time    POC Glucose Once [[id number]]  (Abnormal) Collected: 02/05/21 1105    Specimen: Blood Updated: 02/05/21 1111     Glucose 327 mg/dL      Comment: Serial Number: [id number]:  [id number]       POC Glucose Once [[id number]]  (Abnormal) Collected: 02/05/21 0650    Specimen: Blood Updated: 02/05/21 0713     Glucose 213 mg/dL      Comment: Serial Number: [id number]:  [id number]       Potassium [[id number]]  (Normal) Collected: 02/05/21 0521    Specimen: Blood Updated: 02/05/21 0556     Potassium 4.3 mmol/L     POC Glucose Once [[id number]]  (Abnormal) Collected: 02/04/21 2127    Specimen: Blood Updated: 02/04/21 2131     Glucose 237 mg/dL      Comment: Serial Number: [id number]:  [id number]       POC Glucose Once [[id number]]  (Abnormal) Collected: 02/04/21 1641    Specimen: Blood Updated: 02/04/21 1652     Glucose 356 mg/dL      Comment: Serial Number: [id number]:  [id number]           ECG/EMG Results (last 24 hours)     Procedure Component Value Units Date/Time    ECG 12  Lead [[id number]] Collected: 02/04/21 2127     Updated: 02/04/21 2129     QT Interval 454 ms      QTC Interval 449 ms     Narrative:      Test Reason : monitor QT/QTC after Sotolol  Blood Pressure : **/** mmHG  Vent. Rate : 059 BPM     Atrial Rate : 059 BPM     P-R Int : 188 ms          QRS Dur : 136 ms      QT Int : 454 ms       P-R-T Axes : 027 169 012 degrees     QTc Int : 449 ms    Sinus bradycardia  Right bundle branch block  Possible Inferior infarct , age undetermined  Abnormal ECG  When compared with ECG of 04-FEB-2021 12:32, (Unconfirmed)  Nonspecific T wave abnormality now evident in Lateral leads    Referred By:  HYACINTH           Confirmed By:     ECG 12 Lead [[id number]] Collected: 02/05/21 0950     Updated: 02/05/21 0952     QT Interval 470 ms      QTC Interval 441 ms     Narrative:      Test Reason : sotalol monitoring  Blood Pressure : **/** mmHG  Vent. Rate : 053 BPM     Atrial Rate : 053 BPM     P-R Int : 190 ms          QRS Dur : 132 ms      QT Int : 470 ms       P-R-T Axes : 033 107 022 degrees     QTc Int : 441 ms    Sinus bradycardia  Right bundle branch block  Abnormal ECG  When compared with ECG of 04-FEB-2021 21:27, (Unconfirmed)  Borderline criteria for Inferior infarct are no longer present    Referred By:             Confirmed By:     ECG 12 Lead [[id number]] Collected: 02/04/21 1229     Updated: 02/05/21 1243     QT Interval 436 ms      QTC Interval 467 ms     Narrative:      Test Reason : s/p cardioversion  Blood Pressure : **/** mmHG  Vent. Rate : 069 BPM     Atrial Rate : 069 BPM     P-R Int : 176 ms          QRS Dur : 130 ms      QT Int : 436 ms       P-R-T Axes : 064 116 011 degrees     QTc Int : 467 ms    Normal sinus rhythm  Right bundle branch block  Abnormal ECG  When compared with ECG of 04-FEB-2021 12:29, (Unconfirmed)  Previous ECG has undetermined rhythm, needs review  Confirmed by HOLLY AREVALO ([id number]) on 2/5/2021 12:43:37 PM    Referred By:             Confirmed By:HOLLY  IRINA    ECG 12 Lead [[id number]] Collected: 21 0220     Updated: 21 1243     QT Interval 370 ms      QTC Interval 509 ms     Narrative:      Test Reason : New onset Afib  Blood Pressure : **/** mmHG  Vent. Rate : 114 BPM     Atrial Rate : 264 BPM     P-R Int : 000 ms          QRS Dur : 128 ms      QT Int : 370 ms       P-R-T Axes : 239 186 -15 degrees     QTc Int : 509 ms    Atrial flutter with variable AV block  Right bundle branch block  Possible Inferior infarct , age undetermined  Abnormal ECG  No previous ECGs available  Confirmed by HOLLY AREVALO ([id number]) on 2021 12:43:43 PM    Referred By:  HYACINTH           Confirmed By:HOLLY AREVALO    Adult Transesophageal Echo (GENESIS) W/ Cont if Necessary Per Protocol (Cardiology Department) [[id number]] Resulted: 21 153     Updated: 21 153    Narrative:      1.  Grossly normal LV systolic function, assessment limited by   tachycardia.  2.  Trace mitral regurgitation.  3.  No evidence of left atrial appendage thrombus.  4.  Successful synchronized cardioversion x1 with restoration of sinus   rhythm.          Operative/Procedure Notes (last 48 hours) (Notes from 21 1542 through 21 1542)    No notes of this type exist for this encounter.          Physician Progress Notes (last 72 hours) (Notes from 21 1542 through 21 1542)      Nancy Gay APRN at 21 1105                [facility]    [facility]    Name:  Darnell Garcia   Age:  60 y.o.  Sex:  male  :  1960  MRN:  [id number]   Visit Number:  [id number]  Admission Date:  2/3/2021  Date Of Service:  21  Primary Care Physician:  Carlos Bella MD     LOS: 0 days :  Patient Care Team:  Carlos Bella MD as PCP - General (Internal Medicine)  Radha Klein RN as Ambulatory  (Population Health):    Chief Complaint:      A-Fib with RVR    Subjective / Interval History:     Patient seen and evaluated today after having a  GENESIS and Cardioversion yesterday for newly found Atrial Fibrillation with RVR.  Patient asymptomatic due to chronic asthma chest tightness/ shortness of breath.  Continued admission after procedure to initiate Sotalol therapy with serial EKGs.  Patient with complaints of nausea today, and spoke with nursing to give PRN medications ordered.  Patient updated on plan of care and potential discharge tomorrow.      Review of Systems:     General ROS: Patient denies any fevers, chills or loss of consciousness.  Respiratory ROS: + chronic shortness of breath.  Cardiovascular ROS: Denies chest pain or palpitations. No history of exertional chest pain.  Gastrointestinal ROS: Denies nausea and vomiting. Denies any abdominal pain. No diarrhea.  Neurological ROS: Denies any focal weakness. No loss of consciousness. Denies any numbness.  Dermatological ROS: Denies any redness or pruritis.    Vital Signs:    Temp:  [98.1 °F (36.7 °C)-98.9 °F (37.2 °C)] 98.2 °F (36.8 °C)  Heart Rate:  [52-75] 56  Resp:  [16-18] 18  BP: ()/(61-81) 92/61    Intake and output:    I/O last 3 completed shifts:  In: 970 [P.O.:720; I.V.:250]  Out: 3015 [Urine:3015]  I/O this shift:  In: -   Out: 300 [Urine:300]    Physical Examination:    General Appearance:  Alert and cooperative, not in any acute distress, resting in bed with blinds drawn on examination.   Head:  Atraumatic and normocephalic, without obvious abnormality.   Eyes:          Conjunctivae and sclerae normal, no Icterus. No pallor. Extraocular movements are within normal limits.   Neck: Supple, trachea midline, no thyromegaly, no carotid bruit.   Lungs:   Chest shape is normal. Breath sounds heard bilaterally equally.  No crackles or wheezing. No pleural rub or bronchial breathing.   Heart:  Normal S1 and S2, rate in the 50s and regular, no murmur, no gallop, no rub. No JVD   Abdomen:   Normal bowel sounds, no masses, no organomegaly. Soft, nontender, nondistended, no guarding, no  rebound tenderness.   Extremities: Moves all extremities, no edema, no cyanosis, no clubbing.   Skin: No bleeding, bruising or rash.   Neurologic: Awake, alert and oriented times 3. Moves all 4 extremities equally.     Laboratory results:    Results from last 7 days   Lab Units 02/05/21  0521 02/04/21  0520 02/03/21  1324   SODIUM mmol/L  --  139 133*   POTASSIUM mmol/L 4.3 4.5 4.7   CHLORIDE mmol/L  --  101 97*   CO2 mmol/L  --  25.8 24.1   BUN mg/dL  --  18 14   CREATININE mg/dL  --  1.01 1.02   CALCIUM mg/dL  --  9.1 9.4   BILIRUBIN mg/dL  --  0.8 1.1   ALK PHOS U/L  --  88 96   ALT (SGPT) U/L  --  8 9   AST (SGOT) U/L  --  <5 10   GLUCOSE mg/dL  --  211* 330*     Results from last 7 days   Lab Units 02/04/21  0520 02/03/21  1324   WBC 10*3/mm3 5.89 5.45   HEMOGLOBIN g/dL 13.5 14.2   HEMATOCRIT % 39.5 40.6   PLATELETS 10*3/mm3 213 225         Results from last 7 days   Lab Units 02/03/21  1324   TROPONIN T ng/mL <0.010               I have reviewed the patient's laboratory results.    Radiology results:    Imaging Results (Last 24 Hours)     ** No results found for the last 24 hours. **          I have reviewed the patient's radiology reports.    Medication Review:     I have reviewed the patient's active and prn medications.     Problem List:      Type 2 diabetes mellitus, without long-term current use of insulin (CMS/[facility])    Coronary artery disease    Uncomplicated asthma    Atrial fibrillation with RVR (CMS/[facility])    Long term current use of systemic steroids      Assessment:  Atrial Fibrillation with RVR, present on admission  Asthma  Chronic Steroid Use  Diabetes Mellitus      Plan:  Admitted to the Hospitalist service with cardiology consulting.  Dr. Ríos advised post-procedure that patient would be started on Sotalol which requires a couple days of hospitalization for monitoring.  Lovenox transitioned to Eliquis.  Diabetes Educator consulted for teaching and now has a new glucometer.  Patient's A1C noted to  be 11.7.  Blood sugar has remained elevated during admission.  Will start patient on 10 units of Levemir tonight.  Spoke with nursing to give PRN nausea meds.  Continue supportive care as ordered monitoring vital signs, pain, and lab work as warranted.  Likely stable for discharge Saturday after initiation of Sotalol.  Patient updated on plan of care and agreeable.      KEITH Wylie  02/05/21  11:09 EST    Dictated utilizing Dragon dictation.      Electronically signed by Nancy Gay APRN at 02/05/21 1148     Ravin Ríos MD at 02/04/21 1648               [facility] Cardiology IP Progress Note    Darnell Garcia  1960  [id number]  02/04/21    Subjective:   Mr. Darnell Garcia is a 60 y.o. male seen in follow-up for atrial fibrillation with rapid ventricular rates.  Remained in atrial fibrillation with ventricular rates in the 120s to 130s overnight.  Continues to report dyspnea, with no significant progression in symptoms.  Tolerated GENESIS with subsequent cardioversion this morning with restoration of sinus rhythm.  No other specific complaints at this time.    Review of Systems:   Review of Systems   Constitutional: Negative for chills, diaphoresis, fatigue and fever.   Respiratory: Positive for shortness of breath. Negative for cough, chest tightness and wheezing.    Cardiovascular: Negative for chest pain, palpitations and leg swelling.   Gastrointestinal: Negative for abdominal pain and GERD.   Neurological: Negative for dizziness, syncope, weakness and light-headedness.       I have reviewed and/or updated the patient's past medical, past surgical, family history, social history, problem list and allergies as appropriate in the chart.     Physical Exam:  Vital Signs:   Vitals:    02/04/21 1010 02/04/21 1106 02/04/21 1245 02/04/21 1410   BP: 93/62      BP Location:       Patient Position:       Pulse: 56 81 67    Resp: 16 18 18    Temp:  98 °F (36.7 °C)     TempSrc:  Oral    "  SpO2: 96% 97% 98%    Weight:       Height:    182.9 cm (72\")       Physical Exam  Vitals signs and nursing note reviewed.   Constitutional:       General: He is not in acute distress.     Appearance: Normal appearance. He is not diaphoretic.   HENT:      Head: Normocephalic and atraumatic.   Cardiovascular:      Rate and Rhythm: Normal rate and regular rhythm.      Heart sounds: No murmur.   Pulmonary:      Effort: Pulmonary effort is normal. No respiratory distress.      Breath sounds: Normal breath sounds. No stridor. No wheezing, rhonchi or rales.   Abdominal:      General: Bowel sounds are normal. There is no distension.      Palpations: Abdomen is soft.      Tenderness: There is no abdominal tenderness. There is no guarding or rebound.   Musculoskeletal: Normal range of motion.         General: No swelling.   Skin:     General: Skin is warm and dry.   Neurological:      General: No focal deficit present.      Mental Status: He is alert and oriented to person, place, and time.   Psychiatric:         Mood and Affect: Mood normal.         Behavior: Behavior normal.         Results Review:   Results from last 7 days   Lab Units 02/04/21  0520   SODIUM mmol/L 139   POTASSIUM mmol/L 4.5   CHLORIDE mmol/L 101   CO2 mmol/L 25.8   BUN mg/dL 18   CREATININE mg/dL 1.01   CALCIUM mg/dL 9.1   BILIRUBIN mg/dL 0.8   ALK PHOS U/L 88   ALT (SGPT) U/L 8   AST (SGOT) U/L <5   GLUCOSE mg/dL 211*     Results from last 7 days   Lab Units 02/03/21  1324   TROPONIN T ng/mL <0.010     Results from last 7 days   Lab Units 02/04/21  0520 02/03/21  1324   WBC 10*3/mm3 5.89 5.45   HEMOGLOBIN g/dL 13.5 14.2   HEMATOCRIT % 39.5 40.6   PLATELETS 10*3/mm3 213 225         Results from last 7 days   Lab Units 02/03/21  1324   MAGNESIUM mg/dL 2.1           I personally viewed and interpreted the patient's EKG/Telemetry data     Medications:   )  Current Facility-Administered Medications:   •  acetaminophen (TYLENOL) tablet 650 mg, 650 mg, Oral, " Q4H PRN, Nancy Gay, APRN, 650 mg at 02/03/21 1838  •  albuterol sulfate HFA (PROVENTIL HFA;VENTOLIN HFA;PROAIR HFA) inhaler 2 puff, 2 puff, Inhalation, Q4H PRN, Nancy Gay B, APRN  •  apixaban (ELIQUIS) tablet 5 mg, 5 mg, Oral, Q12H, Nancy Gay, APRN, 5 mg at 02/04/21 1236  •  atorvastatin (LIPITOR) tablet 40 mg, 40 mg, Oral, Daily, Nancy Gay, APRN, 40 mg at 02/04/21 1044  •  budesonide-formoterol (SYMBICORT) 160-4.5 MCG/ACT inhaler 2 puff, 2 puff, Inhalation, BID - RT, Naya Gan, DO, 2 puff at 02/04/21 1245  •  dextrose (D50W) 25 g/ 50mL Intravenous Solution 25 g, 25 g, Intravenous, Q15 Min PRN, Nancy Gay, APRN  •  dextrose (GLUTOSE) oral gel 1 tube, 1 tube, Oral, Q15 Min PRN, Nancy Gay, APRN  •  dilTIAZem (CARDIZEM) 100 mg in 100 mL NS infusion (ADV), 5-15 mg/hr, Intravenous, Continuous, Nancy Gay, APRN, Stopped at 02/04/21 1000  •  gabapentin (NEURONTIN) capsule 300 mg, 300 mg, Oral, Nightly, Neo Bruno MD, 300 mg at 02/03/21 2226  •  glucagon (human recombinant) (GLUCAGEN DIAGNOSTIC) injection 1 mg, 1 mg, Subcutaneous, PRN, Nancy Gay B, APRN  •  insulin aspart (novoLOG) injection 0-9 Units, 0-9 Units, Subcutaneous, TID AC, Nancy Gay, APRN, 8 Units at 02/04/21 0650  •  melatonin tablet 5 mg, 5 mg, Oral, Nightly PRN, Nancy Gay B, APRN  •  ondansetron (ZOFRAN) tablet 4 mg, 4 mg, Oral, Q6H PRN **OR** ondansetron (ZOFRAN) injection 4 mg, 4 mg, Intravenous, Q6H PRN, Nancy Gay, APRN  •  Pharmacy Consult, , Does not apply, Once PRN, Ravin Ríos MD  •  predniSONE (DELTASONE) tablet 5 mg, 5 mg, Oral, Daily, Nancy Gay, APRN, 5 mg at 02/04/21 0911  •  sodium chloride 0.9 % flush 10 mL, 10 mL, Intravenous, PRN, Lakhwinder Mar B, DO  •  sodium chloride 0.9 % flush 10 mL, 10 mL, Intravenous, Q12H, Nancy Gay APRN, 10 mL at 02/04/21 0911  •  sodium chloride 0.9 % flush 10 mL, 10 mL, Intravenous, PRN,  Nancy Gay APRN  •  sodium chloride 0.9 % flush 10 mL, 10 mL, Intravenous, Q12H, Nancy Gay APRN, 10 mL at 21 0911  •  sodium chloride 0.9 % flush 10 mL, 10 mL, Intravenous, PRN, Nancy Gay APRN  •  sotalol (BETAPACE) tablet 80 mg, 80 mg, Oral, Q12H, Ravin Ríos MD    Assessment / Plan:     1.  Atrial fibrillation with rapid ventricular rates  --Newly diagnosed atrial fibrillation, however onset unclear with the possibility of patient being in A. fib for approximately in 1 month  --Underwent GENESIS with successful cardioversion this morning with restoration of sinus rhythm  --Will start sotalol loading for rhythm control, requiring 5 doses being given as inpatient with ECG monitoring (baseline ECG with QTC <450 ms)  --[id number] score is 2, now on Eliquis for CVA prophylaxis     2.  Coronary artery disease  --Known history of single-vessel coronary artery disease involving 30-50% stenosis in the mid LAD on [facility] in 2017  --MPS in 2019 with no evidence of inducible ischemia at that time  --No acute ischemic changes on ECG, and troponin remains within normal limits with no evidence of ACS  --Continue high intensity statin  --Not on aspirin given reported history of aspirin allergy     3. Dyslipidemia  --Continue high intensity statin     3.  Type 2 diabetes mellitus   --Management per primary service       Thank you for allowing me to participate in the care of your patient. Please to not hesitate to contact me with additional questions or concerns.     RANDY Ríos MD  Interventional Cardiology   21  16:49 EST      Electronically signed by Ravin Ríos MD at 21 1652     Nancy Gay APRN at 21 1120                [facility]    Name:  Darnell Garcia   Age:  60 y.o.  Sex:  male  :  1960  MRN:  [id number]   Visit Number:  [id number]  Admission Date:  2/3/2021  Date Of Service:  21  Primary Care  Physician:  Carlos Bella MD     LOS: 0 days :  Patient Care Team:  Carlos Bella MD as PCP - General (Internal Medicine)  Radha Klein RN as Ambulatory  ([facility]):    Chief Complaint:      Atrial Fibrillation with RVR/ Diabetes    Subjective / Interval History:     Patient was seen and evaluated today after being admitted for Atrial Fibrillation with RVR of which the patient was asymptomatic.  Patient has chest tightness and shortness of breath at baseline due to chronic asthma.  Patient states he feels about the same this morning after getting a GENESIS and cardioversion by Dr. Ríos.  On evaluation patient is eating a Quarter Pounder and large fries from [employer].  Patient tells me that he needs a glucometer at discharge.    Review of Systems:     General ROS: Patient denies any fevers, chills or loss of consciousness.  Respiratory ROS: + cough or shortness of breath.  Cardiovascular ROS: + chest tightness.  No history of exertional chest pain.  Gastrointestinal ROS: Denies nausea and vomiting. Denies any abdominal pain. No diarrhea.  Neurological ROS: Denies any focal weakness. No loss of consciousness. Denies any numbness.  Dermatological ROS: Denies any redness or pruritis.    Vital Signs:    Temp:  [97.6 °F (36.4 °C)-98.5 °F (36.9 °C)] 98 °F (36.7 °C)  Heart Rate:  [] 67  Resp:  [16-20] 18  BP: ()/(55-98) 93/62    Intake and output:    I/O last 3 completed shifts:  In: -   Out: 1475 [Urine:1475]  I/O this shift:  In: 250 [I.V.:250]  Out: 800 [Urine:800]    Physical Examination:    General Appearance:  Alert and cooperative, not in any acute distress, resting in bed with wife at bedside.   Head:  Atraumatic and normocephalic, without obvious abnormality.   Eyes:          Conjunctivae and sclerae normal, no Icterus. No pallor. Extraocular movements are within normal limits.   Neck: Supple, trachea midline   Lungs:   Chest shape is normal. Breath sounds heard bilaterally equally.  No  crackles or wheezing. No pleural rub or bronchial breathing.   Heart:  Normal S1 and S2, no murmur, no gallop, no rub. No JVD   Abdomen:   Normal bowel sounds, no masses, no organomegaly. Soft, nontender, nondistended, no guarding, no rebound tenderness.   Extremities: Moves all extremities, no edema, no cyanosis, no clubbing.   Skin: No bleeding, bruising or rash.   Neurologic: Awake, alert and oriented times 3. Moves all 4 extremities equally.     Laboratory results:    Results from last 7 days   Lab Units 02/04/21  0520 02/03/21  1324   SODIUM mmol/L 139 133*   POTASSIUM mmol/L 4.5 4.7   CHLORIDE mmol/L 101 97*   CO2 mmol/L 25.8 24.1   BUN mg/dL 18 14   CREATININE mg/dL 1.01 1.02   CALCIUM mg/dL 9.1 9.4   BILIRUBIN mg/dL 0.8 1.1   ALK PHOS U/L 88 96   ALT (SGPT) U/L 8 9   AST (SGOT) U/L <5 10   GLUCOSE mg/dL 211* 330*     Results from last 7 days   Lab Units 02/04/21  0520 02/03/21  1324   WBC 10*3/mm3 5.89 5.45   HEMOGLOBIN g/dL 13.5 14.2   HEMATOCRIT % 39.5 40.6   PLATELETS 10*3/mm3 213 225         Results from last 7 days   Lab Units 02/03/21  1324   TROPONIN T ng/mL <0.010               I have reviewed the patient's laboratory results.    Radiology results:    Imaging Results (Last 24 Hours)     Procedure Component Value Units Date/Time    XR Chest 1 View [[id number]] Collected: 02/03/21 1337     Updated: 02/03/21 1339    Narrative:      PROCEDURE: XR CHEST 1 VW-     HISTORY: Chest Pain Triage Protocol     COMPARISON: None.     FINDINGS: The heart is normal in size. The mediastinum is unremarkable.  The lungs are clear. There is no pneumothorax.  There are no acute  osseous abnormalities.       Impression:      No acute cardiopulmonary process.     Continued followup is recommended.     This report was finalized on 2/3/2021 1:37 PM by Walter Harrison M.D..          I have reviewed the patient's radiology reports.    Medication Review:     I have reviewed the patient's active and prn medications.      Problem List:      Type 2 diabetes mellitus, without long-term current use of insulin (CMS/[facility])    Coronary artery disease    Uncomplicated asthma    Atrial fibrillation with RVR (CMS/[facility])    Long term current use of systemic steroids      Assessment:  Atrial Fibrillation with RVR, present on admission  Asthma  Chronic Steroid Use  Diabetes Mellitus      Plan:    Admitted to the Hospitalist service yesterday with cardiology consulting.  Dr. Ríos advised post-procedure that patient would be started on Sotalol which requires a couple days of hospitalization for monitoring.  Lovenox transitioned to Eliquis.  Diabetes Educator consulted for teaching and glucometer.  Patient's A1C noted to be 11.7.  TSH and Magnesium noted to be WNL.  Continue supportive care as ordered monitoring vital signs, pain, and lab work as warranted.  Likely stable for discharge Saturday after initiation of Sotalol.      KEITH Wylie  02/04/21  12:51 EST    Dictated utilizing Dragon dictation.        Electronically signed by Nancy Gay APRN at 02/04/21 1302          Consult Notes (last 72 hours) (Notes from 02/02/21 1543 through 02/05/21 1543)      Ravin Ríos MD at 02/03/21 1607               [facility] Cardiology Consult Note    Darnell Garcia  1960  [id number]  02/03/21    Requesting Physician: Lakhwinder Mar DO    Chief Complaint: Tachycardia    History of Present Illness:   Mr. Darnell Garcia is a 60 y.o. male who is being seen by Cardiology for evaluation of atrial fibrillation with rapid ventricular rates.  The patient has a past medical history significant for type 2 diabetes mellitus, GERD, and asthma.  He does not have any significant past cardiac history. He has a past cardiac history significant for coronary artery disease.  In 2017, the patient presented for evaluation of chest pain.  He subsequently had an abnormal pharmacologic MPS, revealed a a small reversible perfusion defect in the  basal inferior wall possibly representing attenuation artifact.  He subsequently underwent coronary angiography in 3/17, which revealed a 30 to 50% stenosis in the mid LAD.  Most recently, the patient was evaluated in the cardiology clinic in 10/19 for chest pain.  At that time, a repeat pharmacologic MPS showed no evidence of inducible ischemia.  He presented to primary care physician's office today for evaluation of shortness of breath.  The patient reports chronic exertional dyspnea, however he has noted worsening of his dyspnea over the past month.  An ECG obtained in his PCPs office revealed atrial fibrillation with rapid ventricular rates.  He was subsequent sent to the emergency department for further evaluation.  He denies any palpitations or sensation of tachycardia.  He does report mild substernal chest discomfort, which is also chronic and present at the time of his last MPS in .  He denies any recent worsening of his chest pain.  No association with exertion.  He denies any orthopnea, PND, or lower extremity swelling.  No other specific complaints at this time.    An ECG upon presentation emergency department showed atrial fibrillation with a ventricular rate of 139 bpm, as well as a right bundle branch block.  He was noted to have T wave inversion in inferior leads, consistent with prior ECGs.  An initial laboratory evaluation including a troponin level was unremarkable.  He was given a bolus of diltiazem as well as started on a diltiazem drip, without any significant improvement in his heart rate.  He was also given a dose of digoxin, however he remains tachycardic with heart rates in the 130s.  Cardiology has been consulted for further recommendations and management.    Prior Cardiac Testin. Last Coronary Angio: 3/2/2017              1.  Mild to moderate single-vessel disease involving 30 to 50% stenosis in the mid LAD              2.  No significant disease in the LCx, left main, or RCA.               3.  Normal left ventricular systolic function, LVEF 55-60%              4.  LVEDP 50 mmHg  2. Prior Stress Testing: Pharmacologic    1.  MPS 2/17/2017               1.  Small sized reversible perfusion defect involving the basal inferior wall, which in the presence of normal wall motion and gastrointestinal uptake may represent attenuation artifact.   2.  MPS 10/19    1.  No evidence of inducible ischemia  3. Last Echo: None  4. Prior Holter Monitor: None    Review of Systems:   Review of Systems   Constitutional: Negative for activity change, appetite change, chills, diaphoresis, fatigue, fever, unexpected weight gain and unexpected weight loss.   Eyes: Negative for blurred vision and double vision.   Respiratory: Positive for shortness of breath. Negative for cough, chest tightness and wheezing.    Cardiovascular: Positive for chest pain. Negative for palpitations and leg swelling.   Gastrointestinal: Negative for abdominal pain, anal bleeding, blood in stool and GERD.   Endocrine: Negative for cold intolerance and heat intolerance.   Genitourinary: Negative for hematuria.   Neurological: Negative for dizziness, syncope, weakness and light-headedness.   Hematological: Does not bruise/bleed easily.   Psychiatric/Behavioral: Negative for depressed mood and stress. The patient is not nervous/anxious.        Past Medical History:   Past Medical History:   Diagnosis Date   • Arthritis    • Asthma    • Diabetes mellitus (CMS/[facility])    • GERD (gastroesophageal reflux disease)    • History of varicella    • Neuropathy        Past Surgical History:   Past Surgical History:   Procedure Laterality Date   • NASAL SEPTUM SURGERY  2009   • VASECTOMY  1988   • VENTRAL HERNIA REPAIR      2007 and 2009 (mesh placed)   • WISDOM TOOTH EXTRACTION         Family History:   Family History   Problem Relation Age of Onset   • Breast cancer Mother    • Osteoarthritis Mother    • Arthritis Mother    • Coronary artery disease Father  "   • Heart attack Father         late 60's   • Lymphoma Son    • Leukemia Son        Social History:   Social History     Socioeconomic History   • Marital status:      Spouse name: Not on file   • Number of children: Not on file   • Years of education: Not on file   • Highest education level: Not on file   Occupational History   • Occupation:    Tobacco Use   • Smoking status: Never Smoker   • Smokeless tobacco: Never Used   Substance and Sexual Activity   • Alcohol use: Yes     Alcohol/week: 10.0 standard drinks     Types: 10 Cans of beer per week     Comment: one day every other weekend   • Drug use: No   • Sexual activity: Never     Partners: Female       Medications:     Current Facility-Administered Medications:   •  dilTIAZem (CARDIZEM) 100 mg in 100 mL NS infusion (ADV), 5-15 mg/hr, Intravenous, Continuous, Zaid Lakhwinder B, DO, Last Rate: 15 mL/hr at 02/03/21 1529, 15 mg/hr at 02/03/21 1529  •  sodium chloride 0.9 % flush 10 mL, 10 mL, Intravenous, PRN, Zaid Lakhwinder B, DO    Allergies:   Allergies   Allergen Reactions   • Aspirin Shortness Of Breath       Physical Exam:  Vital Signs:   Vitals:    02/03/21 1317 02/03/21 1442 02/03/21 1529   BP: 113/85 106/84 122/96   BP Location: Left arm Left arm    Patient Position: Sitting Sitting    Pulse: (!) 138 (!) 141 (!) 137   Resp: 16 16 16   Temp: 98.5 °F (36.9 °C)     TempSrc: Oral     SpO2: 97% 95% 96%   Weight: 96.2 kg (212 lb)     Height: 182.9 cm (72\")         Physical Exam  Vitals signs and nursing note reviewed.   Constitutional:       General: He is not in acute distress.     Appearance: Normal appearance. He is well-developed. He is not diaphoretic.   HENT:      Head: Normocephalic and atraumatic.   Eyes:      General: No scleral icterus.     Pupils: Pupils are equal, round, and reactive to light.   Neck:      Musculoskeletal: Neck supple.      Trachea: No tracheal deviation.   Cardiovascular:      Rate and Rhythm: Tachycardia present. " Rhythm irregular.      Heart sounds: Normal heart sounds. No murmur. No friction rub. No gallop.       Comments: Normal JVD.  Pulmonary:      Effort: Pulmonary effort is normal. No respiratory distress.      Breath sounds: Normal breath sounds. No stridor. No wheezing or rales.   Chest:      Chest wall: No tenderness.   Abdominal:      General: Bowel sounds are normal. There is no distension.      Palpations: Abdomen is soft.      Tenderness: There is no abdominal tenderness. There is no guarding or rebound.   Musculoskeletal: Normal range of motion.         General: No swelling.   Lymphadenopathy:      Cervical: No cervical adenopathy.   Skin:     General: Skin is warm and dry.      Findings: No erythema.   Neurological:      General: No focal deficit present.      Mental Status: He is alert and oriented to person, place, and time.   Psychiatric:         Mood and Affect: Mood normal.         Behavior: Behavior normal.         Results Review:   Results from last 7 days   Lab Units 02/03/21  1324   SODIUM mmol/L 133*   POTASSIUM mmol/L 4.7   CHLORIDE mmol/L 97*   CO2 mmol/L 24.1   BUN mg/dL 14   CREATININE mg/dL 1.02   CALCIUM mg/dL 9.4   BILIRUBIN mg/dL 1.1   ALK PHOS U/L 96   ALT (SGPT) U/L 9   AST (SGOT) U/L 10   GLUCOSE mg/dL 330*     Results from last 7 days   Lab Units 02/03/21  1324   TROPONIN T ng/mL <0.010     Results from last 7 days   Lab Units 02/03/21  1324   WBC 10*3/mm3 5.45   HEMOGLOBIN g/dL 14.2   HEMATOCRIT % 40.6   PLATELETS 10*3/mm3 225         Results from last 7 days   Lab Units 02/03/21  1324   MAGNESIUM mg/dL 2.1           I personally viewed and interpreted the patient's EKG/Telemetry data     Assessment / Plan:     1.  Atrial fibrillation with rapid ventricular rates  --Newly diagnosed atrial fibrillation, however onset unclear with the possibility of patient being in A. fib for approximately in 1 month  --Ventricular rate remains uncontrolled despite diltiazem and digoxin  --Suspect atrial  fibrillation likely contributing to dyspnea  --Discussed the risks and benefits of GENESIS with cardioversion, and the patient is agreeable to proceed.  Will make n.p.o. and plan for GENESIS with cardioversion tomorrow  --Will consider initiation of sotalol following cardioversion for rhythm control  --[id number] score is 2, will transition from Lovenox to Eliquis tomorrow for CVA prophylaxis  --TSH is pending    2.  Coronary artery disease  --Known history of single-vessel coronary artery disease involving 30-50% stenosis in the mid LAD on [facility] in 2017  --MPS in 2019 with no evidence of inducible ischemia at that time  --No acute ischemic changes on ECG, and troponin remains within normal limits with no evidence of ACS  --Will restart metoprolol as antianginal  --Previously on Plavix given possible history of aspirin allergy  --Discontinue Plavix, given starting Eliquis  --Continue high intensity statin     3. Dyslipidemia  --Continue high intensity statin     3.  Type 2 diabetes mellitus   --Management per primary service        Thank you for allowing me to participate in the care of your patient. Please do not hesitate to contact me with additional questions or concerns.     RANDY Ríos MD  Interventional Cardiology   02/03/21  16:08 EST    Electronically signed by Ravin Ríos MD at 02/03/21 5271

## 2021-02-05 NOTE — PLAN OF CARE
Goal Outcome Evaluation:  Plan of Care Reviewed With: patient, spouse  Progress: improving   Carlos Manuel continues to improve. VSS. SB with BBB on cardiac monitoring. Room air. Up ad maximino. Nausea from new meds today, controled per MAR. Will discharge tomorrow. EKG's per protocol.

## 2021-02-05 NOTE — PROGRESS NOTES
[facility]    Name:  Darnell Garcia   Age:  60 y.o.  Sex:  male  :  1960  MRN:  [id number]   Visit Number:  [id number]  Admission Date:  2/3/2021  Date Of Service:  21  Primary Care Physician:  Carlos Bella MD     LOS: 0 days :  Patient Care Team:  Carlos Bella MD as PCP - General (Internal Medicine)  Radha Klein RN as Ambulatory  ([facility]):    Chief Complaint:      A-Fib with RVR    Subjective / Interval History:     Patient seen and evaluated today after having a GENESIS and Cardioversion yesterday for newly found Atrial Fibrillation with RVR.  Patient asymptomatic due to chronic asthma chest tightness/ shortness of breath.  Continued admission after procedure to initiate Sotalol therapy with serial EKGs.  Patient with complaints of nausea today, and spoke with nursing to give PRN medications ordered.  Patient updated on plan of care and potential discharge tomorrow.      Review of Systems:     General ROS: Patient denies any fevers, chills or loss of consciousness.  Respiratory ROS: + chronic shortness of breath.  Cardiovascular ROS: Denies chest pain or palpitations. No history of exertional chest pain.  Gastrointestinal ROS: Denies nausea and vomiting. Denies any abdominal pain. No diarrhea.  Neurological ROS: Denies any focal weakness. No loss of consciousness. Denies any numbness.  Dermatological ROS: Denies any redness or pruritis.    Vital Signs:    Temp:  [98.1 °F (36.7 °C)-98.9 °F (37.2 °C)] 98.2 °F (36.8 °C)  Heart Rate:  [52-75] 56  Resp:  [16-18] 18  BP: ()/(61-81) 92/61    Intake and output:    I/O last 3 completed shifts:  In: 970 [P.O.:720; I.V.:250]  Out: 3015 [Urine:3015]  I/O this shift:  In: -   Out: 300 [Urine:300]    Physical Examination:    General Appearance:  Alert and cooperative, not in any acute distress, resting in bed with blinds drawn on examination.   Head:  Atraumatic and normocephalic,  without obvious abnormality.   Eyes:          Conjunctivae and sclerae normal, no Icterus. No pallor. Extraocular movements are within normal limits.   Neck: Supple, trachea midline, no thyromegaly, no carotid bruit.   Lungs:   Chest shape is normal. Breath sounds heard bilaterally equally.  No crackles or wheezing. No pleural rub or bronchial breathing.   Heart:  Normal S1 and S2, rate in the 50s and regular, no murmur, no gallop, no rub. No JVD   Abdomen:   Normal bowel sounds, no masses, no organomegaly. Soft, nontender, nondistended, no guarding, no rebound tenderness.   Extremities: Moves all extremities, no edema, no cyanosis, no clubbing.   Skin: No bleeding, bruising or rash.   Neurologic: Awake, alert and oriented times 3. Moves all 4 extremities equally.     Laboratory results:    Results from last 7 days   Lab Units 02/05/21  0521 02/04/21  0520 02/03/21  1324   SODIUM mmol/L  --  139 133*   POTASSIUM mmol/L 4.3 4.5 4.7   CHLORIDE mmol/L  --  101 97*   CO2 mmol/L  --  25.8 24.1   BUN mg/dL  --  18 14   CREATININE mg/dL  --  1.01 1.02   CALCIUM mg/dL  --  9.1 9.4   BILIRUBIN mg/dL  --  0.8 1.1   ALK PHOS U/L  --  88 96   ALT (SGPT) U/L  --  8 9   AST (SGOT) U/L  --  <5 10   GLUCOSE mg/dL  --  211* 330*     Results from last 7 days   Lab Units 02/04/21  0520 02/03/21  1324   WBC 10*3/mm3 5.89 5.45   HEMOGLOBIN g/dL 13.5 14.2   HEMATOCRIT % 39.5 40.6   PLATELETS 10*3/mm3 213 225         Results from last 7 days   Lab Units 02/03/21  1324   TROPONIN T ng/mL <0.010               I have reviewed the patient's laboratory results.    Radiology results:    Imaging Results (Last 24 Hours)     ** No results found for the last 24 hours. **          I have reviewed the patient's radiology reports.    Medication Review:     I have reviewed the patient's active and prn medications.     Problem List:      Type 2 diabetes mellitus, without long-term current use of insulin (CMS/[facility])    Coronary artery disease     Uncomplicated asthma    Atrial fibrillation with RVR (CMS/[facility])    Long term current use of systemic steroids      Assessment:  Atrial Fibrillation with RVR, present on admission  Asthma  Chronic Steroid Use  Diabetes Mellitus      Plan:  Admitted to the Hospitalist service with cardiology consulting.  Dr. Ríos advised post-procedure that patient would be started on Sotalol which requires a couple days of hospitalization for monitoring.  Lovenox transitioned to Eliquis.  Diabetes Educator consulted for teaching and now has a new glucometer.  Patient's A1C noted to be 11.7.  Blood sugar has remained elevated during admission.  Will start patient on 10 units of Levemir tonight.  Spoke with nursing to give PRN nausea meds.  Continue supportive care as ordered monitoring vital signs, pain, and lab work as warranted.  Likely stable for discharge Saturday after initiation of Sotalol.  Patient updated on plan of care and agreeable.      Nancy Gay, KEITH  02/05/21  11:09 EST    Dictated utilizing Dragon dictation.

## 2021-02-06 ENCOUNTER — READMISSION MANAGEMENT (OUTPATIENT)
Dept: CALL CENTER | Facility: HOSPITAL | Age: 61
End: 2021-02-06

## 2021-02-06 VITALS
BODY MASS INDEX: 28.04 KG/M2 | SYSTOLIC BLOOD PRESSURE: 109 MMHG | OXYGEN SATURATION: 96 % | TEMPERATURE: 97.8 F | HEIGHT: 72 IN | WEIGHT: 207.01 LBS | RESPIRATION RATE: 16 BRPM | DIASTOLIC BLOOD PRESSURE: 72 MMHG | HEART RATE: 50 BPM

## 2021-02-06 LAB
GLUCOSE BLDC GLUCOMTR-MCNC: 244 MG/DL (ref 70–130)
GLUCOSE BLDC GLUCOMTR-MCNC: 259 MG/DL (ref 70–130)
POTASSIUM SERPL-SCNC: 4.7 MMOL/L (ref 3.5–5.2)
QT INTERVAL: 494 MS
QT INTERVAL: 500 MS
QTC INTERVAL: 446 MS
QTC INTERVAL: 455 MS

## 2021-02-06 PROCEDURE — 99238 HOSP IP/OBS DSCHRG MGMT 30/<: CPT | Performed by: INTERNAL MEDICINE

## 2021-02-06 PROCEDURE — 99231 SBSQ HOSP IP/OBS SF/LOW 25: CPT | Performed by: INTERNAL MEDICINE

## 2021-02-06 PROCEDURE — 63710000001 PREDNISONE PER 5 MG: Performed by: NURSE PRACTITIONER

## 2021-02-06 PROCEDURE — 63710000001 INSULIN ASPART PER 5 UNITS: Performed by: NURSE PRACTITIONER

## 2021-02-06 PROCEDURE — 84132 ASSAY OF SERUM POTASSIUM: CPT | Performed by: INTERNAL MEDICINE

## 2021-02-06 PROCEDURE — 82962 GLUCOSE BLOOD TEST: CPT

## 2021-02-06 PROCEDURE — 93005 ELECTROCARDIOGRAM TRACING: CPT | Performed by: INTERNAL MEDICINE

## 2021-02-06 RX ORDER — SOTALOL HYDROCHLORIDE 80 MG/1
80 TABLET ORAL EVERY 12 HOURS
Qty: 60 TABLET | Refills: 0 | Status: SHIPPED | OUTPATIENT
Start: 2021-02-06 | End: 2021-02-12 | Stop reason: ALTCHOICE

## 2021-02-06 RX ORDER — GABAPENTIN 300 MG/1
300 CAPSULE ORAL NIGHTLY
Status: DISCONTINUED | OUTPATIENT
Start: 2021-02-06 | End: 2021-02-06 | Stop reason: HOSPADM

## 2021-02-06 RX ADMIN — SODIUM CHLORIDE, PRESERVATIVE FREE 10 ML: 5 INJECTION INTRAVENOUS at 08:37

## 2021-02-06 RX ADMIN — INSULIN ASPART 6 UNITS: 100 INJECTION, SOLUTION INTRAVENOUS; SUBCUTANEOUS at 12:08

## 2021-02-06 RX ADMIN — APIXABAN 5 MG: 5 TABLET, FILM COATED ORAL at 08:37

## 2021-02-06 RX ADMIN — ATORVASTATIN CALCIUM 40 MG: 40 TABLET, FILM COATED ORAL at 08:37

## 2021-02-06 RX ADMIN — PREDNISONE 5 MG: 5 TABLET ORAL at 08:37

## 2021-02-06 RX ADMIN — INSULIN ASPART 4 UNITS: 100 INJECTION, SOLUTION INTRAVENOUS; SUBCUTANEOUS at 06:43

## 2021-02-06 RX ADMIN — SODIUM CHLORIDE, PRESERVATIVE FREE 10 ML: 5 INJECTION INTRAVENOUS at 08:38

## 2021-02-06 RX ADMIN — ACETAMINOPHEN 650 MG: 325 TABLET, FILM COATED ORAL at 08:42

## 2021-02-06 RX ADMIN — SOTALOL HYDROCHLORIDE 80 MG: 80 TABLET ORAL at 05:21

## 2021-02-06 NOTE — DISCHARGE SUMMARY
[facility]   DISCHARGE SUMMARY      Name:  Darnell Garcia   Age:  60 y.o.  Sex:  male  :  1960  MRN:  [id number]   Visit Number:  [id number]    Admission Date:  2/3/2021  Date of Discharge:  2021  Primary Care Physician:  Carlos Bella MD    Important issues to note:    1.  New medications started: Eliquis and sotalol.  2.  Medications discontinued: Celebrex and Plavix.  3.  Patient may benefit from insulin therapy after evaluation by primary care physician.  4.  Follow-up with Dr. Ríos in 2 weeks.  5.  Follow-up with primary care physician in 1 week.    Discharge Diagnoses:     1.  Atrial fibrillation with rapid ventricular response, present on admission, improved.  2.  Diabetes mellitus type 2 with hyperglycemia and neuropathy, present on admission.  3.  Coronary artery disease on medical therapy.  4.  Bronchial asthma on chronic steroid therapy with no evidence of exacerbation.    Problem List:       Atrial fibrillation with RVR (CMS/HCC)    Type 2 diabetes mellitus, without long-term current use of insulin (CMS/HCC)    Diabetic neuropathy (CMS/HCC)    Coronary artery disease    Uncomplicated asthma    Long term current use of systemic steroids    Presenting Problem:    Atrial fibrillation with RVR (CMS/HCC) [I48.91]  Atrial fibrillation with RVR (CMS/HCC) [I48.91]     Consults:     Consulting Physician(s)     Provider Relationship Specialty    Ravin Ríos MD Consulting Physician Cardiology        Procedures Performed:    1.  Transesophageal echocardiogram and electrical cardioversion on 2021.    History of presenting illness/Hospital Course:    Mr. Garcia is a 60-year-old male with history of diabetes and coronary artery disease was admitted from the emergency room on 2/3/2021 after he was sent by his primary care physician due to tachycardia and suspected atrial fibrillation.  Patient was noted to have atrial fibrillation with rapid ventricular rate.  His  initial troponin was negative.  He was started on IV Cardizem drip as well as therapeutic Lovenox and was admitted to the medical floor with telemetry.    Patient was evaluated by Dr. Ríos.  Despite being on Cardizem drip, his heart rate was not well controlled and Dr. Ríos scheduled the patient for transesophageal echocardiogram and cardioversion the next day.  Patient did undergo GENESIS with cardioversion on 2/4/2021 and converted back to sinus rhythm.  Patient was loaded with sotalol under cardiac monitor.  His QTC remained stable after sotalol loading and his sotalol was continued at 80 mg twice daily.  Patient was also started on anticoagulation with Eliquis.    Patient is currently feeling better and denies any chest pain or palpitations.  Denies any nausea.  He has mainly remained in sinus rhythm even though at night he had a few episodes of short atrial fibrillation that was noted on telemetry review.  His QTC this morning is 455.  I have discussed the patient's condition with Dr. Ríos over the phone.  He felt that the patient may be able to discontinue his Plavix at this time and continue Eliquis.  I have also advised the patient to discontinue the Celebrex which he takes for neck pain due to high risk for peptic ulcer disease and bleeding especially now that he is on Eliquis.    Patient is advised to follow-up with Dr. Ríos in 2 weeks and his primary care physician in 1 week.  Patient lives with his wife and is independent in daily activities.  He declined any home needs at this time.  Patient does have uncontrolled diabetes with a hemoglobin A1c of 11.7 during this admission.  No change has been made to his home medication regimen but he may benefit from initiation of insulin therapy as an outpatient after discussion with his primary care provider.    Vital Signs:    Temp:  [97.4 °F (36.3 °C)-98.2 °F (36.8 °C)] 97.7 °F (36.5 °C)  Heart Rate:  [50-54] 50  Resp:  [16-18] 16  BP: (101-111)/(67-71)  101/69    Physical Exam:    General Appearance:  Alert and cooperative, not in any acute distress.   Head:  Atraumatic and normocephalic, without obvious abnormality.   Eyes:          Conjunctivae and sclerae normal, no icterus. No pallor. Extraocular movements are within normal limits.   Ears:  Ears appear intact with no abnormalities noted.   Throat: No oral lesions, no thrush, oral mucosa moist.   Neck: Supple, trachea midline, no thyromegaly, no carotid bruit.   Back:   No kyphoscoliosis present. No tenderness to palpation,   range of motion normal.   Lungs:   Chest shape is normal. Breath sounds heard bilaterally equally.  No crackles or wheezing. No Pleural rub or bronchial breathing.   Heart:  Normal S1 and S2, no murmur, no gallop, no rub. No JVD.   Abdomen:   Normal bowel sounds, no masses, no organomegaly. Soft, nontender, obese, no guarding, no rebound tenderness.   Extremities: Supple, no edema, no cyanosis, no clubbing.   Pulses: Pulses palpable and equal bilaterally.   Skin: No bleeding or rash.   Neurologic: Alert and oriented x 3. Moves all four limbs equally. No tremors. No facial asymmetry.     Pertinent Lab Results:     Results from last 7 days   Lab Units 02/06/21  0522 02/05/21  0521 02/04/21  0520 02/03/21  1324   SODIUM mmol/L  --   --  139 133*   POTASSIUM mmol/L 4.7 4.3 4.5 4.7   CHLORIDE mmol/L  --   --  101 97*   CO2 mmol/L  --   --  25.8 24.1   BUN mg/dL  --   --  18 14   CREATININE mg/dL  --   --  1.01 1.02   CALCIUM mg/dL  --   --  9.1 9.4   BILIRUBIN mg/dL  --   --  0.8 1.1   ALK PHOS U/L  --   --  88 96   ALT (SGPT) U/L  --   --  8 9   AST (SGOT) U/L  --   --  <5 10   GLUCOSE mg/dL  --   --  211* 330*     Results from last 7 days   Lab Units 02/04/21  0520 02/03/21  1324   WBC 10*3/mm3 5.89 5.45   HEMOGLOBIN g/dL 13.5 14.2   HEMATOCRIT % 39.5 40.6   PLATELETS 10*3/mm3 213 225         Results from last 7 days   Lab Units 02/03/21  1324   TROPONIN T ng/mL <0.010     Pertinent Radiology  Results:    Imaging Results (All)     Procedure Component Value Units Date/Time    XR Chest 1 View [[id number]] Collected: 02/03/21 1337     Updated: 02/03/21 1339    Narrative:      PROCEDURE: XR CHEST 1 VW-     HISTORY: Chest Pain Triage Protocol     COMPARISON: None.     FINDINGS: The heart is normal in size. The mediastinum is unremarkable.  The lungs are clear. There is no pneumothorax.  There are no acute  osseous abnormalities.       Impression:      No acute cardiopulmonary process.     Continued followup is recommended.     This report was finalized on 2/3/2021 1:37 PM by Walter Harrison M.D..        Echo:    Results for orders placed during the hospital encounter of 02/03/21   Adult Transesophageal Echo (GENESIS) W/ Cont if Necessary Per Protocol (Cardiology Department)    Narrative 1.  Grossly normal LV systolic function, assessment limited by   tachycardia.  2.  Trace mitral regurgitation.  3.  No evidence of left atrial appendage thrombus.  4.  Successful synchronized cardioversion x1 with restoration of sinus   rhythm.     Condition on Discharge:      Stable.    Code status during the hospital stay:    Code Status and Medical Interventions:   Ordered at: 02/03/21 1600     Level Of Support Discussed With:    Patient     Code Status:    CPR     Medical Interventions (Level of Support Prior to Arrest):    Full     Discharge Disposition:    Home or Self Care    Discharge Medications:       Discharge Medications      New Medications      Instructions Start Date   apixaban 5 MG tablet tablet  Commonly known as: ELIQUIS   5 mg, Oral, Every 12 Hours Scheduled      sotalol 80 MG tablet  Commonly known as: BETAPACE   80 mg, Oral, Every 12 Hours         Continue These Medications      Instructions Start Date   albuterol sulfate  (90 Base) MCG/ACT inhaler  Commonly known as: PROVENTIL HFA;VENTOLIN HFA;PROAIR HFA   INHALE TWO PUFFS BY MOUTH EVERY 4 HOURS AS NEEDED FOR WHEEZING      atorvastatin 40 MG  tablet  Commonly known as: LIPITOR   40 mg, Oral, Daily      Breo Ellipta 100-25 MCG/INH inhaler  Generic drug: Fluticasone Furoate-Vilanterol   Inhale 1 puff by mouth once daily      gabapentin 300 MG capsule  Commonly known as: NEURONTIN   300 mg, Oral, Nightly      glimepiride 4 MG tablet  Commonly known as: AMARYL   4 mg, Oral, Every Morning Before Breakfast      Jardiance 25 MG tablet  Generic drug: Empagliflozin   25 mg, Oral, Daily      metFORMIN 1000 MG tablet  Commonly known as: GLUCOPHAGE   1,000 mg, Oral, 2 Times Daily With Meals      montelukast 10 MG tablet  Commonly known as: SINGULAIR   10 mg, Oral, Every Night at Bedtime      nitroglycerin 0.4 MG SL tablet  Commonly known as: NITROSTAT   Place 1 tablet under the tongue Every FIVE Minutes As Needed for Chest Pain. Take no more than 3 doses in 15 minutes.      predniSONE 5 MG tablet  Commonly known as: DELTASONE   TAKE ONE TABLET BY MOUTH EVERY DAY         Stop These Medications    celecoxib 200 MG capsule  Commonly known as: CeleBREX     clopidogrel 75 MG tablet  Commonly known as: PLAVIX          Discharge Diet:     Diet Instructions     Diet: Consistent Carbohydrate, Cardiac; Thin      Discharge Diet:  Consistent Carbohydrate  Cardiac       Fluid Consistency: Thin        Activity at Discharge:     Activity Instructions     Activity as Tolerated          Follow-up Appointments:    Follow-up Information     Carlos Bella MD Follow up in 1 week(s).    Specialty: Internal Medicine  Contact information:  [address]  [address]  [facility] [address] [phone]             Ravin Ríos MD Follow up in 2 week(s).    Specialties: Cardiology, Interventional Cardiology, Internal Medicine  Contact information:  [address]  [address]  [facility] [address] [phone]                 Test Results Pending at Discharge:    None.       Bobby Lopes MD  02/06/21  11:38 EST    Time: I spent 25 minutes on this discharge activity which included:  face-to-face encounter with the patient, reviewing the data in the system, coordination of the care with the nursing staff as well as consultants, documentation, and entering orders.     Dictated utilizing Dragon dictation.

## 2021-02-06 NOTE — PROGRESS NOTES
"[facility]-Cardiology Progress note     LOS: 1 day   Patient Care Team:  Carlos Bella MD as PCP - General (Internal Medicine)  Radha Klein, LANI as Ambulatory  ([facility])    Chief Complaint: Shortness of breath    Subjective:    Interval History:     Patient Complaints: none  Patient Denies:   Chest pain, shortness of breath, orthopnea, peripheral edema, palpitations, cough, sputum production, hemoptysis, abdominal pain, nausea, vomiting, diarrhea, fevers chills or night sweats  History taken from: patient    Review of Systems:   All systems were reviewed and negative       Objective:    Vital Sign Min/Max for last 24 hours  Temp  Min: 97.4 °F (36.3 °C)  Max: 98.2 °F (36.8 °C)   BP  Min: 101/69  Max: 111/70   Pulse  Min: 50  Max: 54   Resp  Min: 16  Max: 18   SpO2  Min: 94 %  Max: 97 %   No data recorded   No data recorded     Flowsheet Rows      First Filed Value   Admission Height  182.9 cm (72\") Documented at 02/03/2021 1317   Admission Weight  96.2 kg (212 lb) Documented at 02/03/2021 1317          Physical Exam:     General Appearance:    Alert, cooperative, in no acute distress   Head:    Normocephalic, without obvious abnormality, atraumatic   Eyes:            Lids and lashes normal, conjunctivae and sclerae normal, no   icterus, no pallor, corneas clear, PERRLA   Ears:    Ears appear intact with no abnormalities noted   Throat:   No oral lesions, no thrush, oral mucosa moist   Neck:   No adenopathy, supple, trachea midline, no thyromegaly, no   carotid bruit, no JVD   Back:     No kyphosis present, no scoliosis present, no skin lesions,      erythema or scars, no tenderness to percussion or                   palpation,   range of motion normal   Lungs:     Clear to auscultation,respirations regular, even and                  unlabored    Heart:    Regular rhythm and normal rate, normal S1 and S2, no            murmur, no gallop, no rub, no click   Chest Wall:    No abnormalities observed "   Abdomen:     Normal bowel sounds, no masses, no organomegaly, soft        non-tender, non-distended, no guarding, no rebound                tenderness   Rectal:     Deferred   Extremities:   Moves all extremities well, no edema, no cyanosis, no             redness   Pulses:   Pulses palpable and equal bilaterally   Skin:   No bleeding, bruising or rash   Lymph nodes:   No palpable adenopathy   Neurologic:   Cranial nerves 2 - 12 grossly intact, sensation intact, DTR       present and equal bilaterally        Results Review:     I reviewed the patient's new clinical results.  Results from last 7 days   Lab Units 02/04/21  0520   HEMOGLOBIN A1C % 11.70*     Results from last 7 days   Lab Units 02/06/21  0522  02/04/21  0520   SODIUM mmol/L  --   --  139   POTASSIUM mmol/L 4.7   < > 4.5   CHLORIDE mmol/L  --   --  101   CO2 mmol/L  --   --  25.8   BUN mg/dL  --   --  18   CREATININE mg/dL  --   --  1.01   GLUCOSE mg/dL  --   --  211*   CALCIUM mg/dL  --   --  9.1    < > = values in this interval not displayed.     Results from last 7 days   Lab Units 02/04/21  0520 02/03/21  1324   WBC 10*3/mm3 5.89 5.45   HEMOGLOBIN g/dL 13.5 14.2   HEMATOCRIT % 39.5 40.6   PLATELETS 10*3/mm3 213 225           Ejection Fraction  Lab Results   Component Value Date    EF 77 10/11/2019       Physical Exam    Medication Review: yes    Assessment/Plan:      Atrial fibrillation with RVR (CMS/HCC)    Type 2 diabetes mellitus, without long-term current use of insulin (CMS/HCC)    Diabetic neuropathy (CMS/HCC)    Coronary artery disease    Uncomplicated asthma    Long term current use of systemic steroids      Status post GENESIS guided synchronized external cardioversion yesterday with initiation of antiarrhythmic drug therapy-sotalol.  Twelve-lead electrocardiogram this morning shows normal sinus rhythm with no QT prolongation significant bradycardia or pro arrhythmia on telemetry monitoring.  The patient has follow-up with Dr. Ríos in 2  weeks.  If he has breakthrough atrial fibrillation on antiarrhythmic drug therapy he would be an excellent candidate for pulmonary vein isolation radiofrequency ablation.    Plan for disposition:Where: home and When:  today    Jeremy Ferraro MD  02/06/21  13:35 EST

## 2021-02-06 NOTE — PROGRESS NOTES
Case Management Discharge Note           Provided Post Acute Provider List?: N/A  Provided Post Acute Provider Quality & Resource List?: N/A    Selected Continued Care - Admitted Since 2/3/2021     Destination    No services have been selected for the patient.              Durable Medical Equipment    No services have been selected for the patient.              Dialysis/Infusion    No services have been selected for the patient.              Home Medical Care    No services have been selected for the patient.              Therapy    No services have been selected for the patient.              Community Resources    No services have been selected for the patient.                       Final Discharge Disposition Code: 01 - home or self-care

## 2021-02-06 NOTE — PROGRESS NOTES
[facility] Cardiology IP Progress Note    Darnell Garcia  1960  [id number]  02/05/21    Subjective:   Mr. Darnell Garcia is a 60 y.o. male seen in follow-up for atrial fibrillation rapid ventricular rates.  No acute overnight events.  Remains in sinus rhythm on review telemetry.  This morning, complains of nausea.  Denies chest pain or chest discomfort.  No other specific complaints at this time.    Review of Systems:   Review of Systems   Constitutional: Negative for chills, diaphoresis, fatigue and fever.   Respiratory: Negative for cough, chest tightness, shortness of breath and wheezing.    Cardiovascular: Negative for chest pain, palpitations and leg swelling.   Gastrointestinal: Negative for abdominal pain and GERD.   Neurological: Negative for dizziness, syncope, weakness and light-headedness.   Hematological: Does not bruise/bleed easily.       I have reviewed and/or updated the patient's past medical, past surgical, family history, social history, problem list and allergies as appropriate in the chart.     Physical Exam:  Vital Signs:   Vitals:    02/05/21 0723 02/05/21 0800 02/05/21 1103 02/05/21 1738   BP:  104/81 92/61 107/68   BP Location:   Left arm    Patient Position:   Lying    Pulse: 59 52 56 54   Resp:   18    Temp:   98.2 °F (36.8 °C)    TempSrc:   Oral    SpO2: 93% 95% 94%    Weight:       Height:           Physical Exam  Vitals signs and nursing note reviewed.   Constitutional:       General: He is not in acute distress.     Appearance: Normal appearance. He is not diaphoretic.   HENT:      Head: Normocephalic and atraumatic.   Cardiovascular:      Rate and Rhythm: Normal rate and regular rhythm.      Heart sounds: No murmur.   Pulmonary:      Effort: Pulmonary effort is normal. No respiratory distress.      Breath sounds: Normal breath sounds. No stridor. No wheezing, rhonchi or rales.   Abdominal:      General: Bowel sounds are normal. There is no distension.       Palpations: Abdomen is soft.      Tenderness: There is no abdominal tenderness. There is no guarding or rebound.   Musculoskeletal: Normal range of motion.         General: No swelling.   Skin:     General: Skin is warm and dry.   Neurological:      General: No focal deficit present.      Mental Status: He is alert and oriented to person, place, and time.   Psychiatric:         Mood and Affect: Mood normal.         Behavior: Behavior normal.         Results Review:   Results from last 7 days   Lab Units 02/05/21  0521 02/04/21  0520   SODIUM mmol/L  --  139   POTASSIUM mmol/L 4.3 4.5   CHLORIDE mmol/L  --  101   CO2 mmol/L  --  25.8   BUN mg/dL  --  18   CREATININE mg/dL  --  1.01   CALCIUM mg/dL  --  9.1   BILIRUBIN mg/dL  --  0.8   ALK PHOS U/L  --  88   ALT (SGPT) U/L  --  8   AST (SGOT) U/L  --  <5   GLUCOSE mg/dL  --  211*     Results from last 7 days   Lab Units 02/03/21  1324   TROPONIN T ng/mL <0.010     Results from last 7 days   Lab Units 02/04/21  0520 02/03/21  1324   WBC 10*3/mm3 5.89 5.45   HEMOGLOBIN g/dL 13.5 14.2   HEMATOCRIT % 39.5 40.6   PLATELETS 10*3/mm3 213 225         Results from last 7 days   Lab Units 02/03/21  1324   MAGNESIUM mg/dL 2.1           I personally viewed and interpreted the patient's EKG/Telemetry data     Medications:   )  Current Facility-Administered Medications:   •  acetaminophen (TYLENOL) tablet 650 mg, 650 mg, Oral, Q4H PRN, Nancy Gay APRN, 650 mg at 02/05/21 0117  •  albuterol sulfate HFA (PROVENTIL HFA;VENTOLIN HFA;PROAIR HFA) inhaler 2 puff, 2 puff, Inhalation, Q4H PRN, Nancy Gay APRN  •  apixaban (ELIQUIS) tablet 5 mg, 5 mg, Oral, Q12H, Nancy Gay APRN, 5 mg at 02/05/21 0903  •  atorvastatin (LIPITOR) tablet 40 mg, 40 mg, Oral, Daily, Nancy Gay APRN, 40 mg at 02/05/21 0903  •  budesonide-formoterol (SYMBICORT) 160-4.5 MCG/ACT inhaler 2 puff, 2 puff, Inhalation, BID - RT, Naya Gan DO, 2 puff at 02/05/21 0723  •   dextrose (D50W) 25 g/ 50mL Intravenous Solution 25 g, 25 g, Intravenous, Q15 Min PRN, Nancy Gay, APRN  •  dextrose (GLUTOSE) oral gel 1 tube, 1 tube, Oral, Q15 Min PRN, Nancy Gay APRN  •  gabapentin (NEURONTIN) capsule 300 mg, 300 mg, Oral, Nightly, Neo Bruno MD, 300 mg at 02/04/21 2122  •  glucagon (human recombinant) (GLUCAGEN DIAGNOSTIC) injection 1 mg, 1 mg, Subcutaneous, PRN, Nancy Gay, APRN  •  insulin aspart (novoLOG) injection 0-9 Units, 0-9 Units, Subcutaneous, TID AC, Nancy Gay, APRN, 2 Units at 02/05/21 1738  •  insulin detemir (LEVEMIR) injection 10 Units, 10 Units, Subcutaneous, Nightly, Nancy Gay, APRN  •  melatonin tablet 5 mg, 5 mg, Oral, Nightly PRN, Nancy Gay, APRN  •  ondansetron (ZOFRAN) tablet 4 mg, 4 mg, Oral, Q6H PRN **OR** ondansetron (ZOFRAN) injection 4 mg, 4 mg, Intravenous, Q6H PRN, Nancy Gay APRN, 4 mg at 02/05/21 1115  •  Pharmacy Consult, , Does not apply, Once PRN, Ravin Ríos MD  •  predniSONE (DELTASONE) tablet 5 mg, 5 mg, Oral, Daily, Nancy Gay, APRN, 5 mg at 02/05/21 0903  •  sodium chloride 0.9 % flush 10 mL, 10 mL, Intravenous, PRN, Lakhwinder Mar B, DO  •  sodium chloride 0.9 % flush 10 mL, 10 mL, Intravenous, Q12H, Nancy Gay, APRN, 10 mL at 02/05/21 0903  •  sodium chloride 0.9 % flush 10 mL, 10 mL, Intravenous, PRN, Nancy Gay, APRN  •  sodium chloride 0.9 % flush 10 mL, 10 mL, Intravenous, Q12H, Nancy Gay, APRN, 10 mL at 02/05/21 0903  •  sodium chloride 0.9 % flush 10 mL, 10 mL, Intravenous, PRN, Nancy Gay APRN  •  sotalol (BETAPACE) tablet 80 mg, 80 mg, Oral, Q12H, Ravin Ríos MD, 80 mg at 02/05/21 5906    Assessment / Plan:     1.  Atrial fibrillation with rapid ventricular rates  --Newly diagnosed atrial fibrillation, however onset unclear   --Underwent GENESIS with successful cardioversion 2/4 with restoration of sinus rhythm  --Currently maintaining  sinus rhythm on review telemetry  --Continue sotalol loading, QTc after a.m. dose this morning <450 ms  --[id number] score is 2, now on Eliquis for CVA prophylaxis  --Anticipate discharge home 2/6 after sotalol loading has been completed     2.  Coronary artery disease  --Known history of single-vessel coronary artery disease involving 30-50% stenosis in the mid LAD on [facility] in 2017  --MPS in 2019 with no evidence of inducible ischemia at that time  --No acute ischemic changes on ECG, and troponin remains within normal limits with no evidence of ACS  --Continue high intensity statin  --Not on aspirin given reported history of aspirin allergy     3. Dyslipidemia  --Continue high intensity statin     3.  Type 2 diabetes mellitus   --Management per primary service      Thank you for allowing me to participate in the care of your patient. Please to not hesitate to contact me with additional questions or concerns.     RANDY Ríos MD  Interventional Cardiology   02/05/21  19:40 EST

## 2021-02-06 NOTE — PLAN OF CARE
Goal Outcome Evaluation:  Plan of Care Reviewed With: patient  Progress: improving   VSS. Darnell remains in SB since cardioversion. Nausea controlled per MAR. Discharge teaching complete. Follow-up appointments will be completed Mon. Feb. 8th.

## 2021-02-06 NOTE — PLAN OF CARE
Goal Outcome Evaluation:  Plan of Care Reviewed With: patient  Progress: improving  Outcome Summary: No acute events overnight, EKG completed, VSS on RA. Pt denies any new problems or concerns. Possible d/c home 2/6/21

## 2021-02-06 NOTE — PAYOR COMM NOTE
"Darnell Padgett (60 y.o. Male)     Date of Birth Social Security Number Address Home Phone MRN    1960  [address] [place] [address] [address] [place] [address] [phone] [id number]    [facility] Mar[employer] Status          None        Admission Date Admission Type Admitting Provider Attending Provider Department, Room/Bed    2/3/21 Emergency Naya Gan DO Pais, Roshan, MD [facility] MED SURG  3, 305/    Discharge Date Discharge Disposition Discharge Destination         Home or Self Care              Attending Provider: Bobby Lopes MD    Allergies: Aspirin    Isolation: None   Infection: None   Code Status: CPR    Ht: 182.9 cm (72\")   Wt: 93.9 kg (207 lb 0.2 oz)    Admission Cmt: None   Principal Problem: Atrial fibrillation with RVR (CMS/HCC) [I48.91]                 Active Insurance as of 2/3/2021     Primary Coverage     Payor Plan Insurance Group Employer/Plan Group    [employer] CROSS BLUE [employer] PPO [id number]     Payor Plan Address Payor Plan Phone Number Payor Plan Fax Number Effective Dates    [address] [phone]  2021 - None Entered    [place] [address]       Subscriber Name Subscriber Birth Date Member ID       DARNELL PADGETT 1960 [id number]                 Emergency Contacts      (Rel.) Home Phone Work Phone Mobile Phone    Naz Padgett (Spouse) [phone] -- --    MANOLO PADGETT (Mother) [phone] -- --        Discharged  2021       Discharge Summary      Bobby Lopes MD at 21 1138              [facility] HOSPITALIST   DISCHARGE SUMMARY      Name:  Darnell Padgett   Age:  60 y.o.  Sex:  male  :  1960  MRN:  [id number]   Visit Number:  [id number]    Admission Date:  2/3/2021  Date of Discharge:  2021  Primary Care Physician:  Carlos Bella MD    Important issues to note:    1.  New medications started: Eliquis and sotalol.  2.  Medications discontinued: Celebrex and Plavix.  3.  Patient may " benefit from insulin therapy after evaluation by primary care physician.  4.  Follow-up with Dr. Ríos in 2 weeks.  5.  Follow-up with primary care physician in 1 week.    Discharge Diagnoses:     1.  Atrial fibrillation with rapid ventricular response, present on admission, improved.  2.  Diabetes mellitus type 2 with hyperglycemia and neuropathy, present on admission.  3.  Coronary artery disease on medical therapy.  4.  Bronchial asthma on chronic steroid therapy with no evidence of exacerbation.    Problem List:       Atrial fibrillation with RVR (CMS/HCC)    Type 2 diabetes mellitus, without long-term current use of insulin (CMS/HCC)    Diabetic neuropathy (CMS/HCC)    Coronary artery disease    Uncomplicated asthma    Long term current use of systemic steroids    Presenting Problem:    Atrial fibrillation with RVR (CMS/HCC) [I48.91]  Atrial fibrillation with RVR (CMS/HCC) [I48.91]     Consults:     Consulting Physician(s)     Provider Relationship Specialty    Ravin Ríos MD Consulting Physician Cardiology        Procedures Performed:    1.  Transesophageal echocardiogram and electrical cardioversion on 2/4/2021.    History of presenting illness/Hospital Course:    Mr. Garcia is a 60-year-old male with history of diabetes and coronary artery disease was admitted from the emergency room on 2/3/2021 after he was sent by his primary care physician due to tachycardia and suspected atrial fibrillation.  Patient was noted to have atrial fibrillation with rapid ventricular rate.  His initial troponin was negative.  He was started on IV Cardizem drip as well as therapeutic Lovenox and was admitted to the medical floor with telemetry.    Patient was evaluated by Dr. Ríos.  Despite being on Cardizem drip, his heart rate was not well controlled and Dr. Ríos scheduled the patient for transesophageal echocardiogram and cardioversion the next day.  Patient did undergo GENESIS with cardioversion on 2/4/2021 and converted  back to sinus rhythm.  Patient was loaded with sotalol under cardiac monitor.  His QTC remained stable after sotalol loading and his sotalol was continued at 80 mg twice daily.  Patient was also started on anticoagulation with Eliquis.    Patient is currently feeling better and denies any chest pain or palpitations.  Denies any nausea.  He has mainly remained in sinus rhythm even though at night he had a few episodes of short atrial fibrillation that was noted on telemetry review.  His QTC this morning is 455.  I have discussed the patient's condition with Dr. Ríos over the phone.  He felt that the patient may be able to discontinue his Plavix at this time and continue Eliquis.  I have also advised the patient to discontinue the Celebrex which he takes for neck pain due to high risk for peptic ulcer disease and bleeding especially now that he is on Eliquis.    Patient is advised to follow-up with Dr. Ríos in 2 weeks and his primary care physician in 1 week.  Patient lives with his wife and is independent in daily activities.  He declined any home needs at this time.  Patient does have uncontrolled diabetes with a hemoglobin A1c of 11.7 during this admission.  No change has been made to his home medication regimen but he may benefit from initiation of insulin therapy as an outpatient after discussion with his primary care provider.    Vital Signs:    Temp:  [97.4 °F (36.3 °C)-98.2 °F (36.8 °C)] 97.7 °F (36.5 °C)  Heart Rate:  [50-54] 50  Resp:  [16-18] 16  BP: (101-111)/(67-71) 101/69    Physical Exam:    General Appearance:  Alert and cooperative, not in any acute distress.   Head:  Atraumatic and normocephalic, without obvious abnormality.   Eyes:          Conjunctivae and sclerae normal, no icterus. No pallor. Extraocular movements are within normal limits.   Ears:  Ears appear intact with no abnormalities noted.   Throat: No oral lesions, no thrush, oral mucosa moist.   Neck: Supple, trachea midline, no  thyromegaly, no carotid bruit.   Back:   No kyphoscoliosis present. No tenderness to palpation,   range of motion normal.   Lungs:   Chest shape is normal. Breath sounds heard bilaterally equally.  No crackles or wheezing. No Pleural rub or bronchial breathing.   Heart:  Normal S1 and S2, no murmur, no gallop, no rub. No JVD.   Abdomen:   Normal bowel sounds, no masses, no organomegaly. Soft, nontender, obese, no guarding, no rebound tenderness.   Extremities: Supple, no edema, no cyanosis, no clubbing.   Pulses: Pulses palpable and equal bilaterally.   Skin: No bleeding or rash.   Neurologic: Alert and oriented x 3. Moves all four limbs equally. No tremors. No facial asymmetry.     Pertinent Lab Results:     Results from last 7 days   Lab Units 02/06/21  0522 02/05/21  0521 02/04/21  0520 02/03/21  1324   SODIUM mmol/L  --   --  139 133*   POTASSIUM mmol/L 4.7 4.3 4.5 4.7   CHLORIDE mmol/L  --   --  101 97*   CO2 mmol/L  --   --  25.8 24.1   BUN mg/dL  --   --  18 14   CREATININE mg/dL  --   --  1.01 1.02   CALCIUM mg/dL  --   --  9.1 9.4   BILIRUBIN mg/dL  --   --  0.8 1.1   ALK PHOS U/L  --   --  88 96   ALT (SGPT) U/L  --   --  8 9   AST (SGOT) U/L  --   --  <5 10   GLUCOSE mg/dL  --   --  211* 330*     Results from last 7 days   Lab Units 02/04/21  0520 02/03/21  1324   WBC 10*3/mm3 5.89 5.45   HEMOGLOBIN g/dL 13.5 14.2   HEMATOCRIT % 39.5 40.6   PLATELETS 10*3/mm3 213 225         Results from last 7 days   Lab Units 02/03/21  1324   TROPONIN T ng/mL <0.010     Pertinent Radiology Results:    Imaging Results (All)     Procedure Component Value Units Date/Time    XR Chest 1 View [[id number]] Collected: 02/03/21 1337     Updated: 02/03/21 1339    Narrative:      PROCEDURE: XR CHEST 1 VW-     HISTORY: Chest Pain Triage Protocol     COMPARISON: None.     FINDINGS: The heart is normal in size. The mediastinum is unremarkable.  The lungs are clear. There is no pneumothorax.  There are no acute  osseous  abnormalities.       Impression:      No acute cardiopulmonary process.     Continued followup is recommended.     This report was finalized on 2/3/2021 1:37 PM by Walter Harrison M.D..        Echo:    Results for orders placed during the hospital encounter of 02/03/21   Adult Transesophageal Echo (GENESIS) W/ Cont if Necessary Per Protocol (Cardiology Department)    Narrative 1.  Grossly normal LV systolic function, assessment limited by   tachycardia.  2.  Trace mitral regurgitation.  3.  No evidence of left atrial appendage thrombus.  4.  Successful synchronized cardioversion x1 with restoration of sinus   rhythm.     Condition on Discharge:      Stable.    Code status during the hospital stay:    Code Status and Medical Interventions:   Ordered at: 02/03/21 1600     Level Of Support Discussed With:    Patient     Code Status:    CPR     Medical Interventions (Level of Support Prior to Arrest):    Full     Discharge Disposition:    Home or Self Care    Discharge Medications:       Discharge Medications      New Medications      Instructions Start Date   apixaban 5 MG tablet tablet  Commonly known as: ELIQUIS   5 mg, Oral, Every 12 Hours Scheduled      sotalol 80 MG tablet  Commonly known as: BETAPACE   80 mg, Oral, Every 12 Hours         Continue These Medications      Instructions Start Date   albuterol sulfate  (90 Base) MCG/ACT inhaler  Commonly known as: PROVENTIL HFA;VENTOLIN HFA;PROAIR HFA   INHALE TWO PUFFS BY MOUTH EVERY 4 HOURS AS NEEDED FOR WHEEZING      atorvastatin 40 MG tablet  Commonly known as: LIPITOR   40 mg, Oral, Daily      Breo Ellipta 100-25 MCG/INH inhaler  Generic drug: Fluticasone Furoate-Vilanterol   Inhale 1 puff by mouth once daily      gabapentin 300 MG capsule  Commonly known as: NEURONTIN   300 mg, Oral, Nightly      glimepiride 4 MG tablet  Commonly known as: AMARYL   4 mg, Oral, Every Morning Before Breakfast      Jardiance 25 MG tablet  Generic drug: Empagliflozin   25 mg,  Oral, Daily      metFORMIN 1000 MG tablet  Commonly known as: GLUCOPHAGE   1,000 mg, Oral, 2 Times Daily With Meals      montelukast 10 MG tablet  Commonly known as: SINGULAIR   10 mg, Oral, Every Night at Bedtime      nitroglycerin 0.4 MG SL tablet  Commonly known as: NITROSTAT   Place 1 tablet under the tongue Every FIVE Minutes As Needed for Chest Pain. Take no more than 3 doses in 15 minutes.      predniSONE 5 MG tablet  Commonly known as: DELTASONE   TAKE ONE TABLET BY MOUTH EVERY DAY         Stop These Medications    celecoxib 200 MG capsule  Commonly known as: CeleBREX     clopidogrel 75 MG tablet  Commonly known as: PLAVIX          Discharge Diet:     Diet Instructions     Diet: Consistent Carbohydrate, Cardiac; Thin      Discharge Diet:  Consistent Carbohydrate  Cardiac       Fluid Consistency: Thin        Activity at Discharge:     Activity Instructions     Activity as Tolerated          Follow-up Appointments:    Follow-up Information     Carlos Bella MD Follow up in 1 week(s).    Specialty: Internal Medicine  Contact information:  [address]  [address]  [facility] [address] [phone]             Ravin Ríos MD Follow up in 2 week(s).    Specialties: Cardiology, Interventional Cardiology, Internal Medicine  Contact information:  [address]  [address]  [facility] [address] [phone]                 Test Results Pending at Discharge:    None.       Bobby Lopes MD  02/06/21  11:38 EST    Time: I spent 25 minutes on this discharge activity which included: face-to-face encounter with the patient, reviewing the data in the system, coordination of the care with the nursing staff as well as consultants, documentation, and entering orders.     Dictated utilizing Dragon dictation.      Electronically signed by Bobby Lopes MD at 02/06/21 5416

## 2021-02-08 ENCOUNTER — TRANSITIONAL CARE MANAGEMENT TELEPHONE ENCOUNTER (OUTPATIENT)
Dept: CALL CENTER | Facility: HOSPITAL | Age: 61
End: 2021-02-08

## 2021-02-08 ENCOUNTER — TELEPHONE (OUTPATIENT)
Dept: TELEMETRY | Facility: HOSPITAL | Age: 61
End: 2021-02-08

## 2021-02-08 ENCOUNTER — TELEPHONE (OUTPATIENT)
Dept: CARDIOLOGY | Facility: CLINIC | Age: 61
End: 2021-02-08

## 2021-02-08 NOTE — OUTREACH NOTE
Call Center TCM Note      Responses   [facility] patient discharged from?  [facility]   Does the patient have one of the following disease processes/diagnoses(primary or secondary)?  Other   TCM attempt successful?  Yes   Call start time  0933   Call end time  0936   Discharge diagnosis  Atrial fibrillation with rapid ventricular response,    Diabetes mellitus type 2 with hyperglycemia and neuropathy   Is patient permission given to speak with other caregiver?  Yes   List who call center can speak with  spouse- Naz   Person spoke with today (if not patient) and relationship  spouse- Naz/ Patient    Meds reviewed with patient/caregiver?  Yes   Is the patient having any side effects they believe may be caused by any medication additions or changes?  Yes   Side effects comments   nausea, SOB, constipation, insomnia- states he thinks it is from new prescription of Sotalol   Does the patient have all medications ordered at discharge?  Yes   Is the patient taking all medications as directed (includes completed medication regime)?  Yes   Does the patient have a primary care provider?   Yes   Does the patient have an appointment with their PCP within 7 days of discharge?  Yes   Comments regarding PCP  f/u with Dr. Bella on 2/15/21   Has the patient kept scheduled appointments due by today?  N/A   Psychosocial issues?  No   Did the patient receive a copy of their discharge instructions?  Yes   Nursing interventions  Reviewed instructions with patient   What is the patient's perception of their health status since discharge?  Same   Is the patient/caregiver able to teach back signs and symptoms related to disease process for when to call PCP?  No   Is the patient/caregiver able to teach back signs and symptoms related to disease process for when to call 911?  Yes   Is the patient/caregiver able to teach back the hierarchy of who to call/visit for symptoms/problems? PCP, Specialist, Home health nurse, Urgent Care, ED,  911  Yes   TCM call completed?  Yes   Wrap up additional comments  States he is having issue with the new medication sotalol and is having some bad side effects, he has called Dr. Ríos's office and is waiting for a return call, urged patient to go to ER if symptoms worsen, he verbalized understanding.          Balbina Kilpatrick RN    2/8/2021, 09:36 EST

## 2021-02-08 NOTE — TELEPHONE ENCOUNTER
Patient states that eliquis and sotalol are causing him some issues.  C/O SOB, fatigue and constipation.  Patient wants to know if medication can be changed or if he could have an ablasion. Advise

## 2021-02-09 DIAGNOSIS — I48.91 ATRIAL FIBRILLATION WITH RVR (HCC): Primary | ICD-10-CM

## 2021-02-12 ENCOUNTER — CONSULT (OUTPATIENT)
Dept: CARDIOLOGY | Facility: CLINIC | Age: 61
End: 2021-02-12

## 2021-02-12 VITALS
HEART RATE: 55 BPM | BODY MASS INDEX: 29.8 KG/M2 | HEIGHT: 72 IN | RESPIRATION RATE: 18 BRPM | DIASTOLIC BLOOD PRESSURE: 86 MMHG | WEIGHT: 220 LBS | OXYGEN SATURATION: 99 % | SYSTOLIC BLOOD PRESSURE: 132 MMHG

## 2021-02-12 DIAGNOSIS — E78.5 HYPERLIPIDEMIA, UNSPECIFIED HYPERLIPIDEMIA TYPE: ICD-10-CM

## 2021-02-12 DIAGNOSIS — I25.10 CORONARY ARTERY DISEASE INVOLVING NATIVE CORONARY ARTERY OF NATIVE HEART WITHOUT ANGINA PECTORIS: ICD-10-CM

## 2021-02-12 DIAGNOSIS — E11.00 TYPE 2 DIABETES MELLITUS WITH HYPEROSMOLARITY WITHOUT COMA, WITHOUT LONG-TERM CURRENT USE OF INSULIN (HCC): ICD-10-CM

## 2021-02-12 DIAGNOSIS — I48.91 ATRIAL FIBRILLATION WITH RVR (HCC): Primary | ICD-10-CM

## 2021-02-12 PROCEDURE — 99213 OFFICE O/P EST LOW 20 MIN: CPT | Performed by: INTERNAL MEDICINE

## 2021-02-12 PROCEDURE — 93000 ELECTROCARDIOGRAM COMPLETE: CPT | Performed by: INTERNAL MEDICINE

## 2021-02-12 NOTE — PROGRESS NOTES
[facility] Cardiology Office Follow Up Note    Darnell Garcia  [id number]  02/12/2021    Primary Care Provider: Carlos Bella MD    Chief Complaint: Regular follow-up    History of Present Illness:   Mr. Darnell Garcia is a 60 y.o. male who presents to the Cardiology Clinic for follow up of atrial fibrillation with rapid ventricular rates.The patient has a past medical history significant for type 2 diabetes mellitus, GERD, and asthma.  He does not have any significant past cardiac history. He has a past cardiac history significant for coronary artery disease.  In 2017, the patient presented for evaluation of chest pain.  He subsequently had an abnormal pharmacologic MPS, revealed a a small reversible perfusion defect in the basal inferior wall possibly representing attenuation artifact.  He subsequently underwent coronary angiography in 3/17, which revealed a 30 to 50% stenosis in the mid LAD.  Most recently, the patient was evaluated in the cardiology clinic in 10/19 for chest pain.  At that time, a repeat pharmacologic MPS showed no evidence of inducible ischemia. Most recently, he presented to [facility] in 2/21 for evaluation of shortness of breath.  He was found to be in atrial fibrillation with rapid ventricular rates and subsequent underwent GENESIS with cardioversion and restoration of sinus rhythm.  He was initiated on Eliquis for anticoagulation, as well as sotalol for rhythm control.  He presents today for follow-up.  Since discharge, the patient reports significant difficulty with sotalol.  He reports different constipation as well as fatigue associated with the sotalol.  His sotalol was subsequently discontinued, with complete resolution of the adverse effects.  Since discontinuation of sotalol, the patient has had several episodes of palpitations, up to 2-3 episodes per day possibly related to recurrent PAF.  He continues to tolerate Eliquis without  significant bleeding or bruising.  No other specific complaints at this time.    Past Cardiac Testin. Last Coronary Angio: 3/2/2017              1.  Mild to moderate single-vessel disease involving 30 to 50% stenosis in the mid LAD              2.  No significant disease in the LCx, left main, or RCA.              3.  Normal left ventricular systolic function, LVEF 55-60%              4.  LVEDP 50 mmHg  2. Prior Stress Testing: Pharmacologic               1.  MPS 2017                          1.  Small sized reversible perfusion defect involving the basal inferior wall, which in the presence of normal wall motion and gastrointestinal uptake may represent attenuation artifact.              2.  MPS 10/19                          1.  No evidence of inducible ischemia  3. Last Echo: None  4. Prior Holter Monitor: None    Review of Systems:   Review of Systems   Constitutional: Negative for activity change, appetite change, chills, diaphoresis, fatigue, fever, unexpected weight gain and unexpected weight loss.   Eyes: Negative for blurred vision and double vision.   Respiratory: Negative for cough, chest tightness, shortness of breath and wheezing.    Cardiovascular: Positive for palpitations. Negative for chest pain and leg swelling.   Gastrointestinal: Negative for abdominal pain, anal bleeding, blood in stool and GERD.   Endocrine: Negative for cold intolerance and heat intolerance.   Genitourinary: Negative for hematuria.   Neurological: Negative for dizziness, syncope, weakness and light-headedness.   Hematological: Does not bruise/bleed easily.   Psychiatric/Behavioral: Negative for depressed mood and stress. The patient is not nervous/anxious.        I have reviewed and/or updated the patient's past medical, past surgical, family, social history, problem list and allergies as appropriate.     Medications:     Current Outpatient Medications:   •  ALBUTEROL SULFATE  (90 Base) MCG/ACT inhaler,  "INHALE TWO PUFFS BY MOUTH EVERY 4 HOURS AS NEEDED FOR WHEEZING, Disp: 8.5 g, Rfl: 5  •  apixaban (ELIQUIS) 5 MG tablet tablet, Take 1 tablet by mouth Every 12 (Twelve) Hours. Indications: Atrial Fibrillation, Disp: 60 tablet, Rfl: 0  •  atorvastatin (LIPITOR) 40 MG tablet, Take 1 tablet by mouth Daily., Disp: 90 tablet, Rfl: 1  •  Empagliflozin (Jardiance) 25 MG tablet, Take 25 mg by mouth Daily., Disp: 90 tablet, Rfl: 1  •  gabapentin (NEURONTIN) 300 MG capsule, Take 300 mg by mouth Every Night., Disp: , Rfl:   •  glimepiride (AMARYL) 4 MG tablet, Take 1 tablet by mouth Every Morning Before Breakfast., Disp: 90 tablet, Rfl: 1  •  metFORMIN (GLUCOPHAGE) 1000 MG tablet, Take 1 tablet by mouth 2 (Two) Times a Day With Meals., Disp: 90 tablet, Rfl: 1  •  montelukast (SINGULAIR) 10 MG tablet, Take 1 tablet by mouth every night at bedtime., Disp: 90 tablet, Rfl: 1  •  nitroglycerin (NITROSTAT) 0.4 MG SL tablet, Place 1 tablet under the tongue Every FIVE Minutes As Needed for Chest Pain. Take no more than 3 doses in 15 minutes., Disp: 25 tablet, Rfl: 2  •  predniSONE (DELTASONE) 5 MG tablet, TAKE ONE TABLET BY MOUTH EVERY DAY, Disp: 90 tablet, Rfl: 3  •  Fluticasone Furoate-Vilanterol (BREO ELLIPTA) 100-25 MCG/INH inhaler, Inhale 1 puff by mouth once daily, Disp: 60 each, Rfl: 5    Physical Exam:  Vital Signs:   Vitals:    02/12/21 0949   BP: 132/86   Pulse: 55   Resp: 18   SpO2: 99%   Weight: 99.8 kg (220 lb)   Height: 182.9 cm (72\")       Physical Exam  Constitutional:       General: He is not in acute distress.     Appearance: Normal appearance. He is well-developed. He is not diaphoretic.   HENT:      Head: Normocephalic and atraumatic.   Eyes:      General: No scleral icterus.     Pupils: Pupils are equal, round, and reactive to light.   Neck:      Musculoskeletal: Neck supple. No muscular tenderness.      Trachea: No tracheal deviation.   Cardiovascular:      Rate and Rhythm: Regular rhythm. Bradycardia present.     "  Heart sounds: Normal heart sounds. No murmur. No friction rub. No gallop.       Comments: Normal JVD.  Pulmonary:      Effort: Pulmonary effort is normal. No respiratory distress.      Breath sounds: Normal breath sounds. No stridor. No wheezing or rales.   Chest:      Chest wall: No tenderness.   Abdominal:      General: Bowel sounds are normal. There is no distension.      Palpations: Abdomen is soft.      Tenderness: There is no abdominal tenderness. There is no guarding or rebound.   Musculoskeletal: Normal range of motion.         General: No swelling.   Lymphadenopathy:      Cervical: No cervical adenopathy.   Skin:     General: Skin is warm and dry.      Findings: No erythema.   Neurological:      General: No focal deficit present.      Mental Status: He is alert and oriented to person, place, and time.   Psychiatric:         Mood and Affect: Mood normal.         Behavior: Behavior normal.         Results Review:   I reviewed the patient's new clinical results.  I personally viewed and interpreted the patient's EKG/Telemetry data      ECG 12 Lead    Date/Time: 2/12/2021 10:16 AM  Performed by: Ravin Ríos MD  Authorized by: Ravin Ríos MD   Comparison: not compared with previous ECG   Rhythm: sinus bradycardia  Rate: bradycardic  Conduction: right bundle branch block  QRS axis: normal  Other findings: non-specific ST-T wave changes    Clinical impression: abnormal EKG            Assessment / Plan:     1.  Atrial fibrillation with rapid ventricular rates  --Initially diagnosed in 2/21, underwent GENESIS with cardioversion with restoration of sinus rhythm  --Initiated on sotalol, however did not tolerate due to side effects  --ECG today shows sinus bradycardia  --[id number] score is 2, continue Eliquis for CVA prophylaxis  --Discussed further options further management including starting Multaq versus referral for ablation, and given his intolerance of sotalol he is hesitant to try alternate  antiarrhythmics and would prefer ablation if possible, therefore has referral to EP pending  --Not currently on rate control given resting bradycardia  --Follow-up in 3 months, or sooner if required     2.  Coronary artery disease  --Known history of single-vessel coronary artery disease involving 30-50% stenosis in the mid LAD on [facility] in 2017  --MPS in 2019 with no evidence of inducible ischemia at that time  --No current anginal symptoms  --Continue high intensity statin  --Not on aspirin given reported history of aspirin allergy     3. Dyslipidemia  --Continue high intensity statin     3.  Type 2 diabetes mellitus   --Management per PCP       Follow Up:   Return in about 3 months (around 5/12/2021).      Thank you for allowing me to participate in the care of your patient. Please to not hesitate to contact me with additional questions or concerns.     RANDY Ríos MD  Interventional Cardiology   02/12/2021  10:11 EST

## 2021-02-15 ENCOUNTER — TELEMEDICINE (OUTPATIENT)
Dept: INTERNAL MEDICINE | Facility: CLINIC | Age: 61
End: 2021-02-15

## 2021-02-15 ENCOUNTER — READMISSION MANAGEMENT (OUTPATIENT)
Dept: CALL CENTER | Facility: HOSPITAL | Age: 61
End: 2021-02-15

## 2021-02-15 DIAGNOSIS — E78.5 HYPERLIPIDEMIA, UNSPECIFIED HYPERLIPIDEMIA TYPE: Primary | ICD-10-CM

## 2021-02-15 DIAGNOSIS — J45.909 UNCOMPLICATED ASTHMA, UNSPECIFIED ASTHMA SEVERITY, UNSPECIFIED WHETHER PERSISTENT: ICD-10-CM

## 2021-02-15 DIAGNOSIS — I48.91 ATRIAL FIBRILLATION, UNSPECIFIED TYPE (HCC): ICD-10-CM

## 2021-02-15 DIAGNOSIS — M54.2 NECK PAIN: ICD-10-CM

## 2021-02-15 DIAGNOSIS — E11.00 TYPE 2 DIABETES MELLITUS WITH HYPEROSMOLARITY WITHOUT COMA, WITHOUT LONG-TERM CURRENT USE OF INSULIN (HCC): ICD-10-CM

## 2021-02-15 PROCEDURE — 99496 TRANSJ CARE MGMT HIGH F2F 7D: CPT | Performed by: INTERNAL MEDICINE

## 2021-02-15 RX ORDER — BUDESONIDE AND FORMOTEROL FUMARATE DIHYDRATE 160; 4.5 UG/1; UG/1
2 AEROSOL RESPIRATORY (INHALATION)
Qty: 6 G | Refills: 0 | Status: SHIPPED | OUTPATIENT
Start: 2021-02-15 | End: 2021-03-24

## 2021-02-15 NOTE — OUTREACH NOTE
Medical Week 2 Survey      Responses   [facility] patient discharged from?  [facility]   Does the patient have one of the following disease processes/diagnoses(primary or secondary)?  Other   Week 2 attempt successful?  Yes   Call start time  1708   Discharge diagnosis  Atrial fibrillation with rapid ventricular response,    Diabetes mellitus type 2 with hyperglycemia and neuropathy   Call end time  1710   Meds reviewed with patient/caregiver?  Yes   Is the patient having any side effects they believe may be caused by any medication additions or changes?  No   Does the patient have all medications ordered at discharge?  Yes   Is the patient taking all medications as directed (includes completed medication regime)?  Yes   Does the patient have a primary care provider?   Yes   Does the patient have an appointment with their PCP within 7 days of discharge?  Yes   Comments regarding PCP  f/u with Dr. Bella on 2/15/21- had virtual visit   Has the patient kept scheduled appointments due by today?  Yes   Has home health visited the patient within 72 hours of discharge?  N/A   Psychosocial issues?  No   Did the patient receive a copy of their discharge instructions?  Yes   Nursing interventions  Reviewed instructions with patient   What is the patient's perception of their health status since discharge?  Improving   Is the patient/caregiver able to teach back signs and symptoms related to disease process for when to call PCP?  Yes   Is the patient/caregiver able to teach back signs and symptoms related to disease process for when to call 911?  Yes   Is the patient/caregiver able to teach back the hierarchy of who to call/visit for symptoms/problems? PCP, Specialist, Home health nurse, Urgent Care, ED, 911  Yes   If the patient is a current smoker, are they able to teach back resources for cessation?  Not a smoker   Week 2 Call Completed?  Yes          Vinnie Ravi RN

## 2021-02-15 NOTE — PROGRESS NOTES
Transitional Care Follow Up Visit  Subjective     Darnell Garcia is a 60 y.o. male who presents for a transitional care management visit.    Within 48 business hours after discharge our office contacted him via telephone to coordinate his care and needs.      I reviewed and discussed the details of that call along with the discharge summary, hospital problems, inpatient lab results, inpatient diagnostic studies, and consultation reports with Darnell.     Current outpatient and discharge medications have been reconciled for the patient.  Reviewed by: Carlos Bella MD      Date of TCM Phone Call 2/6/2021   Psychiatric   Date of Admission 2/3/2021   Date of Discharge 2/6/2021   Discharge Disposition Home or Self Care     Risk for Readmission (LACE) Score: 11 (2/6/2021  6:00 AM)      History of Present Illness video visit  Course During Hospital Stay:    Patient presents for hospital follow up of atrial fibrillation with rapid ventricular response. During his hospital stay patient was started on Cardizem drop and Lovenox. Heart rate was not controlled on Cardizem, pt underwent GENESIS and cardioversion and was converted to back to sinus rhythm. Patient discharged on Eliquis for anticoagulation and sotalol for rhythm control. Patient was unable to tolerate sotalol complaining of constipation and fatigue since starting the medication. He reports occasional episodes of palpitations and discomfort in his chest but denies symptoms at this time. Pt has consult scheduled with cardiology to discuss cardiac ablation. Diabetes mellitus last A1C while in hospital was 11.7. Patient reports adherence to medications and states his morning blood sugars range from 120-140. He reports continued neck pain with movement. Tylenol helps. Patient requesting new inhaler for asthma symptoms as he is no longer able to afford Breo.     The following portions of the patient's history were reviewed and updated as appropriate:  allergies, current medications, past family history, past medical history, past social history, past surgical history and problem list.    Review of Systems   Constitutional: Negative.    Respiratory: Negative.    Cardiovascular: Negative.    Musculoskeletal: Positive for neck pain.       Objective   Physical Exam  Constitutional:       Appearance: Normal appearance.   Pulmonary:      Effort: Pulmonary effort is normal.   Neurological:      Mental Status: He is alert.   Psychiatric:         Mood and Affect: Mood normal.         Assessment/Plan   Diagnoses and all orders for this visit:    1. Hyperlipidemia, unspecified hyperlipidemia type (Primary)    2. Atrial fibrillation, unspecified type (CMS/HCC)    3. Type 2 diabetes mellitus with hyperosmolarity without coma, without long-term current use of insulin (CMS/[facility])  -     Insulin Glargine (LANTUS SOLOSTAR) 100 UNIT/ML injection pen; Inject 10 Units under the skin into the appropriate area as directed Every Morning.  Dispense: 3 pen; Refill: 3    4. Neck pain    5. Uncomplicated asthma, unspecified asthma severity, unspecified whether persistent  -     budesonide-formoterol (Symbicort) 160-4.5 MCG/ACT inhaler; Inhale 2 puffs 2 (Two) Times a Day.  Dispense: 6 g; Refill: 0      Hyperlipidemia, unspecified Continue medication    Atrial fibrillation, unspecified type Patient taking Eliquis, unable to tolerate sotalol. Consult scheduled with cardiology to discuss cardiac ablation.     Type 2 diabetes mellitus with hyperosmolarity without coma, without long-term current use of insulin Start 10 units of Lantus every morning. Follow up in 3 weeks. Pt instructed to call if he develops hypoglycemia.     Neck pain Continue tylenol    Uncomplicated asthma Start symbicort       Follow up in 3 weeks patient to schedule

## 2021-02-15 NOTE — PROGRESS NOTES
You have chosen to receive care through a telehealth visit.  Do you consent to use a video/audio connection for your medical care today? Yes    On this video visit is Meggan MOJICA CMA, Dr. Bella, and Darnell Garcia.

## 2021-02-17 ENCOUNTER — TELEPHONE (OUTPATIENT)
Dept: INTERNAL MEDICINE | Facility: CLINIC | Age: 61
End: 2021-02-17

## 2021-02-17 NOTE — TELEPHONE ENCOUNTER
JENN, WIFE, SAID PT WAS  RECENTLY PUT ON INSULIN BUT THE PHARMACY DID NOT RECEIVE AN ORDER FOR LANCETS, TEST STRIPS AND PEN NEEDLES.  SHE IS REQUESTING AN ORDER FOR LANCETS, PEN NEEDLES AND ONE TOUCH VERIO TEST STRIPS.  PEN NEEDLES.  SHE IS REQUESTING THEM BE SENT TO [facility].

## 2021-02-18 NOTE — TELEPHONE ENCOUNTER
Caller: Naz Garcia    Relationship: Emergency Contact    Best call back number: [phone]    Medication needed: VERIO ONE TOUCH STRIPS  Requested Prescriptions      No prescriptions requested or ordered in this encounter       When do you need the refill by: 2/18/2021    What details did the patient provide when requesting the medication: PATIENT CAN NOT TAKE INSULIN WITHOUT GETTING HIS SUGAR CHECKED. PATIENT NEEDS ASAP    Does the patient have less than a 3 day supply:  [x] Yes  [] No    What is the patient's preferred pharmacy: [facility] [address] [phone] [facility] [phone]

## 2021-02-20 RX ORDER — LANCETS
EACH MISCELLANEOUS
Qty: 180 EACH | Refills: 12 | Status: SHIPPED | OUTPATIENT
Start: 2021-02-20

## 2021-02-22 ENCOUNTER — READMISSION MANAGEMENT (OUTPATIENT)
Dept: CALL CENTER | Facility: HOSPITAL | Age: 61
End: 2021-02-22

## 2021-02-22 NOTE — OUTREACH NOTE
Medical Week 3 Survey      Responses   [facility] patient discharged from?  [facility]   Does the patient have one of the following disease processes/diagnoses(primary or secondary)?  Other   Week 3 attempt successful?  Yes   Call start time  1534   Call end time  1536   Discharge diagnosis  Atrial fibrillation with rapid ventricular response,    Diabetes mellitus type 2 with hyperglycemia and neuropathy   List who call center can speak with  spouse- Naz   Person spoke with today (if not patient) and relationship  spouse- Naz/ Patient    Meds reviewed with patient/caregiver?  Yes   Is the patient having any side effects they believe may be caused by any medication additions or changes?  No   Does the patient have all medications ordered at discharge?  Yes   Is the patient taking all medications as directed (includes completed medication regime)?  Yes   Does the patient have a primary care provider?   Yes   Comments regarding PCP  f/u with Dr. Bella on 2/15/21- had virtual visit   Does the patient have an appointment with their PCP within 7 days of discharge?  Yes   Has the patient kept scheduled appointments due by today?  Yes   Psychosocial issues?  No   Did the patient receive a copy of their discharge instructions?  Yes   Nursing interventions  Reviewed instructions with patient   What is the patient's perception of their health status since discharge?  Improving   Is the patient/caregiver able to teach back signs and symptoms related to disease process for when to call PCP?  Yes   Is the patient/caregiver able to teach back signs and symptoms related to disease process for when to call 911?  Yes   Is the patient/caregiver able to teach back the hierarchy of who to call/visit for symptoms/problems? PCP, Specialist, Home health nurse, Urgent Care, ED, 911  Yes   If the patient is a current smoker, are they able to teach back resources for cessation?  Not a smoker   Week 3 Call Completed?  Yes   Wrap up  additional comments  pt doing ok, goingto appts, got a new glucometer.           Sagrario Mcallister RN

## 2021-02-23 ENCOUNTER — CONSULT (OUTPATIENT)
Dept: CARDIOLOGY | Facility: CLINIC | Age: 61
End: 2021-02-23

## 2021-02-23 VITALS
WEIGHT: 207 LBS | DIASTOLIC BLOOD PRESSURE: 64 MMHG | SYSTOLIC BLOOD PRESSURE: 118 MMHG | HEART RATE: 81 BPM | BODY MASS INDEX: 28.04 KG/M2 | HEIGHT: 72 IN | OXYGEN SATURATION: 96 %

## 2021-02-23 DIAGNOSIS — I48.19 PERSISTENT ATRIAL FIBRILLATION (HCC): Primary | ICD-10-CM

## 2021-02-23 DIAGNOSIS — R00.2 PALPITATIONS: ICD-10-CM

## 2021-02-23 PROCEDURE — 93000 ELECTROCARDIOGRAM COMPLETE: CPT | Performed by: INTERNAL MEDICINE

## 2021-02-23 PROCEDURE — 99214 OFFICE O/P EST MOD 30 MIN: CPT | Performed by: INTERNAL MEDICINE

## 2021-02-23 NOTE — PROGRESS NOTES
Darnell Garcia  1960  PCP: Carlos Bella MD    SUBJECTIVE:   Darnell Garcia is a 60 y.o. male seen for a consultation visit regarding the following:     Chief Complaint:   Chief Complaint   Patient presents with   • Atrial Fibrillation          Consultation is requested by Ravin Ríos MD for evaluation of Atrial Fibrillation        History:  This is a 60-year-old patient referred by Dr. Ríos for further evaluation management of atrial fibrillation.  The patient presented to Dr. Ríos for follow up of atrial fibrillation with rapid ventricular rates.The patient has a past medical history significant for type 2 diabetes mellitus, GERD, and asthma. He has a past cardiac history significant for coronary artery disease.  In 2017, the patient presented for evaluation of chest pain.  He subsequently had an abnormal pharmacologic MPS, revealed a a small reversible perfusion defect in the basal inferior wall possibly representing attenuation artifact.  He subsequently underwent coronary angiography in 3/17, which revealed a 30 to 50% stenosis in the mid LAD.  Most recently, the patient was evaluated in the cardiology clinic in 10/19 for chest pain.  At that time, a repeat pharmacologic MPS showed no evidence of inducible ischemia. He presented to [facility] in  Feb 2021 for evaluation of shortness of breath.  He was found to be in atrial fibrillation with rapid ventricular rates and subsequent underwent GENESIS with cardioversion and restoration of sinus rhythm. He was started on Eliquis for anticoagulation, as well as sotalol for rhythm control.  He reports having difficulty with sotalol, therefore, it was discontinued. Since discontinuation of sotalol, the patient has had several episodes of palpitations, up to 2-3 episodes per day possibly related to recurrent PAF.  He continues to tolerate Eliquis without significant bleeding or bruising.     Cardiac PMH: (Old records have been reviewed and  summarized below)  1. Last Coronary Angio: 3/2/2017              1.  Mild to moderate single-vessel disease involving 30 to 50% stenosis in the mid LAD              2.  No significant disease in the LCx, left main, or RCA.              3.  Normal left ventricular systolic function, LVEF 55-60%              4.  LVEDP 50 mmHg  2. Prior Stress Testing: Pharmacologic               1.  MPS 2/17/2017                          1.  Small sized reversible perfusion defect involving the basal inferior wall, which in the presence of normal wall motion and gastrointestinal uptake may represent attenuation artifact.              2.  MPS 10/19                          1.  No evidence of inducible ischemia  3.  Right bundle branch block-chronic in nature  4.  Diabetes-uncontrolled-A1c 11.7  5.  Obstructive sleep apnea-refuses CPAP  6.  Tachybradycardia syndrome  7.  Asthma    Past Medical History, Past Surgical History, Family history, Social History, and Medications were all reviewed with the patient today and updated as necessary.       Current Outpatient Medications:   •  ALBUTEROL SULFATE  (90 Base) MCG/ACT inhaler, INHALE TWO PUFFS BY MOUTH EVERY 4 HOURS AS NEEDED FOR WHEEZING, Disp: 8.5 g, Rfl: 5  •  apixaban (ELIQUIS) 5 MG tablet tablet, Take 1 tablet by mouth Every 12 (Twelve) Hours. Indications: Atrial Fibrillation, Disp: 60 tablet, Rfl: 0  •  atorvastatin (LIPITOR) 40 MG tablet, Take 1 tablet by mouth Daily., Disp: 90 tablet, Rfl: 1  •  budesonide-formoterol (Symbicort) 160-4.5 MCG/ACT inhaler, Inhale 2 puffs 2 (Two) Times a Day., Disp: 6 g, Rfl: 0  •  Empagliflozin (Jardiance) 25 MG tablet, Take 25 mg by mouth Daily., Disp: 90 tablet, Rfl: 1  •  gabapentin (NEURONTIN) 300 MG capsule, Take 300 mg by mouth Every Night., Disp: , Rfl:   •  glimepiride (AMARYL) 4 MG tablet, Take 1 tablet by mouth Every Morning Before Breakfast., Disp: 90 tablet, Rfl: 1  •  glucose blood test strip, Use as instructed, Disp: 180 each,  Rfl: 12  •  glucose blood test strip, Use as instructed, Disp: 100 each, Rfl: 12  •  Insulin Glargine (LANTUS SOLOSTAR) 100 UNIT/ML injection pen, Inject 10 Units under the skin into the appropriate area as directed Every Morning., Disp: 3 pen, Rfl: 3  •  Insulin Pen Needle 30G X 8 MM misc, 10 Units Daily., Disp: 3 each, Rfl: 12  •  Lancets (onetouch ultrasoft) lancets, Use as instructed, Disp: 180 each, Rfl: 12  •  metFORMIN (GLUCOPHAGE) 1000 MG tablet, Take 1 tablet by mouth 2 (Two) Times a Day With Meals., Disp: 90 tablet, Rfl: 1  •  montelukast (SINGULAIR) 10 MG tablet, Take 1 tablet by mouth every night at bedtime., Disp: 90 tablet, Rfl: 1  •  nitroglycerin (NITROSTAT) 0.4 MG SL tablet, Place 1 tablet under the tongue Every FIVE Minutes As Needed for Chest Pain. Take no more than 3 doses in 15 minutes., Disp: 25 tablet, Rfl: 2  •  predniSONE (DELTASONE) 5 MG tablet, TAKE ONE TABLET BY MOUTH EVERY DAY, Disp: 90 tablet, Rfl: 3    Allergies   Allergen Reactions   • Aspirin Shortness Of Breath         Past Medical History:   Diagnosis Date   • Arthritis    • Asthma    • Diabetes mellitus (CMS/HCC)    • GERD (gastroesophageal reflux disease)    • History of varicella    • Neuropathy      Past Surgical History:   Procedure Laterality Date   • NASAL SEPTUM SURGERY  2009   • VASECTOMY  1988   • VENTRAL HERNIA REPAIR      2007 and 2009 (mesh placed)   • WISDOM TOOTH EXTRACTION       Family History   Problem Relation Age of Onset   • Breast cancer Mother    • Osteoarthritis Mother    • Arthritis Mother    • Coronary artery disease Father    • Heart attack Father         late 60's   • Lymphoma Son    • Leukemia Son      Social History     Tobacco Use   • Smoking status: Never Smoker   • Smokeless tobacco: Never Used   Substance Use Topics   • Alcohol use: Yes     Alcohol/week: 10.0 standard drinks     Types: 10 Cans of beer per week     Comment: one day every other weekend           PHYSICAL EXAM:   /64 (BP  "Location: Left arm, Patient Position: Sitting)   Pulse 81   Ht 182.9 cm (72\")   Wt 93.9 kg (207 lb)   SpO2 96%   BMI 28.07 kg/m²      Wt Readings from Last 5 Encounters:   02/23/21 93.9 kg (207 lb)   02/12/21 99.8 kg (220 lb)   02/04/21 93.9 kg (207 lb 0.2 oz)   02/03/21 96.3 kg (212 lb 6.4 oz)   12/06/19 96.3 kg (212 lb 6.4 oz)     BP Readings from Last 5 Encounters:   02/23/21 118/64   02/12/21 132/86   02/06/21 109/72   02/03/21 124/90   12/06/19 102/72       General-Well Nourished, Well developed  Eyes - PERRLA  Neck- supple, No mass  CV- regular rate and rhythm, no MRG  Lung- clear bilaterally  Abd- soft, +BS  Musc/skel - Norm strength and range of motion  Skin- warm and dry  Neuro - Alert & Oriented x 3, appropriate mood.    Medical problems and test results were reviewed with the patient today.     Results for orders placed or performed during the hospital encounter of 02/03/21   COVID-19,[facility] IN-HOUSE,Nasal Swab No Transport Media 3-4 HR TAT - Swab, Nasal Cavity    Specimen: Nasal Cavity; Swab   Result Value Ref Range    COVID19 Not Detected Not Detected - Ref. Range   Comprehensive Metabolic Panel    Specimen: Blood   Result Value Ref Range    Glucose 330 (H) 65 - 99 mg/dL    BUN 14 8 - 23 mg/dL    Creatinine 1.02 0.76 - 1.27 mg/dL    Sodium 133 (L) 136 - 145 mmol/L    Potassium 4.7 3.5 - 5.2 mmol/L    Chloride 97 (L) 98 - 107 mmol/L    CO2 24.1 22.0 - 29.0 mmol/L    Calcium 9.4 8.6 - 10.5 mg/dL    Total Protein 6.9 6.0 - 8.5 g/dL    Albumin 4.20 3.50 - 5.20 g/dL    ALT (SGPT) 9 1 - 41 U/L    AST (SGOT) 10 1 - 40 U/L    Alkaline Phosphatase 96 39 - 117 U/L    Total Bilirubin 1.1 0.0 - 1.2 mg/dL    eGFR Non African Amer 74 >60 mL/min/1.73    Globulin 2.7 gm/dL    A/G Ratio 1.6 g/dL    BUN/Creatinine Ratio 13.7 7.0 - 25.0    Anion Gap 11.9 5.0 - 15.0 mmol/L   Troponin    Specimen: Blood   Result Value Ref Range    Troponin T <0.010 0.000 - 0.030 ng/mL   CBC Auto Differential    Specimen: Blood "   Result Value Ref Range    WBC 5.45 3.40 - 10.80 10*3/mm3    RBC 4.46 4.14 - 5.80 10*6/mm3    Hemoglobin 14.2 13.0 - 17.7 g/dL    Hematocrit 40.6 37.5 - 51.0 %    MCV 91.0 79.0 - 97.0 fL    MCH 31.8 26.6 - 33.0 pg    MCHC 35.0 31.5 - 35.7 g/dL    RDW 12.0 (L) 12.3 - 15.4 %    RDW-SD 40.2 37.0 - 54.0 fl    MPV 9.7 6.0 - 12.0 fL    Platelets 225 140 - 450 10*3/mm3    Neutrophil % 63.5 42.7 - 76.0 %    Lymphocyte % 29.0 19.6 - 45.3 %    Monocyte % 4.4 (L) 5.0 - 12.0 %    Eosinophil % 2.2 0.3 - 6.2 %    Basophil % 0.7 0.0 - 1.5 %    Immature Grans % 0.2 0.0 - 0.5 %    Neutrophils, Absolute 3.46 1.70 - 7.00 10*3/mm3    Lymphocytes, Absolute 1.58 0.70 - 3.10 10*3/mm3    Monocytes, Absolute 0.24 0.10 - 0.90 10*3/mm3    Eosinophils, Absolute 0.12 0.00 - 0.40 10*3/mm3    Basophils, Absolute 0.04 0.00 - 0.20 10*3/mm3    Immature Grans, Absolute 0.01 0.00 - 0.05 10*3/mm3    nRBC 0.0 0.0 - 0.2 /100 WBC   Magnesium    Specimen: Blood   Result Value Ref Range    Magnesium 2.1 1.6 - 2.4 mg/dL   TSH    Specimen: Blood   Result Value Ref Range    TSH 1.190 0.270 - 4.200 uIU/mL   CBC Auto Differential    Specimen: Blood   Result Value Ref Range    WBC 5.89 3.40 - 10.80 10*3/mm3    RBC 4.25 4.14 - 5.80 10*6/mm3    Hemoglobin 13.5 13.0 - 17.7 g/dL    Hematocrit 39.5 37.5 - 51.0 %    MCV 92.9 79.0 - 97.0 fL    MCH 31.8 26.6 - 33.0 pg    MCHC 34.2 31.5 - 35.7 g/dL    RDW 12.0 (L) 12.3 - 15.4 %    RDW-SD 41.1 37.0 - 54.0 fl    MPV 10.1 6.0 - 12.0 fL    Platelets 213 140 - 450 10*3/mm3    Neutrophil % 55.3 42.7 - 76.0 %    Lymphocyte % 35.1 19.6 - 45.3 %    Monocyte % 5.4 5.0 - 12.0 %    Eosinophil % 2.9 0.3 - 6.2 %    Basophil % 1.0 0.0 - 1.5 %    Immature Grans % 0.3 0.0 - 0.5 %    Neutrophils, Absolute 3.25 1.70 - 7.00 10*3/mm3    Lymphocytes, Absolute 2.07 0.70 - 3.10 10*3/mm3    Monocytes, Absolute 0.32 0.10 - 0.90 10*3/mm3    Eosinophils, Absolute 0.17 0.00 - 0.40 10*3/mm3    Basophils, Absolute 0.06 0.00 - 0.20 10*3/mm3    Immature  Grans, Absolute 0.02 0.00 - 0.05 10*3/mm3    nRBC 0.0 0.0 - 0.2 /100 WBC   Comprehensive Metabolic Panel    Specimen: Blood   Result Value Ref Range    Glucose 211 (H) 65 - 99 mg/dL    BUN 18 8 - 23 mg/dL    Creatinine 1.01 0.76 - 1.27 mg/dL    Sodium 139 136 - 145 mmol/L    Potassium 4.5 3.5 - 5.2 mmol/L    Chloride 101 98 - 107 mmol/L    CO2 25.8 22.0 - 29.0 mmol/L    Calcium 9.1 8.6 - 10.5 mg/dL    Total Protein 6.4 6.0 - 8.5 g/dL    Albumin 3.90 3.50 - 5.20 g/dL    ALT (SGPT) 8 1 - 41 U/L    AST (SGOT) <5 1 - 40 U/L    Alkaline Phosphatase 88 39 - 117 U/L    Total Bilirubin 0.8 0.0 - 1.2 mg/dL    eGFR Non African Amer 75 >60 mL/min/1.73    Globulin 2.5 gm/dL    A/G Ratio 1.6 g/dL    BUN/Creatinine Ratio 17.8 7.0 - 25.0    Anion Gap 12.2 5.0 - 15.0 mmol/L   Hemoglobin A1c    Specimen: Blood   Result Value Ref Range    Hemoglobin A1C 11.70 (H) 4.80 - 5.60 %   Potassium    Specimen: Blood   Result Value Ref Range    Potassium 4.3 3.5 - 5.2 mmol/L   Potassium    Specimen: Blood   Result Value Ref Range    Potassium 4.7 3.5 - 5.2 mmol/L   POC Glucose Once    Specimen: Blood   Result Value Ref Range    Glucose 161 (H) 70 - 130 mg/dL   POC Glucose Once    Specimen: Blood   Result Value Ref Range    Glucose 399 (H) 70 - 130 mg/dL   POC Glucose Once    Specimen: Blood   Result Value Ref Range    Glucose 148 (H) 70 - 130 mg/dL   POC Glucose Once    Specimen: Blood   Result Value Ref Range    Glucose 356 (H) 70 - 130 mg/dL   POC Glucose Once    Specimen: Blood   Result Value Ref Range    Glucose 237 (H) 70 - 130 mg/dL   POC Glucose Once    Specimen: Blood   Result Value Ref Range    Glucose 213 (H) 70 - 130 mg/dL   POC Glucose Once    Specimen: Blood   Result Value Ref Range    Glucose 327 (H) 70 - 130 mg/dL   POC Glucose Once    Specimen: Blood   Result Value Ref Range    Glucose 170 (H) 70 - 130 mg/dL   POC Glucose Once    Specimen: Blood   Result Value Ref Range    Glucose 347 (H) 70 - 130 mg/dL   POC Glucose Once     Specimen: Blood   Result Value Ref Range    Glucose 244 (H) 70 - 130 mg/dL   POC Glucose Once    Specimen: Blood   Result Value Ref Range    Glucose 259 (H) 70 - 130 mg/dL   ECG 12 Lead   Result Value Ref Range    QT Interval 370 ms    QTC Interval 509 ms   ECG 12 Lead   Result Value Ref Range    QT Interval 436 ms    QTC Interval 467 ms   ECG 12 Lead   Result Value Ref Range    QT Interval 454 ms    QTC Interval 449 ms   ECG 12 Lead   Result Value Ref Range    QT Interval 470 ms    QTC Interval 441 ms   ECG 12 Lead   Result Value Ref Range    QT Interval 500 ms    QTC Interval 446 ms   ECG 12 Lead   Result Value Ref Range    QT Interval 494 ms    QTC Interval 455 ms   Light Blue Top   Result Value Ref Range    Extra Tube hold for add-on    Green Top (Gel)   Result Value Ref Range    Extra Tube Hold for add-ons.    Lavender Top   Result Value Ref Range    Extra Tube hold for add-on    Gold Top - SST   Result Value Ref Range    Extra Tube Hold for add-ons.          Lab Results   Component Value Date    CHOL 202 (H) 09/26/2019    HDL 28 (L) 09/26/2019    LDL  09/26/2019      Comment:      Unable to calculate     (H) 09/26/2019    VLDL  09/26/2019      Comment:      Unable to calculate       EKG:  (EKG/Tracing has been independently visualized by me and summarized below)      ECG 12 Lead    Date/Time: 2/24/2021 5:43 PM  Performed by: Darnell Christian MD  Authorized by: Darnell Christian MD   Comparison: compared with previous ECG   Similar to previous ECG  Rhythm: sinus rhythm  Rate: normal  Conduction: right bundle branch block    Clinical impression: abnormal EKG            ASSESSMENT and PLAN  1.  Atrial fibrillation-was unable to tolerate sotalol due to side effects-we will check a 2-week monitor to better define burden and other possible arrhythmias.  2.  Palpitations-we will check a 2-week monitor to define the mechanism  3.  Sleep apnea-untreated he refuses to wear CPAP at this time  4.  Diabetes-last  A1c so there was uncontrolled 11.7  5.  Coronary artery disease-being addressed by Dr. Ríos    Return in about 6 weeks (around 4/6/2021).    1. 2 week Monitor        Darnell Christian M.D., [email or web address], F.H.R.S.  Cardiology/Electrophysiology  02/24/21  17:43 EST  Answers for HPI/ROS submitted by the patient on 2/23/2021   What is the primary reason for your visit?: Other  Please describe your symptoms.: Hospitalized for fast heart rate and Afib recognition. Heart shocked backed into [facility]. Afib condition not controllable via beta blockers, last EKG reported in [facility]. Heart appears to be going in and out of afib. Some chest pain associated., Note: in 2019 I went to the hospital for chest pains, they determined that I had some slight blockage but not enough to stint.  Have you had these symptoms before?: No  How long have you been having these symptoms?: Greater than 2 weeks  Please list any medications you are currently taking for this condition.: Just Equalis directly for this, I was taking a betablocker (Sotadol), but it made me extremely sick. So they pulled it.  Please describe any probable cause for these symptoms. : Didnt even realize I was having the issue, my GP discovered during a routine checkup.

## 2021-02-24 PROBLEM — R00.2 PALPITATIONS: Status: ACTIVE | Noted: 2021-02-24

## 2021-02-28 DIAGNOSIS — E11.49 TYPE 2 DIABETES MELLITUS WITH OTHER DIABETIC NEUROLOGICAL COMPLICATION (HCC): ICD-10-CM

## 2021-03-01 RX ORDER — GABAPENTIN 300 MG/1
CAPSULE ORAL
Qty: 90 CAPSULE | Refills: 2 | Status: SHIPPED | OUTPATIENT
Start: 2021-03-01 | End: 2021-12-09

## 2021-03-01 NOTE — TELEPHONE ENCOUNTER
Please advise as this medication is under a historical provider.     Last office visit 02/03/2021  No future appointment scheduled    No controlled substance agreement on file  Last UDS 09/30/2019

## 2021-03-02 ENCOUNTER — READMISSION MANAGEMENT (OUTPATIENT)
Dept: CALL CENTER | Facility: HOSPITAL | Age: 61
End: 2021-03-02

## 2021-03-17 NOTE — TELEPHONE ENCOUNTER
Caller: [facility] [address] [phone] [facility] [phone] [place]    Relationship: Pharmacy    Best call back number: [phone]    Medication needed:   Requested Prescriptions     Pending Prescriptions Disp Refills   • apixaban (ELIQUIS) 5 MG tablet tablet 60 tablet 0     Sig: Take 1 tablet by mouth Every 12 (Twelve) Hours. Indications: Atrial Fibrillation       When do you need the refill by: 03/17/2021    What additional details did the patient provide when requesting the medication: PHARMACY STATES THAT THE PATIENT IS COMPLETELY OUT OF MEDICATION     Does the patient have less than a 3 day supply:  [x] Yes  [] No    What is the patient's preferred pharmacy: [facility] [address] - [phone] [facility] [phone] [place]

## 2021-03-24 DIAGNOSIS — J45.909 UNCOMPLICATED ASTHMA, UNSPECIFIED ASTHMA SEVERITY, UNSPECIFIED WHETHER PERSISTENT: ICD-10-CM

## 2021-03-24 RX ORDER — BUDESONIDE AND FORMOTEROL FUMARATE DIHYDRATE 160; 4.5 UG/1; UG/1
AEROSOL RESPIRATORY (INHALATION)
Qty: 10.2 G | Refills: 0 | Status: SHIPPED | OUTPATIENT
Start: 2021-03-24 | End: 2021-04-20

## 2021-04-12 RX ORDER — APIXABAN 5 MG/1
TABLET, FILM COATED ORAL
Qty: 180 TABLET | Refills: 3 | Status: SHIPPED | OUTPATIENT
Start: 2021-04-12 | End: 2022-04-06

## 2021-04-19 DIAGNOSIS — J45.909 UNCOMPLICATED ASTHMA, UNSPECIFIED ASTHMA SEVERITY, UNSPECIFIED WHETHER PERSISTENT: ICD-10-CM

## 2021-04-20 ENCOUNTER — OFFICE VISIT (OUTPATIENT)
Dept: CARDIOLOGY | Facility: CLINIC | Age: 61
End: 2021-04-20

## 2021-04-20 VITALS
SYSTOLIC BLOOD PRESSURE: 120 MMHG | HEIGHT: 72 IN | DIASTOLIC BLOOD PRESSURE: 58 MMHG | OXYGEN SATURATION: 97 % | HEART RATE: 77 BPM | BODY MASS INDEX: 29.39 KG/M2 | WEIGHT: 217 LBS

## 2021-04-20 DIAGNOSIS — R00.2 PALPITATIONS: ICD-10-CM

## 2021-04-20 DIAGNOSIS — I48.0 PAROXYSMAL ATRIAL FIBRILLATION (HCC): Primary | ICD-10-CM

## 2021-04-20 PROCEDURE — 99214 OFFICE O/P EST MOD 30 MIN: CPT | Performed by: INTERNAL MEDICINE

## 2021-04-20 RX ORDER — BLOOD-GLUCOSE METER
EACH MISCELLANEOUS
Qty: 1 KIT | Refills: 0 | Status: SHIPPED | OUTPATIENT
Start: 2021-04-20

## 2021-04-20 RX ORDER — BUDESONIDE AND FORMOTEROL FUMARATE DIHYDRATE 160; 4.5 UG/1; UG/1
AEROSOL RESPIRATORY (INHALATION)
Qty: 10.2 G | Refills: 0 | Status: SHIPPED | OUTPATIENT
Start: 2021-04-20 | End: 2021-05-26

## 2021-04-20 RX ORDER — METOPROLOL SUCCINATE 25 MG/1
12.5 TABLET, EXTENDED RELEASE ORAL DAILY
Qty: 45 TABLET | Refills: 3 | Status: SHIPPED | OUTPATIENT
Start: 2021-04-20 | End: 2022-06-13 | Stop reason: SDUPTHER

## 2021-04-20 NOTE — PROGRESS NOTES
Darnell Garcia  1960  PCP: Carlos Bella MD    SUBJECTIVE:   Darnell Garcia is a 61 y.o. male seen for a consultation visit regarding the following:     Chief Complaint:   Chief Complaint   Patient presents with   • Coronary Artery Disease        HPI:  Patient has been doing much better. DM is under better control. Palp have been stable.     History:  This is a 61-year-old patient referred by Dr. Ríos for further evaluation management of atrial fibrillation.  The patient presented to Dr. Ríos for follow up of atrial fibrillation with rapid ventricular rates.The patient has a past medical history significant for type 2 diabetes mellitus, GERD, and asthma. He has a past cardiac history significant for coronary artery disease.  In 2017, the patient presented for evaluation of chest pain.  He subsequently had an abnormal pharmacologic MPS, revealed a a small reversible perfusion defect in the basal inferior wall possibly representing attenuation artifact.  He subsequently underwent coronary angiography in 3/17, which revealed a 30 to 50% stenosis in the mid LAD.  Most recently, the patient was evaluated in the cardiology clinic in 10/19 for chest pain.  At that time, a repeat pharmacologic MPS showed no evidence of inducible ischemia. He presented to [facility] in  Feb 2021 for evaluation of shortness of breath.  He was found to be in atrial fibrillation with rapid ventricular rates and subsequent underwent GENESIS with cardioversion and restoration of sinus rhythm. He was started on Eliquis for anticoagulation, as well as sotalol for rhythm control.  He reports having difficulty with sotalol, therefore, it was discontinued. Since discontinuation of sotalol, the patient has had several episodes of palpitations, up to 2-3 episodes per day possibly related to recurrent PAF.  He continues to tolerate Eliquis without significant bleeding or bruising.     Cardiac PMH: (Old records have been reviewed and  summarized below)  1. Last Coronary Angio: 3/2/2017              1.  Mild to moderate single-vessel disease involving 30 to 50% stenosis in the mid LAD              2.  No significant disease in the LCx, left main, or RCA.              3.  Normal left ventricular systolic function, LVEF 55-60%              4.  LVEDP 50 mmHg  2. Prior Stress Testing: Pharmacologic               1.  MPS 2/17/2017                          1.  Small sized reversible perfusion defect involving the basal inferior wall, which in the presence of normal wall motion and gastrointestinal uptake may represent attenuation artifact.              2.  MPS 10/19                          1.  No evidence of inducible ischemia  3.  Right bundle branch block-chronic in nature  4.  Diabetes-uncontrolled-A1c 11.7  5.  Obstructive sleep apnea-refuses CPAP  6.  Tachybradycardia syndrome  7.  Asthma  8.  2 week Monitor - March 2021 - Average HR: 70. Min HR: 48. Max HR: 231. Rare A. Tach and Non-sustained V. Tach    Past Medical History, Past Surgical History, Family history, Social History, and Medications were all reviewed with the patient today and updated as necessary.       Current Outpatient Medications:   •  ALBUTEROL SULFATE  (90 Base) MCG/ACT inhaler, INHALE TWO PUFFS BY MOUTH EVERY 4 HOURS AS NEEDED FOR WHEEZING, Disp: 8.5 g, Rfl: 5  •  atorvastatin (LIPITOR) 40 MG tablet, Take 1 tablet by mouth Daily., Disp: 90 tablet, Rfl: 1  •  Blood Glucose Monitoring Suppl (Accu-Chek Guide Me) w/Device kit, USE AS DIRECTED, Disp: 1 kit, Rfl: 0  •  Eliquis 5 MG tablet tablet, TAKE ONE TABLET BY MOUTH EVERY TWELVE HOURS, Disp: 180 tablet, Rfl: 3  •  Empagliflozin (Jardiance) 25 MG tablet, Take 25 mg by mouth Daily., Disp: 90 tablet, Rfl: 1  •  gabapentin (NEURONTIN) 300 MG capsule, TAKE ONE CAPSULE BY MOUTH AT BEDTIME, Disp: 90 capsule, Rfl: 2  •  glimepiride (AMARYL) 4 MG tablet, Take 1 tablet by mouth Every Morning Before Breakfast., Disp: 90 tablet,  Rfl: 1  •  glucose blood test strip, Use as instructed, Disp: 180 each, Rfl: 12  •  glucose blood test strip, Use as instructed, Disp: 100 each, Rfl: 12  •  Insulin Glargine (LANTUS SOLOSTAR) 100 UNIT/ML injection pen, Inject 10 Units under the skin into the appropriate area as directed Every Morning., Disp: 3 pen, Rfl: 3  •  Insulin Pen Needle 30G X 8 MM misc, 10 Units Daily., Disp: 3 each, Rfl: 12  •  Lancets (onetouch ultrasoft) lancets, Use as instructed, Disp: 180 each, Rfl: 12  •  metFORMIN (GLUCOPHAGE) 1000 MG tablet, Take 1 tablet by mouth 2 (Two) Times a Day With Meals., Disp: 90 tablet, Rfl: 1  •  montelukast (SINGULAIR) 10 MG tablet, Take 1 tablet by mouth every night at bedtime., Disp: 90 tablet, Rfl: 1  •  nitroglycerin (NITROSTAT) 0.4 MG SL tablet, Place 1 tablet under the tongue Every FIVE Minutes As Needed for Chest Pain. Take no more than 3 doses in 15 minutes., Disp: 25 tablet, Rfl: 2  •  predniSONE (DELTASONE) 5 MG tablet, TAKE ONE TABLET BY MOUTH EVERY DAY, Disp: 90 tablet, Rfl: 3  •  Symbicort 160-4.5 MCG/ACT inhaler, INHALE TWO PUFFS BY MOUTH TWICE DAILY, Disp: 10.2 g, Rfl: 0  •  metoprolol succinate XL (TOPROL-XL) 25 MG 24 hr tablet, Take 0.5 tablets by mouth Daily., Disp: 45 tablet, Rfl: 3    Allergies   Allergen Reactions   • Aspirin Shortness Of Breath         Past Medical History:   Diagnosis Date   • Arthritis    • Asthma    • Diabetes mellitus (CMS/HCC)    • GERD (gastroesophageal reflux disease)    • History of varicella    • Neuropathy      Past Surgical History:   Procedure Laterality Date   • NASAL SEPTUM SURGERY  2009   • VASECTOMY  1988   • VENTRAL HERNIA REPAIR      2007 and 2009 (mesh placed)   • WISDOM TOOTH EXTRACTION       Family History   Problem Relation Age of Onset   • Breast cancer Mother    • Osteoarthritis Mother    • Arthritis Mother    • Coronary artery disease Father    • Heart attack Father         late 60's   • Lymphoma Son    • Leukemia Son      Social History  "    Tobacco Use   • Smoking status: Never Smoker   • Smokeless tobacco: Never Used   Substance Use Topics   • Alcohol use: Yes     Alcohol/week: 10.0 standard drinks     Types: 10 Cans of beer per week     Comment: one day every other weekend           PHYSICAL EXAM:   /58 (BP Location: Right arm, Patient Position: Sitting)   Pulse 77   Ht 182.9 cm (72\")   Wt 98.4 kg (217 lb)   SpO2 97%   BMI 29.43 kg/m²      Wt Readings from Last 5 Encounters:   04/20/21 98.4 kg (217 lb)   02/23/21 93.9 kg (207 lb)   02/12/21 99.8 kg (220 lb)   02/04/21 93.9 kg (207 lb 0.2 oz)   02/03/21 96.3 kg (212 lb 6.4 oz)     BP Readings from Last 5 Encounters:   04/20/21 120/58   02/23/21 118/64   02/12/21 132/86   02/06/21 109/72   02/03/21 124/90       General-Well Nourished, Well developed  Eyes - PERRLA  Neck- supple, No mass  CV- regular rate and rhythm, no MRG  Lung- clear bilaterally  Abd- soft, +BS  Musc/skel - Norm strength and range of motion  Skin- warm and dry  Neuro - Alert & Oriented x 3, appropriate mood.    Medical problems and test results were reviewed with the patient today.     Results for orders placed or performed during the hospital encounter of 02/03/21   COVID-19,[facility] IN-HOUSE,Nasal Swab No Transport Media 3-4 HR TAT - Swab, Nasal Cavity    Specimen: Nasal Cavity; Swab   Result Value Ref Range    COVID19 Not Detected Not Detected - Ref. Range   Comprehensive Metabolic Panel    Specimen: Blood   Result Value Ref Range    Glucose 330 (H) 65 - 99 mg/dL    BUN 14 8 - 23 mg/dL    Creatinine 1.02 0.76 - 1.27 mg/dL    Sodium 133 (L) 136 - 145 mmol/L    Potassium 4.7 3.5 - 5.2 mmol/L    Chloride 97 (L) 98 - 107 mmol/L    CO2 24.1 22.0 - 29.0 mmol/L    Calcium 9.4 8.6 - 10.5 mg/dL    Total Protein 6.9 6.0 - 8.5 g/dL    Albumin 4.20 3.50 - 5.20 g/dL    ALT (SGPT) 9 1 - 41 U/L    AST (SGOT) 10 1 - 40 U/L    Alkaline Phosphatase 96 39 - 117 U/L    Total Bilirubin 1.1 0.0 - 1.2 mg/dL    eGFR Non  Amer 74 >60 " mL/min/1.73    Globulin 2.7 gm/dL    A/G Ratio 1.6 g/dL    BUN/Creatinine Ratio 13.7 7.0 - 25.0    Anion Gap 11.9 5.0 - 15.0 mmol/L   Troponin    Specimen: Blood   Result Value Ref Range    Troponin T <0.010 0.000 - 0.030 ng/mL   CBC Auto Differential    Specimen: Blood   Result Value Ref Range    WBC 5.45 3.40 - 10.80 10*3/mm3    RBC 4.46 4.14 - 5.80 10*6/mm3    Hemoglobin 14.2 13.0 - 17.7 g/dL    Hematocrit 40.6 37.5 - 51.0 %    MCV 91.0 79.0 - 97.0 fL    MCH 31.8 26.6 - 33.0 pg    MCHC 35.0 31.5 - 35.7 g/dL    RDW 12.0 (L) 12.3 - 15.4 %    RDW-SD 40.2 37.0 - 54.0 fl    MPV 9.7 6.0 - 12.0 fL    Platelets 225 140 - 450 10*3/mm3    Neutrophil % 63.5 42.7 - 76.0 %    Lymphocyte % 29.0 19.6 - 45.3 %    Monocyte % 4.4 (L) 5.0 - 12.0 %    Eosinophil % 2.2 0.3 - 6.2 %    Basophil % 0.7 0.0 - 1.5 %    Immature Grans % 0.2 0.0 - 0.5 %    Neutrophils, Absolute 3.46 1.70 - 7.00 10*3/mm3    Lymphocytes, Absolute 1.58 0.70 - 3.10 10*3/mm3    Monocytes, Absolute 0.24 0.10 - 0.90 10*3/mm3    Eosinophils, Absolute 0.12 0.00 - 0.40 10*3/mm3    Basophils, Absolute 0.04 0.00 - 0.20 10*3/mm3    Immature Grans, Absolute 0.01 0.00 - 0.05 10*3/mm3    nRBC 0.0 0.0 - 0.2 /100 WBC   Magnesium    Specimen: Blood   Result Value Ref Range    Magnesium 2.1 1.6 - 2.4 mg/dL   TSH    Specimen: Blood   Result Value Ref Range    TSH 1.190 0.270 - 4.200 uIU/mL   CBC Auto Differential    Specimen: Blood   Result Value Ref Range    WBC 5.89 3.40 - 10.80 10*3/mm3    RBC 4.25 4.14 - 5.80 10*6/mm3    Hemoglobin 13.5 13.0 - 17.7 g/dL    Hematocrit 39.5 37.5 - 51.0 %    MCV 92.9 79.0 - 97.0 fL    MCH 31.8 26.6 - 33.0 pg    MCHC 34.2 31.5 - 35.7 g/dL    RDW 12.0 (L) 12.3 - 15.4 %    RDW-SD 41.1 37.0 - 54.0 fl    MPV 10.1 6.0 - 12.0 fL    Platelets 213 140 - 450 10*3/mm3    Neutrophil % 55.3 42.7 - 76.0 %    Lymphocyte % 35.1 19.6 - 45.3 %    Monocyte % 5.4 5.0 - 12.0 %    Eosinophil % 2.9 0.3 - 6.2 %    Basophil % 1.0 0.0 - 1.5 %    Immature Grans % 0.3 0.0  - 0.5 %    Neutrophils, Absolute 3.25 1.70 - 7.00 10*3/mm3    Lymphocytes, Absolute 2.07 0.70 - 3.10 10*3/mm3    Monocytes, Absolute 0.32 0.10 - 0.90 10*3/mm3    Eosinophils, Absolute 0.17 0.00 - 0.40 10*3/mm3    Basophils, Absolute 0.06 0.00 - 0.20 10*3/mm3    Immature Grans, Absolute 0.02 0.00 - 0.05 10*3/mm3    nRBC 0.0 0.0 - 0.2 /100 WBC   Comprehensive Metabolic Panel    Specimen: Blood   Result Value Ref Range    Glucose 211 (H) 65 - 99 mg/dL    BUN 18 8 - 23 mg/dL    Creatinine 1.01 0.76 - 1.27 mg/dL    Sodium 139 136 - 145 mmol/L    Potassium 4.5 3.5 - 5.2 mmol/L    Chloride 101 98 - 107 mmol/L    CO2 25.8 22.0 - 29.0 mmol/L    Calcium 9.1 8.6 - 10.5 mg/dL    Total Protein 6.4 6.0 - 8.5 g/dL    Albumin 3.90 3.50 - 5.20 g/dL    ALT (SGPT) 8 1 - 41 U/L    AST (SGOT) <5 1 - 40 U/L    Alkaline Phosphatase 88 39 - 117 U/L    Total Bilirubin 0.8 0.0 - 1.2 mg/dL    eGFR Non African Amer 75 >60 mL/min/1.73    Globulin 2.5 gm/dL    A/G Ratio 1.6 g/dL    BUN/Creatinine Ratio 17.8 7.0 - 25.0    Anion Gap 12.2 5.0 - 15.0 mmol/L   Hemoglobin A1c    Specimen: Blood   Result Value Ref Range    Hemoglobin A1C 11.70 (H) 4.80 - 5.60 %   Potassium    Specimen: Blood   Result Value Ref Range    Potassium 4.3 3.5 - 5.2 mmol/L   Potassium    Specimen: Blood   Result Value Ref Range    Potassium 4.7 3.5 - 5.2 mmol/L   POC Glucose Once    Specimen: Blood   Result Value Ref Range    Glucose 161 (H) 70 - 130 mg/dL   POC Glucose Once    Specimen: Blood   Result Value Ref Range    Glucose 399 (H) 70 - 130 mg/dL   POC Glucose Once    Specimen: Blood   Result Value Ref Range    Glucose 148 (H) 70 - 130 mg/dL   POC Glucose Once    Specimen: Blood   Result Value Ref Range    Glucose 356 (H) 70 - 130 mg/dL   POC Glucose Once    Specimen: Blood   Result Value Ref Range    Glucose 237 (H) 70 - 130 mg/dL   POC Glucose Once    Specimen: Blood   Result Value Ref Range    Glucose 213 (H) 70 - 130 mg/dL   POC Glucose Once    Specimen: Blood    Result Value Ref Range    Glucose 327 (H) 70 - 130 mg/dL   POC Glucose Once    Specimen: Blood   Result Value Ref Range    Glucose 170 (H) 70 - 130 mg/dL   POC Glucose Once    Specimen: Blood   Result Value Ref Range    Glucose 347 (H) 70 - 130 mg/dL   POC Glucose Once    Specimen: Blood   Result Value Ref Range    Glucose 244 (H) 70 - 130 mg/dL   POC Glucose Once    Specimen: Blood   Result Value Ref Range    Glucose 259 (H) 70 - 130 mg/dL   ECG 12 Lead   Result Value Ref Range    QT Interval 370 ms    QTC Interval 509 ms   ECG 12 Lead   Result Value Ref Range    QT Interval 436 ms    QTC Interval 467 ms   ECG 12 Lead   Result Value Ref Range    QT Interval 454 ms    QTC Interval 449 ms   ECG 12 Lead   Result Value Ref Range    QT Interval 470 ms    QTC Interval 441 ms   ECG 12 Lead   Result Value Ref Range    QT Interval 500 ms    QTC Interval 446 ms   ECG 12 Lead   Result Value Ref Range    QT Interval 494 ms    QTC Interval 455 ms   Light Blue Top   Result Value Ref Range    Extra Tube hold for add-on    Green Top (Gel)   Result Value Ref Range    Extra Tube Hold for add-ons.    Lavender Top   Result Value Ref Range    Extra Tube hold for add-on    Gold Top - SST   Result Value Ref Range    Extra Tube Hold for add-ons.          Lab Results   Component Value Date    CHOL 202 (H) 09/26/2019    HDL 28 (L) 09/26/2019    LDL  09/26/2019      Comment:      Unable to calculate     (H) 09/26/2019    VLDL  09/26/2019      Comment:      Unable to calculate       EKG:  (EKG/Tracing has been independently visualized by me and summarized below)    Procedures    ASSESSMENT and PLAN  1.  Atrial fibrillation-was unable to tolerate sotalol due to side effects-we checked a 2-week monitor to better define burden and other possible arrhythmias.No significant atrial fibrillation was seen.  Was having a mixture of atrial tachycardia and nonsustained ventricular tachycardia  2.  Palpitations-we checked a 2-week monitor to  define the mechanism -it showed a mixture of nonsustained ventricular tachycardia and atrial tachycardia. Will start metoprolol 12.5mg qam.   3.  Sleep apnea-untreated he refuses to wear CPAP at this time  4.  Diabetes-last A1c so there was uncontrolled 11.7 - He reports that it is now better  5.  Coronary artery disease-being addressed by Dr. Michoacano Gloria in about 6 months (around 10/20/2021).      Darnell Christian M.D., F.A.C.C, F.H.R.S.  Cardiology/Electrophysiology  04/20/21  15:57 EDT

## 2021-05-13 ENCOUNTER — APPOINTMENT (OUTPATIENT)
Dept: GENERAL RADIOLOGY | Facility: HOSPITAL | Age: 61
End: 2021-05-13

## 2021-05-13 ENCOUNTER — HOSPITAL ENCOUNTER (EMERGENCY)
Facility: HOSPITAL | Age: 61
Discharge: HOME | End: 2021-05-13
Attending: EMERGENCY MEDICINE | Admitting: EMERGENCY MEDICINE

## 2021-05-13 ENCOUNTER — APPOINTMENT (OUTPATIENT)
Dept: MRI IMAGING | Facility: HOSPITAL | Age: 61
End: 2021-05-13

## 2021-05-13 VITALS
OXYGEN SATURATION: 99 % | RESPIRATION RATE: 18 BRPM | HEIGHT: 72 IN | HEART RATE: 68 BPM | WEIGHT: 215 LBS | SYSTOLIC BLOOD PRESSURE: 164 MMHG | BODY MASS INDEX: 29.12 KG/M2 | TEMPERATURE: 97.3 F | DIASTOLIC BLOOD PRESSURE: 88 MMHG

## 2021-05-13 DIAGNOSIS — M54.42 ACUTE LEFT-SIDED LOW BACK PAIN WITH LEFT-SIDED SCIATICA: Primary | ICD-10-CM

## 2021-05-13 PROCEDURE — 99283 EMERGENCY DEPT VISIT LOW MDM: CPT

## 2021-05-13 PROCEDURE — 25010000002 DEXAMETHASONE SODIUM PHOSPHATE 10 MG/ML SOLUTION: Performed by: NURSE PRACTITIONER

## 2021-05-13 PROCEDURE — 72100 X-RAY EXAM L-S SPINE 2/3 VWS: CPT

## 2021-05-13 PROCEDURE — 73523 X-RAY EXAM HIPS BI 5/> VIEWS: CPT

## 2021-05-13 PROCEDURE — 96372 THER/PROPH/DIAG INJ SC/IM: CPT

## 2021-05-13 RX ORDER — TRAMADOL HYDROCHLORIDE 50 MG/1
50 TABLET ORAL EVERY 6 HOURS PRN
Qty: 12 TABLET | Refills: 0 | Status: SHIPPED | OUTPATIENT
Start: 2021-05-13 | End: 2021-05-28 | Stop reason: SDUPTHER

## 2021-05-13 RX ORDER — METHOCARBAMOL 750 MG/1
750 TABLET, FILM COATED ORAL 3 TIMES DAILY PRN
Qty: 21 TABLET | Refills: 0 | Status: SHIPPED | OUTPATIENT
Start: 2021-05-13 | End: 2021-09-07

## 2021-05-13 RX ORDER — METHYLPREDNISOLONE 4 MG/1
TABLET ORAL
Qty: 1 EACH | Refills: 0 | Status: SHIPPED | OUTPATIENT
Start: 2021-05-13 | End: 2021-05-20

## 2021-05-13 RX ORDER — METHOCARBAMOL 500 MG/1
1500 TABLET, FILM COATED ORAL ONCE
Status: COMPLETED | OUTPATIENT
Start: 2021-05-13 | End: 2021-05-13

## 2021-05-13 RX ORDER — HYDROCODONE BITARTRATE AND ACETAMINOPHEN 5; 325 MG/1; MG/1
1 TABLET ORAL ONCE
Status: COMPLETED | OUTPATIENT
Start: 2021-05-13 | End: 2021-05-13

## 2021-05-13 RX ORDER — DEXAMETHASONE SODIUM PHOSPHATE 10 MG/ML
10 INJECTION, SOLUTION INTRAMUSCULAR; INTRAVENOUS ONCE
Status: COMPLETED | OUTPATIENT
Start: 2021-05-13 | End: 2021-05-13

## 2021-05-13 RX ADMIN — HYDROCODONE BITARTRATE AND ACETAMINOPHEN 1 TABLET: 5; 325 TABLET ORAL at 11:27

## 2021-05-13 RX ADMIN — METHOCARBAMOL 1500 MG: 500 TABLET, FILM COATED ORAL at 11:27

## 2021-05-13 RX ADMIN — DEXAMETHASONE SODIUM PHOSPHATE 10 MG: 10 INJECTION, SOLUTION INTRAMUSCULAR; INTRAVENOUS at 11:27

## 2021-05-20 ENCOUNTER — OFFICE VISIT (OUTPATIENT)
Dept: ORTHOPEDIC SURGERY | Facility: CLINIC | Age: 61
End: 2021-05-20

## 2021-05-20 VITALS — HEIGHT: 72 IN | BODY MASS INDEX: 29.12 KG/M2 | RESPIRATION RATE: 18 BRPM | WEIGHT: 215 LBS

## 2021-05-20 DIAGNOSIS — M25.552 LEFT HIP PAIN: Primary | ICD-10-CM

## 2021-05-20 DIAGNOSIS — M87.052 AVASCULAR NECROSIS OF FEMORAL HEAD, LEFT (HCC): Primary | ICD-10-CM

## 2021-05-20 DIAGNOSIS — M87.052 AVASCULAR NECROSIS OF FEMORAL HEAD, LEFT (HCC): ICD-10-CM

## 2021-05-20 DIAGNOSIS — M54.32 SCIATICA OF LEFT SIDE: ICD-10-CM

## 2021-05-20 DIAGNOSIS — M25.552 LEFT HIP PAIN: ICD-10-CM

## 2021-05-20 PROCEDURE — 96372 THER/PROPH/DIAG INJ SC/IM: CPT | Performed by: PHYSICIAN ASSISTANT

## 2021-05-20 PROCEDURE — 99203 OFFICE O/P NEW LOW 30 MIN: CPT | Performed by: PHYSICIAN ASSISTANT

## 2021-05-20 RX ORDER — METHYLPREDNISOLONE ACETATE 40 MG/ML
40 INJECTION, SUSPENSION INTRA-ARTICULAR; INTRALESIONAL; INTRAMUSCULAR; SOFT TISSUE ONCE
Status: COMPLETED | OUTPATIENT
Start: 2021-05-20 | End: 2021-05-20

## 2021-05-20 RX ORDER — CELECOXIB 200 MG/1
200 CAPSULE ORAL DAILY
COMMUNITY
Start: 2021-04-19 | End: 2021-05-20

## 2021-05-20 RX ORDER — HYDROCODONE BITARTRATE AND ACETAMINOPHEN 5; 325 MG/1; MG/1
1 TABLET ORAL NIGHTLY PRN
Qty: 7 TABLET | Refills: 0 | Status: SHIPPED | OUTPATIENT
Start: 2021-05-20 | End: 2021-05-28 | Stop reason: SDUPTHER

## 2021-05-20 RX ADMIN — METHYLPREDNISOLONE ACETATE 40 MG: 40 INJECTION, SUSPENSION INTRA-ARTICULAR; INTRALESIONAL; INTRAMUSCULAR; SOFT TISSUE at 09:45

## 2021-05-20 NOTE — PROGRESS NOTES
Subjective   Patient ID: Darnell Garcia is a 61 y.o. right hand dominant male  Pain of the Left Hip and Pain of the Right Hip (Reports celso hip pain for about a week and a half, seen at ER 5/13/21 for back and hip pain radiating down L leg, no R leg pain, no prior back pain, NKI, no treatment)         History of Present Illness  Patient presents with complaints of bilateral lower back discomfort that radiates into the left groin, testicle and down the left lower extremity that began on 5/10/2021.  There has been no bowel or bladder incontinence.  He does have chronic numbness and tingling to the feet which she attributes to prior ankle surgeries and diabetic neuropathy.  He went to the emergency room when the symptoms began abruptly.  He states he was given a cortisone injection intramuscularly which seemed to help.  They sent him home with Robaxin, Medrol Dosepak and tramadol.  Patient admits to drinking several beers most weekends for many years.  He also states he is on oral steroids and has been for many years for the treatment of asthma.  There has been no fever or chills.                                                 Past Medical History:   Diagnosis Date   • Arthritis    • Asthma    • Diabetes mellitus (CMS/[facility])    • GERD (gastroesophageal reflux disease)    • History of varicella    • Neuropathy         Past Surgical History:   Procedure Laterality Date   • NASAL SEPTUM SURGERY  2009   • VASECTOMY  1988   • VENTRAL HERNIA REPAIR      2007 and 2009 (mesh placed)   • WISDOM TOOTH EXTRACTION         Family History   Problem Relation Age of Onset   • Breast cancer Mother    • Osteoarthritis Mother    • Arthritis Mother    • Coronary artery disease Father    • Heart attack Father         late 60's   • Lymphoma Son    • Leukemia Son        Social History     Socioeconomic History   • Marital status:      Spouse name: Not on file   • Number of children: Not on file   • Years of education: Not on file   •  Highest education level: Not on file   Tobacco Use   • Smoking status: Never Smoker   • Smokeless tobacco: Never Used   Vaping Use   • Vaping Use: Never used   Substance and Sexual Activity   • Alcohol use: Yes     Alcohol/week: 10.0 standard drinks     Types: 10 Cans of beer per week     Comment: one day every other weekend   • Drug use: No   • Sexual activity: Never     Partners: Female         Current Outpatient Medications:   •  ALBUTEROL SULFATE  (90 Base) MCG/ACT inhaler, INHALE TWO PUFFS BY MOUTH EVERY 4 HOURS AS NEEDED FOR WHEEZING, Disp: 8.5 g, Rfl: 5  •  atorvastatin (LIPITOR) 40 MG tablet, Take 1 tablet by mouth Daily., Disp: 90 tablet, Rfl: 1  •  Blood Glucose Monitoring Suppl (Accu-Chek Guide Me) w/Device kit, USE AS DIRECTED, Disp: 1 kit, Rfl: 0  •  Eliquis 5 MG tablet tablet, TAKE ONE TABLET BY MOUTH EVERY TWELVE HOURS, Disp: 180 tablet, Rfl: 3  •  Empagliflozin (Jardiance) 25 MG tablet, Take 25 mg by mouth Daily., Disp: 90 tablet, Rfl: 1  •  gabapentin (NEURONTIN) 300 MG capsule, TAKE ONE CAPSULE BY MOUTH AT BEDTIME, Disp: 90 capsule, Rfl: 2  •  glimepiride (AMARYL) 4 MG tablet, Take 1 tablet by mouth Every Morning Before Breakfast., Disp: 90 tablet, Rfl: 1  •  glucose blood test strip, Use as instructed, Disp: 180 each, Rfl: 12  •  glucose blood test strip, Use as instructed, Disp: 100 each, Rfl: 12  •  HYDROcodone-acetaminophen (NORCO) 5-325 MG per tablet, Take 1 tablet by mouth At Night As Needed for Other (PRN PAIN)., Disp: 7 tablet, Rfl: 0  •  HYDROcodone-acetaminophen (NORCO) 5-325 MG per tablet, Take 1 tablet by mouth At Night As Needed for Other (PRN PAIN)., Disp: 7 tablet, Rfl: 0  •  Insulin Glargine (LANTUS SOLOSTAR) 100 UNIT/ML injection pen, Inject 10 Units under the skin into the appropriate area as directed Every Morning., Disp: 3 pen, Rfl: 3  •  Insulin Pen Needle 30G X 8 MM misc, 10 Units Daily., Disp: 3 each, Rfl: 12  •  Lancets (onetouch ultrasoft) lancets, Use as instructed,  "Disp: 180 each, Rfl: 12  •  metFORMIN (GLUCOPHAGE) 1000 MG tablet, Take 1 tablet by mouth 2 (Two) Times a Day With Meals., Disp: 90 tablet, Rfl: 1  •  methocarbamol (ROBAXIN) 750 MG tablet, Take 1 tablet by mouth 3 (Three) Times a Day As Needed for Muscle Spasms., Disp: 21 tablet, Rfl: 0  •  metoprolol succinate XL (TOPROL-XL) 25 MG 24 hr tablet, Take 0.5 tablets by mouth Daily., Disp: 45 tablet, Rfl: 3  •  montelukast (SINGULAIR) 10 MG tablet, Take 1 tablet by mouth every night at bedtime., Disp: 90 tablet, Rfl: 1  •  nitroglycerin (NITROSTAT) 0.4 MG SL tablet, Place 1 tablet under the tongue Every FIVE Minutes As Needed for Chest Pain. Take no more than 3 doses in 15 minutes., Disp: 25 tablet, Rfl: 2  •  Symbicort 160-4.5 MCG/ACT inhaler, INHALE TWO PUFFS BY MOUTH TWICE DAILY, Disp: 10.2 g, Rfl: 0  •  traMADol (ULTRAM) 50 MG tablet, Take 1 tablet by mouth Every 6 (Six) Hours As Needed for Moderate Pain ., Disp: 12 tablet, Rfl: 0    Allergies   Allergen Reactions   • Aspirin Shortness Of Breath       Review of Systems   Constitutional: Negative for diaphoresis, fever and unexpected weight change.   HENT: Negative for dental problem and sore throat.    Eyes: Negative for visual disturbance.   Respiratory: Negative for shortness of breath.    Cardiovascular: Negative for chest pain.   Gastrointestinal: Negative for abdominal pain, constipation, diarrhea, nausea and vomiting.   Genitourinary: Negative for difficulty urinating and frequency.   Musculoskeletal: Positive for arthralgias (celso hip, low back, left leg).   Neurological: Negative for headaches.   Hematological: Does not bruise/bleed easily.       I have reviewed the medical and surgical history, family history, social history, medications, and/or allergies, and the review of systems of this report.    Objective   Resp 18   Ht 182.9 cm (72\")   Wt 97.5 kg (215 lb)   BMI 29.16 kg/m²    Physical Exam  Vitals and nursing note reviewed.   Constitutional:       " Appearance: Normal appearance.   Cardiovascular:      Pulses: Normal pulses.   Pulmonary:      Effort: Pulmonary effort is normal.   Neurological:      Mental Status: He is alert and oriented to person, place, and time.   Psychiatric:         Behavior: Behavior normal.       Left Hip Exam     Tenderness   The patient is experiencing tenderness in the anterior and posterior.    Range of Motion   Abduction: 35   Flexion: 80     Tests   CHANTALE: positive  Fadir:  Positive FADIR test    Other   Sensation: normal  Pulse: present           Extremity DVT signs are negative on physical exam with negative Chantel sign, no calf pain, no palpable cords and no skin tone change   Neurologic Exam     Mental Status   Oriented to person, place, and time.        Neg. SLR LLE      Assessment/Plan   Independent Review of Radiographic Studies:    No new imaging done today.  Narrative & Impression   BILATERAL HIP SERIES     INDICATION:  lower back pain acute,     COMPARISON: None.     FINDINGS: AP and frog-leg views of the pelvis and bilateral hips were  obtained. There is no acute fracture or dislocation. There are  subchondral lucency in the femoral head bilaterally. There is no  subchondral collapse. AVN a concern. No acute soft tissue abnormality is  identified.     IMPRESSION:  Findings concerning for bilateral femoral head AVN without  subchondral collapse. MRI could be performed to confirm.     This report was finalized on 5/13/2021 1:01 PM by Skylar Cueto MD.     Narrative & Impression   PROCEDURE: XR SPINE LUMBAR 2 OR 3 VW-     HISTORY: pain     FINDINGS:  No fracture is identified.  Disc spaces are well preserved.  Alignment is normal. The vertebral body heights are normal. There is  incompletely evaluated abnormal appearance to the femoral heads  bilaterally, AVN cannot be excluded.     IMPRESSION:  1. No acute osseous abnormality of the lumbar spine.  2. Incompletely evaluated abnormal appearance of the femoral  heads  bilaterally. AVN cannot be excluded.            Procedures       Diagnoses and all orders for this visit:    1. Left hip pain (Primary)  -     MRI Hip Left Without Contrast; Future  -     methylPREDNISolone acetate (DEPO-medrol) injection 40 mg    2. Avascular necrosis of femoral head, left (CMS/HCC)  -     MRI Hip Left Without Contrast; Future  -     methylPREDNISolone acetate (DEPO-medrol) injection 40 mg    3. Sciatica of left side       Discussion of orthopedic goals  Risk, benefits, and merits of treatment alternatives reviewed with the patient and questions answered  Ice, heat, and/or modalities as beneficial    Recommendations/Plan:  Patient was provided a cortisone intramuscular injection for pain and inflammation  Avoid strenuous physical activity for the risk of collapse-femoral heads.  Giacomo and Rx agreement were obtained.  Discussed the importance of alcohol cessation although he should not quit cold turkey due to the chance and potential of DTs and withdrawal symptoms.  I would like for him to discuss with his PCP proper guidelines on alcohol cessation.  Follow up after MRI   Patient agreeable to call or return sooner for any concerns.               EMR Dragon-transcription disclaimer:  This encounter note is an electronic transcription of spoken language to printed text.  Electronic transcription of spoken language may permit erroneous or at times nonsensical words or phrases to be inadvertently transcribed.  Although I have reviewed the note for such errors, some may still exist

## 2021-05-24 DIAGNOSIS — M87.052 AVASCULAR NECROSIS OF FEMORAL HEAD, LEFT (HCC): ICD-10-CM

## 2021-05-24 DIAGNOSIS — M25.552 LEFT HIP PAIN: ICD-10-CM

## 2021-05-24 DIAGNOSIS — M54.32 SCIATICA OF LEFT SIDE: ICD-10-CM

## 2021-05-24 RX ORDER — HYDROCODONE BITARTRATE AND ACETAMINOPHEN 5; 325 MG/1; MG/1
1 TABLET ORAL NIGHTLY PRN
Qty: 7 TABLET | Refills: 0 | Status: SHIPPED | OUTPATIENT
Start: 2021-05-24 | End: 2021-05-28 | Stop reason: SDUPTHER

## 2021-05-24 RX ORDER — HYDROCODONE BITARTRATE AND ACETAMINOPHEN 5; 325 MG/1; MG/1
1 TABLET ORAL NIGHTLY PRN
Qty: 7 TABLET | Refills: 0 | OUTPATIENT
Start: 2021-05-24

## 2021-05-25 DIAGNOSIS — J45.909 UNCOMPLICATED ASTHMA, UNSPECIFIED ASTHMA SEVERITY, UNSPECIFIED WHETHER PERSISTENT: ICD-10-CM

## 2021-05-26 ENCOUNTER — HOSPITAL ENCOUNTER (OUTPATIENT)
Dept: MRI IMAGING | Facility: HOSPITAL | Age: 61
Discharge: HOME | End: 2021-05-26
Admitting: PHYSICIAN ASSISTANT

## 2021-05-26 DIAGNOSIS — M87.052 AVASCULAR NECROSIS OF FEMORAL HEAD, LEFT (HCC): ICD-10-CM

## 2021-05-26 DIAGNOSIS — M25.552 LEFT HIP PAIN: ICD-10-CM

## 2021-05-26 PROCEDURE — 73721 MRI JNT OF LWR EXTRE W/O DYE: CPT

## 2021-05-26 RX ORDER — BUDESONIDE AND FORMOTEROL FUMARATE DIHYDRATE 160; 4.5 UG/1; UG/1
AEROSOL RESPIRATORY (INHALATION)
Qty: 10.2 G | Refills: 3 | Status: SHIPPED | OUTPATIENT
Start: 2021-05-26 | End: 2022-08-03 | Stop reason: SDUPTHER

## 2021-05-28 ENCOUNTER — TELEPHONE (OUTPATIENT)
Dept: ORTHOPEDIC SURGERY | Facility: CLINIC | Age: 61
End: 2021-05-28

## 2021-05-28 DIAGNOSIS — M54.32 SCIATICA OF LEFT SIDE: ICD-10-CM

## 2021-05-28 DIAGNOSIS — M87.052 AVASCULAR NECROSIS OF FEMORAL HEAD, LEFT (HCC): Primary | ICD-10-CM

## 2021-05-28 RX ORDER — HYDROCODONE BITARTRATE AND ACETAMINOPHEN 5; 325 MG/1; MG/1
1 TABLET ORAL NIGHTLY PRN
Qty: 7 TABLET | Refills: 0 | Status: SHIPPED | OUTPATIENT
Start: 2021-05-28 | End: 2021-06-04

## 2021-05-28 NOTE — TELEPHONE ENCOUNTER
Caller: YAHAIRA PADGETT    Relationship: SELF    Best call back number: [phone]    Medication needed: HYDROCODONE AND TRAMADOL    When do you need the refill by: ASAP    What additional details did the patient provide when requesting the medication: PT STILL IN PAIN, DOESN'T HAVE FOLLOW UP UNTIL 6/7, HAS NO MORE PAIN MEDICATION    Does the patient have less than a 3 day supply:  [x] Yes  [] No    What is the patient's preferred pharmacy: [facility] IN [place]

## 2021-06-07 ENCOUNTER — OFFICE VISIT (OUTPATIENT)
Dept: ORTHOPEDIC SURGERY | Facility: CLINIC | Age: 61
End: 2021-06-07

## 2021-06-07 VITALS — HEIGHT: 72 IN | WEIGHT: 215 LBS | RESPIRATION RATE: 18 BRPM | BODY MASS INDEX: 29.12 KG/M2

## 2021-06-07 DIAGNOSIS — M51.16 LUMBAR DISC DISEASE WITH RADICULOPATHY: ICD-10-CM

## 2021-06-07 DIAGNOSIS — M25.552 LEFT HIP PAIN: ICD-10-CM

## 2021-06-07 DIAGNOSIS — M87.052 AVASCULAR NECROSIS OF FEMORAL HEAD, LEFT (HCC): Primary | ICD-10-CM

## 2021-06-07 PROCEDURE — 99213 OFFICE O/P EST LOW 20 MIN: CPT | Performed by: PHYSICIAN ASSISTANT

## 2021-06-07 PROCEDURE — 96372 THER/PROPH/DIAG INJ SC/IM: CPT | Performed by: PHYSICIAN ASSISTANT

## 2021-06-07 RX ORDER — METHYLPREDNISOLONE ACETATE 40 MG/ML
40 INJECTION, SUSPENSION INTRA-ARTICULAR; INTRALESIONAL; INTRAMUSCULAR; SOFT TISSUE ONCE
Status: COMPLETED | OUTPATIENT
Start: 2021-06-07 | End: 2021-06-07

## 2021-06-07 RX ADMIN — METHYLPREDNISOLONE ACETATE 80 MG: 40 INJECTION, SUSPENSION INTRA-ARTICULAR; INTRALESIONAL; INTRAMUSCULAR; SOFT TISSUE at 14:57

## 2021-06-07 NOTE — PROGRESS NOTES
Subjective   Patient ID: Darnell Garcia is a 61 y.o. right hand dominant male  Follow-up of the Left Hip (MRI results)         History of Present Illness    Patient is following up to review MRI results of the left hip.  He still experiences anterior left hip/groin pain.  He is also experiencing low back pain that radiates below the left knee joint line that is intermittent.  He has been experiencing the symptoms since the beginning of May 2021.  No bowel or bladder incontinence.  Patient has tried Robaxin, oral steroids, tramadol as well as hydrocodone.  He does request a refill on the hydrocodone.  Of note-patient has been on oral steroids for many years due to the treatment of asthma.  He does admit to drinking several beers most weekends for many years.      Past Medical History:   Diagnosis Date   • Arthritis    • Asthma    • Diabetes mellitus (CMS/[facility])    • GERD (gastroesophageal reflux disease)    • History of varicella    • Neuropathy         Past Surgical History:   Procedure Laterality Date   • NASAL SEPTUM SURGERY  2009   • VASECTOMY  1988   • VENTRAL HERNIA REPAIR      2007 and 2009 (mesh placed)   • WISDOM TOOTH EXTRACTION         Family History   Problem Relation Age of Onset   • Breast cancer Mother    • Osteoarthritis Mother    • Arthritis Mother    • Coronary artery disease Father    • Heart attack Father         late 60's   • Lymphoma Son    • Leukemia Son        Social History     Socioeconomic History   • Marital status:      Spouse name: Not on file   • Number of children: Not on file   • Years of education: Not on file   • Highest education level: Not on file   Tobacco Use   • Smoking status: Never Smoker   • Smokeless tobacco: Never Used   Vaping Use   • Vaping Use: Never used   Substance and Sexual Activity   • Alcohol use: Yes     Alcohol/week: 10.0 standard drinks     Types: 10 Cans of beer per week     Comment: one day every other weekend   • Drug use: No   • Sexual activity: Never      Partners: Female         Current Outpatient Medications:   •  ALBUTEROL SULFATE  (90 Base) MCG/ACT inhaler, INHALE TWO PUFFS BY MOUTH EVERY 4 HOURS AS NEEDED FOR WHEEZING, Disp: 8.5 g, Rfl: 5  •  atorvastatin (LIPITOR) 40 MG tablet, Take 1 tablet by mouth Daily., Disp: 90 tablet, Rfl: 1  •  Blood Glucose Monitoring Suppl (Accu-Chek Guide Me) w/Device kit, USE AS DIRECTED, Disp: 1 kit, Rfl: 0  •  Eliquis 5 MG tablet tablet, TAKE ONE TABLET BY MOUTH EVERY TWELVE HOURS, Disp: 180 tablet, Rfl: 3  •  Empagliflozin (Jardiance) 25 MG tablet, Take 25 mg by mouth Daily., Disp: 90 tablet, Rfl: 1  •  gabapentin (NEURONTIN) 300 MG capsule, TAKE ONE CAPSULE BY MOUTH AT BEDTIME, Disp: 90 capsule, Rfl: 2  •  glimepiride (AMARYL) 4 MG tablet, Take 1 tablet by mouth Every Morning Before Breakfast., Disp: 90 tablet, Rfl: 1  •  glucose blood test strip, Use as instructed, Disp: 180 each, Rfl: 12  •  glucose blood test strip, Use as instructed, Disp: 100 each, Rfl: 12  •  Insulin Glargine (LANTUS SOLOSTAR) 100 UNIT/ML injection pen, Inject 10 Units under the skin into the appropriate area as directed Every Morning., Disp: 3 pen, Rfl: 3  •  Insulin Pen Needle 30G X 8 MM misc, 10 Units Daily., Disp: 3 each, Rfl: 12  •  Lancets (onetouch ultrasoft) lancets, Use as instructed, Disp: 180 each, Rfl: 12  •  metFORMIN (GLUCOPHAGE) 1000 MG tablet, Take 1 tablet by mouth 2 (Two) Times a Day With Meals., Disp: 90 tablet, Rfl: 1  •  methocarbamol (ROBAXIN) 750 MG tablet, Take 1 tablet by mouth 3 (Three) Times a Day As Needed for Muscle Spasms., Disp: 21 tablet, Rfl: 0  •  metoprolol succinate XL (TOPROL-XL) 25 MG 24 hr tablet, Take 0.5 tablets by mouth Daily., Disp: 45 tablet, Rfl: 3  •  montelukast (SINGULAIR) 10 MG tablet, Take 1 tablet by mouth every night at bedtime., Disp: 90 tablet, Rfl: 1  •  nitroglycerin (NITROSTAT) 0.4 MG SL tablet, Place 1 tablet under the tongue Every FIVE Minutes As Needed for Chest Pain. Take no more than  "3 doses in 15 minutes., Disp: 25 tablet, Rfl: 2  •  Symbicort 160-4.5 MCG/ACT inhaler, INHALE TWO PUFFS BY MOUTH TWICE DAILY, Disp: 10.2 g, Rfl: 3  No current facility-administered medications for this visit.    Allergies   Allergen Reactions   • Aspirin Shortness Of Breath       Review of Systems   Constitutional: Negative for diaphoresis, fever and unexpected weight change.   HENT: Negative for dental problem and sore throat.    Eyes: Negative for visual disturbance.   Respiratory: Negative for shortness of breath.    Cardiovascular: Negative for chest pain.   Gastrointestinal: Negative for abdominal pain, constipation, diarrhea, nausea and vomiting.   Genitourinary: Negative for difficulty urinating and frequency.   Musculoskeletal: Positive for arthralgias (celso hip).   Neurological: Negative for headaches.   Hematological: Does not bruise/bleed easily.       I have reviewed the medical and surgical history, family history, social history, medications, and/or allergies, and the review of systems of this report.    Objective   Resp 18   Ht 182.9 cm (72\")   Wt 97.5 kg (215 lb)   BMI 29.16 kg/m²    Physical Exam  Vitals and nursing note reviewed.   Constitutional:       General: He is not in acute distress.     Appearance: Normal appearance.   Cardiovascular:      Pulses: Normal pulses.   Pulmonary:      Effort: Pulmonary effort is normal.   Musculoskeletal:      Left hip: Tenderness present. No deformity. Decreased range of motion. Decreased strength.      Left upper leg: No edema or deformity.      Left knee: No swelling or deformity.   Neurological:      Mental Status: He is alert and oriented to person, place, and time.   Psychiatric:         Behavior: Behavior normal.       Ortho Exam   Extremity DVT signs are negative on physical exam with negative Chantel sign, no calf pain, no palpable cords and no skin tone change   Neurologic Exam     Mental Status   Oriented to person, place, and time.        +ttp left " medial hamstring      Assessment/Plan   Independent Review of Radiographic Studies:    No new imaging done today.  PROCEDURE: MRI HIP LEFT WO CONTRAST-     HISTORY:  xray of left hip reveals AVN with acute left hip pain;  M25.552-Pain in left hip; M87.052-Idiopathic aseptic necrosis of left  femur     COMPARISON: Radiographs dated 05/13/2021.     TECHNIQUE: Multiplanar multisequence imaging of the hip was performed  without the use of intravenous contrast.     FINDINGS: There are linear areas of low T1, high T2 signal within both  femoral heads consistent with osteonecrosis. There is no evidence of any  perilesional edema. There is no femoral head collapse. There is no joint  effusion. There is no evidence of a labral tear. The adductor origins  are normal. There is no evidence of iliopsoas or greater trochanteric  bursitis. The fat plane surrounding the sciatic nerve are preserved. The  soft tissues are unremarkable.     IMPRESSION:  Findings consistent with osteonecrosis involving both  femoral heads.     This report was finalized on 5/27/2021 9:40 AM by Walter Harrison M.D..    Procedures       Diagnoses and all orders for this visit:    1. Avascular necrosis of femoral head, left (CMS/HCC) (Primary)  -     Ambulatory Referral to Orthopedic Surgery  -     methylPREDNISolone acetate (DEPO-medrol) injection 40 mg    2. Left hip pain    3. Lumbar disc disease with radiculopathy       Orthopedic activities reviewed and patient expressed appreciation  Discussion of orthopedic goals  Risk, benefits, and merits of treatment alternatives reviewed with the patient and questions answered  Reduced physical activity as appropriate  Ice, heat, and/or modalities as beneficial    Recommendations/Plan:  Exercise, medications, injections, other patient advice, and return appointment as noted.  Patient is encouraged to call or return for any issues or concerns.    I would like for patient to use a cane to prevent collapse of  the femoral head.  We had a lengthy discussion in the room with his wife on FaceTime video.  Patient states he did research possible treatment options regarding his femoral avascular necrosis.  I would like to set him up with a fluoroscopy guided left hip injection and formal physical therapy.  However, our orthopedic surgeon is currently out of the office until further notice due to a tragic accident.  Patient would like to be referred to an orthopedic surgeon in [place] he specifically request Dr. Sainz.  I would also like for the patient to have a formal lumbar spine work-up whether it be with our office or the orthopedist in [place] due to his complaints of left lower extremity radiating pain, numbness and tingling  Patient did request a refill on the hydrocodone.  However, I do not have a ALETHA as I am a physician assistant.  I did provide him with Depo-Medrol IM in the office for pain and inflammation.  Patient agreeable to call or return sooner for any concerns.           EMR Dragon-transcription disclaimer:  This encounter note is an electronic transcription of spoken language to printed text.  Electronic transcription of spoken language may permit erroneous or at times nonsensical words or phrases to be inadvertently transcribed.  Although I have reviewed the note for such errors, some may still exist

## 2021-06-10 ENCOUNTER — TELEPHONE (OUTPATIENT)
Dept: ORTHOPEDIC SURGERY | Facility: CLINIC | Age: 61
End: 2021-06-10

## 2021-06-10 NOTE — TELEPHONE ENCOUNTER
Caller: KAYLA PADGETT    Relationship: SPOUSE ([facility] [employer])    Best call back number: [phone]    What form or medical record are you requesting: RECORDS AND FILMS FOR 2ND OPINION REFERRAL     Who is requesting this form or medical record from you: NICCI GEE    How would you like to receive the form or medical records (pick-up, mail, fax): ???  If fax, what is the fax number: ???  Timeframe paperwork needed: ASAP    Additional notes: PATIENT WAS REFERRED TO DR GEE- DR GEE IS REQUESTING RECORDS AND IMAGING- SPOUSE DID NOT SPECIFY HOW OFFICE WANTED TO RECEIVE INFO

## 2021-06-16 ENCOUNTER — HOSPITAL ENCOUNTER (OUTPATIENT)
Dept: MRI IMAGING | Facility: HOSPITAL | Age: 61
End: 2021-06-16

## 2021-07-22 ENCOUNTER — OFFICE VISIT (OUTPATIENT)
Dept: INTERNAL MEDICINE | Facility: CLINIC | Age: 61
End: 2021-07-22

## 2021-07-22 VITALS
OXYGEN SATURATION: 96 % | RESPIRATION RATE: 14 BRPM | DIASTOLIC BLOOD PRESSURE: 64 MMHG | SYSTOLIC BLOOD PRESSURE: 120 MMHG | TEMPERATURE: 97.7 F | WEIGHT: 212 LBS | BODY MASS INDEX: 28.71 KG/M2 | HEIGHT: 72 IN | HEART RATE: 69 BPM

## 2021-07-22 DIAGNOSIS — M25.572 CHRONIC PAIN OF LEFT ANKLE: ICD-10-CM

## 2021-07-22 DIAGNOSIS — E11.49 OTHER DIABETIC NEUROLOGICAL COMPLICATION ASSOCIATED WITH TYPE 2 DIABETES MELLITUS (HCC): Primary | ICD-10-CM

## 2021-07-22 DIAGNOSIS — E11.9 TYPE 2 DIABETES MELLITUS WITHOUT COMPLICATION, WITHOUT LONG-TERM CURRENT USE OF INSULIN (HCC): ICD-10-CM

## 2021-07-22 DIAGNOSIS — M87.051 AVASCULAR NECROSIS OF BONE OF HIP, RIGHT (HCC): ICD-10-CM

## 2021-07-22 DIAGNOSIS — G89.29 CHRONIC PAIN OF LEFT ANKLE: ICD-10-CM

## 2021-07-22 DIAGNOSIS — E78.2 MIXED HYPERLIPIDEMIA: ICD-10-CM

## 2021-07-22 PROBLEM — M87.052 AVASCULAR NECROSIS OF BONE OF HIP, LEFT: Status: ACTIVE | Noted: 2021-07-22

## 2021-07-22 PROCEDURE — 99214 OFFICE O/P EST MOD 30 MIN: CPT | Performed by: INTERNAL MEDICINE

## 2021-07-22 RX ORDER — ATORVASTATIN CALCIUM 40 MG/1
TABLET, FILM COATED ORAL
Qty: 90 TABLET | Refills: 3 | Status: SHIPPED | OUTPATIENT
Start: 2021-07-22 | End: 2022-07-28

## 2021-07-22 RX ORDER — GLIMEPIRIDE 4 MG/1
TABLET ORAL
Qty: 90 TABLET | Refills: 3 | Status: SHIPPED | OUTPATIENT
Start: 2021-07-22 | End: 2022-06-29

## 2021-07-22 RX ORDER — EMPAGLIFLOZIN 25 MG/1
TABLET, FILM COATED ORAL
Qty: 90 TABLET | Refills: 3 | Status: SHIPPED | OUTPATIENT
Start: 2021-07-22 | End: 2022-07-28

## 2021-07-22 NOTE — PROGRESS NOTES
Subjective   Darnell Garcia is a 61 y.o. male.     Chief Complaint   Patient presents with   • Animal Bite     Got bit by dog 7/20/21, went to urgent care, following up today       History of Present Illness   Patient here for follow-up.  July 20 patient was bitten by friends dog.  Right index finger laceration was treated and a sterile strip in emergency room.  Hyperlipidemia stable on medication.  Atrial fibrillation stable on medication.  Diabetes on medication Lantus patient states sugar improved after Lantus.  Reviewed the log book sugar around 100-110.  Average.  Neck pain Tylenol helps.  Asthma improved after Symbicort patient states the need for albuterol decreased.    Current Outpatient Medications:   •  ALBUTEROL SULFATE  (90 Base) MCG/ACT inhaler, INHALE TWO PUFFS BY MOUTH EVERY 4 HOURS AS NEEDED FOR WHEEZING, Disp: 8.5 g, Rfl: 5  •  amoxicillin-clavulanate (AUGMENTIN) 875-125 MG per tablet, Take 1 tablet by mouth Every 12 (Twelve) Hours., Disp: 20 tablet, Rfl: 0  •  atorvastatin (LIPITOR) 40 MG tablet, TAKE ONE TABLET BY MOUTH EVERY DAY, Disp: 90 tablet, Rfl: 3  •  Blood Glucose Monitoring Suppl (Accu-Chek Guide Me) w/Device kit, USE AS DIRECTED, Disp: 1 kit, Rfl: 0  •  Eliquis 5 MG tablet tablet, TAKE ONE TABLET BY MOUTH EVERY TWELVE HOURS, Disp: 180 tablet, Rfl: 3  •  gabapentin (NEURONTIN) 300 MG capsule, TAKE ONE CAPSULE BY MOUTH AT BEDTIME, Disp: 90 capsule, Rfl: 2  •  glimepiride (AMARYL) 4 MG tablet, TAKE ONE TABLET BY MOUTH EVERY MORNING BEFORE breakfast, Disp: 90 tablet, Rfl: 3  •  glucose blood test strip, Use as instructed, Disp: 180 each, Rfl: 12  •  glucose blood test strip, Use as instructed, Disp: 100 each, Rfl: 12  •  Insulin Glargine (LANTUS SOLOSTAR) 100 UNIT/ML injection pen, Inject 10 Units under the skin into the appropriate area as directed Every Morning., Disp: 3 pen, Rfl: 3  •  Insulin Pen Needle 30G X 8 MM misc, 10 Units Daily., Disp: 3 each, Rfl: 12  •  Jardiance 25 MG  tablet tablet, TAKE ONE TABLET BY MOUTH EVERY DAY, Disp: 90 tablet, Rfl: 3  •  Lancets (onetouch ultrasoft) lancets, Use as instructed, Disp: 180 each, Rfl: 12  •  metFORMIN (GLUCOPHAGE) 1000 MG tablet, Take 1 tablet by mouth 2 (Two) Times a Day With Meals., Disp: 90 tablet, Rfl: 1  •  methocarbamol (ROBAXIN) 750 MG tablet, Take 1 tablet by mouth 3 (Three) Times a Day As Needed for Muscle Spasms., Disp: 21 tablet, Rfl: 0  •  metoprolol succinate XL (TOPROL-XL) 25 MG 24 hr tablet, Take 0.5 tablets by mouth Daily., Disp: 45 tablet, Rfl: 3  •  montelukast (SINGULAIR) 10 MG tablet, Take 1 tablet by mouth every night at bedtime., Disp: 90 tablet, Rfl: 1  •  nitroglycerin (NITROSTAT) 0.4 MG SL tablet, Place 1 tablet under the tongue Every FIVE Minutes As Needed for Chest Pain. Take no more than 3 doses in 15 minutes., Disp: 25 tablet, Rfl: 2  •  Symbicort 160-4.5 MCG/ACT inhaler, INHALE TWO PUFFS BY MOUTH TWICE DAILY, Disp: 10.2 g, Rfl: 3    The following portions of the patient's history were reviewed and updated as appropriate: allergies, current medications, past family history, past medical history, past social history, past surgical history and problem list.    Review of Systems   Constitutional: Negative.    Respiratory: Negative.    Cardiovascular: Negative.    Gastrointestinal: Negative.    Musculoskeletal: Negative.    Skin: Positive for wound.   Neurological: Negative.    Psychiatric/Behavioral: Negative.        Objective   Physical Exam  HENT:      Head: Normocephalic and atraumatic.   Cardiovascular:      Rate and Rhythm: Normal rate and regular rhythm.      Heart sounds: Normal heart sounds.   Pulmonary:      Effort: Pulmonary effort is normal.      Breath sounds: Normal breath sounds.   Abdominal:      General: Bowel sounds are normal.   Musculoskeletal:      Cervical back: Neck supple.   Skin:     General: Skin is warm.      Comments: Laceration   Neurological:      Mental Status: He is alert and oriented  to person, place, and time.   Psychiatric:         Behavior: Behavior normal.         All tests have been reviewed.    Assessment/Plan   There are no diagnoses linked to this encounter.                    Hyperlipidemia, unspecified Continue medication     Atrial fibrillation, unspecified type Patient taking Eliquis, unable to tolerate sotalol. Consult scheduled with cardiology to discuss cardiac ablation.      Type 2 diabetes mellitus with hyperosmolarity without coma, without long-term current use of insulin Start 10 units of Lantus every morning. Follow up in 3 weeks. Pt instructed to call if he develops hypoglycemia.      Neck pain Continue tylenol     Uncomplicated asthma improved after symbicort    Right index finger dog bite on abx now    1 mo PE after lab

## 2021-08-05 DIAGNOSIS — J45.909 UNCOMPLICATED ASTHMA, UNSPECIFIED ASTHMA SEVERITY, UNSPECIFIED WHETHER PERSISTENT: ICD-10-CM

## 2021-08-05 RX ORDER — MONTELUKAST SODIUM 10 MG/1
TABLET ORAL
Qty: 90 TABLET | Refills: 3 | Status: SHIPPED | OUTPATIENT
Start: 2021-08-05 | End: 2021-09-15

## 2021-09-04 ENCOUNTER — LAB (OUTPATIENT)
Dept: LAB | Facility: HOSPITAL | Age: 61
End: 2021-09-04

## 2021-09-04 LAB
25(OH)D3 SERPL-MCNC: 21.4 NG/ML (ref 30–100)
ALBUMIN SERPL-MCNC: 4.6 G/DL (ref 3.5–5.2)
ALBUMIN UR-MCNC: <1.2 MG/DL
ALBUMIN/GLOB SERPL: 2.1 G/DL
ALP SERPL-CCNC: 89 U/L (ref 39–117)
ALT SERPL W P-5'-P-CCNC: 15 U/L (ref 1–41)
ANION GAP SERPL CALCULATED.3IONS-SCNC: 12 MMOL/L (ref 5–15)
AST SERPL-CCNC: 12 U/L (ref 1–40)
BASOPHILS # BLD AUTO: 0.09 10*3/MM3 (ref 0–0.2)
BASOPHILS NFR BLD AUTO: 1.4 % (ref 0–1.5)
BILIRUB SERPL-MCNC: 1 MG/DL (ref 0–1.2)
BUN SERPL-MCNC: 18 MG/DL (ref 8–23)
BUN/CREAT SERPL: 19.6 (ref 7–25)
CALCIUM SPEC-SCNC: 9.9 MG/DL (ref 8.6–10.5)
CHLORIDE SERPL-SCNC: 102 MMOL/L (ref 98–107)
CHOLEST SERPL-MCNC: 154 MG/DL (ref 0–200)
CK SERPL-CCNC: 50 U/L (ref 20–200)
CO2 SERPL-SCNC: 26 MMOL/L (ref 22–29)
CREAT SERPL-MCNC: 0.92 MG/DL (ref 0.76–1.27)
DEPRECATED RDW RBC AUTO: 42.2 FL (ref 37–54)
EOSINOPHIL # BLD AUTO: 0.21 10*3/MM3 (ref 0–0.4)
EOSINOPHIL NFR BLD AUTO: 3.3 % (ref 0.3–6.2)
ERYTHROCYTE [DISTWIDTH] IN BLOOD BY AUTOMATED COUNT: 12.2 % (ref 12.3–15.4)
GFR SERPL CREATININE-BSD FRML MDRD: 84 ML/MIN/1.73
GLOBULIN UR ELPH-MCNC: 2.2 GM/DL
GLUCOSE SERPL-MCNC: 152 MG/DL (ref 65–99)
HBA1C MFR BLD: 8.3 % (ref 4.8–5.6)
HCT VFR BLD AUTO: 40.5 % (ref 37.5–51)
HDLC SERPL-MCNC: 37 MG/DL (ref 40–60)
HGB BLD-MCNC: 13.8 G/DL (ref 13–17.7)
IMM GRANULOCYTES # BLD AUTO: 0.04 10*3/MM3 (ref 0–0.05)
IMM GRANULOCYTES NFR BLD AUTO: 0.6 % (ref 0–0.5)
LDLC SERPL CALC-MCNC: 83 MG/DL (ref 0–100)
LDLC/HDLC SERPL: 2.06 {RATIO}
LYMPHOCYTES # BLD AUTO: 2.28 10*3/MM3 (ref 0.7–3.1)
LYMPHOCYTES NFR BLD AUTO: 35.8 % (ref 19.6–45.3)
MCH RBC QN AUTO: 31.9 PG (ref 26.6–33)
MCHC RBC AUTO-ENTMCNC: 34.1 G/DL (ref 31.5–35.7)
MCV RBC AUTO: 93.5 FL (ref 79–97)
MONOCYTES # BLD AUTO: 0.44 10*3/MM3 (ref 0.1–0.9)
MONOCYTES NFR BLD AUTO: 6.9 % (ref 5–12)
NEUTROPHILS NFR BLD AUTO: 3.31 10*3/MM3 (ref 1.7–7)
NEUTROPHILS NFR BLD AUTO: 52 % (ref 42.7–76)
NRBC BLD AUTO-RTO: 0 /100 WBC (ref 0–0.2)
PLATELET # BLD AUTO: 268 10*3/MM3 (ref 140–450)
PMV BLD AUTO: 11.3 FL (ref 6–12)
POTASSIUM SERPL-SCNC: 4.6 MMOL/L (ref 3.5–5.2)
PROT SERPL-MCNC: 6.8 G/DL (ref 6–8.5)
PSA SERPL-MCNC: 0.93 NG/ML (ref 0–4)
RBC # BLD AUTO: 4.33 10*6/MM3 (ref 4.14–5.8)
SODIUM SERPL-SCNC: 140 MMOL/L (ref 136–145)
TRIGL SERPL-MCNC: 203 MG/DL (ref 0–150)
TSH SERPL DL<=0.05 MIU/L-ACNC: 2.37 UIU/ML (ref 0.27–4.2)
VLDLC SERPL-MCNC: 34 MG/DL (ref 5–40)
WBC # BLD AUTO: 6.37 10*3/MM3 (ref 3.4–10.8)

## 2021-09-04 PROCEDURE — 84443 ASSAY THYROID STIM HORMONE: CPT | Performed by: INTERNAL MEDICINE

## 2021-09-04 PROCEDURE — 36415 COLL VENOUS BLD VENIPUNCTURE: CPT | Performed by: INTERNAL MEDICINE

## 2021-09-04 PROCEDURE — 80053 COMPREHEN METABOLIC PANEL: CPT | Performed by: INTERNAL MEDICINE

## 2021-09-04 PROCEDURE — 85025 COMPLETE CBC W/AUTO DIFF WBC: CPT | Performed by: INTERNAL MEDICINE

## 2021-09-04 PROCEDURE — 83036 HEMOGLOBIN GLYCOSYLATED A1C: CPT | Performed by: INTERNAL MEDICINE

## 2021-09-04 PROCEDURE — 82043 UR ALBUMIN QUANTITATIVE: CPT | Performed by: INTERNAL MEDICINE

## 2021-09-04 PROCEDURE — 82550 ASSAY OF CK (CPK): CPT | Performed by: INTERNAL MEDICINE

## 2021-09-04 PROCEDURE — 80061 LIPID PANEL: CPT | Performed by: INTERNAL MEDICINE

## 2021-09-04 PROCEDURE — 82306 VITAMIN D 25 HYDROXY: CPT | Performed by: INTERNAL MEDICINE

## 2021-09-04 PROCEDURE — 84153 ASSAY OF PSA TOTAL: CPT | Performed by: INTERNAL MEDICINE

## 2021-09-07 ENCOUNTER — OFFICE VISIT (OUTPATIENT)
Dept: INTERNAL MEDICINE | Facility: CLINIC | Age: 61
End: 2021-09-07

## 2021-09-07 VITALS
HEIGHT: 72 IN | HEART RATE: 73 BPM | TEMPERATURE: 97.3 F | OXYGEN SATURATION: 98 % | WEIGHT: 218 LBS | BODY MASS INDEX: 29.53 KG/M2 | DIASTOLIC BLOOD PRESSURE: 82 MMHG | SYSTOLIC BLOOD PRESSURE: 130 MMHG

## 2021-09-07 DIAGNOSIS — E55.9 VITAMIN D DEFICIENCY: ICD-10-CM

## 2021-09-07 DIAGNOSIS — I48.0 PAROXYSMAL ATRIAL FIBRILLATION (HCC): ICD-10-CM

## 2021-09-07 DIAGNOSIS — M19.079 ARTHRITIS OF ANKLE: ICD-10-CM

## 2021-09-07 DIAGNOSIS — N40.1 BENIGN PROSTATIC HYPERPLASIA WITH LOWER URINARY TRACT SYMPTOMS, SYMPTOM DETAILS UNSPECIFIED: ICD-10-CM

## 2021-09-07 DIAGNOSIS — E11.49 OTHER DIABETIC NEUROLOGICAL COMPLICATION ASSOCIATED WITH TYPE 2 DIABETES MELLITUS (HCC): ICD-10-CM

## 2021-09-07 DIAGNOSIS — J45.909 UNCOMPLICATED ASTHMA, UNSPECIFIED ASTHMA SEVERITY, UNSPECIFIED WHETHER PERSISTENT: ICD-10-CM

## 2021-09-07 DIAGNOSIS — I25.10 CORONARY ARTERY DISEASE INVOLVING NATIVE CORONARY ARTERY OF NATIVE HEART WITHOUT ANGINA PECTORIS: ICD-10-CM

## 2021-09-07 DIAGNOSIS — E11.00 TYPE 2 DIABETES MELLITUS WITH HYPEROSMOLARITY WITHOUT COMA, WITHOUT LONG-TERM CURRENT USE OF INSULIN (HCC): ICD-10-CM

## 2021-09-07 DIAGNOSIS — Z12.11 COLON CANCER SCREENING: ICD-10-CM

## 2021-09-07 DIAGNOSIS — M54.2 NECK PAIN: ICD-10-CM

## 2021-09-07 DIAGNOSIS — M87.052 AVASCULAR NECROSIS OF BONE OF HIP, LEFT (HCC): ICD-10-CM

## 2021-09-07 DIAGNOSIS — I99.8 VASCULAR CALCIFICATION: Primary | ICD-10-CM

## 2021-09-07 DIAGNOSIS — E78.5 HYPERLIPIDEMIA, UNSPECIFIED HYPERLIPIDEMIA TYPE: ICD-10-CM

## 2021-09-07 PROBLEM — R00.2 PALPITATIONS: Status: RESOLVED | Noted: 2021-02-24 | Resolved: 2021-09-07

## 2021-09-07 PROCEDURE — 99214 OFFICE O/P EST MOD 30 MIN: CPT | Performed by: INTERNAL MEDICINE

## 2021-09-07 PROCEDURE — 99396 PREV VISIT EST AGE 40-64: CPT | Performed by: INTERNAL MEDICINE

## 2021-09-07 RX ORDER — CELECOXIB 200 MG/1
200 CAPSULE ORAL DAILY
COMMUNITY
Start: 2021-08-23 | End: 2021-09-15

## 2021-09-07 NOTE — PROGRESS NOTES
Subjective   Darnell Garcia is a 61 y.o. male and is here for a comprehensive physical exam.     Patient here for follow-up.  CT scan showed vascular calcification.  Atrial fibrillation stable.  CAD stable without chest pain or short of breath.  Hyperlipidemia stable on medication.  Vitamin D stable on supplement.  Diabetes A1c 8.3 now improved already.  The BPH is stable now.  Neck pain stable comes and goes.  SI joint is status post cortisone injection helped for a short time.  Asthma stable on medication Symbicort.    Do you take any herbs or supplements that were not prescribed by a doctor? no  Are you taking calcium supplements? no  Are you taking aspirin daily? no      The following portions of the patient's history were reviewed and updated as appropriate: allergies, current medications, past family history, past medical history, past social history, past surgical history and problem list.      Review of Systems   Constitutional: Negative.    HENT: Negative.    Eyes: Negative.    Respiratory: Negative.    Cardiovascular: Negative.    Gastrointestinal: Negative.    Endocrine: Negative.    Genitourinary: Negative.    Musculoskeletal: Negative.    Skin: Negative.    Allergic/Immunologic: Negative.    Neurological: Negative.    Hematological: Negative.    Psychiatric/Behavioral: Negative.    All other systems reviewed and are negative.        Physical Exam  Vitals and nursing note reviewed.   Constitutional:       Appearance: He is well-developed.   HENT:      Head: Normocephalic and atraumatic.      Right Ear: External ear normal.      Left Ear: External ear normal.      Nose: Nose normal.   Eyes:      Conjunctiva/sclera: Conjunctivae normal.      Pupils: Pupils are equal, round, and reactive to light.   Neck:      Thyroid: No thyromegaly.   Cardiovascular:      Rate and Rhythm: Normal rate and regular rhythm.      Heart sounds: Normal heart sounds.   Pulmonary:      Effort: Pulmonary effort is normal.       Breath sounds: Normal breath sounds.   Abdominal:      General: Bowel sounds are normal.      Palpations: Abdomen is soft.   Genitourinary:     Penis: Normal.       Prostate: Normal.      Rectum: Normal.   Musculoskeletal:         General: Normal range of motion.      Cervical back: Normal range of motion and neck supple.   Skin:     General: Skin is warm and dry.   Neurological:      Mental Status: He is alert and oriented to person, place, and time.      Deep Tendon Reflexes: Reflexes are normal and symmetric.   Psychiatric:         Behavior: Behavior normal.         Thought Content: Thought content normal.         Judgment: Judgment normal.         All  tests have been reviewed.    Assessment/Plan          1. Patient Counseling:  --Nutrition: Stressed importance of moderation in sodium/caffeine intake, saturated fat and cholesterol, caloric balance, sufficient intake of fresh fruits, vegetables, fiber, calcium and iron.  --Exercise: Stressed the importance of regular exercise.   --Injury prevention: Discussed safety belts, safety helmets, smoke detector, smoking near bedding or upholstery.   --Dental health: Discussed importance of regular tooth brushing, flossing, and dental visits.  --Immunizations reviewed.  --Discussed benefits of screening colonoscopy.  --After hours service discussed with patient    2. Discussed the patient's BMI with him.            Vascular calcification watch.    Paroxysmal atrial fibrillation (CMS/HCC) unable to tolerate sotalol patient to check if ablation with cardio , cont med    Coronary artery disease involving native coronary artery of native heart without angina pectoris patient is to discuss aspirin with cardiologist    Hyperlipidemia, continue medication    Vitamin D deficiency continue supplement  -     Vitamin D 25 Hydroxy    Type 2 diabetes mellitus with hyperosmolarity without coma, without long-term current use of insulin, increase lantus to 15u daily-     Hemoglobin  A1c    Benign prostatic hyperplasia with lower urinary tract symptoms, symptom details unspecified    Neck pain chronic comes and goes patient declines medication or physical therapy    Avascular necrosis of bone of hip, left (CMS/[facility]) follow-up with orthopedist    SI joint arthritis follow-up with orthopedist    Other diabetic neurological complication associated with type 2 diabetes mellitus (CMS/[facility]) continue medication    Uncomplicated asthma, unspecified asthma severity, unspecified whether persistent continue medication    Arthritis of ankle stable    Colon cancer screening  -     Ambulatory Referral to Gastroenterology    Other orders  -     celecoxib (CeleBREX) 200 MG capsule; Take 200 mg by mouth Daily.    3 mo after labs

## 2021-09-15 DIAGNOSIS — M19.90 UNSPECIFIED OSTEOARTHRITIS, UNSPECIFIED SITE: ICD-10-CM

## 2021-09-15 DIAGNOSIS — J45.909 UNCOMPLICATED ASTHMA, UNSPECIFIED ASTHMA SEVERITY, UNSPECIFIED WHETHER PERSISTENT: ICD-10-CM

## 2021-09-15 RX ORDER — MONTELUKAST SODIUM 10 MG/1
TABLET ORAL
Qty: 30 TABLET | Refills: 2 | Status: SHIPPED | OUTPATIENT
Start: 2021-09-15 | End: 2021-12-09

## 2021-09-15 RX ORDER — CELECOXIB 200 MG/1
CAPSULE ORAL
Qty: 90 CAPSULE | Refills: 1 | Status: SHIPPED | OUTPATIENT
Start: 2021-09-15 | End: 2022-03-10

## 2021-09-15 NOTE — TELEPHONE ENCOUNTER
Rx Refill Note  Requested Prescriptions     Pending Prescriptions Disp Refills   • celecoxib (CeleBREX) 200 MG capsule [Pharmacy Med Name: celecoxib 200 mg capsule] 90 capsule 1     Sig: TAKE ONE CAPSULE BY MOUTH EVERY DAY   • montelukast (SINGULAIR) 10 MG tablet [Pharmacy Med Name: montelukast 10 mg tablet] 30 tablet 2     Sig: TAKE ONE TABLET BY MOUTH AT BEDTIME      Last office visit with prescribing clinician: 9/7/2021      Next office visit with prescribing clinician: 12/8/2021            Charissa Ball MA  09/15/21, 12:15 EDT

## 2021-10-12 ENCOUNTER — OFFICE VISIT (OUTPATIENT)
Dept: GASTROENTEROLOGY | Facility: CLINIC | Age: 61
End: 2021-10-12

## 2021-10-12 VITALS
HEIGHT: 72 IN | WEIGHT: 216 LBS | TEMPERATURE: 97.3 F | SYSTOLIC BLOOD PRESSURE: 133 MMHG | DIASTOLIC BLOOD PRESSURE: 63 MMHG | BODY MASS INDEX: 29.26 KG/M2

## 2021-10-12 DIAGNOSIS — Z12.11 ENCOUNTER FOR SCREENING FOR MALIGNANT NEOPLASM OF COLON: Primary | ICD-10-CM

## 2021-10-12 PROCEDURE — S0285 CNSLT BEFORE SCREEN COLONOSC: HCPCS | Performed by: NURSE PRACTITIONER

## 2021-10-12 RX ORDER — PREDNISONE 1 MG/1
5 TABLET ORAL DAILY
COMMUNITY
Start: 2021-09-20 | End: 2021-12-09

## 2021-10-12 RX ORDER — SODIUM CHLORIDE 9 MG/ML
70 INJECTION, SOLUTION INTRAVENOUS CONTINUOUS PRN
Status: CANCELLED | OUTPATIENT
Start: 2021-10-12

## 2021-10-12 NOTE — PROGRESS NOTES
New Patient Consult      Date: 10/12/2021   Patient Name: Darnell Garcia  MRN: [id number]  : 1960     Primary Care Provider: Carlos Bella MD    Chief Complaint   Patient presents with   • Colonoscopy     screening     History of Present Illness: Darnell Garcia is a 61 y.o. male who is here today to establish care with Gastroenterology for colon cancer screening.     The patient denies recent change in bowel habits. There is no diarrhea or constipation. There is no history of abdominal pain. There is no history of overt GI bleed (hematemesis melena or hematochezia). The patient denies nausea or vomiting. There is no history of reflux. The patient denies dysphagia or odynophagia. There is no history of recent significant weight loss. There is no history of liver disease in the past. There is no family history of GI malignancy. The patient's last colonoscopy was 10 years ago. No history of polyps in the past.    Subjective      Past Medical History:   Diagnosis Date   • Arthritis    • Asthma    • Diabetes mellitus (HCC)    • GERD (gastroesophageal reflux disease)    • History of varicella    • Neuropathy      Past Surgical History:   Procedure Laterality Date   • COLONOSCOPY  10 years ago   • NASAL SEPTUM SURGERY     • VASECTOMY     • VENTRAL HERNIA REPAIR       and  (mesh placed)   • WISDOM TOOTH EXTRACTION       Family History   Problem Relation Age of Onset   • Breast cancer Mother    • Osteoarthritis Mother    • Arthritis Mother    • Coronary artery disease Father    • Heart attack Father         late 60's   • Lymphoma Son    • Leukemia Son    • Colon cancer Neg Hx      Social History     Socioeconomic History   • Marital status:    Tobacco Use   • Smoking status: Never Smoker   • Smokeless tobacco: Never Used   Vaping Use   • Vaping Use: Never used   Substance and Sexual Activity   • Alcohol use: Yes     Alcohol/week: 10.0 standard drinks     Types: 10 Cans of beer per week      Comment: one day every other weekend   • Drug use: No   • Sexual activity: Never     Partners: Female       Current Outpatient Medications:   •  ALBUTEROL SULFATE  (90 Base) MCG/ACT inhaler, INHALE TWO PUFFS BY MOUTH EVERY 4 HOURS AS NEEDED FOR WHEEZING, Disp: 8.5 g, Rfl: 5  •  atorvastatin (LIPITOR) 40 MG tablet, TAKE ONE TABLET BY MOUTH EVERY DAY, Disp: 90 tablet, Rfl: 3  •  Blood Glucose Monitoring Suppl (Accu-Chek Guide Me) w/Device kit, USE AS DIRECTED, Disp: 1 kit, Rfl: 0  •  celecoxib (CeleBREX) 200 MG capsule, TAKE ONE CAPSULE BY MOUTH EVERY DAY, Disp: 90 capsule, Rfl: 1  •  Eliquis 5 MG tablet tablet, TAKE ONE TABLET BY MOUTH EVERY TWELVE HOURS, Disp: 180 tablet, Rfl: 3  •  gabapentin (NEURONTIN) 300 MG capsule, TAKE ONE CAPSULE BY MOUTH AT BEDTIME, Disp: 90 capsule, Rfl: 2  •  glimepiride (AMARYL) 4 MG tablet, TAKE ONE TABLET BY MOUTH EVERY MORNING BEFORE breakfast, Disp: 90 tablet, Rfl: 3  •  glucose blood test strip, Use as instructed, Disp: 180 each, Rfl: 12  •  glucose blood test strip, Use as instructed, Disp: 100 each, Rfl: 12  •  Insulin Glargine (LANTUS SOLOSTAR) 100 UNIT/ML injection pen, Inject 10 Units under the skin into the appropriate area as directed Every Morning., Disp: 3 pen, Rfl: 3  •  Insulin Pen Needle 30G X 8 MM misc, 10 Units Daily., Disp: 3 each, Rfl: 12  •  Jardiance 25 MG tablet tablet, TAKE ONE TABLET BY MOUTH EVERY DAY, Disp: 90 tablet, Rfl: 3  •  Lancets (onetouch ultrasoft) lancets, Use as instructed, Disp: 180 each, Rfl: 12  •  metFORMIN (GLUCOPHAGE) 1000 MG tablet, Take 1 tablet by mouth 2 (Two) Times a Day With Meals., Disp: 90 tablet, Rfl: 1  •  metoprolol succinate XL (TOPROL-XL) 25 MG 24 hr tablet, Take 0.5 tablets by mouth Daily., Disp: 45 tablet, Rfl: 3  •  montelukast (SINGULAIR) 10 MG tablet, TAKE ONE TABLET BY MOUTH AT BEDTIME, Disp: 30 tablet, Rfl: 2  •  nitroglycerin (NITROSTAT) 0.4 MG SL tablet, Place 1 tablet under the tongue Every FIVE Minutes As Needed  "for Chest Pain. Take no more than 3 doses in 15 minutes., Disp: 25 tablet, Rfl: 2  •  predniSONE (DELTASONE) 5 MG tablet, Take 5 mg by mouth Daily., Disp: , Rfl:   •  Symbicort 160-4.5 MCG/ACT inhaler, INHALE TWO PUFFS BY MOUTH TWICE DAILY, Disp: 10.2 g, Rfl: 3    Allergies   Allergen Reactions   • Aspirin Shortness Of Breath     The following portions of the patient's history were reviewed and updated as appropriate: allergies, current medications, past family history, past medical history, past social history, past surgical history and problem list.    Objective     Physical Exam  Vitals and nursing note reviewed.   Constitutional:       General: He is not in acute distress.     Appearance: Normal appearance. He is well-developed.   HENT:      Head: Normocephalic and atraumatic.      Right Ear: Hearing normal.      Left Ear: Hearing normal.      Mouth/Throat:      Mouth: Mucous membranes are not pale, not dry and not cyanotic.   Eyes:      General: Lids are normal.      Conjunctiva/sclera: Conjunctivae normal.   Neck:      Trachea: Trachea normal.   Cardiovascular:      Rate and Rhythm: Normal rate and regular rhythm.      Heart sounds: Normal heart sounds.   Pulmonary:      Effort: Pulmonary effort is normal. No respiratory distress.      Breath sounds: Normal breath sounds.   Abdominal:      General: Bowel sounds are normal.      Palpations: Abdomen is soft. There is no mass.      Tenderness: There is no abdominal tenderness.      Hernia: A hernia is present. Hernia is present in the ventral area.   Skin:     General: Skin is warm and dry.   Neurological:      Mental Status: He is alert and oriented to person, place, and time.   Psychiatric:         Mood and Affect: Mood normal.         Speech: Speech normal.         Behavior: Behavior normal. Behavior is cooperative.       Vitals:    10/12/21 1314   BP: 133/63   Temp: 97.3 °F (36.3 °C)   Weight: 98 kg (216 lb)   Height: 182.9 cm (72\")     Body mass index is " 29.29 kg/m².     Results Review:   I have reviewed the patient's new clinical and imaging results.    No visits with results within 90 Day(s) from this visit.   Latest known visit with results is:   Admission on 02/03/2021, Discharged on 02/06/2021   Component Date Value Ref Range Status   • Glucose 02/03/2021 330* 65 - 99 mg/dL Final    Glucose >180, Hemoglobin A1C recommended.   • BUN 02/03/2021 14  8 - 23 mg/dL Final   • Creatinine 02/03/2021 1.02  0.76 - 1.27 mg/dL Final   • Sodium 02/03/2021 133* 136 - 145 mmol/L Final   • Potassium 02/03/2021 4.7  3.5 - 5.2 mmol/L Final   • Chloride 02/03/2021 97* 98 - 107 mmol/L Final   • CO2 02/03/2021 24.1  22.0 - 29.0 mmol/L Final   • Calcium 02/03/2021 9.4  8.6 - 10.5 mg/dL Final   • Total Protein 02/03/2021 6.9  6.0 - 8.5 g/dL Final   • Albumin 02/03/2021 4.20  3.50 - 5.20 g/dL Final   • ALT (SGPT) 02/03/2021 9  1 - 41 U/L Final   • AST (SGOT) 02/03/2021 10  1 - 40 U/L Final   • Alkaline Phosphatase 02/03/2021 96  39 - 117 U/L Final   • Total Bilirubin 02/03/2021 1.1  0.0 - 1.2 mg/dL Final   • eGFR Non African Amer 02/03/2021 74  >60 mL/min/1.73 Final   • Globulin 02/03/2021 2.7  gm/dL Final   • A/G Ratio 02/03/2021 1.6  g/dL Final   • BUN/Creatinine Ratio 02/03/2021 13.7  7.0 - 25.0 Final   • Anion Gap 02/03/2021 11.9  5.0 - 15.0 mmol/L Final   • Troponin T 02/03/2021 <0.010  0.000 - 0.030 ng/mL Final   • Extra Tube 02/03/2021 hold for add-on   Final    Auto resulted   • Extra Tube 02/03/2021 Hold for add-ons.   Final    Auto resulted.   • Extra Tube 02/03/2021 hold for add-on   Final    Auto resulted   • Extra Tube 02/03/2021 Hold for add-ons.   Final    Auto resulted.   • WBC 02/03/2021 5.45  3.40 - 10.80 10*3/mm3 Final   • RBC 02/03/2021 4.46  4.14 - 5.80 10*6/mm3 Final   • Hemoglobin 02/03/2021 14.2  13.0 - 17.7 g/dL Final   • Hematocrit 02/03/2021 40.6  37.5 - 51.0 % Final   • MCV 02/03/2021 91.0  79.0 - 97.0 fL Final   • MCH 02/03/2021 31.8  26.6 - 33.0 pg Final    • MCHC 02/03/2021 35.0  31.5 - 35.7 g/dL Final   • RDW 02/03/2021 12.0* 12.3 - 15.4 % Final   • RDW-SD 02/03/2021 40.2  37.0 - 54.0 fl Final   • MPV 02/03/2021 9.7  6.0 - 12.0 fL Final   • Platelets 02/03/2021 225  140 - 450 10*3/mm3 Final   • Neutrophil % 02/03/2021 63.5  42.7 - 76.0 % Final   • Lymphocyte % 02/03/2021 29.0  19.6 - 45.3 % Final   • Monocyte % 02/03/2021 4.4* 5.0 - 12.0 % Final   • Eosinophil % 02/03/2021 2.2  0.3 - 6.2 % Final   • Basophil % 02/03/2021 0.7  0.0 - 1.5 % Final   • Immature Grans % 02/03/2021 0.2  0.0 - 0.5 % Final   • Neutrophils, Absolute 02/03/2021 3.46  1.70 - 7.00 10*3/mm3 Final   • Lymphocytes, Absolute 02/03/2021 1.58  0.70 - 3.10 10*3/mm3 Final   • Monocytes, Absolute 02/03/2021 0.24  0.10 - 0.90 10*3/mm3 Final   • Eosinophils, Absolute 02/03/2021 0.12  0.00 - 0.40 10*3/mm3 Final   • Basophils, Absolute 02/03/2021 0.04  0.00 - 0.20 10*3/mm3 Final   • Immature Grans, Absolute 02/03/2021 0.01  0.00 - 0.05 10*3/mm3 Final   • nRBC 02/03/2021 0.0  0.0 - 0.2 /100 WBC Final   • COVID19 02/03/2021 Not Detected  Not Detected - Ref. Range Final   • Magnesium 02/03/2021 2.1  1.6 - 2.4 mg/dL Final   • QT Interval 02/04/2021 370  ms Final   • QTC Interval 02/04/2021 509  ms Final   • TSH 02/03/2021 1.190  0.270 - 4.200 uIU/mL Final   • Glucose 02/03/2021 161* 70 - 130 mg/dL Final    Serial Number: [id number]:  [id number]   • WBC 02/04/2021 5.89  3.40 - 10.80 10*3/mm3 Final   • RBC 02/04/2021 4.25  4.14 - 5.80 10*6/mm3 Final   • Hemoglobin 02/04/2021 13.5  13.0 - 17.7 g/dL Final   • Hematocrit 02/04/2021 39.5  37.5 - 51.0 % Final   • MCV 02/04/2021 92.9  79.0 - 97.0 fL Final   • MCH 02/04/2021 31.8  26.6 - 33.0 pg Final   • MCHC 02/04/2021 34.2  31.5 - 35.7 g/dL Final   • RDW 02/04/2021 12.0* 12.3 - 15.4 % Final   • RDW-SD 02/04/2021 41.1  37.0 - 54.0 fl Final   • MPV 02/04/2021 10.1  6.0 - 12.0 fL Final   • Platelets 02/04/2021 213  140 - 450 10*3/mm3 Final   • Neutrophil %  02/04/2021 55.3  42.7 - 76.0 % Final   • Lymphocyte % 02/04/2021 35.1  19.6 - 45.3 % Final   • Monocyte % 02/04/2021 5.4  5.0 - 12.0 % Final   • Eosinophil % 02/04/2021 2.9  0.3 - 6.2 % Final   • Basophil % 02/04/2021 1.0  0.0 - 1.5 % Final   • Immature Grans % 02/04/2021 0.3  0.0 - 0.5 % Final   • Neutrophils, Absolute 02/04/2021 3.25  1.70 - 7.00 10*3/mm3 Final   • Lymphocytes, Absolute 02/04/2021 2.07  0.70 - 3.10 10*3/mm3 Final   • Monocytes, Absolute 02/04/2021 0.32  0.10 - 0.90 10*3/mm3 Final   • Eosinophils, Absolute 02/04/2021 0.17  0.00 - 0.40 10*3/mm3 Final   • Basophils, Absolute 02/04/2021 0.06  0.00 - 0.20 10*3/mm3 Final   • Immature Grans, Absolute 02/04/2021 0.02  0.00 - 0.05 10*3/mm3 Final   • nRBC 02/04/2021 0.0  0.0 - 0.2 /100 WBC Final   • Glucose 02/04/2021 211* 65 - 99 mg/dL Final    Glucose >180, Hemoglobin A1C recommended.   • BUN 02/04/2021 18  8 - 23 mg/dL Final   • Creatinine 02/04/2021 1.01  0.76 - 1.27 mg/dL Final   • Sodium 02/04/2021 139  136 - 145 mmol/L Final   • Potassium 02/04/2021 4.5  3.5 - 5.2 mmol/L Final   • Chloride 02/04/2021 101  98 - 107 mmol/L Final   • CO2 02/04/2021 25.8  22.0 - 29.0 mmol/L Final   • Calcium 02/04/2021 9.1  8.6 - 10.5 mg/dL Final   • Total Protein 02/04/2021 6.4  6.0 - 8.5 g/dL Final   • Albumin 02/04/2021 3.90  3.50 - 5.20 g/dL Final   • ALT (SGPT) 02/04/2021 8  1 - 41 U/L Final   • AST (SGOT) 02/04/2021 <5  1 - 40 U/L Final   • Alkaline Phosphatase 02/04/2021 88  39 - 117 U/L Final   • Total Bilirubin 02/04/2021 0.8  0.0 - 1.2 mg/dL Final   • eGFR Non African Amer 02/04/2021 75  >60 mL/min/1.73 Final   • Globulin 02/04/2021 2.5  gm/dL Final   • A/G Ratio 02/04/2021 1.6  g/dL Final   • BUN/Creatinine Ratio 02/04/2021 17.8  7.0 - 25.0 Final   • Anion Gap 02/04/2021 12.2  5.0 - 15.0 mmol/L Final   • Glucose 02/04/2021 399* 70 - 130 mg/dL Final    Serial Number: [id number]:  [id number]   • Hemoglobin A1C 02/04/2021 11.70* 4.80 - 5.60 % Final   •  Glucose 02/04/2021 148* 70 - 130 mg/dL Final    Serial Number: [id number]:  [id number]   • QT Interval 02/04/2021 436  ms Final   • QTC Interval 02/04/2021 467  ms Final   • Glucose 02/04/2021 356* 70 - 130 mg/dL Final    Serial Number: [id number]:  [id number]   • QT Interval 02/04/2021 454  ms Final   • QTC Interval 02/04/2021 449  ms Final   • Glucose 02/04/2021 237* 70 - 130 mg/dL Final    Serial Number: [id number]:  [id number]   • Potassium 02/05/2021 4.3  3.5 - 5.2 mmol/L Final   • QT Interval 02/05/2021 470  ms Final   • QTC Interval 02/05/2021 441  ms Final   • Glucose 02/05/2021 213* 70 - 130 mg/dL Final    Serial Number: [id number]:  [id number]   • Glucose 02/05/2021 327* 70 - 130 mg/dL Final    Serial Number: [id number]:  [id number]   • QT Interval 02/05/2021 500  ms Final   • QTC Interval 02/05/2021 446  ms Final   • Glucose 02/05/2021 170* 70 - 130 mg/dL Final    Serial Number: [id number]:  [id number]   • Glucose 02/05/2021 347* 70 - 130 mg/dL Final    Serial Number: [id number]:  [id number]   • Potassium 02/06/2021 4.7  3.5 - 5.2 mmol/L Final   • Glucose 02/06/2021 244* 70 - 130 mg/dL Final    Serial Number: [id number]:  [id number]   • QT Interval 02/06/2021 494  ms Final   • QTC Interval 02/06/2021 455  ms Final   • Glucose 02/06/2021 259* 70 - 130 mg/dL Final    Serial Number: [id number]:  [id number]      No radiology results for the last 90 days.     Assessment / Plan      1. Encounter for screening for malignant neoplasm of colon  The patient's last colonoscopy was 10 years ago.  There is no family history of colon cancer.  Colonoscopy for colon cancer screening.    - Case Request; Standing  - Case Request    Patient Instructions   Colonoscopy: The indications, technique, alternatives and potential risk and complications were discussed with the patient including but not limited to bleeding, perforations, missing lesions and anesthetic complications. The patient  understands and wishes to proceed with the procedure and has given their verbal consent. Written patient education information was given to the patient.   The patient will call if they have further questions before procedure.      Syed Wright, APRN  10/12/2021    Please note that portions of this note may have been completed with a voice recognition program. Efforts were made to edit the dictations, but occasionally words are mistranscribed.

## 2021-10-12 NOTE — PATIENT INSTRUCTIONS
Colonoscopy: The indications, technique, alternatives and potential risk and complications were discussed with the patient including but not limited to bleeding, perforations, missing lesions and anesthetic complications. The patient understands and wishes to proceed with the procedure and has given their verbal consent. Written patient education information was given to the patient.   The patient will call if they have further questions before procedure.

## 2021-11-29 ENCOUNTER — TELEPHONE (OUTPATIENT)
Dept: GASTROENTEROLOGY | Facility: CLINIC | Age: 61
End: 2021-11-29

## 2021-11-29 DIAGNOSIS — Z12.11 ENCOUNTER FOR SCREENING FOR MALIGNANT NEOPLASM OF COLON: Primary | ICD-10-CM

## 2021-11-29 RX ORDER — POLYETHYLENE GLYCOL 3350 17 G/17G
POWDER, FOR SOLUTION ORAL
Qty: 238 G | Refills: 0 | Status: SHIPPED | OUTPATIENT
Start: 2021-11-29 | End: 2022-05-13 | Stop reason: HOSPADM

## 2021-11-29 RX ORDER — BISACODYL 5 MG/1
TABLET, DELAYED RELEASE ORAL
Qty: 4 TABLET | Refills: 0 | Status: SHIPPED | OUTPATIENT
Start: 2021-11-29 | End: 2022-05-13 | Stop reason: HOSPADM

## 2021-12-09 DIAGNOSIS — E11.49 TYPE 2 DIABETES MELLITUS WITH OTHER DIABETIC NEUROLOGICAL COMPLICATION (HCC): ICD-10-CM

## 2021-12-09 DIAGNOSIS — J45.909 UNCOMPLICATED ASTHMA, UNSPECIFIED ASTHMA SEVERITY, UNSPECIFIED WHETHER PERSISTENT: ICD-10-CM

## 2021-12-09 RX ORDER — MONTELUKAST SODIUM 10 MG/1
TABLET ORAL
Qty: 30 TABLET | Refills: 2 | Status: SHIPPED | OUTPATIENT
Start: 2021-12-09 | End: 2022-03-10

## 2021-12-09 RX ORDER — GABAPENTIN 300 MG/1
CAPSULE ORAL
Qty: 90 CAPSULE | Refills: 2 | Status: SHIPPED | OUTPATIENT
Start: 2021-12-09 | End: 2022-08-03 | Stop reason: SDUPTHER

## 2021-12-09 RX ORDER — PREDNISONE 1 MG/1
TABLET ORAL
Qty: 90 TABLET | Refills: 3 | Status: SHIPPED | OUTPATIENT
Start: 2021-12-09 | End: 2022-12-19

## 2022-01-26 ENCOUNTER — TELEPHONE (OUTPATIENT)
Dept: GASTROENTEROLOGY | Facility: CLINIC | Age: 62
End: 2022-01-26

## 2022-01-26 NOTE — TELEPHONE ENCOUNTER
Called and spoke with patient.  Procedure for 01/31/22 cancelled due to COVID increase.  We will call patient back to R/S.  Patient is aware.

## 2022-03-10 ENCOUNTER — TELEPHONE (OUTPATIENT)
Dept: GASTROENTEROLOGY | Facility: CLINIC | Age: 62
End: 2022-03-10

## 2022-03-10 DIAGNOSIS — M19.90 UNSPECIFIED OSTEOARTHRITIS, UNSPECIFIED SITE: ICD-10-CM

## 2022-03-10 DIAGNOSIS — J45.909 UNCOMPLICATED ASTHMA, UNSPECIFIED ASTHMA SEVERITY, UNSPECIFIED WHETHER PERSISTENT: ICD-10-CM

## 2022-03-10 RX ORDER — MONTELUKAST SODIUM 10 MG/1
TABLET ORAL
Qty: 30 TABLET | Refills: 2 | Status: SHIPPED | OUTPATIENT
Start: 2022-03-10 | End: 2022-06-02

## 2022-03-10 RX ORDER — CELECOXIB 200 MG/1
CAPSULE ORAL
Qty: 90 CAPSULE | Refills: 1 | Status: SHIPPED | OUTPATIENT
Start: 2022-03-10 | End: 2022-08-26

## 2022-03-10 NOTE — TELEPHONE ENCOUNTER
Called patient to reschedule colonoscopy. Spoke to patient. He has been scheduled for 05/13/22 at 9:30 am.

## 2022-03-10 NOTE — TELEPHONE ENCOUNTER
Rx Refill Note  Requested Prescriptions     Pending Prescriptions Disp Refills   • montelukast (SINGULAIR) 10 MG tablet [Pharmacy Med Name: montelukast 10 mg tablet] 30 tablet 2     Sig: TAKE ONE TABLET BY MOUTH AT BEDTIME   • celecoxib (CeleBREX) 200 MG capsule [Pharmacy Med Name: celecoxib 200 mg capsule] 90 capsule 1     Sig: TAKE ONE CAPSULE BY MOUTH EVERY DAY      Last office visit with prescribing clinician: 9/7/2021      Next office visit with prescribing clinician: Visit date not found            VICKY HESTER MA  03/10/22, 09:22 EST

## 2022-03-15 DIAGNOSIS — E11.9 TYPE 2 DIABETES MELLITUS WITHOUT COMPLICATION, WITHOUT LONG-TERM CURRENT USE OF INSULIN: ICD-10-CM

## 2022-03-15 NOTE — TELEPHONE ENCOUNTER
Rx Refill Note  Requested Prescriptions     Pending Prescriptions Disp Refills   • metFORMIN (GLUCOPHAGE) 1000 MG tablet [Pharmacy Med Name: metformin 1,000 mg tablet] 90 tablet 1     Sig: TAKE ONE TABLET BY MOUTH TWICE DAILY WITH MEALS      Last office visit with prescribing clinician: 9/7/2021      Next office visit with prescribing clinician: Visit date not found            VICKY HESTER MA  03/15/22, 15:31 EDT

## 2022-04-06 RX ORDER — APIXABAN 5 MG/1
TABLET, FILM COATED ORAL
Qty: 180 TABLET | Refills: 3 | Status: SHIPPED | OUTPATIENT
Start: 2022-04-06 | End: 2023-03-16

## 2022-04-19 DIAGNOSIS — E11.9 TYPE 2 DIABETES MELLITUS WITHOUT COMPLICATION, WITHOUT LONG-TERM CURRENT USE OF INSULIN: ICD-10-CM

## 2022-04-19 NOTE — TELEPHONE ENCOUNTER
Rx Refill Note  Requested Prescriptions     Pending Prescriptions Disp Refills   • metFORMIN (GLUCOPHAGE) 1000 MG tablet [Pharmacy Med Name: metformin 1,000 mg tablet] 90 tablet 0     Sig: TAKE ONE TABLET BY MOUTH TWICE DAILY WITH MEALS      Last office visit with prescribing clinician: 9/7/2021      Next office visit with prescribing clinician: Visit date not found            VICKY HESTER MA  04/19/22, 10:28 EDT

## 2022-05-02 ENCOUNTER — PREP FOR SURGERY (OUTPATIENT)
Dept: OTHER | Facility: HOSPITAL | Age: 62
End: 2022-05-02

## 2022-05-02 DIAGNOSIS — Z12.11 ENCOUNTER FOR SCREENING FOR MALIGNANT NEOPLASM OF COLON: Primary | ICD-10-CM

## 2022-05-02 RX ORDER — SODIUM CHLORIDE 9 MG/ML
70 INJECTION, SOLUTION INTRAVENOUS CONTINUOUS PRN
Status: CANCELLED | OUTPATIENT
Start: 2022-05-02

## 2022-05-03 PROBLEM — Z12.11 ENCOUNTER FOR SCREENING FOR MALIGNANT NEOPLASM OF COLON: Status: ACTIVE | Noted: 2022-05-03

## 2022-05-10 ENCOUNTER — TELEPHONE (OUTPATIENT)
Dept: GASTROENTEROLOGY | Facility: CLINIC | Age: 62
End: 2022-05-10

## 2022-05-10 NOTE — TELEPHONE ENCOUNTER
CALLED PATIENT TO CONFIRM COLONOSCOPY FOR 05/13/22 AT 9:30 AM. REACHED VOICEMAIL. LEFT DETAILED MESSAGE FOR RETURN CALL.

## 2022-05-12 RX ORDER — OMEGA-3S/DHA/EPA/FISH OIL/D3 300MG-1000
400 CAPSULE ORAL DAILY
COMMUNITY

## 2022-05-12 NOTE — PRE-PROCEDURE INSTRUCTIONS
PAT phone history completed with pt for upcoming procedure on  5/13/22 with Dr. Art.     PAT PASS GIVEN/REVIEWED WITH PT.  VERBALIZED UNDERSTANDING OF THE FOLLOWING:  DO NOT EAT, DRINK, SMOKE, USE SMOKELESS TOBACCO OR CHEW GUM AFTER MIDNIGHT THE NIGHT BEFORE SURGERY.  THIS ALSO INCLUDES HARD CANDIES AND MINTS.    DO NOT SHAVE THE AREA TO BE OPERATED ON AT LEAST 48 HOURS PRIOR TO THE PROCEDURE.  DO NOT WEAR MAKE UP OR NAIL POLISH.  DO NOT LEAVE IN ANY PIERCING OR WEAR JEWELRY THE DAY OF SURGERY.      DO NOT USE ADHESIVES IF YOU WEAR DENTURES.    DO NOT WEAR EYE CONTACTS; BRING IN YOUR GLASSES.    ONLY TAKE MEDICATION THE MORNING OF YOUR PROCEDURE IF INSTRUCTED BY YOUR SURGEON WITH ENOUGH WATER TO SWALLOW THE MEDICATION.  IF YOUR SURGEON DID NOT SPECIFY WHICH MEDICATIONS TO TAKE, YOU WILL NEED TO CALL THEIR OFFICE FOR FURTHER INSTRUCTIONS AND DO AS THEY INSTRUCT.    LEAVE ANYTHING YOU CONSIDER VALUABLE AT HOME.    YOU WILL NEED TO ARRANGE FOR SOMEONE TO DRIVE YOU HOME AFTER SURGERY.  IT IS RECOMMENDED THAT YOU DO NOT DRIVE, WORK, DRINK ALCOHOL OR MAKE MAJOR DECISIONS FOR AT LEAST 24 HOURS AFTER YOUR PROCEDURE IS COMPLETE.      THE DAY OF YOUR PROCEDURE, BRING IN THE FOLLOWING IF APPLICABLE:   PICTURE ID AND INSURANCE/MEDICARE OR MEDICAID CARDS/ANY CO-PAY THAT MAY BE DUE   COPY OF ADVANCED DIRECTIVE/LIVING WILL/POWER OR    CPAP/BIPAP/INHALERS   SKIN PREP SHEET   YOUR PREADMISSION TESTING PASS (IF NOT A PHONE HISTORY)           COVID self-quarantine instructions reviewed with the pt.  Verbalized understanding.

## 2022-05-13 ENCOUNTER — ANESTHESIA (OUTPATIENT)
Dept: GASTROENTEROLOGY | Facility: HOSPITAL | Age: 62
End: 2022-05-13

## 2022-05-13 ENCOUNTER — HOSPITAL ENCOUNTER (OUTPATIENT)
Facility: HOSPITAL | Age: 62
Setting detail: HOSPITAL OUTPATIENT SURGERY
Discharge: HOME | End: 2022-05-13
Attending: INTERNAL MEDICINE | Admitting: INTERNAL MEDICINE

## 2022-05-13 ENCOUNTER — ANESTHESIA EVENT (OUTPATIENT)
Dept: GASTROENTEROLOGY | Facility: HOSPITAL | Age: 62
End: 2022-05-13

## 2022-05-13 VITALS
BODY MASS INDEX: 29.12 KG/M2 | WEIGHT: 215 LBS | DIASTOLIC BLOOD PRESSURE: 63 MMHG | TEMPERATURE: 97 F | SYSTOLIC BLOOD PRESSURE: 108 MMHG | HEIGHT: 72 IN | OXYGEN SATURATION: 97 % | RESPIRATION RATE: 18 BRPM | HEART RATE: 64 BPM

## 2022-05-13 DIAGNOSIS — Z12.11 ENCOUNTER FOR SCREENING FOR MALIGNANT NEOPLASM OF COLON: ICD-10-CM

## 2022-05-13 LAB — GLUCOSE BLDC GLUCOMTR-MCNC: 178 MG/DL (ref 70–130)

## 2022-05-13 PROCEDURE — 45390 COLONOSCOPY W/RESECTION: CPT | Performed by: INTERNAL MEDICINE

## 2022-05-13 PROCEDURE — 25010000002 PROPOFOL 200 MG/20ML EMULSION: Performed by: NURSE ANESTHETIST, CERTIFIED REGISTERED

## 2022-05-13 PROCEDURE — 82962 GLUCOSE BLOOD TEST: CPT

## 2022-05-13 RX ORDER — LIDOCAINE HYDROCHLORIDE 20 MG/ML
INJECTION, SOLUTION INTRAVENOUS AS NEEDED
Status: DISCONTINUED | OUTPATIENT
Start: 2022-05-13 | End: 2022-05-13 | Stop reason: SURG

## 2022-05-13 RX ORDER — PROPOFOL 10 MG/ML
INJECTION, EMULSION INTRAVENOUS AS NEEDED
Status: DISCONTINUED | OUTPATIENT
Start: 2022-05-13 | End: 2022-05-13 | Stop reason: SURG

## 2022-05-13 RX ORDER — SIMETHICONE 20 MG/.3ML
EMULSION ORAL AS NEEDED
Status: DISCONTINUED | OUTPATIENT
Start: 2022-05-13 | End: 2022-05-13 | Stop reason: HOSPADM

## 2022-05-13 RX ORDER — SODIUM CHLORIDE 9 MG/ML
70 INJECTION, SOLUTION INTRAVENOUS CONTINUOUS PRN
Status: DISCONTINUED | OUTPATIENT
Start: 2022-05-13 | End: 2022-05-13 | Stop reason: HOSPADM

## 2022-05-13 RX ADMIN — PROPOFOL 200 MG: 10 INJECTION, EMULSION INTRAVENOUS at 12:36

## 2022-05-13 RX ADMIN — LIDOCAINE HYDROCHLORIDE 60 MG: 20 INJECTION, SOLUTION INTRAVENOUS at 12:19

## 2022-05-13 RX ADMIN — PROPOFOL 200 MG: 10 INJECTION, EMULSION INTRAVENOUS at 12:19

## 2022-05-13 RX ADMIN — PROPOFOL 100 MG: 10 INJECTION, EMULSION INTRAVENOUS at 12:53

## 2022-05-13 RX ADMIN — SODIUM CHLORIDE 70 ML/HR: 9 INJECTION, SOLUTION INTRAVENOUS at 09:57

## 2022-05-13 NOTE — H&P
[facility]  HISTORY AND PHYSICAL    Patient Name: Darnell Garcia  : 1960  MRN: [id number]    Chief Complaint:   For screening colonoscopy    History Of Presenting Illness:        Past Medical History:   Diagnosis Date   • A-fib (HCC)    • Arthritis    • Asthma    • COVID-19 vaccine series completed     with booster x2   • Diabetes mellitus (HCC)    • GERD (gastroesophageal reflux disease)    • History of varicella    • Neuropathy        Past Surgical History:   Procedure Laterality Date   • ANKLE FUSION Bilateral     screws in both   • COLONOSCOPY  10 years ago   • NASAL SEPTUM SURGERY     • VASECTOMY     • VENTRAL HERNIA REPAIR       and  (mesh placed)   • WISDOM TOOTH EXTRACTION         Social History     Socioeconomic History   • Marital status:    Tobacco Use   • Smoking status: Never Smoker   • Smokeless tobacco: Never Used   Vaping Use   • Vaping Use: Never used   Substance and Sexual Activity   • Alcohol use: Yes     Alcohol/week: 10.0 standard drinks     Types: 10 Cans of beer per week     Comment: one day every other weekend   • Drug use: No   • Sexual activity: Never     Partners: Female       Family History   Problem Relation Age of Onset   • Breast cancer Mother    • Osteoarthritis Mother    • Arthritis Mother    • Coronary artery disease Father    • Heart attack Father         late 60's   • Lymphoma Son    • Leukemia Son    • Colon cancer Neg Hx        Prior to Admission Medications:  Medications Prior to Admission   Medication Sig Dispense Refill Last Dose   • ALBUTEROL SULFATE  (90 Base) MCG/ACT inhaler INHALE TWO PUFFS BY MOUTH EVERY 4 HOURS AS NEEDED FOR WHEEZING 8.5 g 5 2022 at 2330   • atorvastatin (LIPITOR) 40 MG tablet TAKE ONE TABLET BY MOUTH EVERY DAY 90 tablet 3 Past Week at Unknown time   • bisacodyl (DULCOLAX) 5 MG EC tablet Take as directed for colon prep 4 tablet 0 Past Week at Unknown time   • celecoxib (CeleBREX) 200 MG capsule  TAKE ONE CAPSULE BY MOUTH EVERY DAY 90 capsule 1 Past Week at Unknown time   • cholecalciferol (VITAMIN D3) 10 MCG (400 UNIT) tablet Take 400 Units by mouth Daily.   Past Week at Unknown time   • Eliquis 5 MG tablet tablet TAKE ONE TABLET BY MOUTH EVERY TWELVE HOURS 180 tablet 3 5/10/2022   • gabapentin (NEURONTIN) 300 MG capsule TAKE ONE CAPSULE BY MOUTH AT BEDTIME 90 capsule 2 Past Week at Unknown time   • glimepiride (AMARYL) 4 MG tablet TAKE ONE TABLET BY MOUTH EVERY MORNING BEFORE breakfast 90 tablet 3 Past Week at Unknown time   • Insulin Glargine (LANTUS SOLOSTAR) 100 UNIT/ML injection pen Inject 10 Units under the skin into the appropriate area as directed Every Morning. 3 pen 3 Past Week at Unknown time   • Jardiance 25 MG tablet tablet TAKE ONE TABLET BY MOUTH EVERY DAY 90 tablet 3 Past Week at Unknown time   • metFORMIN (GLUCOPHAGE) 1000 MG tablet TAKE ONE TABLET BY MOUTH TWICE DAILY WITH MEALS (Patient taking differently: Take 1,000 mg by mouth Every Night.) 180 tablet 0 Past Week at Unknown time   • metoprolol succinate XL (TOPROL-XL) 25 MG 24 hr tablet Take 0.5 tablets by mouth Daily. 45 tablet 3 5/11/2022 at Unknown time   • montelukast (SINGULAIR) 10 MG tablet TAKE ONE TABLET BY MOUTH AT BEDTIME 30 tablet 2 Past Week at Unknown time   • polyethylene glycol (MiraLax) 17 GM/SCOOP powder Take as directed for colonoscopy prep 238 g 0 5/12/2022 at Unknown time   • predniSONE (DELTASONE) 5 MG tablet TAKE ONE TABLET BY MOUTH EVERY DAY 90 tablet 3 Past Week at Unknown time   • Symbicort 160-4.5 MCG/ACT inhaler INHALE TWO PUFFS BY MOUTH TWICE DAILY 10.2 g 3 Past Week at Unknown time   • Blood Glucose Monitoring Suppl (Accu-Chek Guide Me) w/Device kit USE AS DIRECTED 1 kit 0 Unknown at Unknown time   • glucose blood test strip Use as instructed 100 each 12 Unknown at Unknown time   • Insulin Pen Needle 30G X 8 MM misc 10 Units Daily. 3 each 12 Unknown at Unknown time   • Lancets (onetouch ultrasoft) lancets  Use as instructed 180 each 12 Unknown at Unknown time   • nitroglycerin (NITROSTAT) 0.4 MG SL tablet Place 1 tablet under the tongue Every FIVE Minutes As Needed for Chest Pain. Take no more than 3 doses in 15 minutes. 25 tablet 2 Unknown at Unknown time       Allergies:  Allergies   Allergen Reactions   • Aspirin Shortness Of Breath        Vitals: Temp:  [96.8 °F (36 °C)] 96.8 °F (36 °C)  Heart Rate:  [81] 81  Resp:  [18] 18  BP: (110)/(64) 110/64    Review Of Systems:  Constitutional:  Negative for chills, fever, and unexpected weight change.  Respiratory:  Negative for cough, chest tightness, shortness of breath, and wheezing.  Cardiovascular:  Negative for chest pain, palpitations, and leg swelling.  Gastrointestinal:  Negative for abdominal distention, abdominal pain, Nausea, vomiting.  Neurological:  Negative for Weakness, numbness, and headaches.     Physical Exam:    General Appearance:  Alert, cooperative, in no acute distress.   Lungs:   Clear to auscultation, respirations regular, even and                 unlabored.   Heart:  Regular rhythm and normal rate.   Abdomen:   Normal bowel sounds, no masses, no organomegaly. Soft, non-tender, non-distended   Neurologic: Alert and oriented x 3. Moves all four limbs equally       Plan: COLONOSCOPY (N/A)     Latonia Art MD  5/13/2022

## 2022-05-13 NOTE — ANESTHESIA PREPROCEDURE EVALUATION
Anesthesia Evaluation     Patient summary reviewed and Nursing notes reviewed   no history of anesthetic complications:  NPO Solid Status: > 8 hours  NPO Liquid Status: > 8 hours           Airway   Mallampati: I  TM distance: >3 FB  Neck ROM: full  no difficulty expected  Dental - normal exam     Pulmonary - normal exam   (+) asthma,  Cardiovascular - normal exam    (+) CAD, dysrhythmias Atrial Fib, hyperlipidemia,       Neuro/Psych- negative ROS  GI/Hepatic/Renal/Endo    (+)  GERD,  diabetes mellitus,     Musculoskeletal     (+) neck pain,   Abdominal    Substance History - negative use     OB/GYN negative ob/gyn ROS         Other - negative ROS                       Anesthesia Plan    ASA 3     MAC     intravenous induction     Anesthetic plan, all risks, benefits, and alternatives have been provided, discussed and informed consent has been obtained with: patient.        CODE STATUS:

## 2022-05-13 NOTE — DISCHARGE INSTRUCTIONS
- Discharge patient to home (ambulatory).   - High fiber diet.   - Continue present medications.   - Await pathology results.   - No ASA/NSAIDS for 3 days   - Hold Eliquis for 3 days  - Repeat colonoscopy in 3 months because the examination was incomplete and because the bowel preparation was poor.   - Return to GI office in 8 weeks    Please follow all post op instructions and follow up appointment time from your physician's office included in your discharge packet.    REST TODAY    No pushing, pulling, tugging,  heavy lifting, or strenuous activity.  No major decision making, driving, or drinking alcoholic beverages for 24 hours. ( due to the medications you have  received)  Always use good hand hygiene/washing techniques.  NO driving while taking pain medications.    To assist you in voiding:  Drink plenty of fluids  Listen to running water while attempting to void.    If you are unable to urinate and you have an uncomfortable urge to void or it has been   6 hours since you were discharged, return to the Emergency Room

## 2022-05-13 NOTE — ANESTHESIA POSTPROCEDURE EVALUATION
Patient: Darnell Garcia    Procedure Summary     Date: 05/13/22 Room / Location: [facility] ENDOSCOPY 2 / [facility] ENDOSCOPY    Anesthesia Start: 1215 Anesthesia Stop: 1315    Procedure: COLONOSCOPY EMR POLYPECTOMY AND ORISE INJECTION (N/A Anus) Diagnosis:       Encounter for screening for malignant neoplasm of colon      (Encounter for screening for malignant neoplasm of colon [Z12.11])    Surgeons: Latonia Art MD Provider: Suman Vidal CRNA    Anesthesia Type: MAC ASA Status: 3          Anesthesia Type: MAC    Vitals  Vital signs per nursing notes documentation      Post Anesthesia Care and Evaluation    Patient location during evaluation: bedside  Patient participation: complete - patient participated  Level of consciousness: awake  Pain score: 0  Pain management: adequate  Airway patency: patent  Anesthetic complications: No anesthetic complications  PONV Status: controlled  Cardiovascular status: acceptable and stable  Respiratory status: acceptable and room air  Hydration status: acceptable

## 2022-05-16 LAB — REF LAB TEST METHOD: NORMAL

## 2022-06-02 DIAGNOSIS — J45.909 UNCOMPLICATED ASTHMA, UNSPECIFIED ASTHMA SEVERITY, UNSPECIFIED WHETHER PERSISTENT: ICD-10-CM

## 2022-06-02 RX ORDER — MONTELUKAST SODIUM 10 MG/1
TABLET ORAL
Qty: 30 TABLET | Refills: 2 | Status: SHIPPED | OUTPATIENT
Start: 2022-06-02 | End: 2022-08-26

## 2022-06-07 RX ORDER — METOPROLOL SUCCINATE 25 MG/1
12.5 TABLET, EXTENDED RELEASE ORAL DAILY
Qty: 45 TABLET | Refills: 3 | OUTPATIENT
Start: 2022-06-07

## 2022-06-07 NOTE — TELEPHONE ENCOUNTER
Caller: Naz Garcia    Relationship: Emergency Contact    Best call back number: [phone]    Requested Prescriptions:   Requested Prescriptions     Pending Prescriptions Disp Refills   • metoprolol succinate XL (TOPROL-XL) 25 MG 24 hr tablet 45 tablet 3     Sig: Take 0.5 tablets by mouth Daily.        Pharmacy where request should be sent: [facility] [address] [phone] [facility] [phone]     Additional details provided by patient:  PRESCRIPTION ORIGINALLY WRITTEN BY ER VISIT.      Does the patient have less than a 3 day supply:  [] Yes  [x] No    Yaneth Escudero   06/07/22 10:55 EDT

## 2022-06-07 NOTE — TELEPHONE ENCOUNTER
Rx Refill Note  Requested Prescriptions     Refused Prescriptions Disp Refills   • metoprolol succinate XL (TOPROL-XL) 25 MG 24 hr tablet 45 tablet 3     Sig: Take 0.5 tablets by mouth Daily.      Last office visit with prescribing clinician: 9/7/2021      Next office visit with prescribing clinician: 6/13/2022            Charissa Ball MA  06/07/22, 11:59 EDT     Patient was notified that appt was needed and he stated he had enough medication to last until then. Patient is scheduled for upcoming appt and was notified that medication was going to be denied until speaking with PCP

## 2022-06-13 ENCOUNTER — OFFICE VISIT (OUTPATIENT)
Dept: INTERNAL MEDICINE | Facility: CLINIC | Age: 62
End: 2022-06-13

## 2022-06-13 VITALS
DIASTOLIC BLOOD PRESSURE: 64 MMHG | WEIGHT: 217 LBS | BODY MASS INDEX: 29.39 KG/M2 | HEART RATE: 62 BPM | OXYGEN SATURATION: 97 % | TEMPERATURE: 97.1 F | HEIGHT: 72 IN | SYSTOLIC BLOOD PRESSURE: 122 MMHG

## 2022-06-13 DIAGNOSIS — M19.90 OSTEOARTHRITIS, UNSPECIFIED OSTEOARTHRITIS TYPE, UNSPECIFIED SITE: ICD-10-CM

## 2022-06-13 DIAGNOSIS — I48.0 PAROXYSMAL ATRIAL FIBRILLATION: ICD-10-CM

## 2022-06-13 DIAGNOSIS — E78.5 HYPERLIPIDEMIA, UNSPECIFIED HYPERLIPIDEMIA TYPE: Primary | ICD-10-CM

## 2022-06-13 DIAGNOSIS — Z79.52 LONG TERM CURRENT USE OF SYSTEMIC STEROIDS: ICD-10-CM

## 2022-06-13 DIAGNOSIS — E11.00 TYPE 2 DIABETES MELLITUS WITH HYPEROSMOLARITY WITHOUT COMA, WITHOUT LONG-TERM CURRENT USE OF INSULIN: ICD-10-CM

## 2022-06-13 DIAGNOSIS — Z12.11 ENCOUNTER FOR SCREENING FOR MALIGNANT NEOPLASM OF COLON: ICD-10-CM

## 2022-06-13 DIAGNOSIS — J45.909 UNCOMPLICATED ASTHMA, UNSPECIFIED ASTHMA SEVERITY, UNSPECIFIED WHETHER PERSISTENT: ICD-10-CM

## 2022-06-13 DIAGNOSIS — Z00.00 ANNUAL PHYSICAL EXAM: ICD-10-CM

## 2022-06-13 DIAGNOSIS — I25.10 CORONARY ARTERY DISEASE INVOLVING NATIVE CORONARY ARTERY OF NATIVE HEART WITHOUT ANGINA PECTORIS: ICD-10-CM

## 2022-06-13 DIAGNOSIS — E55.9 VITAMIN D DEFICIENCY: ICD-10-CM

## 2022-06-13 PROCEDURE — 99214 OFFICE O/P EST MOD 30 MIN: CPT | Performed by: INTERNAL MEDICINE

## 2022-06-13 RX ORDER — METOPROLOL SUCCINATE 25 MG/1
12.5 TABLET, EXTENDED RELEASE ORAL DAILY
Qty: 45 TABLET | Refills: 3 | Status: SHIPPED | OUTPATIENT
Start: 2022-06-13

## 2022-06-13 NOTE — PROGRESS NOTES
Subjective   Darnell Garcia is a 62 y.o. male.     Chief Complaint   Patient presents with   • Follow-up   • Diabetes   • Hyperlipidemia       History of Present Illness   Patient here for follow up. Needing metoprolol refill.     Patient has history of hyperlipidemia. On medication. No concerns. Patient has history has history of Vit D deficiency. Taking supplement. No Concerns. Patient had history of T2DM. Taking medication. Average blood glucose is 120-145. Patient has history of asthma and long term steroid use. No concerns. Patient has history of colon polyps and screen for malignant neoplasm of colon. He is returning to  for another colonoscopy in 2 months to remove another polyp. Patient has history of osteoarthritis, reports pain is very bad. Patient has history of CAD and a fib. Taking elliquis. No concerns.     Current Outpatient Medications:   •  ALBUTEROL SULFATE  (90 Base) MCG/ACT inhaler, INHALE TWO PUFFS BY MOUTH EVERY 4 HOURS AS NEEDED FOR WHEEZING, Disp: 8.5 g, Rfl: 5  •  atorvastatin (LIPITOR) 40 MG tablet, TAKE ONE TABLET BY MOUTH EVERY DAY, Disp: 90 tablet, Rfl: 3  •  Blood Glucose Monitoring Suppl (Accu-Chek Guide Me) w/Device kit, USE AS DIRECTED, Disp: 1 kit, Rfl: 0  •  celecoxib (CeleBREX) 200 MG capsule, TAKE ONE CAPSULE BY MOUTH EVERY DAY, Disp: 90 capsule, Rfl: 1  •  cholecalciferol (VITAMIN D3) 10 MCG (400 UNIT) tablet, Take 400 Units by mouth Daily., Disp: , Rfl:   •  Eliquis 5 MG tablet tablet, TAKE ONE TABLET BY MOUTH EVERY TWELVE HOURS, Disp: 180 tablet, Rfl: 3  •  gabapentin (NEURONTIN) 300 MG capsule, TAKE ONE CAPSULE BY MOUTH AT BEDTIME, Disp: 90 capsule, Rfl: 2  •  glimepiride (AMARYL) 4 MG tablet, TAKE ONE TABLET BY MOUTH EVERY MORNING BEFORE breakfast, Disp: 90 tablet, Rfl: 3  •  glucose blood test strip, Use as instructed, Disp: 100 each, Rfl: 12  •  Insulin Glargine (LANTUS SOLOSTAR) 100 UNIT/ML injection pen, Inject 10 Units under the skin into the appropriate area as  directed Every Morning., Disp: 3 pen, Rfl: 3  •  Insulin Pen Needle 30G X 8 MM misc, 10 Units Daily., Disp: 3 each, Rfl: 12  •  Jardiance 25 MG tablet tablet, TAKE ONE TABLET BY MOUTH EVERY DAY, Disp: 90 tablet, Rfl: 3  •  Lancets (onetouch ultrasoft) lancets, Use as instructed, Disp: 180 each, Rfl: 12  •  metFORMIN (GLUCOPHAGE) 1000 MG tablet, TAKE ONE TABLET BY MOUTH TWICE DAILY WITH MEALS (Patient taking differently: Take 1,000 mg by mouth Every Night.), Disp: 180 tablet, Rfl: 0  •  metoprolol succinate XL (TOPROL-XL) 25 MG 24 hr tablet, Take 0.5 tablets by mouth Daily., Disp: 45 tablet, Rfl: 3  •  montelukast (SINGULAIR) 10 MG tablet, TAKE ONE TABLET BY MOUTH AT BEDTIME, Disp: 30 tablet, Rfl: 2  •  nitroglycerin (NITROSTAT) 0.4 MG SL tablet, Place 1 tablet under the tongue Every FIVE Minutes As Needed for Chest Pain. Take no more than 3 doses in 15 minutes., Disp: 25 tablet, Rfl: 2  •  predniSONE (DELTASONE) 5 MG tablet, TAKE ONE TABLET BY MOUTH EVERY DAY, Disp: 90 tablet, Rfl: 3  •  Symbicort 160-4.5 MCG/ACT inhaler, INHALE TWO PUFFS BY MOUTH TWICE DAILY, Disp: 10.2 g, Rfl: 3    The following portions of the patient's history were reviewed and updated as appropriate: allergies, current medications, past family history, past medical history, past social history, past surgical history and problem list.    Review of Systems   Constitutional: Negative.    Respiratory: Negative.    Cardiovascular: Negative.    Gastrointestinal: Negative.    Skin: Negative.    Psychiatric/Behavioral: Negative.        Objective   Physical Exam  Constitutional:       Appearance: He is well-developed.   Cardiovascular:      Rate and Rhythm: Normal rate and regular rhythm.      Heart sounds: Normal heart sounds.   Pulmonary:      Effort: Pulmonary effort is normal.      Breath sounds: Normal breath sounds.   Abdominal:      General: Bowel sounds are normal.      Palpations: Abdomen is soft.   Musculoskeletal:      Cervical back: Neck  supple.   Neurological:      Mental Status: He is alert and oriented to person, place, and time.   Psychiatric:         Behavior: Behavior normal.         All tests have been reviewed.    Assessment & Plan   Diagnoses and all orders for this visit:    Hyperlipidemia, Continue with lipitor 40 mg.     Vitamin D deficiency. Continue with Vit D supplement.     Type 2 diabetes mellitus with hyperosmolarity without coma, without long-term current use of insulin (HCC). Continue with Glimepiride, Jardiance 25 mg, Metformin 1000 mg, lantus 15 improved am sugar . Continue to watch diet.     Encounter for screening for malignant neoplasm of colon. Return to Gi for colonoscopy in 2 mo.     Uncomplicated asthma, Controlled at this time per patient, continue symbicort 160-4.5 mcg. Albuterol inhaler prn. Long term current use of systemic steroids. Continue use of steroid.     Osteoarthritis,Take tylenol for pain prn.     Coronary artery disease involving native coronary artery of native heart without angina pectoris. Continue elliquis 5 mg.     Paroxysmal atrial fibrillation (HCC). Continue elliquis 5 mg.     BMI is >= 25 and <30. (Overweight) The following options were offered after discussion;: exercise counseling/recommendations and nutrition counseling/recommendations     Follow up 6 mo after labs

## 2022-06-29 DIAGNOSIS — E11.9 TYPE 2 DIABETES MELLITUS WITHOUT COMPLICATION, WITHOUT LONG-TERM CURRENT USE OF INSULIN: ICD-10-CM

## 2022-06-29 RX ORDER — GLIMEPIRIDE 4 MG/1
TABLET ORAL
Qty: 90 TABLET | Refills: 3 | Status: SHIPPED | OUTPATIENT
Start: 2022-06-29

## 2022-06-29 NOTE — TELEPHONE ENCOUNTER
Rx Refill Note  Requested Prescriptions     Pending Prescriptions Disp Refills   • glimepiride (AMARYL) 4 MG tablet [Pharmacy Med Name: glimepiride 4 mg tablet] 90 tablet 3     Sig: TAKE ONE TABLET BY MOUTH EVERY MORNING BEFORE A MEAL      Last office visit with prescribing clinician: 6/13/2022      Next office visit with prescribing clinician: 10/13/2022            Charissa Ball MA  06/29/22, 08:22 EDT

## 2022-07-06 DIAGNOSIS — Z86.010 PERSONAL HISTORY OF COLONIC POLYPS: ICD-10-CM

## 2022-07-06 DIAGNOSIS — Z12.11 ENCOUNTER FOR SCREENING FOR MALIGNANT NEOPLASM OF COLON: Primary | ICD-10-CM

## 2022-07-06 RX ORDER — BISACODYL 5 MG/1
TABLET, DELAYED RELEASE ORAL
Qty: 4 TABLET | Refills: 0 | Status: SHIPPED | OUTPATIENT
Start: 2022-07-06 | End: 2022-09-02 | Stop reason: HOSPADM

## 2022-07-07 ENCOUNTER — TELEPHONE (OUTPATIENT)
Dept: GASTROENTEROLOGY | Facility: CLINIC | Age: 62
End: 2022-07-07

## 2022-07-07 ENCOUNTER — PREP FOR SURGERY (OUTPATIENT)
Dept: OTHER | Facility: HOSPITAL | Age: 62
End: 2022-07-07

## 2022-07-07 DIAGNOSIS — Z86.010 PERSONAL HISTORY OF COLONIC POLYPS: Primary | ICD-10-CM

## 2022-07-07 PROBLEM — Z86.0100 PERSONAL HISTORY OF COLONIC POLYPS: Status: ACTIVE | Noted: 2022-07-07

## 2022-07-07 RX ORDER — SODIUM CHLORIDE 9 MG/ML
70 INJECTION, SOLUTION INTRAVENOUS CONTINUOUS PRN
Status: CANCELLED | OUTPATIENT
Start: 2022-07-07

## 2022-07-07 NOTE — TELEPHONE ENCOUNTER
----- Message from KEITH Wolf sent at 7/6/2022  5:21 PM EDT -----  I think you have already put the patient on the schedule for August, we just needed to send him new instructions with the date and time. I have sent in the Munch a [employer]unch prep, according to the procedure report, it was thought he did not follow the pre-procedure instructions. Can you highlight or make sure the dietary changes, no food the day prior, etc is well marked along with the times of the prep to make sure he understands the instructions.     ----- Message -----  From: Chris Carey RegSched Rep  Sent: 7/6/2022  10:31 AM EDT  To: KEITH Wolf    The patient was scheduled for an appt next week to followup on his colonoscopy.  His wife called to cancel that appt due to his work schedule.  She states he is supposed to be scheduled for a repeat colonoscopy in August and he would rather not have to come to the office prior to that colonoscopy if possible due to his work schedule.  Please advise.  Thank you.

## 2022-07-27 ENCOUNTER — TELEPHONE (OUTPATIENT)
Dept: INTERNAL MEDICINE | Facility: CLINIC | Age: 62
End: 2022-07-27

## 2022-07-28 DIAGNOSIS — E11.9 TYPE 2 DIABETES MELLITUS WITHOUT COMPLICATION, WITHOUT LONG-TERM CURRENT USE OF INSULIN: ICD-10-CM

## 2022-07-28 DIAGNOSIS — E78.2 MIXED HYPERLIPIDEMIA: ICD-10-CM

## 2022-07-28 RX ORDER — ATORVASTATIN CALCIUM 40 MG/1
TABLET, FILM COATED ORAL
Qty: 90 TABLET | Refills: 3 | Status: SHIPPED | OUTPATIENT
Start: 2022-07-28

## 2022-07-28 RX ORDER — EMPAGLIFLOZIN 25 MG/1
TABLET, FILM COATED ORAL
Qty: 90 TABLET | Refills: 3 | Status: SHIPPED | OUTPATIENT
Start: 2022-07-28

## 2022-07-28 NOTE — TELEPHONE ENCOUNTER
Rx Refill Note  Requested Prescriptions     Pending Prescriptions Disp Refills   • atorvastatin (LIPITOR) 40 MG tablet [Pharmacy Med Name: atorvastatin 40 mg tablet] 90 tablet 3     Sig: TAKE ONE TABLET BY MOUTH EVERY DAY   • Jardiance 25 MG tablet tablet [Pharmacy Med Name: Jardiance 25 mg tablet] 90 tablet 3     Sig: TAKE ONE TABLET BY MOUTH EVERY DAY      Last office visit with prescribing clinician: 6/13/2022      Next office visit with prescribing clinician: 10/13/2022            PAMELA DAY MA  07/28/22, 08:08 EDT

## 2022-08-03 DIAGNOSIS — E11.49 TYPE 2 DIABETES MELLITUS WITH OTHER DIABETIC NEUROLOGICAL COMPLICATION: ICD-10-CM

## 2022-08-03 DIAGNOSIS — J45.909 UNCOMPLICATED ASTHMA, UNSPECIFIED ASTHMA SEVERITY, UNSPECIFIED WHETHER PERSISTENT: ICD-10-CM

## 2022-08-03 RX ORDER — BUDESONIDE AND FORMOTEROL FUMARATE DIHYDRATE 160; 4.5 UG/1; UG/1
2 AEROSOL RESPIRATORY (INHALATION) 2 TIMES DAILY
Qty: 10.2 G | Refills: 3 | Status: SHIPPED | OUTPATIENT
Start: 2022-08-03

## 2022-08-03 RX ORDER — GABAPENTIN 300 MG/1
300 CAPSULE ORAL
Qty: 90 CAPSULE | Refills: 2 | Status: SHIPPED | OUTPATIENT
Start: 2022-08-03 | End: 2022-12-13 | Stop reason: SDUPTHER

## 2022-08-03 NOTE — TELEPHONE ENCOUNTER
Caller: Naz Garcia    Relationship: Emergency Contact    Best call back number: [phone]    Requested Prescriptions:   Requested Prescriptions     Pending Prescriptions Disp Refills   • gabapentin (NEURONTIN) 300 MG capsule 90 capsule 2     Sig: Take 1 capsule by mouth every night at bedtime.   • budesonide-formoterol (Symbicort) 160-4.5 MCG/ACT inhaler 10.2 g 3     Sig: Inhale 2 puffs 2 (Two) Times a Day.        Pharmacy where request should be sent: [facility] [address] [phone] [facility] [phone]      Additional details provided by patient: OUT OF SYMBICORT.  YAHAIRA IS ASKING TO TAKE 6 00 MG'S A NIGHT.  HE SAYS  MG HASN'T BEEN WORKING-NERVES IN FOOT ARE MORE PAINFUL.    Does the patient have less than a 3 day supply:  [] Yes  [] No    Jemal Salas Rep   08/03/22 11:10 EDT

## 2022-08-03 NOTE — TELEPHONE ENCOUNTER
Rx Refill Note  Requested Prescriptions     Pending Prescriptions Disp Refills   • gabapentin (NEURONTIN) 300 MG capsule 90 capsule 2     Sig: Take 1 capsule by mouth every night at bedtime.   • budesonide-formoterol (Symbicort) 160-4.5 MCG/ACT inhaler 10.2 g 3     Sig: Inhale 2 puffs 2 (Two) Times a Day.      Last office visit with prescribing clinician: 6/13/2022      Next office visit with prescribing clinician: 10/13/2022            Howard Vu MA  08/03/22, 11:26 EDT

## 2022-08-23 DIAGNOSIS — E11.9 TYPE 2 DIABETES MELLITUS WITHOUT COMPLICATION, WITHOUT LONG-TERM CURRENT USE OF INSULIN: ICD-10-CM

## 2022-08-23 NOTE — TELEPHONE ENCOUNTER
Rx Refill Note  Requested Prescriptions     Pending Prescriptions Disp Refills   • metFORMIN (GLUCOPHAGE) 1000 MG tablet [Pharmacy Med Name: metformin 1,000 mg tablet] 180 tablet 0     Sig: TAKE ONE TABLET BY MOUTH TWICE DAILY WITH MEALS      Last office visit with prescribing clinician: 6/13/2022      Next office visit with prescribing clinician: 10/13/2022            Charissa Ball MA  08/23/22, 16:10 EDT

## 2022-08-25 DIAGNOSIS — M19.90 UNSPECIFIED OSTEOARTHRITIS, UNSPECIFIED SITE: ICD-10-CM

## 2022-08-25 DIAGNOSIS — J45.909 UNCOMPLICATED ASTHMA, UNSPECIFIED ASTHMA SEVERITY, UNSPECIFIED WHETHER PERSISTENT: ICD-10-CM

## 2022-08-26 ENCOUNTER — TELEPHONE (OUTPATIENT)
Dept: GASTROENTEROLOGY | Facility: CLINIC | Age: 62
End: 2022-08-26

## 2022-08-26 RX ORDER — CELECOXIB 200 MG/1
CAPSULE ORAL
Qty: 90 CAPSULE | Refills: 1 | Status: SHIPPED | OUTPATIENT
Start: 2022-08-26 | End: 2023-03-16

## 2022-08-26 RX ORDER — MONTELUKAST SODIUM 10 MG/1
TABLET ORAL
Qty: 90 TABLET | Refills: 3 | Status: SHIPPED | OUTPATIENT
Start: 2022-08-26

## 2022-08-26 NOTE — TELEPHONE ENCOUNTER
Rx Refill Note  Requested Prescriptions     Pending Prescriptions Disp Refills   • montelukast (SINGULAIR) 10 MG tablet [Pharmacy Med Name: montelukast 10 mg tablet] 30 tablet 2     Sig: TAKE ONE TABLET BY MOUTH AT BEDTIME   • celecoxib (CeleBREX) 200 MG capsule [Pharmacy Med Name: celecoxib 200 mg capsule] 90 capsule 1     Sig: TAKE ONE CAPSULE BY MOUTH EVERY DAY      Last office visit with prescribing clinician: 6/13/2022      Next office visit with prescribing clinician: 10/13/2022            Charissa Ball MA  08/26/22, 08:40 EDT

## 2022-09-02 ENCOUNTER — ANESTHESIA EVENT (OUTPATIENT)
Dept: GASTROENTEROLOGY | Facility: HOSPITAL | Age: 62
End: 2022-09-02

## 2022-09-02 ENCOUNTER — HOSPITAL ENCOUNTER (OUTPATIENT)
Facility: HOSPITAL | Age: 62
Setting detail: HOSPITAL OUTPATIENT SURGERY
Discharge: HOME | End: 2022-09-02
Attending: INTERNAL MEDICINE | Admitting: INTERNAL MEDICINE

## 2022-09-02 ENCOUNTER — ANESTHESIA (OUTPATIENT)
Dept: GASTROENTEROLOGY | Facility: HOSPITAL | Age: 62
End: 2022-09-02

## 2022-09-02 VITALS
RESPIRATION RATE: 18 BRPM | OXYGEN SATURATION: 98 % | WEIGHT: 212 LBS | HEIGHT: 72 IN | BODY MASS INDEX: 28.71 KG/M2 | DIASTOLIC BLOOD PRESSURE: 67 MMHG | SYSTOLIC BLOOD PRESSURE: 107 MMHG | TEMPERATURE: 98 F | HEART RATE: 65 BPM

## 2022-09-02 DIAGNOSIS — Z86.010 PERSONAL HISTORY OF COLONIC POLYPS: ICD-10-CM

## 2022-09-02 LAB — GLUCOSE BLDC GLUCOMTR-MCNC: 123 MG/DL (ref 70–130)

## 2022-09-02 PROCEDURE — 82962 GLUCOSE BLOOD TEST: CPT

## 2022-09-02 PROCEDURE — 45380 COLONOSCOPY AND BIOPSY: CPT | Performed by: INTERNAL MEDICINE

## 2022-09-02 PROCEDURE — 25010000002 PHENYLEPHRINE 10 MG/ML SOLUTION: Performed by: NURSE ANESTHETIST, CERTIFIED REGISTERED

## 2022-09-02 PROCEDURE — 45385 COLONOSCOPY W/LESION REMOVAL: CPT | Performed by: INTERNAL MEDICINE

## 2022-09-02 PROCEDURE — 25010000002 PROPOFOL 200 MG/20ML EMULSION: Performed by: NURSE ANESTHETIST, CERTIFIED REGISTERED

## 2022-09-02 RX ORDER — SODIUM CHLORIDE 9 MG/ML
70 INJECTION, SOLUTION INTRAVENOUS CONTINUOUS PRN
Status: DISCONTINUED | OUTPATIENT
Start: 2022-09-02 | End: 2022-09-02 | Stop reason: HOSPADM

## 2022-09-02 RX ORDER — PHENYLEPHRINE HYDROCHLORIDE 10 MG/ML
INJECTION INTRAVENOUS AS NEEDED
Status: DISCONTINUED | OUTPATIENT
Start: 2022-09-02 | End: 2022-09-02 | Stop reason: SURG

## 2022-09-02 RX ORDER — LIDOCAINE HYDROCHLORIDE 20 MG/ML
INJECTION, SOLUTION INTRAVENOUS AS NEEDED
Status: DISCONTINUED | OUTPATIENT
Start: 2022-09-02 | End: 2022-09-02 | Stop reason: SURG

## 2022-09-02 RX ORDER — SIMETHICONE 20 MG/.3ML
EMULSION ORAL AS NEEDED
Status: DISCONTINUED | OUTPATIENT
Start: 2022-09-02 | End: 2022-09-02 | Stop reason: HOSPADM

## 2022-09-02 RX ORDER — PROPOFOL 10 MG/ML
INJECTION, EMULSION INTRAVENOUS AS NEEDED
Status: DISCONTINUED | OUTPATIENT
Start: 2022-09-02 | End: 2022-09-02 | Stop reason: SURG

## 2022-09-02 RX ORDER — SODIUM CHLORIDE 9 MG/ML
INJECTION, SOLUTION INTRAVENOUS CONTINUOUS PRN
Status: DISCONTINUED | OUTPATIENT
Start: 2022-09-02 | End: 2022-09-02 | Stop reason: SURG

## 2022-09-02 RX ADMIN — LIDOCAINE HYDROCHLORIDE 40 MG: 20 INJECTION, SOLUTION INTRAVENOUS at 07:47

## 2022-09-02 RX ADMIN — PROPOFOL 550 MG: 10 INJECTION, EMULSION INTRAVENOUS at 07:47

## 2022-09-02 RX ADMIN — SODIUM CHLORIDE 70 ML/HR: 9 INJECTION, SOLUTION INTRAVENOUS at 07:24

## 2022-09-02 RX ADMIN — SODIUM CHLORIDE: 9 INJECTION, SOLUTION INTRAVENOUS at 07:38

## 2022-09-02 RX ADMIN — PHENYLEPHRINE HYDROCHLORIDE 100 MCG: 10 INJECTION INTRAVENOUS at 08:23

## 2022-09-02 NOTE — ANESTHESIA PREPROCEDURE EVALUATION
Anesthesia Evaluation     Patient summary reviewed and Nursing notes reviewed   no history of anesthetic complications:  NPO Solid Status: > 8 hours  NPO Liquid Status: > 8 hours           Airway   Mallampati: I  TM distance: >3 FB  Neck ROM: full  no difficulty expected  Dental - normal exam     Pulmonary - normal exam   (+) asthma,  Cardiovascular - normal exam    ECG reviewed  PT is on anticoagulation therapy  Patient on routine beta blocker and Beta blocker given within 24 hours of surgery    (+) CAD, dysrhythmias Atrial Fib, hyperlipidemia,       Neuro/Psych- negative ROS  GI/Hepatic/Renal/Endo    (+)  GERD,  diabetes mellitus,     Musculoskeletal     (+) neck pain,   Abdominal    Substance History - negative use     OB/GYN negative ob/gyn ROS         Other   arthritis,                        Anesthesia Plan    ASA 3     MAC     intravenous induction     Anesthetic plan, risks, benefits, and alternatives have been provided, discussed and informed consent has been obtained with: patient.  Pre-procedure education provided      CODE STATUS:

## 2022-09-02 NOTE — ANESTHESIA POSTPROCEDURE EVALUATION
Patient: Darnell Garcia    Procedure Summary     Date: 09/02/22 Room / Location: [facility] ENDOSCOPY 2 / [facility] ENDOSCOPY    Anesthesia Start: 0738 Anesthesia Stop: 0827    Procedure: COLONOSCOPY WITH BIOPSY AND POLYPECTOMY (N/A Anus) Diagnosis:       Personal history of colonic polyps      (Personal history of colonic polyps [Z86.010])    Surgeons: Latonia Art MD Provider: Vince Rae CRNA    Anesthesia Type: MAC ASA Status: 3          Anesthesia Type: MAC    Vitals  Vitals Value Taken Time   BP 99/59 09/02/22 0830   Temp 98 °F (36.7 °C) 09/02/22 0830   Pulse 62 09/02/22 0830   Resp 16 09/02/22 0830   SpO2 97 % 09/02/22 0830           Post Anesthesia Care and Evaluation    Patient location during evaluation: PHASE II  Patient participation: complete - patient participated  Level of consciousness: awake and alert  Pain score: 0  Pain management: adequate    Airway patency: patent  Anesthetic complications: No anesthetic complications  PONV Status: none  Cardiovascular status: acceptable  Respiratory status: acceptable  Hydration status: acceptable  No anesthesia care post op

## 2022-09-02 NOTE — DISCHARGE INSTRUCTIONS
- Discharge patient to home (ambulatory).   - High fiber diet.   - Continue present medications.   - OK to start eliquis after 48 hours  - Await pathology results.   - Return to my office in 2 weeks.   - Further procedure based on path report    To assist you in voiding:  Drink plenty of fluids  Listen to running water while attempting to void.    If you are unable to urinate and you have an uncomfortable urge to void or it has been   6 hours since you were discharged, return to the Emergency Room     No pushing, pulling, tugging,  heavy lifting, or strenuous activity.  No major decision making, driving, or drinking alcoholic beverages for 24 hours. ( due to the medications you have  received)  Always use good hand hygiene/washing techniques.  NO driving while taking pain medications.

## 2022-09-04 NOTE — H&P
[facility]  HISTORY AND PHYSICAL    Patient Name: Darnell Garcia  : 1960  MRN: [id number]    Chief Complaint:   For surveillance colonoscopy    History Of Presenting Illness:    Personal h/o colon polyps  Prior colonoscopy with poor prep <1yrs   Prior incomplete colonoscopy    Past Medical History:   Diagnosis Date   • A-fib (HCC)    • Arthritis    • Asthma    • COVID-19 vaccine series completed     with booster x2   • Diabetes mellitus (HCC)    • GERD (gastroesophageal reflux disease)    • History of nuclear stress test     ?   • History of varicella    • Neuropathy    • Wears glasses        Past Surgical History:   Procedure Laterality Date   • ANKLE FUSION Bilateral     screws in both   • COLONOSCOPY  10 years ago   • COLONOSCOPY N/A 2022    Procedure: COLONOSCOPY EMR POLYPECTOMY AND ORISE INJECTION;  Surgeon: Latonia Art MD;  Location: [facility] ENDOSCOPY;  Service: Gastroenterology;  Laterality: N/A;   • NASAL SEPTUM SURGERY     • VASECTOMY     • VENTRAL HERNIA REPAIR       and  (mesh placed)   • WISDOM TOOTH EXTRACTION         Social History     Socioeconomic History   • Marital status:    Tobacco Use   • Smoking status: Never Smoker   • Smokeless tobacco: Never Used   Vaping Use   • Vaping Use: Never used   Substance and Sexual Activity   • Alcohol use: Yes     Alcohol/week: 10.0 standard drinks     Types: 10 Cans of beer per week     Comment: one day every other weekend   • Drug use: No   • Sexual activity: Defer       Family History   Problem Relation Age of Onset   • Breast cancer Mother    • Osteoarthritis Mother    • Arthritis Mother    • Coronary artery disease Father    • Heart attack Father         late 60's   • Lymphoma Son    • Leukemia Son    • Colon cancer Neg Hx        Prior to Admission Medications:  No medications prior to admission.       Allergies:  Allergies   Allergen Reactions   • Aspirin Shortness Of Breath        Vitals:       Review Of Systems:  Constitutional:  Negative for chills, fever, and unexpected weight change.  Respiratory:  Negative for cough, chest tightness, shortness of breath, and wheezing.  Cardiovascular:  Negative for chest pain, palpitations, and leg swelling.  Gastrointestinal:  Negative for abdominal distention, abdominal pain, Nausea, vomiting.  Neurological:  Negative for Weakness, numbness, and headaches.     Physical Exam:    General Appearance:  Alert, cooperative, in no acute distress.   Lungs:   Clear to auscultation, respirations regular, even and                 unlabored.   Heart:  Regular rhythm and normal rate.   Abdomen:   Normal bowel sounds, no masses, no organomegaly. Soft, non-tender, non-distended   Neurologic: Alert and oriented x 3. Moves all four limbs equally       Plan: COLONOSCOPY WITH BIOPSY AND POLYPECTOMY (N/A)     Latonia Art MD  9/4/2022

## 2022-09-06 ENCOUNTER — TELEPHONE (OUTPATIENT)
Dept: GASTROENTEROLOGY | Facility: CLINIC | Age: 62
End: 2022-09-06

## 2022-09-06 DIAGNOSIS — D12.6 ADENOMATOUS POLYP OF COLON, UNSPECIFIED PART OF COLON: Primary | ICD-10-CM

## 2022-09-06 LAB — REF LAB TEST METHOD: NORMAL

## 2022-09-06 NOTE — TELEPHONE ENCOUNTER
Called the patient regarding his colonoscopy finding and also the pathology reports.    I have given the 2 options for him.  Option 1 to refer him to advanced endoscopy at [facility] for possible EMR by advanced endoscopist, however given the involvement of both upper and lower lip of IC valve, this procedure may be difficult and patient may have a higher risk of recurrence even if the procedure performed, with the multiple colonoscopies in the future.    Second option is to have a lap assisted local resection.     He opted for option to to proceed with the referral for consideration of lap assisted local resection.  We will send him to surgery for consultation with Dr. Danae Aburto

## 2022-09-07 ENCOUNTER — TELEPHONE (OUTPATIENT)
Dept: SURGERY | Facility: CLINIC | Age: 62
End: 2022-09-07

## 2022-10-18 ENCOUNTER — OFFICE VISIT (OUTPATIENT)
Dept: SURGERY | Facility: CLINIC | Age: 62
End: 2022-10-18

## 2022-10-18 VITALS
HEART RATE: 81 BPM | SYSTOLIC BLOOD PRESSURE: 112 MMHG | DIASTOLIC BLOOD PRESSURE: 60 MMHG | HEIGHT: 72 IN | WEIGHT: 216 LBS | RESPIRATION RATE: 16 BRPM | BODY MASS INDEX: 29.26 KG/M2 | OXYGEN SATURATION: 97 % | TEMPERATURE: 98.4 F

## 2022-10-18 DIAGNOSIS — D12.0 ADENOMA OF CECUM: Primary | ICD-10-CM

## 2022-10-18 PROCEDURE — 99204 OFFICE O/P NEW MOD 45 MIN: CPT | Performed by: SURGERY

## 2022-10-18 RX ORDER — BISACODYL 5 MG
TABLET, DELAYED RELEASE (ENTERIC COATED) ORAL
Qty: 4 TABLET | Refills: 0 | Status: ON HOLD | OUTPATIENT
Start: 2022-10-18 | End: 2022-11-07

## 2022-10-18 RX ORDER — POLYETHYLENE GLYCOL 3350 17 G/17G
POWDER, FOR SOLUTION ORAL
Qty: 238 G | Refills: 0 | Status: ON HOLD | OUTPATIENT
Start: 2022-10-18 | End: 2022-11-07

## 2022-10-19 ENCOUNTER — PREP FOR SURGERY (OUTPATIENT)
Dept: OTHER | Facility: HOSPITAL | Age: 62
End: 2022-10-19

## 2022-10-19 DIAGNOSIS — D12.6 ADENOMATOUS POLYP OF COLON, UNSPECIFIED PART OF COLON: Primary | ICD-10-CM

## 2022-10-19 RX ORDER — ACETAMINOPHEN 500 MG
1000 TABLET ORAL ONCE
Status: CANCELLED | OUTPATIENT
Start: 2022-10-19 | End: 2022-10-19

## 2022-10-19 RX ORDER — CHLORHEXIDINE GLUCONATE 4 G/100ML
SOLUTION TOPICAL 2 TIMES DAILY
Qty: 236 ML | Refills: 0 | Status: ON HOLD | OUTPATIENT
Start: 2022-10-19 | End: 2022-11-07

## 2022-10-19 RX ORDER — HEPARIN SODIUM 5000 [USP'U]/ML
5000 INJECTION, SOLUTION INTRAVENOUS; SUBCUTANEOUS ONCE
Status: CANCELLED | OUTPATIENT
Start: 2022-10-19 | End: 2022-10-19

## 2022-10-19 RX ORDER — SCOLOPAMINE TRANSDERMAL SYSTEM 1 MG/1
1 PATCH, EXTENDED RELEASE TRANSDERMAL CONTINUOUS
Status: CANCELLED | OUTPATIENT
Start: 2022-10-19 | End: 2022-10-22

## 2022-10-19 RX ORDER — ERYTHROMYCIN 500 MG/1
1000 TABLET, COATED ORAL 3 TIMES DAILY
Qty: 6 TABLET | Refills: 0 | Status: SHIPPED | OUTPATIENT
Start: 2022-10-19 | End: 2022-10-20

## 2022-10-19 RX ORDER — NEOMYCIN SULFATE 500 MG/1
1000 TABLET ORAL 3 TIMES DAILY
Qty: 6 TABLET | Refills: 0 | Status: SHIPPED | OUTPATIENT
Start: 2022-10-19 | End: 2022-10-20

## 2022-10-19 RX ORDER — CEFOXITIN SODIUM 2 G/50ML
2 INJECTION, SOLUTION INTRAVENOUS
Status: CANCELLED | OUTPATIENT
Start: 2022-10-19

## 2022-10-20 PROBLEM — D12.6 ADENOMATOUS POLYP OF COLON: Status: ACTIVE | Noted: 2022-10-20

## 2022-10-26 ENCOUNTER — APPOINTMENT (OUTPATIENT)
Dept: GENERAL RADIOLOGY | Facility: HOSPITAL | Age: 62
End: 2022-10-26

## 2022-10-26 ENCOUNTER — HOSPITAL ENCOUNTER (INPATIENT)
Facility: HOSPITAL | Age: 62
LOS: 3 days | Discharge: HOME | End: 2022-10-29
Attending: EMERGENCY MEDICINE | Admitting: INTERNAL MEDICINE

## 2022-10-26 ENCOUNTER — APPOINTMENT (OUTPATIENT)
Dept: CT IMAGING | Facility: HOSPITAL | Age: 62
End: 2022-10-26

## 2022-10-26 DIAGNOSIS — M25.572 CHRONIC PAIN OF LEFT ANKLE: ICD-10-CM

## 2022-10-26 DIAGNOSIS — E11.49 OTHER DIABETIC NEUROLOGICAL COMPLICATION ASSOCIATED WITH TYPE 2 DIABETES MELLITUS: ICD-10-CM

## 2022-10-26 DIAGNOSIS — R11.2 NAUSEA AND VOMITING IN ADULT: ICD-10-CM

## 2022-10-26 DIAGNOSIS — M19.90 OSTEOARTHRITIS, UNSPECIFIED OSTEOARTHRITIS TYPE, UNSPECIFIED SITE: ICD-10-CM

## 2022-10-26 DIAGNOSIS — G89.29 CHRONIC PAIN OF LEFT ANKLE: ICD-10-CM

## 2022-10-26 DIAGNOSIS — M87.052 AVASCULAR NECROSIS OF BONE OF HIP, LEFT: ICD-10-CM

## 2022-10-26 DIAGNOSIS — R07.9 ACUTE CHEST PAIN: Primary | ICD-10-CM

## 2022-10-26 DIAGNOSIS — K85.20 ALCOHOL-INDUCED ACUTE PANCREATITIS, UNSPECIFIED COMPLICATION STATUS: ICD-10-CM

## 2022-10-26 PROBLEM — K85.90 PANCREATITIS: Status: ACTIVE | Noted: 2022-10-26

## 2022-10-26 LAB
ALBUMIN SERPL-MCNC: 4.6 G/DL (ref 3.5–5.2)
ALBUMIN/GLOB SERPL: 2.1 G/DL
ALP SERPL-CCNC: 86 U/L (ref 39–117)
ALT SERPL W P-5'-P-CCNC: 26 U/L (ref 1–41)
ANION GAP SERPL CALCULATED.3IONS-SCNC: 11 MMOL/L (ref 5–15)
AST SERPL-CCNC: 16 U/L (ref 1–40)
BASOPHILS # BLD AUTO: 0.05 10*3/MM3 (ref 0–0.2)
BASOPHILS NFR BLD AUTO: 0.8 % (ref 0–1.5)
BILIRUB SERPL-MCNC: 1 MG/DL (ref 0–1.2)
BUN SERPL-MCNC: 19 MG/DL (ref 8–23)
BUN/CREAT SERPL: 19.4 (ref 7–25)
CALCIUM SPEC-SCNC: 10.1 MG/DL (ref 8.6–10.5)
CHLORIDE SERPL-SCNC: 98 MMOL/L (ref 98–107)
CO2 SERPL-SCNC: 26 MMOL/L (ref 22–29)
CREAT SERPL-MCNC: 0.98 MG/DL (ref 0.76–1.27)
DEPRECATED RDW RBC AUTO: 42.5 FL (ref 37–54)
EGFRCR SERPLBLD CKD-EPI 2021: 87.2 ML/MIN/1.73
EOSINOPHIL # BLD AUTO: 0.23 10*3/MM3 (ref 0–0.4)
EOSINOPHIL NFR BLD AUTO: 3.5 % (ref 0.3–6.2)
ERYTHROCYTE [DISTWIDTH] IN BLOOD BY AUTOMATED COUNT: 12.2 % (ref 12.3–15.4)
GLOBULIN UR ELPH-MCNC: 2.2 GM/DL
GLUCOSE BLDC GLUCOMTR-MCNC: 265 MG/DL (ref 70–130)
GLUCOSE SERPL-MCNC: 346 MG/DL (ref 65–99)
HCT VFR BLD AUTO: 39.1 % (ref 37.5–51)
HGB BLD-MCNC: 13.2 G/DL (ref 13–17.7)
HOLD SPECIMEN: NORMAL
IMM GRANULOCYTES # BLD AUTO: 0.02 10*3/MM3 (ref 0–0.05)
IMM GRANULOCYTES NFR BLD AUTO: 0.3 % (ref 0–0.5)
LIPASE SERPL-CCNC: 209 U/L (ref 13–60)
LYMPHOCYTES # BLD AUTO: 1.2 10*3/MM3 (ref 0.7–3.1)
LYMPHOCYTES NFR BLD AUTO: 18.3 % (ref 19.6–45.3)
MCH RBC QN AUTO: 32.2 PG (ref 26.6–33)
MCHC RBC AUTO-ENTMCNC: 33.8 G/DL (ref 31.5–35.7)
MCV RBC AUTO: 95.4 FL (ref 79–97)
MONOCYTES # BLD AUTO: 0.36 10*3/MM3 (ref 0.1–0.9)
MONOCYTES NFR BLD AUTO: 5.5 % (ref 5–12)
NEUTROPHILS NFR BLD AUTO: 4.7 10*3/MM3 (ref 1.7–7)
NEUTROPHILS NFR BLD AUTO: 71.6 % (ref 42.7–76)
NRBC BLD AUTO-RTO: 0 /100 WBC (ref 0–0.2)
NT-PROBNP SERPL-MCNC: 290.4 PG/ML (ref 0–900)
PLATELET # BLD AUTO: 219 10*3/MM3 (ref 140–450)
PMV BLD AUTO: 10.3 FL (ref 6–12)
POTASSIUM SERPL-SCNC: 5.2 MMOL/L (ref 3.5–5.2)
PROT SERPL-MCNC: 6.8 G/DL (ref 6–8.5)
QT INTERVAL: 422 MS
QT INTERVAL: 438 MS
QTC INTERVAL: 414 MS
QTC INTERVAL: 455 MS
RBC # BLD AUTO: 4.1 10*6/MM3 (ref 4.14–5.8)
SODIUM SERPL-SCNC: 135 MMOL/L (ref 136–145)
TROPONIN T SERPL-MCNC: <0.01 NG/ML (ref 0–0.03)
TROPONIN T SERPL-MCNC: <0.01 NG/ML (ref 0–0.03)
WBC NRBC COR # BLD: 6.56 10*3/MM3 (ref 3.4–10.8)
WHOLE BLOOD HOLD COAG: NORMAL
WHOLE BLOOD HOLD SPECIMEN: NORMAL

## 2022-10-26 PROCEDURE — 82962 GLUCOSE BLOOD TEST: CPT

## 2022-10-26 PROCEDURE — 93005 ELECTROCARDIOGRAM TRACING: CPT

## 2022-10-26 PROCEDURE — 0 IOPAMIDOL PER 1 ML: Performed by: EMERGENCY MEDICINE

## 2022-10-26 PROCEDURE — 83690 ASSAY OF LIPASE: CPT | Performed by: EMERGENCY MEDICINE

## 2022-10-26 PROCEDURE — 25010000002 MORPHINE PER 10 MG: Performed by: EMERGENCY MEDICINE

## 2022-10-26 PROCEDURE — 71275 CT ANGIOGRAPHY CHEST: CPT

## 2022-10-26 PROCEDURE — 85025 COMPLETE CBC W/AUTO DIFF WBC: CPT | Performed by: EMERGENCY MEDICINE

## 2022-10-26 PROCEDURE — 99285 EMERGENCY DEPT VISIT HI MDM: CPT

## 2022-10-26 PROCEDURE — 94640 AIRWAY INHALATION TREATMENT: CPT

## 2022-10-26 PROCEDURE — 25010000002 ONDANSETRON PER 1 MG: Performed by: EMERGENCY MEDICINE

## 2022-10-26 PROCEDURE — 36415 COLL VENOUS BLD VENIPUNCTURE: CPT

## 2022-10-26 PROCEDURE — 63710000001 INSULIN DETEMIR PER 5 UNITS: Performed by: INTERNAL MEDICINE

## 2022-10-26 PROCEDURE — 80053 COMPREHEN METABOLIC PANEL: CPT | Performed by: EMERGENCY MEDICINE

## 2022-10-26 PROCEDURE — 74177 CT ABD & PELVIS W/CONTRAST: CPT

## 2022-10-26 PROCEDURE — 83880 ASSAY OF NATRIURETIC PEPTIDE: CPT | Performed by: EMERGENCY MEDICINE

## 2022-10-26 PROCEDURE — 84484 ASSAY OF TROPONIN QUANT: CPT | Performed by: EMERGENCY MEDICINE

## 2022-10-26 PROCEDURE — 94664 DEMO&/EVAL PT USE INHALER: CPT

## 2022-10-26 PROCEDURE — 99223 1ST HOSP IP/OBS HIGH 75: CPT | Performed by: INTERNAL MEDICINE

## 2022-10-26 PROCEDURE — 93005 ELECTROCARDIOGRAM TRACING: CPT | Performed by: EMERGENCY MEDICINE

## 2022-10-26 PROCEDURE — 71045 X-RAY EXAM CHEST 1 VIEW: CPT

## 2022-10-26 RX ORDER — SODIUM CHLORIDE 9 MG/ML
100 INJECTION, SOLUTION INTRAVENOUS CONTINUOUS
Status: DISCONTINUED | OUTPATIENT
Start: 2022-10-26 | End: 2022-10-28

## 2022-10-26 RX ORDER — LORAZEPAM 2 MG/ML
2 INJECTION INTRAMUSCULAR
Status: DISCONTINUED | OUTPATIENT
Start: 2022-10-26 | End: 2022-10-29 | Stop reason: HOSPADM

## 2022-10-26 RX ORDER — MONTELUKAST SODIUM 10 MG/1
10 TABLET ORAL DAILY
Status: DISCONTINUED | OUTPATIENT
Start: 2022-10-27 | End: 2022-10-29 | Stop reason: HOSPADM

## 2022-10-26 RX ORDER — ONDANSETRON 2 MG/ML
4 INJECTION INTRAMUSCULAR; INTRAVENOUS ONCE
Status: COMPLETED | OUTPATIENT
Start: 2022-10-26 | End: 2022-10-26

## 2022-10-26 RX ORDER — ALBUTEROL SULFATE 90 UG/1
2 AEROSOL, METERED RESPIRATORY (INHALATION) EVERY 4 HOURS PRN
Status: DISCONTINUED | OUTPATIENT
Start: 2022-10-26 | End: 2022-10-26

## 2022-10-26 RX ORDER — ACETAMINOPHEN 325 MG/1
650 TABLET ORAL EVERY 4 HOURS PRN
Status: DISCONTINUED | OUTPATIENT
Start: 2022-10-26 | End: 2022-10-29 | Stop reason: HOSPADM

## 2022-10-26 RX ORDER — ALBUTEROL SULFATE 2.5 MG/3ML
2.5 SOLUTION RESPIRATORY (INHALATION) EVERY 4 HOURS PRN
Status: DISCONTINUED | OUTPATIENT
Start: 2022-10-26 | End: 2022-10-29 | Stop reason: HOSPADM

## 2022-10-26 RX ORDER — PREDNISONE 1 MG/1
5 TABLET ORAL DAILY
Status: DISCONTINUED | OUTPATIENT
Start: 2022-10-27 | End: 2022-10-29 | Stop reason: HOSPADM

## 2022-10-26 RX ORDER — ATORVASTATIN CALCIUM 40 MG/1
40 TABLET, FILM COATED ORAL DAILY
Status: DISCONTINUED | OUTPATIENT
Start: 2022-10-27 | End: 2022-10-29 | Stop reason: HOSPADM

## 2022-10-26 RX ORDER — NITROGLYCERIN 0.4 MG/1
0.4 TABLET SUBLINGUAL
Status: DISCONTINUED | OUTPATIENT
Start: 2022-10-26 | End: 2022-10-29 | Stop reason: HOSPADM

## 2022-10-26 RX ORDER — ONDANSETRON 2 MG/ML
4 INJECTION INTRAMUSCULAR; INTRAVENOUS EVERY 6 HOURS PRN
Status: DISCONTINUED | OUTPATIENT
Start: 2022-10-26 | End: 2022-10-29 | Stop reason: HOSPADM

## 2022-10-26 RX ORDER — NICOTINE POLACRILEX 4 MG
15 LOZENGE BUCCAL
Status: DISCONTINUED | OUTPATIENT
Start: 2022-10-26 | End: 2022-10-29 | Stop reason: HOSPADM

## 2022-10-26 RX ORDER — DEXTROSE MONOHYDRATE 25 G/50ML
25 INJECTION, SOLUTION INTRAVENOUS
Status: DISCONTINUED | OUTPATIENT
Start: 2022-10-26 | End: 2022-10-29 | Stop reason: HOSPADM

## 2022-10-26 RX ORDER — LORAZEPAM 2 MG/ML
1 INJECTION INTRAMUSCULAR
Status: DISCONTINUED | OUTPATIENT
Start: 2022-10-26 | End: 2022-10-29 | Stop reason: HOSPADM

## 2022-10-26 RX ORDER — NALOXONE HCL 0.4 MG/ML
0.4 VIAL (ML) INJECTION
Status: DISCONTINUED | OUTPATIENT
Start: 2022-10-26 | End: 2022-10-29 | Stop reason: HOSPADM

## 2022-10-26 RX ORDER — GABAPENTIN 300 MG/1
300 CAPSULE ORAL NIGHTLY
Status: DISCONTINUED | OUTPATIENT
Start: 2022-10-26 | End: 2022-10-29 | Stop reason: HOSPADM

## 2022-10-26 RX ORDER — SODIUM CHLORIDE 0.9 % (FLUSH) 0.9 %
10 SYRINGE (ML) INJECTION AS NEEDED
Status: DISCONTINUED | OUTPATIENT
Start: 2022-10-26 | End: 2022-10-29 | Stop reason: HOSPADM

## 2022-10-26 RX ORDER — LORAZEPAM 1 MG/1
1 TABLET ORAL
Status: DISCONTINUED | OUTPATIENT
Start: 2022-10-26 | End: 2022-10-29 | Stop reason: HOSPADM

## 2022-10-26 RX ORDER — METOPROLOL SUCCINATE 25 MG/1
12.5 TABLET, EXTENDED RELEASE ORAL DAILY
Status: DISCONTINUED | OUTPATIENT
Start: 2022-10-27 | End: 2022-10-29 | Stop reason: HOSPADM

## 2022-10-26 RX ORDER — LORAZEPAM 1 MG/1
2 TABLET ORAL
Status: DISCONTINUED | OUTPATIENT
Start: 2022-10-26 | End: 2022-10-29 | Stop reason: HOSPADM

## 2022-10-26 RX ORDER — HYDROCODONE BITARTRATE AND ACETAMINOPHEN 5; 325 MG/1; MG/1
1 TABLET ORAL EVERY 4 HOURS PRN
Status: DISCONTINUED | OUTPATIENT
Start: 2022-10-26 | End: 2022-10-29 | Stop reason: HOSPADM

## 2022-10-26 RX ORDER — MORPHINE SULFATE 4 MG/ML
4 INJECTION, SOLUTION INTRAMUSCULAR; INTRAVENOUS ONCE
Status: COMPLETED | OUTPATIENT
Start: 2022-10-26 | End: 2022-10-26

## 2022-10-26 RX ORDER — INSULIN LISPRO 100 [IU]/ML
0-7 INJECTION, SOLUTION INTRAVENOUS; SUBCUTANEOUS
Status: DISCONTINUED | OUTPATIENT
Start: 2022-10-26 | End: 2022-10-29 | Stop reason: HOSPADM

## 2022-10-26 RX ORDER — CHOLECALCIFEROL (VITAMIN D3) 125 MCG
5 CAPSULE ORAL NIGHTLY PRN
Status: DISCONTINUED | OUTPATIENT
Start: 2022-10-26 | End: 2022-10-29 | Stop reason: HOSPADM

## 2022-10-26 RX ORDER — SODIUM CHLORIDE 0.9 % (FLUSH) 0.9 %
10 SYRINGE (ML) INJECTION EVERY 12 HOURS SCHEDULED
Status: DISCONTINUED | OUTPATIENT
Start: 2022-10-26 | End: 2022-10-29 | Stop reason: HOSPADM

## 2022-10-26 RX ORDER — ASPIRIN 81 MG/1
324 TABLET, CHEWABLE ORAL ONCE
Status: DISCONTINUED | OUTPATIENT
Start: 2022-10-26 | End: 2022-10-26

## 2022-10-26 RX ORDER — HYDROMORPHONE HYDROCHLORIDE 1 MG/ML
0.5 INJECTION, SOLUTION INTRAMUSCULAR; INTRAVENOUS; SUBCUTANEOUS
Status: DISCONTINUED | OUTPATIENT
Start: 2022-10-26 | End: 2022-10-29 | Stop reason: HOSPADM

## 2022-10-26 RX ORDER — BUDESONIDE AND FORMOTEROL FUMARATE DIHYDRATE 160; 4.5 UG/1; UG/1
2 AEROSOL RESPIRATORY (INHALATION) 2 TIMES DAILY
Status: DISCONTINUED | OUTPATIENT
Start: 2022-10-26 | End: 2022-10-29 | Stop reason: HOSPADM

## 2022-10-26 RX ADMIN — APIXABAN 5 MG: 5 TABLET, FILM COATED ORAL at 21:23

## 2022-10-26 RX ADMIN — Medication 10 ML: at 21:24

## 2022-10-26 RX ADMIN — INSULIN DETEMIR 10 UNITS: 100 INJECTION, SOLUTION SUBCUTANEOUS at 21:23

## 2022-10-26 RX ADMIN — HYDROCODONE BITARTRATE AND ACETAMINOPHEN 1 TABLET: 5; 325 TABLET ORAL at 21:23

## 2022-10-26 RX ADMIN — SODIUM CHLORIDE 100 ML/HR: 9 INJECTION, SOLUTION INTRAVENOUS at 16:56

## 2022-10-26 RX ADMIN — HYDROCODONE BITARTRATE AND ACETAMINOPHEN 1 TABLET: 5; 325 TABLET ORAL at 17:07

## 2022-10-26 RX ADMIN — BUDESONIDE AND FORMOTEROL FUMARATE DIHYDRATE 2 PUFF: 160; 4.5 AEROSOL RESPIRATORY (INHALATION) at 22:29

## 2022-10-26 RX ADMIN — MORPHINE SULFATE 4 MG: 4 INJECTION, SOLUTION INTRAMUSCULAR; INTRAVENOUS at 15:23

## 2022-10-26 RX ADMIN — ONDANSETRON 4 MG: 2 INJECTION INTRAMUSCULAR; INTRAVENOUS at 13:22

## 2022-10-26 RX ADMIN — GABAPENTIN 300 MG: 300 CAPSULE ORAL at 21:23

## 2022-10-26 RX ADMIN — IOPAMIDOL 100 ML: 755 INJECTION, SOLUTION INTRAVENOUS at 14:45

## 2022-10-26 RX ADMIN — NITROGLYCERIN 0.4 MG: 0.4 TABLET SUBLINGUAL at 13:22

## 2022-10-27 ENCOUNTER — APPOINTMENT (OUTPATIENT)
Dept: GENERAL RADIOLOGY | Facility: HOSPITAL | Age: 62
End: 2022-10-27

## 2022-10-27 ENCOUNTER — APPOINTMENT (OUTPATIENT)
Dept: ULTRASOUND IMAGING | Facility: HOSPITAL | Age: 62
End: 2022-10-27

## 2022-10-27 LAB
ALBUMIN SERPL-MCNC: 4 G/DL (ref 3.5–5.2)
ALBUMIN/GLOB SERPL: 2.1 G/DL
ALP SERPL-CCNC: 68 U/L (ref 39–117)
ALT SERPL W P-5'-P-CCNC: 20 U/L (ref 1–41)
ANION GAP SERPL CALCULATED.3IONS-SCNC: 5 MMOL/L (ref 5–15)
AST SERPL-CCNC: 13 U/L (ref 1–40)
BASOPHILS # BLD AUTO: 0.06 10*3/MM3 (ref 0–0.2)
BASOPHILS NFR BLD AUTO: 0.9 % (ref 0–1.5)
BILIRUB SERPL-MCNC: 1.5 MG/DL (ref 0–1.2)
BUN SERPL-MCNC: 13 MG/DL (ref 8–23)
BUN/CREAT SERPL: 15.7 (ref 7–25)
CALCIUM SPEC-SCNC: 9 MG/DL (ref 8.6–10.5)
CHLORIDE SERPL-SCNC: 102 MMOL/L (ref 98–107)
CO2 SERPL-SCNC: 32 MMOL/L (ref 22–29)
CREAT SERPL-MCNC: 0.83 MG/DL (ref 0.76–1.27)
DEPRECATED RDW RBC AUTO: 43.1 FL (ref 37–54)
EGFRCR SERPLBLD CKD-EPI 2021: 99 ML/MIN/1.73
EOSINOPHIL # BLD AUTO: 0.34 10*3/MM3 (ref 0–0.4)
EOSINOPHIL NFR BLD AUTO: 5.2 % (ref 0.3–6.2)
ERYTHROCYTE [DISTWIDTH] IN BLOOD BY AUTOMATED COUNT: 12.3 % (ref 12.3–15.4)
GLOBULIN UR ELPH-MCNC: 1.9 GM/DL
GLUCOSE BLDC GLUCOMTR-MCNC: 114 MG/DL (ref 70–130)
GLUCOSE BLDC GLUCOMTR-MCNC: 115 MG/DL (ref 70–130)
GLUCOSE BLDC GLUCOMTR-MCNC: 120 MG/DL (ref 70–130)
GLUCOSE BLDC GLUCOMTR-MCNC: 223 MG/DL (ref 70–130)
GLUCOSE SERPL-MCNC: 154 MG/DL (ref 65–99)
HCT VFR BLD AUTO: 37 % (ref 37.5–51)
HGB BLD-MCNC: 12.2 G/DL (ref 13–17.7)
IMM GRANULOCYTES # BLD AUTO: 0.02 10*3/MM3 (ref 0–0.05)
IMM GRANULOCYTES NFR BLD AUTO: 0.3 % (ref 0–0.5)
LIPASE SERPL-CCNC: 153 U/L (ref 13–60)
LYMPHOCYTES # BLD AUTO: 1.41 10*3/MM3 (ref 0.7–3.1)
LYMPHOCYTES NFR BLD AUTO: 21.6 % (ref 19.6–45.3)
MAGNESIUM SERPL-MCNC: 1.8 MG/DL (ref 1.6–2.4)
MCH RBC QN AUTO: 31.9 PG (ref 26.6–33)
MCHC RBC AUTO-ENTMCNC: 33 G/DL (ref 31.5–35.7)
MCV RBC AUTO: 96.9 FL (ref 79–97)
MONOCYTES # BLD AUTO: 0.5 10*3/MM3 (ref 0.1–0.9)
MONOCYTES NFR BLD AUTO: 7.7 % (ref 5–12)
NEUTROPHILS NFR BLD AUTO: 4.2 10*3/MM3 (ref 1.7–7)
NEUTROPHILS NFR BLD AUTO: 64.3 % (ref 42.7–76)
NRBC BLD AUTO-RTO: 0 /100 WBC (ref 0–0.2)
PLATELET # BLD AUTO: 196 10*3/MM3 (ref 140–450)
PMV BLD AUTO: 10 FL (ref 6–12)
POTASSIUM SERPL-SCNC: 4.4 MMOL/L (ref 3.5–5.2)
PROT SERPL-MCNC: 5.9 G/DL (ref 6–8.5)
RBC # BLD AUTO: 3.82 10*6/MM3 (ref 4.14–5.8)
SODIUM SERPL-SCNC: 139 MMOL/L (ref 136–145)
WBC NRBC COR # BLD: 6.53 10*3/MM3 (ref 3.4–10.8)

## 2022-10-27 PROCEDURE — 83690 ASSAY OF LIPASE: CPT | Performed by: INTERNAL MEDICINE

## 2022-10-27 PROCEDURE — 83036 HEMOGLOBIN GLYCOSYLATED A1C: CPT | Performed by: INTERNAL MEDICINE

## 2022-10-27 PROCEDURE — 80053 COMPREHEN METABOLIC PANEL: CPT | Performed by: INTERNAL MEDICINE

## 2022-10-27 PROCEDURE — 83735 ASSAY OF MAGNESIUM: CPT | Performed by: INTERNAL MEDICINE

## 2022-10-27 PROCEDURE — 72072 X-RAY EXAM THORAC SPINE 3VWS: CPT

## 2022-10-27 PROCEDURE — 85025 COMPLETE CBC W/AUTO DIFF WBC: CPT | Performed by: INTERNAL MEDICINE

## 2022-10-27 PROCEDURE — 63710000001 PREDNISONE PER 5 MG: Performed by: INTERNAL MEDICINE

## 2022-10-27 PROCEDURE — 99233 SBSQ HOSP IP/OBS HIGH 50: CPT | Performed by: INTERNAL MEDICINE

## 2022-10-27 PROCEDURE — 94799 UNLISTED PULMONARY SVC/PX: CPT

## 2022-10-27 PROCEDURE — 76705 ECHO EXAM OF ABDOMEN: CPT

## 2022-10-27 PROCEDURE — 63710000001 INSULIN DETEMIR PER 5 UNITS: Performed by: INTERNAL MEDICINE

## 2022-10-27 PROCEDURE — 72100 X-RAY EXAM L-S SPINE 2/3 VWS: CPT

## 2022-10-27 PROCEDURE — 82962 GLUCOSE BLOOD TEST: CPT

## 2022-10-27 PROCEDURE — 25010000002 HYDROMORPHONE PER 4 MG: Performed by: INTERNAL MEDICINE

## 2022-10-27 RX ADMIN — EMPAGLIFLOZIN 25 MG: 25 TABLET, FILM COATED ORAL at 08:09

## 2022-10-27 RX ADMIN — PREDNISONE 5 MG: 5 TABLET ORAL at 08:09

## 2022-10-27 RX ADMIN — ATORVASTATIN CALCIUM 40 MG: 40 TABLET, FILM COATED ORAL at 08:09

## 2022-10-27 RX ADMIN — HYDROCODONE BITARTRATE AND ACETAMINOPHEN 1 TABLET: 5; 325 TABLET ORAL at 06:31

## 2022-10-27 RX ADMIN — BUDESONIDE AND FORMOTEROL FUMARATE DIHYDRATE 2 PUFF: 160; 4.5 AEROSOL RESPIRATORY (INHALATION) at 09:59

## 2022-10-27 RX ADMIN — HYDROMORPHONE HYDROCHLORIDE 0.5 MG: 1 INJECTION, SOLUTION INTRAMUSCULAR; INTRAVENOUS; SUBCUTANEOUS at 22:05

## 2022-10-27 RX ADMIN — METOPROLOL SUCCINATE 12.5 MG: 25 TABLET, FILM COATED, EXTENDED RELEASE ORAL at 08:09

## 2022-10-27 RX ADMIN — Medication 10 ML: at 16:22

## 2022-10-27 RX ADMIN — APIXABAN 5 MG: 5 TABLET, FILM COATED ORAL at 20:03

## 2022-10-27 RX ADMIN — BUDESONIDE AND FORMOTEROL FUMARATE DIHYDRATE 2 PUFF: 160; 4.5 AEROSOL RESPIRATORY (INHALATION) at 20:22

## 2022-10-27 RX ADMIN — HYDROCODONE BITARTRATE AND ACETAMINOPHEN 1 TABLET: 5; 325 TABLET ORAL at 20:03

## 2022-10-27 RX ADMIN — SODIUM CHLORIDE 100 ML/HR: 9 INJECTION, SOLUTION INTRAVENOUS at 22:05

## 2022-10-27 RX ADMIN — INSULIN DETEMIR 10 UNITS: 100 INJECTION, SOLUTION SUBCUTANEOUS at 08:15

## 2022-10-27 RX ADMIN — Medication 10 ML: at 20:03

## 2022-10-27 RX ADMIN — MONTELUKAST 10 MG: 10 TABLET, FILM COATED ORAL at 08:09

## 2022-10-27 RX ADMIN — Medication 10 ML: at 08:15

## 2022-10-27 RX ADMIN — HYDROMORPHONE HYDROCHLORIDE 0.5 MG: 1 INJECTION, SOLUTION INTRAMUSCULAR; INTRAVENOUS; SUBCUTANEOUS at 08:15

## 2022-10-27 RX ADMIN — APIXABAN 5 MG: 5 TABLET, FILM COATED ORAL at 08:09

## 2022-10-27 RX ADMIN — SODIUM CHLORIDE 100 ML/HR: 9 INJECTION, SOLUTION INTRAVENOUS at 11:45

## 2022-10-27 RX ADMIN — HYDROMORPHONE HYDROCHLORIDE 0.5 MG: 1 INJECTION, SOLUTION INTRAMUSCULAR; INTRAVENOUS; SUBCUTANEOUS at 16:22

## 2022-10-27 RX ADMIN — GABAPENTIN 300 MG: 300 CAPSULE ORAL at 20:03

## 2022-10-28 ENCOUNTER — APPOINTMENT (OUTPATIENT)
Dept: MRI IMAGING | Facility: HOSPITAL | Age: 62
End: 2022-10-28

## 2022-10-28 LAB
ALBUMIN SERPL-MCNC: 3.9 G/DL (ref 3.5–5.2)
ALBUMIN/GLOB SERPL: 2.2 G/DL
ALP SERPL-CCNC: 70 U/L (ref 39–117)
ALT SERPL W P-5'-P-CCNC: 15 U/L (ref 1–41)
ANION GAP SERPL CALCULATED.3IONS-SCNC: 7 MMOL/L (ref 5–15)
AST SERPL-CCNC: 11 U/L (ref 1–40)
BACTERIA UR QL AUTO: NORMAL /HPF
BILIRUB SERPL-MCNC: 1.6 MG/DL (ref 0–1.2)
BILIRUB UR QL STRIP: NEGATIVE
BUN SERPL-MCNC: 10 MG/DL (ref 8–23)
BUN/CREAT SERPL: 11 (ref 7–25)
CALCIUM SPEC-SCNC: 9 MG/DL (ref 8.6–10.5)
CHLORIDE SERPL-SCNC: 104 MMOL/L (ref 98–107)
CLARITY UR: ABNORMAL
CO2 SERPL-SCNC: 31 MMOL/L (ref 22–29)
COLOR UR: YELLOW
CREAT SERPL-MCNC: 0.91 MG/DL (ref 0.76–1.27)
DEPRECATED RDW RBC AUTO: 45.4 FL (ref 37–54)
EGFRCR SERPLBLD CKD-EPI 2021: 95.3 ML/MIN/1.73
ERYTHROCYTE [DISTWIDTH] IN BLOOD BY AUTOMATED COUNT: 12.5 % (ref 12.3–15.4)
GLOBULIN UR ELPH-MCNC: 1.8 GM/DL
GLUCOSE BLDC GLUCOMTR-MCNC: 131 MG/DL (ref 70–130)
GLUCOSE BLDC GLUCOMTR-MCNC: 145 MG/DL (ref 70–130)
GLUCOSE BLDC GLUCOMTR-MCNC: 210 MG/DL (ref 70–130)
GLUCOSE BLDC GLUCOMTR-MCNC: 81 MG/DL (ref 70–130)
GLUCOSE SERPL-MCNC: 75 MG/DL (ref 65–99)
GLUCOSE UR STRIP-MCNC: ABNORMAL MG/DL
HBA1C MFR BLD: 8.3 % (ref 4.8–5.6)
HCT VFR BLD AUTO: 39.1 % (ref 37.5–51)
HGB BLD-MCNC: 12.5 G/DL (ref 13–17.7)
HGB UR QL STRIP.AUTO: NEGATIVE
HYALINE CASTS UR QL AUTO: NORMAL /LPF
INR PPP: 1.18 (ref 0.84–1.13)
KETONES UR QL STRIP: ABNORMAL
LEUKOCYTE ESTERASE UR QL STRIP.AUTO: NEGATIVE
MCH RBC QN AUTO: 32.1 PG (ref 26.6–33)
MCHC RBC AUTO-ENTMCNC: 32 G/DL (ref 31.5–35.7)
MCV RBC AUTO: 100.5 FL (ref 79–97)
NITRITE UR QL STRIP: NEGATIVE
PH UR STRIP.AUTO: 5.5 [PH] (ref 5–8)
PLATELET # BLD AUTO: 184 10*3/MM3 (ref 140–450)
PMV BLD AUTO: 10.9 FL (ref 6–12)
POTASSIUM SERPL-SCNC: 4.6 MMOL/L (ref 3.5–5.2)
PROT SERPL-MCNC: 5.7 G/DL (ref 6–8.5)
PROT UR QL STRIP: NEGATIVE
PROTHROMBIN TIME: 15 SECONDS (ref 11.4–14.4)
RBC # BLD AUTO: 3.89 10*6/MM3 (ref 4.14–5.8)
RBC # UR STRIP: NORMAL /HPF
REF LAB TEST METHOD: NORMAL
SODIUM SERPL-SCNC: 142 MMOL/L (ref 136–145)
SP GR UR STRIP: 1.02 (ref 1–1.03)
SQUAMOUS #/AREA URNS HPF: NORMAL /HPF
UROBILINOGEN UR QL STRIP: ABNORMAL
WBC # UR STRIP: NORMAL /HPF
WBC NRBC COR # BLD: 6.26 10*3/MM3 (ref 3.4–10.8)

## 2022-10-28 PROCEDURE — 82962 GLUCOSE BLOOD TEST: CPT

## 2022-10-28 PROCEDURE — 94799 UNLISTED PULMONARY SVC/PX: CPT

## 2022-10-28 PROCEDURE — 63710000001 PREDNISONE PER 5 MG: Performed by: INTERNAL MEDICINE

## 2022-10-28 PROCEDURE — 85610 PROTHROMBIN TIME: CPT | Performed by: INTERNAL MEDICINE

## 2022-10-28 PROCEDURE — 72158 MRI LUMBAR SPINE W/O & W/DYE: CPT

## 2022-10-28 PROCEDURE — 85027 COMPLETE CBC AUTOMATED: CPT | Performed by: INTERNAL MEDICINE

## 2022-10-28 PROCEDURE — 63710000001 INSULIN DETEMIR PER 5 UNITS: Performed by: INTERNAL MEDICINE

## 2022-10-28 PROCEDURE — A9577 INJ MULTIHANCE: HCPCS | Performed by: INTERNAL MEDICINE

## 2022-10-28 PROCEDURE — 72157 MRI CHEST SPINE W/O & W/DYE: CPT

## 2022-10-28 PROCEDURE — 81001 URINALYSIS AUTO W/SCOPE: CPT | Performed by: INTERNAL MEDICINE

## 2022-10-28 PROCEDURE — 25010000002 HYDROMORPHONE PER 4 MG: Performed by: INTERNAL MEDICINE

## 2022-10-28 PROCEDURE — 0 GADOBENATE DIMEGLUMINE 529 MG/ML SOLUTION: Performed by: INTERNAL MEDICINE

## 2022-10-28 PROCEDURE — 94761 N-INVAS EAR/PLS OXIMETRY MLT: CPT

## 2022-10-28 PROCEDURE — 99232 SBSQ HOSP IP/OBS MODERATE 35: CPT | Performed by: INTERNAL MEDICINE

## 2022-10-28 PROCEDURE — 80053 COMPREHEN METABOLIC PANEL: CPT | Performed by: INTERNAL MEDICINE

## 2022-10-28 PROCEDURE — 63710000001 INSULIN LISPRO (HUMAN) PER 5 UNITS: Performed by: INTERNAL MEDICINE

## 2022-10-28 RX ORDER — CYCLOBENZAPRINE HCL 10 MG
5 TABLET ORAL EVERY 8 HOURS PRN
Status: DISCONTINUED | OUTPATIENT
Start: 2022-10-28 | End: 2022-10-29 | Stop reason: HOSPADM

## 2022-10-28 RX ORDER — DIAZEPAM 5 MG/1
5 TABLET ORAL EVERY 6 HOURS PRN
Status: DISCONTINUED | OUTPATIENT
Start: 2022-10-28 | End: 2022-10-29 | Stop reason: HOSPADM

## 2022-10-28 RX ORDER — CYCLOBENZAPRINE HCL 10 MG
5 TABLET ORAL EVERY 8 HOURS SCHEDULED
Status: DISCONTINUED | OUTPATIENT
Start: 2022-10-28 | End: 2022-10-28

## 2022-10-28 RX ADMIN — SODIUM CHLORIDE 100 ML/HR: 9 INJECTION, SOLUTION INTRAVENOUS at 08:10

## 2022-10-28 RX ADMIN — GABAPENTIN 300 MG: 300 CAPSULE ORAL at 22:06

## 2022-10-28 RX ADMIN — DIAZEPAM 5 MG: 5 TABLET ORAL at 13:04

## 2022-10-28 RX ADMIN — HYDROCODONE BITARTRATE AND ACETAMINOPHEN 1 TABLET: 5; 325 TABLET ORAL at 08:06

## 2022-10-28 RX ADMIN — INSULIN LISPRO 3 UNITS: 100 INJECTION, SOLUTION INTRAVENOUS; SUBCUTANEOUS at 17:14

## 2022-10-28 RX ADMIN — Medication 10 ML: at 22:06

## 2022-10-28 RX ADMIN — HYDROMORPHONE HYDROCHLORIDE 0.5 MG: 1 INJECTION, SOLUTION INTRAMUSCULAR; INTRAVENOUS; SUBCUTANEOUS at 20:02

## 2022-10-28 RX ADMIN — INSULIN DETEMIR 10 UNITS: 100 INJECTION, SOLUTION SUBCUTANEOUS at 08:08

## 2022-10-28 RX ADMIN — APIXABAN 5 MG: 5 TABLET, FILM COATED ORAL at 08:07

## 2022-10-28 RX ADMIN — APIXABAN 5 MG: 5 TABLET, FILM COATED ORAL at 22:05

## 2022-10-28 RX ADMIN — Medication 10 ML: at 08:06

## 2022-10-28 RX ADMIN — PREDNISONE 5 MG: 5 TABLET ORAL at 08:07

## 2022-10-28 RX ADMIN — GADOBENATE DIMEGLUMINE 20 ML: 529 INJECTION, SOLUTION INTRAVENOUS at 21:58

## 2022-10-28 RX ADMIN — DIAZEPAM 5 MG: 5 TABLET ORAL at 22:09

## 2022-10-28 RX ADMIN — MONTELUKAST 10 MG: 10 TABLET, FILM COATED ORAL at 08:07

## 2022-10-28 RX ADMIN — HYDROCODONE BITARTRATE AND ACETAMINOPHEN 1 TABLET: 5; 325 TABLET ORAL at 23:53

## 2022-10-28 RX ADMIN — ATORVASTATIN CALCIUM 40 MG: 40 TABLET, FILM COATED ORAL at 08:07

## 2022-10-28 RX ADMIN — METOPROLOL SUCCINATE 12.5 MG: 25 TABLET, FILM COATED, EXTENDED RELEASE ORAL at 08:06

## 2022-10-28 RX ADMIN — CYCLOBENZAPRINE 5 MG: 10 TABLET, FILM COATED ORAL at 17:20

## 2022-10-28 RX ADMIN — BUDESONIDE AND FORMOTEROL FUMARATE DIHYDRATE 2 PUFF: 160; 4.5 AEROSOL RESPIRATORY (INHALATION) at 09:52

## 2022-10-28 RX ADMIN — HYDROCODONE BITARTRATE AND ACETAMINOPHEN 1 TABLET: 5; 325 TABLET ORAL at 12:18

## 2022-10-29 ENCOUNTER — READMISSION MANAGEMENT (OUTPATIENT)
Dept: CALL CENTER | Facility: HOSPITAL | Age: 62
End: 2022-10-29

## 2022-10-29 VITALS
RESPIRATION RATE: 16 BRPM | TEMPERATURE: 97.4 F | DIASTOLIC BLOOD PRESSURE: 77 MMHG | HEIGHT: 72 IN | WEIGHT: 217 LBS | BODY MASS INDEX: 29.39 KG/M2 | OXYGEN SATURATION: 95 % | SYSTOLIC BLOOD PRESSURE: 134 MMHG | HEART RATE: 61 BPM

## 2022-10-29 LAB
ALBUMIN SERPL-MCNC: 4.7 G/DL (ref 3.5–5.2)
ALBUMIN/GLOB SERPL: 2.1 G/DL
ALP SERPL-CCNC: 92 U/L (ref 39–117)
ALT SERPL W P-5'-P-CCNC: 14 U/L (ref 1–41)
ANION GAP SERPL CALCULATED.3IONS-SCNC: 10 MMOL/L (ref 5–15)
AST SERPL-CCNC: 11 U/L (ref 1–40)
BASOPHILS # BLD AUTO: 0.04 10*3/MM3 (ref 0–0.2)
BASOPHILS NFR BLD AUTO: 0.8 % (ref 0–1.5)
BILIRUB SERPL-MCNC: 1.7 MG/DL (ref 0–1.2)
BUN SERPL-MCNC: 12 MG/DL (ref 8–23)
BUN/CREAT SERPL: 12.9 (ref 7–25)
CALCIUM SPEC-SCNC: 9.8 MG/DL (ref 8.6–10.5)
CHLORIDE SERPL-SCNC: 101 MMOL/L (ref 98–107)
CO2 SERPL-SCNC: 29 MMOL/L (ref 22–29)
CREAT SERPL-MCNC: 0.93 MG/DL (ref 0.76–1.27)
DEPRECATED RDW RBC AUTO: 42.6 FL (ref 37–54)
EGFRCR SERPLBLD CKD-EPI 2021: 92.8 ML/MIN/1.73
EOSINOPHIL # BLD AUTO: 0.27 10*3/MM3 (ref 0–0.4)
EOSINOPHIL NFR BLD AUTO: 5.6 % (ref 0.3–6.2)
ERYTHROCYTE [DISTWIDTH] IN BLOOD BY AUTOMATED COUNT: 12.2 % (ref 12.3–15.4)
GLOBULIN UR ELPH-MCNC: 2.2 GM/DL
GLUCOSE BLDC GLUCOMTR-MCNC: 94 MG/DL (ref 70–130)
GLUCOSE SERPL-MCNC: 103 MG/DL (ref 65–99)
HCT VFR BLD AUTO: 42.2 % (ref 37.5–51)
HGB BLD-MCNC: 14.1 G/DL (ref 13–17.7)
IMM GRANULOCYTES # BLD AUTO: 0.01 10*3/MM3 (ref 0–0.05)
IMM GRANULOCYTES NFR BLD AUTO: 0.2 % (ref 0–0.5)
LYMPHOCYTES # BLD AUTO: 1.49 10*3/MM3 (ref 0.7–3.1)
LYMPHOCYTES NFR BLD AUTO: 31.1 % (ref 19.6–45.3)
MCH RBC QN AUTO: 31.8 PG (ref 26.6–33)
MCHC RBC AUTO-ENTMCNC: 33.4 G/DL (ref 31.5–35.7)
MCV RBC AUTO: 95.3 FL (ref 79–97)
MONOCYTES # BLD AUTO: 0.47 10*3/MM3 (ref 0.1–0.9)
MONOCYTES NFR BLD AUTO: 9.8 % (ref 5–12)
NEUTROPHILS NFR BLD AUTO: 2.51 10*3/MM3 (ref 1.7–7)
NEUTROPHILS NFR BLD AUTO: 52.5 % (ref 42.7–76)
NRBC BLD AUTO-RTO: 0 /100 WBC (ref 0–0.2)
PLATELET # BLD AUTO: 230 10*3/MM3 (ref 140–450)
PMV BLD AUTO: 10.2 FL (ref 6–12)
POTASSIUM SERPL-SCNC: 4.5 MMOL/L (ref 3.5–5.2)
PROT SERPL-MCNC: 6.9 G/DL (ref 6–8.5)
RBC # BLD AUTO: 4.43 10*6/MM3 (ref 4.14–5.8)
SODIUM SERPL-SCNC: 140 MMOL/L (ref 136–145)
WBC NRBC COR # BLD: 4.79 10*3/MM3 (ref 3.4–10.8)

## 2022-10-29 PROCEDURE — 94664 DEMO&/EVAL PT USE INHALER: CPT

## 2022-10-29 PROCEDURE — 63710000001 PREDNISONE PER 5 MG: Performed by: INTERNAL MEDICINE

## 2022-10-29 PROCEDURE — 82962 GLUCOSE BLOOD TEST: CPT

## 2022-10-29 PROCEDURE — 25010000002 HYDROMORPHONE PER 4 MG: Performed by: INTERNAL MEDICINE

## 2022-10-29 PROCEDURE — 99239 HOSP IP/OBS DSCHRG MGMT >30: CPT | Performed by: INTERNAL MEDICINE

## 2022-10-29 PROCEDURE — 85025 COMPLETE CBC W/AUTO DIFF WBC: CPT | Performed by: INTERNAL MEDICINE

## 2022-10-29 PROCEDURE — 80053 COMPREHEN METABOLIC PANEL: CPT | Performed by: INTERNAL MEDICINE

## 2022-10-29 PROCEDURE — 63710000001 INSULIN DETEMIR PER 5 UNITS: Performed by: INTERNAL MEDICINE

## 2022-10-29 PROCEDURE — 94799 UNLISTED PULMONARY SVC/PX: CPT

## 2022-10-29 RX ORDER — HYDROCODONE BITARTRATE AND ACETAMINOPHEN 5; 325 MG/1; MG/1
1 TABLET ORAL EVERY 4 HOURS PRN
Qty: 18 TABLET | Refills: 0 | Status: SHIPPED | OUTPATIENT
Start: 2022-10-29 | End: 2022-11-01

## 2022-10-29 RX ORDER — CYCLOBENZAPRINE HCL 5 MG
5 TABLET ORAL EVERY 8 HOURS PRN
Qty: 21 TABLET | Refills: 0 | Status: SHIPPED | OUTPATIENT
Start: 2022-10-29 | End: 2022-11-05

## 2022-10-29 RX ORDER — DIAZEPAM 5 MG/1
5 TABLET ORAL EVERY 8 HOURS PRN
Qty: 9 TABLET | Refills: 0 | Status: SHIPPED | OUTPATIENT
Start: 2022-10-29 | End: 2022-11-01

## 2022-10-29 RX ORDER — CYCLOBENZAPRINE HCL 5 MG
5 TABLET ORAL EVERY 8 HOURS PRN
Qty: 21 TABLET | Refills: 0 | Status: SHIPPED | OUTPATIENT
Start: 2022-10-29 | End: 2022-10-29 | Stop reason: SDUPTHER

## 2022-10-29 RX ORDER — DIAZEPAM 5 MG/1
5 TABLET ORAL EVERY 8 HOURS PRN
Qty: 9 TABLET | Refills: 0 | Status: SHIPPED | OUTPATIENT
Start: 2022-10-29 | End: 2022-10-29 | Stop reason: SDUPTHER

## 2022-10-29 RX ORDER — HYDROCODONE BITARTRATE AND ACETAMINOPHEN 5; 325 MG/1; MG/1
1 TABLET ORAL EVERY 4 HOURS PRN
Qty: 18 TABLET | Refills: 0 | Status: SHIPPED | OUTPATIENT
Start: 2022-10-29 | End: 2022-10-29 | Stop reason: SDUPTHER

## 2022-10-29 RX ADMIN — INSULIN DETEMIR 10 UNITS: 100 INJECTION, SOLUTION SUBCUTANEOUS at 08:39

## 2022-10-29 RX ADMIN — PREDNISONE 5 MG: 5 TABLET ORAL at 08:39

## 2022-10-29 RX ADMIN — MONTELUKAST 10 MG: 10 TABLET, FILM COATED ORAL at 08:39

## 2022-10-29 RX ADMIN — BUDESONIDE AND FORMOTEROL FUMARATE DIHYDRATE 2 PUFF: 160; 4.5 AEROSOL RESPIRATORY (INHALATION) at 08:45

## 2022-10-29 RX ADMIN — Medication 10 ML: at 08:39

## 2022-10-29 RX ADMIN — METOPROLOL SUCCINATE 12.5 MG: 25 TABLET, FILM COATED, EXTENDED RELEASE ORAL at 08:39

## 2022-10-29 RX ADMIN — APIXABAN 5 MG: 5 TABLET, FILM COATED ORAL at 08:39

## 2022-10-29 RX ADMIN — HYDROCODONE BITARTRATE AND ACETAMINOPHEN 1 TABLET: 5; 325 TABLET ORAL at 08:39

## 2022-10-29 RX ADMIN — HYDROMORPHONE HYDROCHLORIDE 0.5 MG: 1 INJECTION, SOLUTION INTRAMUSCULAR; INTRAVENOUS; SUBCUTANEOUS at 05:38

## 2022-10-29 RX ADMIN — ATORVASTATIN CALCIUM 40 MG: 40 TABLET, FILM COATED ORAL at 08:39

## 2022-10-29 NOTE — OUTREACH NOTE
Prep Survey    Flowsheet Row Responses   Denominational facility patient discharged from? [facility]   Is LACE score < 7 ? No   Emergency Room discharge w/ pulse ox? No   Eligibility [facility]   Date of Admission 10/26/22   Date of Discharge 10/29/22   Discharge Disposition Home or Self Care   Discharge diagnosis acute back pain, pancreatitis, T2DM, A-fib   Does the patient have one of the following disease processes/diagnoses(primary or secondary)? Other   Does the patient have Home health ordered? No   Is there a DME ordered? No   Prep survey completed? Yes          CATHY PERERA - Registered Nurse

## 2022-10-31 ENCOUNTER — TRANSITIONAL CARE MANAGEMENT TELEPHONE ENCOUNTER (OUTPATIENT)
Dept: CALL CENTER | Facility: HOSPITAL | Age: 62
End: 2022-10-31

## 2022-10-31 NOTE — OUTREACH NOTE
Call Center TCM Note    Flowsheet Row Responses   [facility] patient discharged from? [place]   Does the patient have one of the following disease processes/diagnoses(primary or secondary)? Other   TCM attempt successful? Yes  [No VR]   Call start time 1528   Revoked Reason Patient/Family Refused   Call end time 1529   Discharge diagnosis acute back pain, pancreatitis, T2DM, A-fib   Call end time 1529          Alyssa Snider RN    10/31/2022, 15:29 EDT

## 2022-11-02 ENCOUNTER — TELEPHONE (OUTPATIENT)
Dept: SURGERY | Facility: CLINIC | Age: 62
End: 2022-11-02

## 2022-11-04 ENCOUNTER — PRE-ADMISSION TESTING (OUTPATIENT)
Dept: PREADMISSION TESTING | Facility: HOSPITAL | Age: 62
End: 2022-11-04

## 2022-11-04 VITALS — HEIGHT: 72 IN | BODY MASS INDEX: 28.99 KG/M2 | WEIGHT: 214 LBS

## 2022-11-04 LAB
ABO GROUP BLD: NORMAL
ANION GAP SERPL CALCULATED.3IONS-SCNC: 12.4 MMOL/L (ref 5–15)
BASOPHILS # BLD AUTO: 0.09 10*3/MM3 (ref 0–0.2)
BASOPHILS NFR BLD AUTO: 1.2 % (ref 0–1.5)
BUN SERPL-MCNC: 21 MG/DL (ref 8–23)
BUN/CREAT SERPL: 19.1 (ref 7–25)
CALCIUM SPEC-SCNC: 9 MG/DL (ref 8.6–10.5)
CHLORIDE SERPL-SCNC: 98 MMOL/L (ref 98–107)
CO2 SERPL-SCNC: 24.6 MMOL/L (ref 22–29)
CREAT SERPL-MCNC: 1.1 MG/DL (ref 0.76–1.27)
DEPRECATED RDW RBC AUTO: 41 FL (ref 37–54)
EGFRCR SERPLBLD CKD-EPI 2021: 75.9 ML/MIN/1.73
EOSINOPHIL # BLD AUTO: 0.41 10*3/MM3 (ref 0–0.4)
EOSINOPHIL NFR BLD AUTO: 5.4 % (ref 0.3–6.2)
ERYTHROCYTE [DISTWIDTH] IN BLOOD BY AUTOMATED COUNT: 12.2 % (ref 12.3–15.4)
GLUCOSE SERPL-MCNC: 235 MG/DL (ref 65–99)
HCT VFR BLD AUTO: 39.3 % (ref 37.5–51)
HGB BLD-MCNC: 13.6 G/DL (ref 13–17.7)
IMM GRANULOCYTES # BLD AUTO: 0.06 10*3/MM3 (ref 0–0.05)
IMM GRANULOCYTES NFR BLD AUTO: 0.8 % (ref 0–0.5)
LYMPHOCYTES # BLD AUTO: 1.88 10*3/MM3 (ref 0.7–3.1)
LYMPHOCYTES NFR BLD AUTO: 24.8 % (ref 19.6–45.3)
MCH RBC QN AUTO: 31.9 PG (ref 26.6–33)
MCHC RBC AUTO-ENTMCNC: 34.6 G/DL (ref 31.5–35.7)
MCV RBC AUTO: 92 FL (ref 79–97)
MONOCYTES # BLD AUTO: 0.45 10*3/MM3 (ref 0.1–0.9)
MONOCYTES NFR BLD AUTO: 5.9 % (ref 5–12)
NEUTROPHILS NFR BLD AUTO: 4.68 10*3/MM3 (ref 1.7–7)
NEUTROPHILS NFR BLD AUTO: 61.9 % (ref 42.7–76)
NRBC BLD AUTO-RTO: 0 /100 WBC (ref 0–0.2)
PLATELET # BLD AUTO: 266 10*3/MM3 (ref 140–450)
PMV BLD AUTO: 10.1 FL (ref 6–12)
POTASSIUM SERPL-SCNC: 4.6 MMOL/L (ref 3.5–5.2)
RBC # BLD AUTO: 4.27 10*6/MM3 (ref 4.14–5.8)
RH BLD: POSITIVE
SODIUM SERPL-SCNC: 135 MMOL/L (ref 136–145)
WBC NRBC COR # BLD: 7.57 10*3/MM3 (ref 3.4–10.8)

## 2022-11-04 PROCEDURE — 80048 BASIC METABOLIC PNL TOTAL CA: CPT

## 2022-11-04 PROCEDURE — 86901 BLOOD TYPING SEROLOGIC RH(D): CPT

## 2022-11-04 PROCEDURE — 36415 COLL VENOUS BLD VENIPUNCTURE: CPT

## 2022-11-04 PROCEDURE — 86900 BLOOD TYPING SEROLOGIC ABO: CPT

## 2022-11-04 PROCEDURE — 85025 COMPLETE CBC W/AUTO DIFF WBC: CPT

## 2022-11-04 RX ORDER — FAMOTIDINE 20 MG/1
20 TABLET, FILM COATED ORAL NIGHTLY
COMMUNITY

## 2022-11-04 NOTE — DISCHARGE INSTRUCTIONS
Introduction to anesthesia video viewed by pt in PAT.   PAT PASS GIVEN/REVIEWED WITH PT.  VERBALIZED UNDERSTANDING OF THE FOLLOWING:  DO NOT EAT, DRINK, SMOKE, USE SMOKELESS TOBACCO OR CHEW GUM AFTER MIDNIGHT THE NIGHT BEFORE SURGERY.  THIS ALSO INCLUDES HARD CANDIES AND MINTS.    DO NOT SHAVE THE AREA TO BE OPERATED ON AT LEAST 48 HOURS PRIOR TO THE PROCEDURE.  DO NOT WEAR MAKE UP OR NAIL POLISH.  DO NOT LEAVE IN ANY PIERCING OR WEAR JEWELRY THE DAY OF SURGERY.      DO NOT USE ADHESIVES IF YOU WEAR DENTURES.    DO NOT WEAR EYE CONTACTS; BRING IN YOUR GLASSES.    ONLY TAKE MEDICATION THE MORNING OF YOUR PROCEDURE IF INSTRUCTED BY YOUR SURGEON WITH ENOUGH WATER TO SWALLOW THE MEDICATION.  IF YOUR SURGEON DID NOT SPECIFY WHICH MEDICATIONS TO TAKE, YOU WILL NEED TO CALL THEIR OFFICE FOR FURTHER INSTRUCTIONS AND DO AS THEY INSTRUCT.    LEAVE ANYTHING YOU CONSIDER VALUABLE AT HOME.    YOU WILL NEED TO ARRANGE FOR SOMEONE TO DRIVE YOU HOME AFTER SURGERY.  IT IS RECOMMENDED THAT YOU DO NOT DRIVE, WORK, DRINK ALCOHOL OR MAKE MAJOR DECISIONS FOR AT LEAST 24 HOURS AFTER YOUR PROCEDURE IS COMPLETE.      THE DAY OF YOUR PROCEDURE, BRING IN THE FOLLOWING IF APPLICABLE:   PICTURE ID AND INSURANCE/MEDICARE OR MEDICAID CARDS/ANY CO-PAY THAT MAY BE DUE   COPY OF ADVANCED DIRECTIVE/LIVING WILL/POWER OR    CPAP/BIPAP/INHALERS   SKIN PREP SHEET   YOUR PREADMISSION TESTING PASS (IF NOT A PHONE HISTORY)       Chlorhexadine wipes along with instruction/verification sheet given to pt.  Instructed pt to date, time, and initial the verification sheet once skin prep has been  completed, and to return to [facility] the day of the procedure.  Pt. Verbalizes understanding.

## 2022-11-04 NOTE — NURSING NOTE
Placed call to Maikol De La Torre regarding pt in PAT scheduled for right colon resection 11/7/22 with medical history: A-Fib, diabetes, seen in ED 10/26/22 & was admitted - admitting dx chest pain/right upper back pain.ECGs,chest xray, MRI completed. Pt stated findings were that pain related to herniated disc- denies any chest pain, SOB since discharge. Discharge notes confirm. Received order to assess mets scoring with no additional f/u required if METS >4. Pt verbalized ability to perform activities consistent with METS >4 without difficulty.

## 2022-11-07 ENCOUNTER — ANESTHESIA EVENT (OUTPATIENT)
Dept: PERIOP | Facility: HOSPITAL | Age: 62
End: 2022-11-07

## 2022-11-07 ENCOUNTER — ANESTHESIA EVENT CONVERTED (OUTPATIENT)
Dept: ANESTHESIOLOGY | Facility: HOSPITAL | Age: 62
End: 2022-11-07

## 2022-11-07 ENCOUNTER — ANESTHESIA (OUTPATIENT)
Dept: PERIOP | Facility: HOSPITAL | Age: 62
End: 2022-11-07

## 2022-11-07 ENCOUNTER — HOSPITAL ENCOUNTER (INPATIENT)
Facility: HOSPITAL | Age: 62
LOS: 4 days | Discharge: HOME | End: 2022-11-11
Attending: SURGERY | Admitting: SURGERY

## 2022-11-07 DIAGNOSIS — E11.00 TYPE 2 DIABETES MELLITUS WITH HYPEROSMOLARITY WITHOUT COMA, WITHOUT LONG-TERM CURRENT USE OF INSULIN: ICD-10-CM

## 2022-11-07 DIAGNOSIS — Z90.49 S/P RIGHT COLECTOMY: Primary | ICD-10-CM

## 2022-11-07 DIAGNOSIS — D12.6 ADENOMATOUS POLYP OF COLON, UNSPECIFIED PART OF COLON: ICD-10-CM

## 2022-11-07 LAB
GLUCOSE BLDC GLUCOMTR-MCNC: 224 MG/DL (ref 70–130)
GLUCOSE BLDC GLUCOMTR-MCNC: 228 MG/DL (ref 70–130)
GLUCOSE BLDC GLUCOMTR-MCNC: 230 MG/DL (ref 70–130)

## 2022-11-07 PROCEDURE — 94664 DEMO&/EVAL PT USE INHALER: CPT

## 2022-11-07 PROCEDURE — 88309 TISSUE EXAM BY PATHOLOGIST: CPT

## 2022-11-07 PROCEDURE — 25010000002 FENTANYL CITRATE (PF) 100 MCG/2ML SOLUTION: Performed by: NURSE ANESTHETIST, CERTIFIED REGISTERED

## 2022-11-07 PROCEDURE — 88304 TISSUE EXAM BY PATHOLOGIST: CPT

## 2022-11-07 PROCEDURE — 25010000002 CEFOXITIN SODIUM-DEXTROSE 2-2.2 GM-%(50ML) RECONSTITUTED SOLUTION: Performed by: SURGERY

## 2022-11-07 PROCEDURE — 25010000002 ONDANSETRON PER 1 MG: Performed by: SURGERY

## 2022-11-07 PROCEDURE — 25010000002 HYDROMORPHONE PER 4 MG: Performed by: NURSE ANESTHETIST, CERTIFIED REGISTERED

## 2022-11-07 PROCEDURE — 25010000002 HYDROCORTISONE SOD SUC (PF) 100 MG RECONSTITUTED SOLUTION: Performed by: NURSE ANESTHETIST, CERTIFIED REGISTERED

## 2022-11-07 PROCEDURE — 94761 N-INVAS EAR/PLS OXIMETRY MLT: CPT

## 2022-11-07 PROCEDURE — 63710000001 INSULIN ASPART PER 5 UNITS: Performed by: SURGERY

## 2022-11-07 PROCEDURE — 25010000002 PROPOFOL 200 MG/20ML EMULSION: Performed by: NURSE ANESTHETIST, CERTIFIED REGISTERED

## 2022-11-07 PROCEDURE — 94799 UNLISTED PULMONARY SVC/PX: CPT

## 2022-11-07 PROCEDURE — 25010000002 SUCCINYLCHOLINE PER 20 MG: Performed by: NURSE ANESTHETIST, CERTIFIED REGISTERED

## 2022-11-07 PROCEDURE — 82962 GLUCOSE BLOOD TEST: CPT

## 2022-11-07 PROCEDURE — 25010000002 HEPARIN (PORCINE) PER 1000 UNITS: Performed by: SURGERY

## 2022-11-07 PROCEDURE — 25010000002 CEFOXITIN PER 1 G: Performed by: SURGERY

## 2022-11-07 PROCEDURE — 94640 AIRWAY INHALATION TREATMENT: CPT

## 2022-11-07 PROCEDURE — 25010000002 DEXAMETHASONE PER 1 MG: Performed by: NURSE ANESTHETIST, CERTIFIED REGISTERED

## 2022-11-07 PROCEDURE — 44204 LAPARO PARTIAL COLECTOMY: CPT | Performed by: SURGERY

## 2022-11-07 PROCEDURE — 0DTF0ZZ RESECTION OF RIGHT LARGE INTESTINE, OPEN APPROACH: ICD-10-PCS | Performed by: SURGERY

## 2022-11-07 PROCEDURE — 25010000002 ONDANSETRON PER 1 MG: Performed by: NURSE ANESTHETIST, CERTIFIED REGISTERED

## 2022-11-07 PROCEDURE — 25010000002 HYDROMORPHONE 1 MG/ML SOLUTION: Performed by: SURGERY

## 2022-11-07 PROCEDURE — 25010000002 KETOROLAC TROMETHAMINE PER 15 MG: Performed by: SURGERY

## 2022-11-07 DEVICE — PROXIMATE RELOADABLE LINEAR CUTTER WITH SAFETY LOCK-OUT, 75MM
Type: IMPLANTABLE DEVICE | Site: ABDOMEN | Status: FUNCTIONAL
Brand: PROXIMATE

## 2022-11-07 DEVICE — PROXIMATE RELOADABLE LINEAR STAPLER
Type: IMPLANTABLE DEVICE | Site: ABDOMEN | Status: FUNCTIONAL
Brand: PROXIMATE

## 2022-11-07 DEVICE — PROXIMATE LINEAR CUTTER RELOAD, BLUE, 75MM
Type: IMPLANTABLE DEVICE | Site: ABDOMEN | Status: FUNCTIONAL
Brand: PROXIMATE

## 2022-11-07 RX ORDER — SODIUM CHLORIDE 9 MG/ML
50 INJECTION, SOLUTION INTRAVENOUS CONTINUOUS
Status: DISCONTINUED | OUTPATIENT
Start: 2022-11-07 | End: 2022-11-11 | Stop reason: HOSPADM

## 2022-11-07 RX ORDER — FAMOTIDINE 20 MG/1
20 TABLET, FILM COATED ORAL NIGHTLY
Status: DISCONTINUED | OUTPATIENT
Start: 2022-11-08 | End: 2022-11-11 | Stop reason: HOSPADM

## 2022-11-07 RX ORDER — ATORVASTATIN CALCIUM 40 MG/1
40 TABLET, FILM COATED ORAL NIGHTLY
Status: DISCONTINUED | OUTPATIENT
Start: 2022-11-08 | End: 2022-11-11 | Stop reason: HOSPADM

## 2022-11-07 RX ORDER — NICOTINE POLACRILEX 4 MG
15 LOZENGE BUCCAL
Status: DISCONTINUED | OUTPATIENT
Start: 2022-11-07 | End: 2022-11-11 | Stop reason: HOSPADM

## 2022-11-07 RX ORDER — GABAPENTIN 300 MG/1
300 CAPSULE ORAL NIGHTLY
Status: DISCONTINUED | OUTPATIENT
Start: 2022-11-07 | End: 2022-11-11 | Stop reason: HOSPADM

## 2022-11-07 RX ORDER — SUCCINYLCHOLINE CHLORIDE 20 MG/ML
INJECTION INTRAMUSCULAR; INTRAVENOUS AS NEEDED
Status: DISCONTINUED | OUTPATIENT
Start: 2022-11-07 | End: 2022-11-07 | Stop reason: SURG

## 2022-11-07 RX ORDER — DEXAMETHASONE SODIUM PHOSPHATE 4 MG/ML
INJECTION, SOLUTION INTRA-ARTICULAR; INTRALESIONAL; INTRAMUSCULAR; INTRAVENOUS; SOFT TISSUE AS NEEDED
Status: DISCONTINUED | OUTPATIENT
Start: 2022-11-07 | End: 2022-11-07 | Stop reason: SURG

## 2022-11-07 RX ORDER — ATORVASTATIN CALCIUM 40 MG/1
40 TABLET, FILM COATED ORAL DAILY
Status: DISCONTINUED | OUTPATIENT
Start: 2022-11-07 | End: 2022-11-07

## 2022-11-07 RX ORDER — HEPARIN SODIUM 5000 [USP'U]/ML
5000 INJECTION, SOLUTION INTRAVENOUS; SUBCUTANEOUS ONCE
Status: COMPLETED | OUTPATIENT
Start: 2022-11-07 | End: 2022-11-07

## 2022-11-07 RX ORDER — INSULIN ASPART 100 [IU]/ML
0-9 INJECTION, SOLUTION INTRAVENOUS; SUBCUTANEOUS
Status: DISCONTINUED | OUTPATIENT
Start: 2022-11-07 | End: 2022-11-11 | Stop reason: HOSPADM

## 2022-11-07 RX ORDER — HYDROMORPHONE HCL 110MG/55ML
PATIENT CONTROLLED ANALGESIA SYRINGE INTRAVENOUS AS NEEDED
Status: DISCONTINUED | OUTPATIENT
Start: 2022-11-07 | End: 2022-11-07 | Stop reason: SURG

## 2022-11-07 RX ORDER — ACETAMINOPHEN 500 MG
1000 TABLET ORAL EVERY 6 HOURS SCHEDULED
Status: DISCONTINUED | OUTPATIENT
Start: 2022-11-07 | End: 2022-11-10

## 2022-11-07 RX ORDER — ALBUTEROL SULFATE 2.5 MG/3ML
2.5 SOLUTION RESPIRATORY (INHALATION) EVERY 4 HOURS PRN
Status: DISCONTINUED | OUTPATIENT
Start: 2022-11-07 | End: 2022-11-11 | Stop reason: HOSPADM

## 2022-11-07 RX ORDER — BUDESONIDE AND FORMOTEROL FUMARATE DIHYDRATE 160; 4.5 UG/1; UG/1
2 AEROSOL RESPIRATORY (INHALATION) 2 TIMES DAILY
Status: DISCONTINUED | OUTPATIENT
Start: 2022-11-07 | End: 2022-11-11 | Stop reason: HOSPADM

## 2022-11-07 RX ORDER — HEPARIN SODIUM 5000 [USP'U]/ML
5000 INJECTION, SOLUTION INTRAVENOUS; SUBCUTANEOUS EVERY 8 HOURS SCHEDULED
Status: DISCONTINUED | OUTPATIENT
Start: 2022-11-08 | End: 2022-11-11 | Stop reason: HOSPADM

## 2022-11-07 RX ORDER — DEXTROSE MONOHYDRATE 25 G/50ML
25 INJECTION, SOLUTION INTRAVENOUS
Status: DISCONTINUED | OUTPATIENT
Start: 2022-11-07 | End: 2022-11-11 | Stop reason: HOSPADM

## 2022-11-07 RX ORDER — ONDANSETRON 2 MG/ML
INJECTION INTRAMUSCULAR; INTRAVENOUS AS NEEDED
Status: DISCONTINUED | OUTPATIENT
Start: 2022-11-07 | End: 2022-11-07 | Stop reason: SURG

## 2022-11-07 RX ORDER — PREDNISONE 10 MG/1
5 TABLET ORAL DAILY
Status: DISCONTINUED | OUTPATIENT
Start: 2022-11-07 | End: 2022-11-11 | Stop reason: HOSPADM

## 2022-11-07 RX ORDER — FAMOTIDINE 20 MG/1
20 TABLET, FILM COATED ORAL DAILY
Status: DISCONTINUED | OUTPATIENT
Start: 2022-11-07 | End: 2022-11-07

## 2022-11-07 RX ORDER — SCOLOPAMINE TRANSDERMAL SYSTEM 1 MG/1
1 PATCH, EXTENDED RELEASE TRANSDERMAL CONTINUOUS
Status: DISCONTINUED | OUTPATIENT
Start: 2022-11-07 | End: 2022-11-10

## 2022-11-07 RX ORDER — SODIUM CHLORIDE, SODIUM LACTATE, POTASSIUM CHLORIDE, CALCIUM CHLORIDE 600; 310; 30; 20 MG/100ML; MG/100ML; MG/100ML; MG/100ML
1000 INJECTION, SOLUTION INTRAVENOUS CONTINUOUS
Status: DISCONTINUED | OUTPATIENT
Start: 2022-11-07 | End: 2022-11-07

## 2022-11-07 RX ORDER — CEFOXITIN SODIUM 1 G/50ML
1 INJECTION, SOLUTION INTRAVENOUS EVERY 6 HOURS
Status: DISCONTINUED | OUTPATIENT
Start: 2022-11-07 | End: 2022-11-07

## 2022-11-07 RX ORDER — OMEGA-3S/DHA/EPA/FISH OIL/D3 300MG-1000
400 CAPSULE ORAL DAILY
Status: DISCONTINUED | OUTPATIENT
Start: 2022-11-07 | End: 2022-11-11 | Stop reason: HOSPADM

## 2022-11-07 RX ORDER — METOPROLOL SUCCINATE 25 MG/1
12.5 TABLET, EXTENDED RELEASE ORAL NIGHTLY
Status: DISCONTINUED | OUTPATIENT
Start: 2022-11-08 | End: 2022-11-11 | Stop reason: HOSPADM

## 2022-11-07 RX ORDER — ONDANSETRON 2 MG/ML
4 INJECTION INTRAMUSCULAR; INTRAVENOUS EVERY 6 HOURS PRN
Status: DISCONTINUED | OUTPATIENT
Start: 2022-11-07 | End: 2022-11-11 | Stop reason: HOSPADM

## 2022-11-07 RX ORDER — FENTANYL CITRATE 50 UG/ML
INJECTION, SOLUTION INTRAMUSCULAR; INTRAVENOUS AS NEEDED
Status: DISCONTINUED | OUTPATIENT
Start: 2022-11-07 | End: 2022-11-07 | Stop reason: SURG

## 2022-11-07 RX ORDER — MONTELUKAST SODIUM 10 MG/1
10 TABLET ORAL NIGHTLY
Status: DISCONTINUED | OUTPATIENT
Start: 2022-11-08 | End: 2022-11-11 | Stop reason: HOSPADM

## 2022-11-07 RX ORDER — CEFOXITIN SODIUM 2 G/50ML
2 INJECTION, SOLUTION INTRAVENOUS
Status: COMPLETED | OUTPATIENT
Start: 2022-11-07 | End: 2022-11-07

## 2022-11-07 RX ORDER — LORAZEPAM 0.5 MG/1
0.5 TABLET ORAL EVERY 8 HOURS PRN
Status: DISCONTINUED | OUTPATIENT
Start: 2022-11-07 | End: 2022-11-10

## 2022-11-07 RX ORDER — ACETAMINOPHEN 500 MG
1000 TABLET ORAL ONCE
Status: COMPLETED | OUTPATIENT
Start: 2022-11-07 | End: 2022-11-07

## 2022-11-07 RX ORDER — LIDOCAINE HYDROCHLORIDE 20 MG/ML
INJECTION, SOLUTION INTRAVENOUS AS NEEDED
Status: DISCONTINUED | OUTPATIENT
Start: 2022-11-07 | End: 2022-11-07 | Stop reason: SURG

## 2022-11-07 RX ORDER — KETOROLAC TROMETHAMINE 30 MG/ML
15 INJECTION, SOLUTION INTRAMUSCULAR; INTRAVENOUS EVERY 6 HOURS
Status: COMPLETED | OUTPATIENT
Start: 2022-11-07 | End: 2022-11-09

## 2022-11-07 RX ORDER — CYCLOBENZAPRINE HCL 5 MG
5 TABLET ORAL 4 TIMES DAILY PRN
COMMUNITY

## 2022-11-07 RX ORDER — MAGNESIUM HYDROXIDE 1200 MG/15ML
LIQUID ORAL AS NEEDED
Status: DISCONTINUED | OUTPATIENT
Start: 2022-11-07 | End: 2022-11-07 | Stop reason: HOSPADM

## 2022-11-07 RX ORDER — PROPOFOL 10 MG/ML
INJECTION, EMULSION INTRAVENOUS AS NEEDED
Status: DISCONTINUED | OUTPATIENT
Start: 2022-11-07 | End: 2022-11-07 | Stop reason: SURG

## 2022-11-07 RX ORDER — ROCURONIUM BROMIDE 10 MG/ML
INJECTION, SOLUTION INTRAVENOUS AS NEEDED
Status: DISCONTINUED | OUTPATIENT
Start: 2022-11-07 | End: 2022-11-07 | Stop reason: SURG

## 2022-11-07 RX ORDER — HYDROCODONE BITARTRATE AND ACETAMINOPHEN 5; 325 MG/1; MG/1
1 TABLET ORAL EVERY 8 HOURS PRN
COMMUNITY
End: 2022-12-15

## 2022-11-07 RX ORDER — CEFOXITIN SODIUM 2 G/50ML
2 INJECTION, SOLUTION INTRAVENOUS ONCE
Status: DISCONTINUED | OUTPATIENT
Start: 2022-11-07 | End: 2022-11-07 | Stop reason: HOSPADM

## 2022-11-07 RX ORDER — NALOXONE HCL 0.4 MG/ML
0.4 VIAL (ML) INJECTION
Status: DISCONTINUED | OUTPATIENT
Start: 2022-11-07 | End: 2022-11-11 | Stop reason: HOSPADM

## 2022-11-07 RX ORDER — METOPROLOL SUCCINATE 25 MG/1
12.5 TABLET, EXTENDED RELEASE ORAL DAILY
Status: DISCONTINUED | OUTPATIENT
Start: 2022-11-07 | End: 2022-11-07

## 2022-11-07 RX ORDER — BUPIVACAINE HYDROCHLORIDE 2.5 MG/ML
INJECTION, SOLUTION EPIDURAL; INFILTRATION; INTRACAUDAL
Status: COMPLETED | OUTPATIENT
Start: 2022-11-07 | End: 2022-11-07

## 2022-11-07 RX ORDER — DIAZEPAM 5 MG/1
5 TABLET ORAL EVERY 6 HOURS PRN
COMMUNITY

## 2022-11-07 RX ORDER — MONTELUKAST SODIUM 10 MG/1
10 TABLET ORAL DAILY
Status: DISCONTINUED | OUTPATIENT
Start: 2022-11-07 | End: 2022-11-07

## 2022-11-07 RX ADMIN — BUDESONIDE AND FORMOTEROL FUMARATE DIHYDRATE 2 PUFF: 160; 4.5 AEROSOL RESPIRATORY (INHALATION) at 19:25

## 2022-11-07 RX ADMIN — ONDANSETRON 4 MG: 2 INJECTION INTRAMUSCULAR; INTRAVENOUS at 20:26

## 2022-11-07 RX ADMIN — SUGAMMADEX 250 MG: 100 INJECTION, SOLUTION INTRAVENOUS at 13:05

## 2022-11-07 RX ADMIN — INSULIN ASPART 4 UNITS: 100 INJECTION, SOLUTION INTRAVENOUS; SUBCUTANEOUS at 17:17

## 2022-11-07 RX ADMIN — HEPARIN SODIUM 5000 UNITS: 5000 INJECTION INTRAVENOUS; SUBCUTANEOUS at 08:49

## 2022-11-07 RX ADMIN — BUPIVACAINE HYDROCHLORIDE 60 ML: 2.5 INJECTION, SOLUTION EPIDURAL; INFILTRATION; INTRACAUDAL; PERINEURAL at 13:05

## 2022-11-07 RX ADMIN — HYDROMORPHONE HYDROCHLORIDE 0.5 MG: 1 INJECTION, SOLUTION INTRAMUSCULAR; INTRAVENOUS; SUBCUTANEOUS at 17:29

## 2022-11-07 RX ADMIN — HYDROMORPHONE HYDROCHLORIDE 0.5 MG: 1 INJECTION, SOLUTION INTRAMUSCULAR; INTRAVENOUS; SUBCUTANEOUS at 20:10

## 2022-11-07 RX ADMIN — KETOROLAC TROMETHAMINE 15 MG: 30 INJECTION, SOLUTION INTRAMUSCULAR; INTRAVENOUS at 15:30

## 2022-11-07 RX ADMIN — EPHEDRINE SULFATE 10 MG: 50 INJECTION INTRAMUSCULAR; INTRAVENOUS; SUBCUTANEOUS at 10:06

## 2022-11-07 RX ADMIN — CEFOXITIN SODIUM 2 G: 2 INJECTION, SOLUTION INTRAVENOUS at 09:53

## 2022-11-07 RX ADMIN — HYDROCORTISONE SODIUM SUCCINATE 100 MG: 100 INJECTION, POWDER, FOR SOLUTION INTRAMUSCULAR; INTRAVENOUS at 09:51

## 2022-11-07 RX ADMIN — ROCURONIUM BROMIDE 10 MG: 10 INJECTION INTRAVENOUS at 10:34

## 2022-11-07 RX ADMIN — ROCURONIUM BROMIDE 20 MG: 10 INJECTION INTRAVENOUS at 11:14

## 2022-11-07 RX ADMIN — KETOROLAC TROMETHAMINE 15 MG: 30 INJECTION, SOLUTION INTRAMUSCULAR; INTRAVENOUS at 21:33

## 2022-11-07 RX ADMIN — CEFOXITIN SODIUM 2 G: 2 INJECTION, SOLUTION INTRAVENOUS at 11:53

## 2022-11-07 RX ADMIN — EPHEDRINE SULFATE 10 MG: 50 INJECTION INTRAMUSCULAR; INTRAVENOUS; SUBCUTANEOUS at 10:09

## 2022-11-07 RX ADMIN — SODIUM CHLORIDE 75 ML/HR: 9 INJECTION, SOLUTION INTRAVENOUS at 14:11

## 2022-11-07 RX ADMIN — EPHEDRINE SULFATE 10 MG: 50 INJECTION INTRAMUSCULAR; INTRAVENOUS; SUBCUTANEOUS at 10:02

## 2022-11-07 RX ADMIN — ONDANSETRON 4 MG: 2 INJECTION INTRAMUSCULAR; INTRAVENOUS at 10:01

## 2022-11-07 RX ADMIN — ROCURONIUM BROMIDE 50 MG: 10 INJECTION INTRAVENOUS at 10:04

## 2022-11-07 RX ADMIN — HYDROMORPHONE HYDROCHLORIDE 1 MG: 2 INJECTION, SOLUTION INTRAMUSCULAR; INTRAVENOUS; SUBCUTANEOUS at 10:24

## 2022-11-07 RX ADMIN — DEXAMETHASONE SODIUM PHOSPHATE 8 MG: 4 INJECTION, SOLUTION INTRAMUSCULAR; INTRAVENOUS at 10:01

## 2022-11-07 RX ADMIN — CEFOXITIN SODIUM 1 G: 1 POWDER, FOR SOLUTION INTRAVENOUS at 17:29

## 2022-11-07 RX ADMIN — PROPOFOL 170 MG: 10 INJECTION, EMULSION INTRAVENOUS at 09:53

## 2022-11-07 RX ADMIN — SUCCINYLCHOLINE CHLORIDE 180 MG: 20 INJECTION, SOLUTION INTRAMUSCULAR; INTRAVENOUS at 09:53

## 2022-11-07 RX ADMIN — ACETAMINOPHEN 1000 MG: 500 TABLET ORAL at 08:49

## 2022-11-07 RX ADMIN — HYDROMORPHONE HYDROCHLORIDE 1 MG: 2 INJECTION, SOLUTION INTRAMUSCULAR; INTRAVENOUS; SUBCUTANEOUS at 10:27

## 2022-11-07 RX ADMIN — ACETAMINOPHEN 1000 MG: 500 TABLET, FILM COATED ORAL at 17:17

## 2022-11-07 RX ADMIN — FENTANYL CITRATE 100 MCG: 50 INJECTION INTRAMUSCULAR; INTRAVENOUS at 09:50

## 2022-11-07 RX ADMIN — LIDOCAINE HYDROCHLORIDE 60 MG: 20 INJECTION, SOLUTION INTRAVENOUS at 09:50

## 2022-11-07 RX ADMIN — SCOLOPAMINE TRANSDERMAL SYSTEM 1 PATCH: 1 PATCH, EXTENDED RELEASE TRANSDERMAL at 08:50

## 2022-11-07 RX ADMIN — GABAPENTIN 300 MG: 300 CAPSULE ORAL at 20:10

## 2022-11-07 RX ADMIN — SODIUM CHLORIDE, POTASSIUM CHLORIDE, SODIUM LACTATE AND CALCIUM CHLORIDE 1000 ML: 600; 310; 30; 20 INJECTION, SOLUTION INTRAVENOUS at 09:35

## 2022-11-07 NOTE — ANESTHESIA PROCEDURE NOTES
Airway  Urgency: elective    Date/Time: 11/7/2022 9:54 AM  Airway not difficult    General Information and Staff    Patient location during procedure: OR  CRNA/CAA: Florian Linn CRNA    Indications and Patient Condition  Indications for airway management: airway protection    Preoxygenated: yes  MILS maintained throughout  Mask difficulty assessment: 0 - not attempted    Final Airway Details  Final airway type: endotracheal airway      Successful airway: ETT  Cuffed: yes   Successful intubation technique: video laryngoscopy  Endotracheal tube insertion site: oral  Blade: Syed  Blade size: 3  ETT size (mm): 8.0  Cormack-Lehane Classification: grade I - full view of glottis  Placement verified by: chest auscultation and capnometry   Measured from: teeth  ETT/EBT  to teeth (cm): 22  Number of attempts at approach: 1  Assessment: lips, teeth, and gum same as pre-op and atraumatic intubation    Additional Comments  Pt stated comfortable head and neck position prior to induction.

## 2022-11-07 NOTE — ADDENDUM NOTE
Addendum  created 11/07/22 1430 by Florian Linn CRNA    Flowsheet accepted, Intraprocedure Meds edited

## 2022-11-07 NOTE — ANESTHESIA POSTPROCEDURE EVALUATION
Patient: Darnell Garcia    Procedure Summary     Date: 11/07/22 Room / Location: [facility] OR  /  [facility] OR    Anesthesia Start: 0949 Anesthesia Stop: 1323    Procedure: LAPROSCOPIC RIGHT COLON RESECTION HAND ASSIST (Right: Abdomen) Diagnosis:       Adenomatous polyp of colon, unspecified part of colon      (Adenomatous polyp of colon, unspecified part of colon [D12.6])    Surgeons: Kalli Aburto MD Provider: Florian Linn CRNA    Anesthesia Type: general ASA Status: 3          Anesthesia Type: general    Vitals  Vitals Value Taken Time   /79 11/07/22 1320   Temp     Pulse 89 11/07/22 1322   Resp     SpO2 98 % 11/07/22 1322   Vitals shown include unvalidated device data.        Post Anesthesia Care and Evaluation    Patient location during evaluation: PACU  Patient participation: complete - patient participated  Level of consciousness: awake and alert and sleepy but conscious  Pain score: 2  Pain management: adequate    Airway patency: patent  Anesthetic complications: No anesthetic complications  PONV Status: none  Cardiovascular status: acceptable  Respiratory status: acceptable and face mask  Hydration status: acceptable

## 2022-11-07 NOTE — PLAN OF CARE
Goal Outcome Evaluation:  Plan of Care Reviewed With: patient, spouse        Progress: no change   Admit from PACU after Lap Hand assist colon resection by Dr. Aburto. He is A+O x 4. Wife at bedside. Pain managed per MAR. He is currently on 2 L NC until the anesthesia wears off. He does not wear oxygen at home. Incision is CDI with scan old drainage.

## 2022-11-07 NOTE — ANESTHESIA PREPROCEDURE EVALUATION
Anesthesia Evaluation     Patient summary reviewed and Nursing notes reviewed   no history of anesthetic complications:  NPO Solid Status: > 8 hours  NPO Liquid Status: > 8 hours           Airway   Mallampati: II  TM distance: <3 FB  Neck ROM: limited  Possible difficult intubation  Dental - normal exam   (+) edentulous    Pulmonary - normal exam   (+) asthma,sleep apnea (Non compliant with cpap),   Cardiovascular     ECG reviewed  PT is on anticoagulation therapy  Patient on routine beta blocker and Beta blocker given within 24 hours of surgery  Rhythm: irregular    (+) valvular problems/murmurs MR, CAD, dysrhythmias Atrial Fib, Tachycardia, hyperlipidemia,     ROS comment: Echo/GENESIS 2021  Interpretation Summary    1.  Grossly normal LV systolic function, assessment limited by tachycardia.  2.  Trace mitral regurgitation.  3.  No evidence of left atrial appendage thrombus.  4.  Successful synchronized cardioversion x1 with restoration of sinus rhythm.    Nuclear med stress 2019  Interpretation Summary    1. Normal pharmacologic stress test with no evidence of inducible ischemia.   2. Normal LV systolic function, LVEF 77%.           Neuro/Psych  (+) numbness,    GI/Hepatic/Renal/Endo    (+)  GERD,  diabetes mellitus type 2 poorly controlled using insulin,     Musculoskeletal     (+) arthralgias, back pain, chronic pain, neck pain, neck stiffness,   Abdominal    Substance History   (+) alcohol use,      OB/GYN negative ob/gyn ROS         Other   arthritis,      ROS/Med Hx Other: EKG RBBB                  Anesthesia Plan    ASA 3     general     (Risks and benefits discussed including risk of aspiration, recall and dental damage. All patient questions answered. Will continue with POC.    )  intravenous induction     Anesthetic plan, risks, benefits, and alternatives have been provided, discussed and informed consent has been obtained with: patient.  Pre-procedure education provided      CODE STATUS:

## 2022-11-07 NOTE — ANESTHESIA PROCEDURE NOTES
Peripheral Block      Patient reassessed immediately prior to procedure    Start time: 11/7/2022 1:00 PM  Stop time: 11/7/2022 1:10 PM  Reason for block: at surgeon's request and post-op pain management  Preanesthetic Checklist  Completed: patient identified, IV checked, site marked, risks and benefits discussed, surgical consent, monitors and equipment checked, pre-op evaluation and timeout performed  Prep:  Pt Position: supine  Sterile barriers:gloves, cap, sterile barriers and mask  Prep: ChloraPrep  Patient monitoring: blood pressure monitoring, continuous pulse oximetry and EKG  Procedure    Guidance:ultrasound guided  Images:still images obtained    Laterality:Bilateral  Block Type:TAP  Injection Technique:single-shot  Needle Type:echogenic  Needle Gauge:21 G  Resistance on Injection: none    Medications Used: bupivacaine PF (MARCAINE) injection 0.25% - Injection   60 mL - 11/7/2022 1:05:00 PM      Medications  Comment:Block Injection: LA dose divided between Right and Left Block  Adjuncts per total volume of LA:    Decadron 10 mg PSF      If required, intravenous sedation was given -- see meds on anesthesia record.    Post Assessment  Injection Assessment: negative aspiration for heme, no paresthesia on injection and incremental injection  Patient Tolerance:comfortable throughout block  Complications:no  Additional Notes  Procedure:      BILATERAL TAP BLOCKS                             Patient analgesia was achieved with General Anesthesia    The pt was placed in the Supine Position and under Ultrasound guidance, an echogenic or touhy needle was advanced with Normal Saline hydro dissection of tissue.  The Internal Oblique and Transversus Abdominus muscles were visualized.  At or before the aponeurosis of Internal Oblique, the local anesthetic spread was visualized in the Transversus Abdominus Plane. Injection was made incrementally with aspiration every 5 mls.  There was no intravascular injection;  injection  pressure was normal; there was no neural injection; and the procedure was completed without difficulty.

## 2022-11-08 LAB
ANION GAP SERPL CALCULATED.3IONS-SCNC: 11.2 MMOL/L (ref 5–15)
BASOPHILS # BLD AUTO: 0.02 10*3/MM3 (ref 0–0.2)
BASOPHILS NFR BLD AUTO: 0.2 % (ref 0–1.5)
BUN SERPL-MCNC: 20 MG/DL (ref 8–23)
BUN/CREAT SERPL: 22.5 (ref 7–25)
CALCIUM SPEC-SCNC: 8.3 MG/DL (ref 8.6–10.5)
CHLORIDE SERPL-SCNC: 105 MMOL/L (ref 98–107)
CO2 SERPL-SCNC: 22.8 MMOL/L (ref 22–29)
CREAT SERPL-MCNC: 0.89 MG/DL (ref 0.76–1.27)
DEPRECATED RDW RBC AUTO: 41.7 FL (ref 37–54)
EGFRCR SERPLBLD CKD-EPI 2021: 96.9 ML/MIN/1.73
EOSINOPHIL # BLD AUTO: 0 10*3/MM3 (ref 0–0.4)
EOSINOPHIL NFR BLD AUTO: 0 % (ref 0.3–6.2)
ERYTHROCYTE [DISTWIDTH] IN BLOOD BY AUTOMATED COUNT: 12.1 % (ref 12.3–15.4)
GLUCOSE BLDC GLUCOMTR-MCNC: 164 MG/DL (ref 70–130)
GLUCOSE BLDC GLUCOMTR-MCNC: 175 MG/DL (ref 70–130)
GLUCOSE BLDC GLUCOMTR-MCNC: 179 MG/DL (ref 70–130)
GLUCOSE BLDC GLUCOMTR-MCNC: 208 MG/DL (ref 70–130)
GLUCOSE SERPL-MCNC: 177 MG/DL (ref 65–99)
HCT VFR BLD AUTO: 34.7 % (ref 37.5–51)
HGB BLD-MCNC: 11.9 G/DL (ref 13–17.7)
IMM GRANULOCYTES # BLD AUTO: 0.06 10*3/MM3 (ref 0–0.05)
IMM GRANULOCYTES NFR BLD AUTO: 0.6 % (ref 0–0.5)
LYMPHOCYTES # BLD AUTO: 0.89 10*3/MM3 (ref 0.7–3.1)
LYMPHOCYTES NFR BLD AUTO: 8.3 % (ref 19.6–45.3)
MAGNESIUM SERPL-MCNC: 2.1 MG/DL (ref 1.6–2.4)
MCH RBC QN AUTO: 31.8 PG (ref 26.6–33)
MCHC RBC AUTO-ENTMCNC: 34.3 G/DL (ref 31.5–35.7)
MCV RBC AUTO: 92.8 FL (ref 79–97)
MONOCYTES # BLD AUTO: 0.78 10*3/MM3 (ref 0.1–0.9)
MONOCYTES NFR BLD AUTO: 7.2 % (ref 5–12)
NEUTROPHILS NFR BLD AUTO: 83.7 % (ref 42.7–76)
NEUTROPHILS NFR BLD AUTO: 9.03 10*3/MM3 (ref 1.7–7)
NRBC BLD AUTO-RTO: 0 /100 WBC (ref 0–0.2)
PLATELET # BLD AUTO: 271 10*3/MM3 (ref 140–450)
PMV BLD AUTO: 9.9 FL (ref 6–12)
POTASSIUM SERPL-SCNC: 4.9 MMOL/L (ref 3.5–5.2)
RBC # BLD AUTO: 3.74 10*6/MM3 (ref 4.14–5.8)
SODIUM SERPL-SCNC: 139 MMOL/L (ref 136–145)
WBC NRBC COR # BLD: 10.78 10*3/MM3 (ref 3.4–10.8)

## 2022-11-08 PROCEDURE — 99024 POSTOP FOLLOW-UP VISIT: CPT | Performed by: SURGERY

## 2022-11-08 PROCEDURE — 94799 UNLISTED PULMONARY SVC/PX: CPT

## 2022-11-08 PROCEDURE — 85025 COMPLETE CBC W/AUTO DIFF WBC: CPT | Performed by: SURGERY

## 2022-11-08 PROCEDURE — 25010000002 HYDROMORPHONE 1 MG/ML SOLUTION: Performed by: SURGERY

## 2022-11-08 PROCEDURE — 63710000001 PREDNISONE PER 5 MG: Performed by: SURGERY

## 2022-11-08 PROCEDURE — 83735 ASSAY OF MAGNESIUM: CPT | Performed by: SURGERY

## 2022-11-08 PROCEDURE — 63710000001 INSULIN ASPART PER 5 UNITS: Performed by: SURGERY

## 2022-11-08 PROCEDURE — 25010000002 KETOROLAC TROMETHAMINE PER 15 MG: Performed by: SURGERY

## 2022-11-08 PROCEDURE — 63710000001 INSULIN DETEMIR PER 5 UNITS: Performed by: SURGERY

## 2022-11-08 PROCEDURE — 80048 BASIC METABOLIC PNL TOTAL CA: CPT | Performed by: SURGERY

## 2022-11-08 PROCEDURE — 82962 GLUCOSE BLOOD TEST: CPT

## 2022-11-08 PROCEDURE — 25010000002 HEPARIN (PORCINE) PER 1000 UNITS: Performed by: SURGERY

## 2022-11-08 PROCEDURE — 25010000002 CEFOXITIN PER 1 G: Performed by: SURGERY

## 2022-11-08 RX ORDER — CYCLOBENZAPRINE HCL 10 MG
5 TABLET ORAL 4 TIMES DAILY PRN
Status: DISCONTINUED | OUTPATIENT
Start: 2022-11-08 | End: 2022-11-11 | Stop reason: HOSPADM

## 2022-11-08 RX ORDER — DIAZEPAM 5 MG/1
5 TABLET ORAL EVERY 6 HOURS PRN
Status: DISCONTINUED | OUTPATIENT
Start: 2022-11-08 | End: 2022-11-11 | Stop reason: HOSPADM

## 2022-11-08 RX ADMIN — SODIUM CHLORIDE 75 ML/HR: 9 INJECTION, SOLUTION INTRAVENOUS at 03:54

## 2022-11-08 RX ADMIN — INSULIN ASPART 2 UNITS: 100 INJECTION, SOLUTION INTRAVENOUS; SUBCUTANEOUS at 17:50

## 2022-11-08 RX ADMIN — PREDNISONE 5 MG: 10 TABLET ORAL at 09:22

## 2022-11-08 RX ADMIN — ACETAMINOPHEN 1000 MG: 500 TABLET, FILM COATED ORAL at 23:08

## 2022-11-08 RX ADMIN — HEPARIN SODIUM 5000 UNITS: 5000 INJECTION INTRAVENOUS; SUBCUTANEOUS at 14:51

## 2022-11-08 RX ADMIN — ACETAMINOPHEN 1000 MG: 500 TABLET, FILM COATED ORAL at 17:50

## 2022-11-08 RX ADMIN — INSULIN ASPART 2 UNITS: 100 INJECTION, SOLUTION INTRAVENOUS; SUBCUTANEOUS at 07:00

## 2022-11-08 RX ADMIN — HYDROMORPHONE HYDROCHLORIDE 0.5 MG: 1 INJECTION, SOLUTION INTRAMUSCULAR; INTRAVENOUS; SUBCUTANEOUS at 11:35

## 2022-11-08 RX ADMIN — MONTELUKAST 10 MG: 10 TABLET, FILM COATED ORAL at 20:54

## 2022-11-08 RX ADMIN — CEFOXITIN SODIUM 1 G: 1 POWDER, FOR SOLUTION INTRAVENOUS at 06:29

## 2022-11-08 RX ADMIN — FAMOTIDINE 20 MG: 20 TABLET ORAL at 20:54

## 2022-11-08 RX ADMIN — BUDESONIDE AND FORMOTEROL FUMARATE DIHYDRATE 2 PUFF: 160; 4.5 AEROSOL RESPIRATORY (INHALATION) at 06:51

## 2022-11-08 RX ADMIN — ACETAMINOPHEN 1000 MG: 500 TABLET, FILM COATED ORAL at 11:35

## 2022-11-08 RX ADMIN — HEPARIN SODIUM 5000 UNITS: 5000 INJECTION INTRAVENOUS; SUBCUTANEOUS at 23:07

## 2022-11-08 RX ADMIN — METOPROLOL SUCCINATE 12.5 MG: 25 TABLET, FILM COATED, EXTENDED RELEASE ORAL at 20:54

## 2022-11-08 RX ADMIN — KETOROLAC TROMETHAMINE 15 MG: 30 INJECTION, SOLUTION INTRAMUSCULAR; INTRAVENOUS at 09:22

## 2022-11-08 RX ADMIN — HEPARIN SODIUM 5000 UNITS: 5000 INJECTION INTRAVENOUS; SUBCUTANEOUS at 06:29

## 2022-11-08 RX ADMIN — HYDROMORPHONE HYDROCHLORIDE 0.5 MG: 1 INJECTION, SOLUTION INTRAMUSCULAR; INTRAVENOUS; SUBCUTANEOUS at 19:36

## 2022-11-08 RX ADMIN — ACETAMINOPHEN 1000 MG: 500 TABLET, FILM COATED ORAL at 00:09

## 2022-11-08 RX ADMIN — SODIUM CHLORIDE 75 ML/HR: 9 INJECTION, SOLUTION INTRAVENOUS at 18:29

## 2022-11-08 RX ADMIN — INSULIN ASPART 4 UNITS: 100 INJECTION, SOLUTION INTRAVENOUS; SUBCUTANEOUS at 11:35

## 2022-11-08 RX ADMIN — CEFOXITIN SODIUM 1 G: 1 POWDER, FOR SOLUTION INTRAVENOUS at 00:09

## 2022-11-08 RX ADMIN — KETOROLAC TROMETHAMINE 15 MG: 30 INJECTION, SOLUTION INTRAMUSCULAR; INTRAVENOUS at 20:55

## 2022-11-08 RX ADMIN — BUDESONIDE AND FORMOTEROL FUMARATE DIHYDRATE 2 PUFF: 160; 4.5 AEROSOL RESPIRATORY (INHALATION) at 19:42

## 2022-11-08 RX ADMIN — KETOROLAC TROMETHAMINE 15 MG: 30 INJECTION, SOLUTION INTRAMUSCULAR; INTRAVENOUS at 14:51

## 2022-11-08 RX ADMIN — HYDROMORPHONE HYDROCHLORIDE 0.5 MG: 1 INJECTION, SOLUTION INTRAMUSCULAR; INTRAVENOUS; SUBCUTANEOUS at 04:51

## 2022-11-08 RX ADMIN — KETOROLAC TROMETHAMINE 15 MG: 30 INJECTION, SOLUTION INTRAMUSCULAR; INTRAVENOUS at 03:54

## 2022-11-08 RX ADMIN — GABAPENTIN 300 MG: 300 CAPSULE ORAL at 20:54

## 2022-11-08 RX ADMIN — CHOLECALCIFEROL TAB 10 MCG (400 UNIT) 400 UNITS: 10 TAB at 09:22

## 2022-11-08 RX ADMIN — ATORVASTATIN CALCIUM 40 MG: 40 TABLET, FILM COATED ORAL at 20:54

## 2022-11-08 RX ADMIN — ACETAMINOPHEN 1000 MG: 500 TABLET, FILM COATED ORAL at 06:29

## 2022-11-08 RX ADMIN — INSULIN DETEMIR 15 UNITS: 100 INJECTION, SOLUTION SUBCUTANEOUS at 17:50

## 2022-11-08 NOTE — PLAN OF CARE
Goal Outcome Evaluation:  Plan of Care Reviewed With: patient        Progress: improving  Outcome Evaluation: VSS.  Pt sit up in chair for most of shift.  He ambulated in his room and in hallway.  Ace catheter was removed today and pt did have UOP.  No other changes in pt condition to report.  Will monitor.

## 2022-11-08 NOTE — CONSULTS
Patient: Darnell Garcia  Procedure(s):  LAPROSCOPIC RIGHT COLON RESECTION HAND ASSIST  Anesthesia type: general    Patient location: [facility] Surgical Floor  Last vitals:   Vitals:    11/08/22 0732   BP: 128/58   Pulse: 70   Resp: 16   Temp: 98.4 °F (36.9 °C)   SpO2: 94%     Level of consciousness: awake, alert and oriented    Post-anesthesia pain: adequate analgesia  Airway patency: patent  Respiratory: unassisted  Cardiovascular: stable and blood pressure at baseline  Hydration: euvolemic    Anesthetic complications: no

## 2022-11-08 NOTE — CASE MANAGEMENT/SOCIAL WORK
Discharge Planning Assessment  [facility]     Patient Name: Darnell Garcia  MRN: [id number]  Today's Date: 11/8/2022    Admit Date: 11/7/2022    Plan: MEHRDAD spoke with pt to initiate discharge planning.  Pt lives at home with spouse Nza.  Independent with ADL's. Declines STR and HH services. States his ortho doctor wants to complete MRI's of neck and back before pt begins HH services.  Pt does not anticipate the need for DME at discharge. Pharmacy of choice is [facility].  No barriers to discharge identified during DCP. CM/SW card left at bedside.   Discharge Needs Assessment     Row Name 11/08/22 0920       Living Environment    People in Home spouse    Current Living Arrangements home    Potentially Unsafe Housing Conditions unable to assess    Primary Care Provided by self    Provides Primary Care For no one    Family Caregiver if Needed spouse    Family Caregiver Names Naz Garcia    Quality of Family Relationships helpful;involved    Able to Return to Prior Arrangements yes       Transition Planning    Patient/Family Anticipates Transition to home    Patient/Family Anticipated Services at Transition none    Transportation Anticipated family or friend will provide       Discharge Needs Assessment    Equipment Currently Used at Home walker, standard;other (see comments)  Knee Scooter    Concerns to be Addressed no discharge needs identified    Equipment Needed After Discharge none    Provided Post Acute Provider List? N/A    Provided Post Acute Provider Quality & Resource List? N/A               Discharge Plan     Row Name 11/08/22 0922       Plan    Plan MEHRDAD spoke with pt to initiate discharge planning.  Pt lives at home with spouse Naz.  Independent with ADL's. Declines STR and HH services. States his ortho doctor wants to complete MRI's of neck and back before pt begins HH services.  Pt does not anticipate the need for DME at discharge. Pharmacy of choice is [facility].  No barriers to  discharge identified during DCP. CM/SW card left at bedside.    Patient/Family in Agreement with Plan yes              Continued Care and Services - Admitted Since 11/7/2022    Coordination has not been started for this encounter.       Expected Discharge Date and Time     Expected Discharge Date Expected Discharge Time    Nov 8, 2022          Demographic Summary     Row Name 11/08/22 0917       General Information    Admission Type inpatient    Arrived From home    Referral Source admission list    Reason for Consult discharge planning    Preferred Language English       Contact Information    Permission Granted to Share Info With     Contact Information Obtained for                Functional Status     Row Name 11/08/22 0918       Functional Status    Usual Activity Tolerance moderate    Current Activity Tolerance moderate       Functional Status, IADL    Medications independent    Meal Preparation independent    Housekeeping independent    Laundry independent    Shopping independent       Mental Status    General Appearance WDL WDL       Mental Status Summary    Recent Changes in Mental Status/Cognitive Functioning no changes       Employment/    Employment Status employed full-time    Current or Previous Occupation --  [employer]               Psychosocial     Row Name 11/08/22 0919       Values/Beliefs    Spiritual, Cultural Beliefs, Jainism Practices, Values that Affect Care no       Behavior WDL    Behavior WDL WDL       Emotion Mood WDL    Emotion/Mood/Affect WDL WDL       Speech WDL    Speech WDL WDL       Perceptual State WDL    Perceptual State WDL WDL       Thought Process WDL    Thought Process WDL WDL       Intellectual Performance WDL    Intellectual Performance WDL WDL    Level of Consciousness Alert       Coping/Stress    Major Change/Loss/Stressor none    Patient Personal Strengths positive attitude    Sources of Support spouse    Reaction to Health  Status realistic       Developmental Stage (Eriksson's)    Developmental Stage Stage 7 (35-65 years/Middle Adulthood) Generativity vs. Stagnation       C-SSRS (Recent)    Q1 Wished to be Dead (Past Month) no    Q2 Suicidal Thoughts (Past Month) no    Q6 Suicide Behavior (Lifetime) no       Violence Risk    Feels Like Hurting Others no    Previous Attempt to Harm Others no               Abuse/Neglect    No documentation.                Legal    No documentation.                Substance Abuse    No documentation.                Patient Forms    No documentation.                   Zena Macias

## 2022-11-08 NOTE — PAYOR COMM NOTE
"TO:[facility]  FROM:KEVIN JOHNSON, RN PHONE [phone]  IN CLINICALS REF# [id number]    Darnell Padgett (62 y.o. Male)     Date of Birth   1960    Social Security Number       Address   [facility] [place] [address]    Home Phone   [phone]    MRN   [id number]       Cheondoism   None    Marital Status                               Admission Date   11/7/22    Admission Type   Elective    Admitting Provider   Kalli Aburto MD    Attending Provider   Kalli Aburto MD    Department, Room/Bed   [facility] MED SURG  4, 427/1       Discharge Date       Discharge Disposition       Discharge Destination                               Attending Provider: Kalli Aburto MD    Allergies: Aspirin    Isolation: None   Infection: None   Code Status: CPR    Ht: 177.8 cm (70\")   Wt: 95.7 kg (210 lb 15.7 oz)    Admission Cmt: None   Principal Problem: Adenomatous polyp of colon [D12.6]                 Active Insurance as of 11/7/2022     Primary Coverage     Payor Plan Insurance Group Employer/Plan Group    [id number] PPO [id number]     Payor Plan Address Payor Plan Phone Number Payor Plan Fax Number Effective Dates    [address] [phone]  2/1/2021 - None Entered    [place] [id number]       Subscriber Name Subscriber Birth Date Member ID       DARNELL PADGETT 1960 [id number]                 Emergency Contacts      (Rel.) Home Phone Work Phone Mobile Phone    Naz Padgett (Spouse) [phone] -- [phone]    MANOLO PADGETT (Mother) -- -- [phone]    MITCHELL PADGETT (Father) -- -- [phone]               History & Physical      Kalli Aburto MD at 11/07/22 0920          H&P reviewed. The patient was examined and there are no changes to the H&P.          Electronically signed by Kalli Aburto MD at 11/07/22 0963   Source Note          Subjective   Darnell Padgett is a 62 y.o. male.   Chief " Complaint   Patient presents with   • Consult     Adenomatous polyp of colon       History of Present Illness:     Mr. Garcia is a 62-year-old gentleman who presents for evaluation for segmental colectomy due to recent discovery of a large adenomatous polyp involving the ileocecal valve.  He previously underwent colonoscopy by Dr. Art initially on 5/13/2022, and was noted to have a less than adequate prep.  A 15 to 20 mm polyp was resected in the cecum.  A large polyp was also found involving the ileocecal valve but was not resected due to poor endoscopic visualization due to the poor prep.  The patient then returned on 9/2/2022 for repeat colonoscopy, and was noted to have multiple polyps in the right colon including an 18 to 20mm area of adenomatous polypoid mucosa along the ileocecal valve.  The polyp mucosa was flat and lateral spreading was suspected.  This involved the upper lip of the ileocecal valve.  The area was biopsied, but not resected.  The other polyps were resected without difficulty.  He was also noted to have diverticulosis.  The biopsies from the ileocecal valve polyp demonstrated fragments of adenomatous polyp without high-grade dysplasia.  Biopsies of the prior EMR site in the cecum showed normal colon mucosa.  Additional polyps removed from the cecum, and ascending colon were consistent with tubular adenomas.  These results were discussed with the patient by his gastroenterologist, and he was offered a referral to [facility] for possible EMR of the erythematous lesion involving the ileocecal valve.  Alternatively, he was also offered surgical referral for laparoscopic assisted versus open segmental resection.  He elected to proceed with the latter, and comes in today for evaluation.       The following portions of the patient's history were reviewed and updated as appropriate: allergies, current medications, past family history, past medical history, past social history, past  surgical history and problem list.      Patient Active Problem List   Diagnosis   • Type 2 diabetes mellitus, without long-term current use of insulin (HCC)   • Diabetic neuropathy (HCC)   • Arthritis of ankle   • Coronary artery disease   • Hyperlipidemia   • Vitamin D deficiency   • Osteoarthritis   • Uncomplicated asthma   • Vascular calcification   • Benign prostatic hyperplasia with lower urinary tract symptoms   • Atrial fibrillation (HCC)   • Neck pain   • Long term current use of systemic steroids   • Avascular necrosis of bone of hip, left (HCC)   • Chronic pain of left ankle   • Encounter for screening for malignant neoplasm of colon   • Personal history of colonic polyps   • Adenomatous polyp of colon   • Pancreatitis       Past Medical History:   Diagnosis Date   • A-fib (HCC)    • Arthritis    • Asthma    • Cataracts, both eyes    • COVID-19 vaccine series completed     with booster x2   • Diabetes mellitus (HCC)    • GERD (gastroesophageal reflux disease)    • History of cardiac catheterization     pt states approx 2017 -no stent needed   • History of nuclear stress test     2019?   • History of varicella    • Neuropathy     feet   • Sleep apnea     diagnosed but does not use CPAP   • Wears glasses        Past Surgical History:   Procedure Laterality Date   • ANKLE FUSION Bilateral     screws in both   • COLONOSCOPY  10 years ago   • COLONOSCOPY N/A 5/13/2022    Procedure: COLONOSCOPY EMR POLYPECTOMY AND ORISE INJECTION;  Surgeon: Latonia Art MD;  Location: [facility] ENDOSCOPY;  Service: Gastroenterology;  Laterality: N/A;   • COLONOSCOPY N/A 9/2/2022    Procedure: COLONOSCOPY WITH BIOPSY AND POLYPECTOMY;  Surgeon: Latonia Art MD;  Location: [facility] ENDOSCOPY;  Service: Gastroenterology;  Laterality: N/A;   • NASAL SEPTUM SURGERY  2009   • VASECTOMY  1988   • VENTRAL HERNIA REPAIR      2007 and 2009 (mesh placed)   • WISDOM TOOTH EXTRACTION         Medications:   No current  facility-administered medications on file prior to visit.     Current Outpatient Medications on File Prior to Visit   Medication Sig   • acetaminophen (TYLENOL) 650 MG 8 hr tablet Take 1 tablet by mouth Daily As Needed for Mild Pain.   • ALBUTEROL SULFATE  (90 Base) MCG/ACT inhaler INHALE TWO PUFFS BY MOUTH EVERY 4 HOURS AS NEEDED FOR WHEEZING (Patient taking differently: As Needed.)   • atorvastatin (LIPITOR) 40 MG tablet TAKE ONE TABLET BY MOUTH EVERY DAY (Patient taking differently: Take 1 tablet by mouth Daily.)   • Blood Glucose Monitoring Suppl (Accu-Chek Guide Me) w/Device kit USE AS DIRECTED   • budesonide-formoterol (Symbicort) 160-4.5 MCG/ACT inhaler Inhale 2 puffs 2 (Two) Times a Day.   • celecoxib (CeleBREX) 200 MG capsule TAKE ONE CAPSULE BY MOUTH EVERY DAY (Patient taking differently: Take 1 capsule by mouth Daily.)   • cholecalciferol (VITAMIN D3) 10 MCG (400 UNIT) tablet Take 400 Units by mouth Daily.   • Eliquis 5 MG tablet tablet TAKE ONE TABLET BY MOUTH EVERY TWELVE HOURS (Patient taking differently: Take 1 tablet by mouth Every Night.)   • gabapentin (NEURONTIN) 300 MG capsule Take 1 capsule by mouth every night at bedtime.   • glimepiride (AMARYL) 4 MG tablet TAKE ONE TABLET BY MOUTH EVERY MORNING BEFORE A MEAL (Patient taking differently: Take 1 tablet by mouth Every Morning Before Breakfast.)   • glucose blood test strip Use as instructed   • Insulin Glargine (LANTUS SOLOSTAR) 100 UNIT/ML injection pen Inject 10 Units under the skin into the appropriate area as directed Every Morning. (Patient taking differently: Inject 15 Units under the skin into the appropriate area as directed Every Morning.)   • Insulin Pen Needle 30G X 8 MM misc 10 Units Daily.   • Jardiance 25 MG tablet tablet TAKE ONE TABLET BY MOUTH EVERY DAY (Patient taking differently: Take 1 tablet by mouth Daily.)   • Lancets (onetouch ultrasoft) lancets Use as instructed   • metFORMIN (GLUCOPHAGE) 1000 MG tablet TAKE ONE  TABLET BY MOUTH TWICE DAILY WITH MEALS (Patient taking differently: 1 tablet 2 (Two) Times a Day With Meals.)   • metoprolol succinate XL (TOPROL-XL) 25 MG 24 hr tablet Take 0.5 tablets by mouth Daily.   • montelukast (SINGULAIR) 10 MG tablet TAKE ONE TABLET BY MOUTH AT BEDTIME (Patient taking differently: 1 tablet every night at bedtime.)   • nitroglycerin (NITROSTAT) 0.4 MG SL tablet Place 1 tablet under the tongue Every FIVE Minutes As Needed for Chest Pain. Take no more than 3 doses in 15 minutes. (Patient not taking: Reported on 11/4/2022)   • predniSONE (DELTASONE) 5 MG tablet TAKE ONE TABLET BY MOUTH EVERY DAY (Patient taking differently: Take 1 tablet by mouth.)       Allergies:   Allergies   Allergen Reactions   • Aspirin Shortness Of Breath     As a child         Family History   Problem Relation Age of Onset   • Breast cancer Mother    • Osteoarthritis Mother    • Arthritis Mother    • Coronary artery disease Father    • Heart attack Father         late 60's   • Lymphoma Son    • Leukemia Son    • Colon cancer Neg Hx        Social History     Socioeconomic History   • Marital status:    Tobacco Use   • Smoking status: Never   • Smokeless tobacco: Never   Vaping Use   • Vaping Use: Never used   Substance and Sexual Activity   • Alcohol use: Yes     Comment: on weekends   • Drug use: No   • Sexual activity: Defer       Review of Systems   Constitutional: Negative.  Negative for chills, fatigue and fever.   HENT: Negative.    Eyes: Negative.    Respiratory: Negative.  Negative for cough.    Cardiovascular: Negative.  Negative for chest pain.   Gastrointestinal: Negative.  Negative for abdominal pain, diarrhea, nausea and vomiting.   Endocrine: Negative.    Genitourinary: Negative.    Musculoskeletal: Positive for back pain and neck pain.   Skin: Negative.    Allergic/Immunologic: Negative.    Neurological: Negative.    Hematological: Negative.    Psychiatric/Behavioral: Negative.        Objective   "  /60 (BP Location: Left arm, Patient Position: Sitting, Cuff Size: Adult)   Pulse 81   Temp 98.4 °F (36.9 °C) (Temporal)   Resp 16   Ht 182.9 cm (72\")   Wt 98 kg (216 lb)   SpO2 97%   BMI 29.29 kg/m²     Physical Exam  Constitutional:       Appearance: He is well-developed.   HENT:      Head: Normocephalic and atraumatic.   Eyes:      General: No scleral icterus.  Cardiovascular:      Rate and Rhythm: Regular rhythm.   Pulmonary:      Effort: Pulmonary effort is normal.   Abdominal:      General: There is no distension.      Palpations: Abdomen is soft.      Tenderness: There is no abdominal tenderness.      Comments: Surgical scars noted   Skin:     General: Skin is warm and dry.   Neurological:      Mental Status: He is alert and oriented to person, place, and time.   Psychiatric:         Behavior: Behavior normal.         Results Review:  I have reviewed the patient's recent colonoscopy reports, endoscopic images, and pathology as discussed above.  I have also reviewed the notes from his gastroenterologist.      Assessment & Plan   Diagnoses and all orders for this visit:    1. Adenoma of cecum (Primary)      Mr. Garcia is a 62-year-old gentleman with and adenomatous lesion involving the ileocecal valve which was felt to be not amenable to colonoscopic resection.  As such, he was offered attempts at serial colonoscopy for attempted resection with a noted high risk of complication and recurrence versus referral for surgical resection.  We discussed surgical resection in detail.  He does have a history of at least 2 prior ventral hernia repairs with mesh, which does potentially add a level of difficulty to laparoscopic colon resection depending on the level of the scar that exists.  We discussed the pertinent anatomy for segmental colon resection.  Several illustrations were drawn to assist in this discussion.  I discussed with the patient and his wife that I would initially plan a laparoscopic " approach, but that it was worth noting that conversion to an open procedure may be required to proceed safely.  We discussed the procedure in detail along with its risks, benefits, and alternatives.  We specifically discussed the risks of bleeding, infection, anastomotic leak, wound healing difficulties or surgical site infections, and the risks related to anesthesia.  We discussed the need to hold his Eliquis 24 hours prior to the upcoming surgical procedure.  We discussed management of his diabetic medications.  Notably, he is on very low-dose prednisone, 5 mg daily, which she has been on for a number of years, and will be continued.  He understands that he will need to repeat a bowel preparation prior to surgery.  His questions were answered to his satisfaction, and he wishes to proceed as discussed.  My office will assist him in making scheduling arrangements, and scheduling preadmission testing for a laparoscopic right hemicolectomy, possible open.          Electronically signed by Kalli Aburto MD at 11/07/22 4385             Kalli Aburto MD at 10/18/22 5650          Subjective   Darnell Garcia is a 62 y.o. male.   Chief Complaint   Patient presents with   • Consult     Adenomatous polyp of colon       History of Present Illness:     Mr. Garcia is a 62-year-old gentleman who presents for evaluation for segmental colectomy due to recent discovery of a large adenomatous polyp involving the ileocecal valve.  He previously underwent colonoscopy by Dr. Art initially on 5/13/2022, and was noted to have a less than adequate prep.  A 15 to 20 mm polyp was resected in the cecum.  A large polyp was also found involving the ileocecal valve but was not resected due to poor endoscopic visualization due to the poor prep.  The patient then returned on 9/2/2022 for repeat colonoscopy, and was noted to have multiple polyps in the right colon including an 18 to 20mm area of adenomatous polypoid mucosa along the  ileocecal valve.  The polyp mucosa was flat and lateral spreading was suspected.  This involved the upper lip of the ileocecal valve.  The area was biopsied, but not resected.  The other polyps were resected without difficulty.  He was also noted to have diverticulosis.  The biopsies from the ileocecal valve polyp demonstrated fragments of adenomatous polyp without high-grade dysplasia.  Biopsies of the prior EMR site in the cecum showed normal colon mucosa.  Additional polyps removed from the cecum, and ascending colon were consistent with tubular adenomas.  These results were discussed with the patient by his gastroenterologist, and he was offered a referral to [facility] for possible EMR of the erythematous lesion involving the ileocecal valve.  Alternatively, he was also offered surgical referral for laparoscopic assisted versus open segmental resection.  He elected to proceed with the latter, and comes in today for evaluation.       The following portions of the patient's history were reviewed and updated as appropriate: allergies, current medications, past family history, past medical history, past social history, past surgical history and problem list.      Patient Active Problem List   Diagnosis   • Type 2 diabetes mellitus, without long-term current use of insulin (HCC)   • Diabetic neuropathy (HCC)   • Arthritis of ankle   • Coronary artery disease   • Hyperlipidemia   • Vitamin D deficiency   • Osteoarthritis   • Uncomplicated asthma   • Vascular calcification   • Benign prostatic hyperplasia with lower urinary tract symptoms   • Atrial fibrillation (HCC)   • Neck pain   • Long term current use of systemic steroids   • Avascular necrosis of bone of hip, left (HCC)   • Chronic pain of left ankle   • Encounter for screening for malignant neoplasm of colon   • Personal history of colonic polyps   • Adenomatous polyp of colon   • Pancreatitis       Past Medical History:   Diagnosis Date   • A-fib (HCC)     • Arthritis    • Asthma    • Cataracts, both eyes    • COVID-19 vaccine series completed     with booster x2   • Diabetes mellitus (HCC)    • GERD (gastroesophageal reflux disease)    • History of cardiac catheterization     pt states approx 2017 -no stent needed   • History of nuclear stress test     2019?   • History of varicella    • Neuropathy     feet   • Sleep apnea     diagnosed but does not use CPAP   • Wears glasses        Past Surgical History:   Procedure Laterality Date   • ANKLE FUSION Bilateral     screws in both   • COLONOSCOPY  10 years ago   • COLONOSCOPY N/A 5/13/2022    Procedure: COLONOSCOPY EMR POLYPECTOMY AND ORISE INJECTION;  Surgeon: Latonia Art MD;  Location: [facility] ENDOSCOPY;  Service: Gastroenterology;  Laterality: N/A;   • COLONOSCOPY N/A 9/2/2022    Procedure: COLONOSCOPY WITH BIOPSY AND POLYPECTOMY;  Surgeon: Latonia Art MD;  Location: [facility] ENDOSCOPY;  Service: Gastroenterology;  Laterality: N/A;   • NASAL SEPTUM SURGERY  2009   • VASECTOMY  1988   • VENTRAL HERNIA REPAIR      2007 and 2009 (mesh placed)   • WISDOM TOOTH EXTRACTION         Medications:   No current facility-administered medications on file prior to visit.     Current Outpatient Medications on File Prior to Visit   Medication Sig   • acetaminophen (TYLENOL) 650 MG 8 hr tablet Take 1 tablet by mouth Daily As Needed for Mild Pain.   • ALBUTEROL SULFATE  (90 Base) MCG/ACT inhaler INHALE TWO PUFFS BY MOUTH EVERY 4 HOURS AS NEEDED FOR WHEEZING (Patient taking differently: As Needed.)   • atorvastatin (LIPITOR) 40 MG tablet TAKE ONE TABLET BY MOUTH EVERY DAY (Patient taking differently: Take 1 tablet by mouth Daily.)   • Blood Glucose Monitoring Suppl (Accu-Chek Guide Me) w/Device kit USE AS DIRECTED   • budesonide-formoterol (Symbicort) 160-4.5 MCG/ACT inhaler Inhale 2 puffs 2 (Two) Times a Day.   • celecoxib (CeleBREX) 200 MG capsule TAKE ONE CAPSULE BY MOUTH EVERY DAY (Patient taking  differently: Take 1 capsule by mouth Daily.)   • cholecalciferol (VITAMIN D3) 10 MCG (400 UNIT) tablet Take 400 Units by mouth Daily.   • Eliquis 5 MG tablet tablet TAKE ONE TABLET BY MOUTH EVERY TWELVE HOURS (Patient taking differently: Take 1 tablet by mouth Every Night.)   • gabapentin (NEURONTIN) 300 MG capsule Take 1 capsule by mouth every night at bedtime.   • glimepiride (AMARYL) 4 MG tablet TAKE ONE TABLET BY MOUTH EVERY MORNING BEFORE A MEAL (Patient taking differently: Take 1 tablet by mouth Every Morning Before Breakfast.)   • glucose blood test strip Use as instructed   • Insulin Glargine (LANTUS SOLOSTAR) 100 UNIT/ML injection pen Inject 10 Units under the skin into the appropriate area as directed Every Morning. (Patient taking differently: Inject 15 Units under the skin into the appropriate area as directed Every Morning.)   • Insulin Pen Needle 30G X 8 MM misc 10 Units Daily.   • Jardiance 25 MG tablet tablet TAKE ONE TABLET BY MOUTH EVERY DAY (Patient taking differently: Take 1 tablet by mouth Daily.)   • Lancets (onetouch ultrasoft) lancets Use as instructed   • metFORMIN (GLUCOPHAGE) 1000 MG tablet TAKE ONE TABLET BY MOUTH TWICE DAILY WITH MEALS (Patient taking differently: 1 tablet 2 (Two) Times a Day With Meals.)   • metoprolol succinate XL (TOPROL-XL) 25 MG 24 hr tablet Take 0.5 tablets by mouth Daily.   • montelukast (SINGULAIR) 10 MG tablet TAKE ONE TABLET BY MOUTH AT BEDTIME (Patient taking differently: 1 tablet every night at bedtime.)   • nitroglycerin (NITROSTAT) 0.4 MG SL tablet Place 1 tablet under the tongue Every FIVE Minutes As Needed for Chest Pain. Take no more than 3 doses in 15 minutes. (Patient not taking: Reported on 11/4/2022)   • predniSONE (DELTASONE) 5 MG tablet TAKE ONE TABLET BY MOUTH EVERY DAY (Patient taking differently: Take 1 tablet by mouth.)       Allergies:   Allergies   Allergen Reactions   • Aspirin Shortness Of Breath     As a child         Family History  "  Problem Relation Age of Onset   • Breast cancer Mother    • Osteoarthritis Mother    • Arthritis Mother    • Coronary artery disease Father    • Heart attack Father         late 60's   • Lymphoma Son    • Leukemia Son    • Colon cancer Neg Hx        Social History     Socioeconomic History   • Marital status:    Tobacco Use   • Smoking status: Never   • Smokeless tobacco: Never   Vaping Use   • Vaping Use: Never used   Substance and Sexual Activity   • Alcohol use: Yes     Comment: on weekends   • Drug use: No   • Sexual activity: Defer       Review of Systems   Constitutional: Negative.  Negative for chills, fatigue and fever.   HENT: Negative.    Eyes: Negative.    Respiratory: Negative.  Negative for cough.    Cardiovascular: Negative.  Negative for chest pain.   Gastrointestinal: Negative.  Negative for abdominal pain, diarrhea, nausea and vomiting.   Endocrine: Negative.    Genitourinary: Negative.    Musculoskeletal: Positive for back pain and neck pain.   Skin: Negative.    Allergic/Immunologic: Negative.    Neurological: Negative.    Hematological: Negative.    Psychiatric/Behavioral: Negative.        Objective    /60 (BP Location: Left arm, Patient Position: Sitting, Cuff Size: Adult)   Pulse 81   Temp 98.4 °F (36.9 °C) (Temporal)   Resp 16   Ht 182.9 cm (72\")   Wt 98 kg (216 lb)   SpO2 97%   BMI 29.29 kg/m²     Physical Exam  Constitutional:       Appearance: He is well-developed.   HENT:      Head: Normocephalic and atraumatic.   Eyes:      General: No scleral icterus.  Cardiovascular:      Rate and Rhythm: Regular rhythm.   Pulmonary:      Effort: Pulmonary effort is normal.   Abdominal:      General: There is no distension.      Palpations: Abdomen is soft.      Tenderness: There is no abdominal tenderness.      Comments: Surgical scars noted   Skin:     General: Skin is warm and dry.   Neurological:      Mental Status: He is alert and oriented to person, place, and time. "   Psychiatric:         Behavior: Behavior normal.         Results Review:  I have reviewed the patient's recent colonoscopy reports, endoscopic images, and pathology as discussed above.  I have also reviewed the notes from his gastroenterologist.      Assessment & Plan   Diagnoses and all orders for this visit:    1. Adenoma of cecum (Primary)      Mr. Garcia is a 62-year-old gentleman with and adenomatous lesion involving the ileocecal valve which was felt to be not amenable to colonoscopic resection.  As such, he was offered attempts at serial colonoscopy for attempted resection with a noted high risk of complication and recurrence versus referral for surgical resection.  We discussed surgical resection in detail.  He does have a history of at least 2 prior ventral hernia repairs with mesh, which does potentially add a level of difficulty to laparoscopic colon resection depending on the level of the scar that exists.  We discussed the pertinent anatomy for segmental colon resection.  Several illustrations were drawn to assist in this discussion.  I discussed with the patient and his wife that I would initially plan a laparoscopic approach, but that it was worth noting that conversion to an open procedure may be required to proceed safely.  We discussed the procedure in detail along with its risks, benefits, and alternatives.  We specifically discussed the risks of bleeding, infection, anastomotic leak, wound healing difficulties or surgical site infections, and the risks related to anesthesia.  We discussed the need to hold his Eliquis 24 hours prior to the upcoming surgical procedure.  We discussed management of his diabetic medications.  Notably, he is on very low-dose prednisone, 5 mg daily, which she has been on for a number of years, and will be continued.  He understands that he will need to repeat a bowel preparation prior to surgery.  His questions were answered to his satisfaction, and he wishes to proceed  as discussed.  My office will assist him in making scheduling arrangements, and scheduling preadmission testing for a laparoscopic right hemicolectomy, possible open.          Electronically signed by Kalli Aburto MD at 11/07/22 0951       Vital Signs (last day)     Date/Time Temp Temp src Pulse Resp BP Patient Position SpO2    11/08/22 1127 97.7 (36.5) Oral 80 14 139/63 Sitting 95    11/08/22 0732 98.4 (36.9) Oral 70 16 128/58 Lying 94    11/08/22 0436 97.8 (36.6) Oral 86 18 152/67 Lying 95    11/08/22 0019 97.4 (36.3) Oral 84 18 153/71 Lying 94    11/07/22 2001 97.4 (36.3) Oral 95 18 157/73 Lying 93    11/07/22 1925 -- -- 91 16 -- -- 92    11/07/22 1513 97.2 (36.2) Oral 84 16 154/70 Lying 94    11/07/22 1410 98.6 (37) Temporal 86 15 159/81 Lying 96    11/07/22 1355 -- -- 87 13 170/80 -- 99    11/07/22 1350 -- -- 88 13 170/84 -- 99    11/07/22 1335 -- -- 89 13 170/89 Lying 99    11/07/22 1330 -- -- 89 10 162/84 -- 99    11/07/22 1325 -- -- 87 11 153/97 -- 99    11/07/22 1320 -- -- 86 8 164/79 Lying 99    11/07/22 1315 98.8 (37.1) Temporal 86 12 146/64 Lying 99    11/07/22 0811 98.2 (36.8) Temporal 80 17 139/79 Sitting 96            Current Facility-Administered Medications   Medication Dose Route Frequency Provider Last Rate Last Admin   • acetaminophen (TYLENOL) tablet 1,000 mg  1,000 mg Oral Q6H Kalli Aburto MD   1,000 mg at 11/08/22 1135   • albuterol (PROVENTIL) nebulizer solution 0.083% 2.5 mg/3mL  2.5 mg Nebulization Q4H PRN Kalli Aburto MD       • atorvastatin (LIPITOR) tablet 40 mg  40 mg Oral Nightly Kalli Aburto MD       • budesonide-formoterol (SYMBICORT) 160-4.5 MCG/ACT inhaler 2 puff  2 puff Inhalation BID Kalli Aburto MD   2 puff at 11/08/22 0651   • cholecalciferol (VITAMIN D3) tablet 400 Units  400 Units Oral Daily Kalli Aburto MD   400 Units at 11/08/22 0922   • dextrose (D50W) (25 g/50 mL) IV injection 25 g  25 g Intravenous Q15 Min PRN Kalli Aburto MD       •  dextrose (GLUTOSE) oral gel 15 g  15 g Oral Q15 Min PRN Kalli Aburto MD       • famotidine (PEPCID) tablet 20 mg  20 mg Oral Nightly Kalli Aburto MD       • gabapentin (NEURONTIN) capsule 300 mg  300 mg Oral Nightly Kalli Aburto MD   300 mg at 11/07/22 2010   • glucagon (human recombinant) (GLUCAGEN DIAGNOSTIC) injection 1 mg  1 mg Intramuscular Q15 Min PRN Kalli Aburto MD       • heparin (porcine) 5000 UNIT/ML injection 5,000 Units  5,000 Units Subcutaneous Q8H Kalli Aburto MD   5,000 Units at 11/08/22 0629   • HYDROmorphone (DILAUDID) injection 0.5 mg  0.5 mg Intravenous Q2H PRN Kalli Aburto MD   0.5 mg at 11/08/22 1135    And   • naloxone (NARCAN) injection 0.4 mg  0.4 mg Intravenous Q5 Min PRN Kalli Aburto MD       • influenza vac split quad (FLUZONE,FLUARIX,AFLURIA,FLULAVAL) injection 0.5 mL  0.5 mL Intramuscular During Hospitalization Kalli Aburto MD       • Insulin Aspart (novoLOG) injection 0-9 Units  0-9 Units Subcutaneous TID AC Kalli Aburto MD   4 Units at 11/08/22 1135   • ketorolac (TORADOL) injection 15 mg  15 mg Intravenous Q6H Kalli Aburto MD   15 mg at 11/08/22 0922   • LORazepam (ATIVAN) tablet 0.5 mg  0.5 mg Oral Q8H PRN Kalli Aburto MD       • metoprolol succinate XL (TOPROL-XL) 24 hr tablet 12.5 mg  12.5 mg Oral Nightly Kalli Aburto MD       • montelukast (SINGULAIR) tablet 10 mg  10 mg Oral Nightly Kalli Aburto MD       • ondansetron (ZOFRAN) injection 4 mg  4 mg Intravenous Q6H PRN Kalli Aburto MD   4 mg at 11/07/22 2026   • predniSONE (DELTASONE) tablet 5 mg  5 mg Oral Daily Kalli Aburto MD   5 mg at 11/08/22 0922   • scopolamine patch 1 mg/72 hr  1 patch Transdermal Continuous Kalli Aburto MD   1 patch at 11/07/22 0850   • sodium chloride 0.9 % infusion  75 mL/hr Intravenous Continuous Kalli Aburto MD 75 mL/hr at 11/08/22 0354 75 mL/hr at 11/08/22 0354       Lab Results (last 24 hours)     Procedure  Component Value Units Date/Time    POC Glucose Once [[id number]]  (Abnormal) Collected: 11/08/22 1058    Specimen: Blood Updated: 11/08/22 1111     Glucose 208 mg/dL      Comment: Serial Number: [id number]:  [id number]       Basic Metabolic Panel [[id number]]  (Abnormal) Collected: 11/08/22 0559    Specimen: Blood Updated: 11/08/22 0651     Glucose 177 mg/dL      BUN 20 mg/dL      Creatinine 0.89 mg/dL      Sodium 139 mmol/L      Potassium 4.9 mmol/L      Chloride 105 mmol/L      CO2 22.8 mmol/L      Calcium 8.3 mg/dL      BUN/Creatinine Ratio 22.5     Anion Gap 11.2 mmol/L      eGFR 96.9 mL/min/1.73      Comment: National Kidney Foundation and American Society of Nephrology (ASN) Task Force recommended calculation based on the Chronic Kidney Disease Epidemiology Collaboration (CKD-EPI) equation refit without adjustment for race.       Narrative:      GFR Normal >60  Chronic Kidney Disease <60  Kidney Failure <15      Magnesium [[id number]]  (Normal) Collected: 11/08/22 0559    Specimen: Blood Updated: 11/08/22 0651     Magnesium 2.1 mg/dL     POC Glucose Once [[id number]]  (Abnormal) Collected: 11/08/22 0616    Specimen: Blood Updated: 11/08/22 0636     Glucose 164 mg/dL      Comment: Serial Number: [id number]:  [id number]       CBC & Differential [[id number]]  (Abnormal) Collected: 11/08/22 0559    Specimen: Blood Updated: 11/08/22 0633    Narrative:      The following orders were created for panel order CBC & Differential.  Procedure                               Abnormality         Status                     ---------                               -----------         ------                     CBC Auto Differential[[id number]]        Abnormal            Final result                 Please view results for these tests on the individual orders.    CBC Auto Differential [[id number]]  (Abnormal) Collected: 11/08/22 0559    Specimen: Blood Updated: 11/08/22 0633     WBC 10.78 10*3/mm3      RBC 3.74 10*6/mm3       Hemoglobin 11.9 g/dL      Hematocrit 34.7 %      MCV 92.8 fL      MCH 31.8 pg      MCHC 34.3 g/dL      RDW 12.1 %      RDW-SD 41.7 fl      MPV 9.9 fL      Platelets 271 10*3/mm3      Neutrophil % 83.7 %      Lymphocyte % 8.3 %      Monocyte % 7.2 %      Eosinophil % 0.0 %      Basophil % 0.2 %      Immature Grans % 0.6 %      Neutrophils, Absolute 9.03 10*3/mm3      Lymphocytes, Absolute 0.89 10*3/mm3      Monocytes, Absolute 0.78 10*3/mm3      Eosinophils, Absolute 0.00 10*3/mm3      Basophils, Absolute 0.02 10*3/mm3      Immature Grans, Absolute 0.06 10*3/mm3      nRBC 0.0 /100 WBC     POC Glucose Once [[id number]]  (Abnormal) Collected: 11/07/22 2000    Specimen: Blood Updated: 11/07/22 2007     Glucose 224 mg/dL      Comment: Serial Number: [id number]:  [id number]       POC Glucose Once [[id number]]  (Abnormal) Collected: 11/07/22 1517    Specimen: Blood Updated: 11/07/22 1554     Glucose 230 mg/dL      Comment: Serial Number: [id number]:  [id number]       POC Glucose Once [[id number]]  (Abnormal) Collected: 11/07/22 1351    Specimen: Blood Updated: 11/07/22 1354     Glucose 228 mg/dL      Comment: Serial Number: [id number]:  [id number]       TISSUE EXAM, [facility]) [[id number]] Collected: 11/07/22 1242    Specimen: Tissue from Large Intestine, Right / Ascending Colon Updated: 11/07/22 1326        Imaging Results (Last 24 Hours)     ** No results found for the last 24 hours. **        Operative/Procedure Notes (last 24 hours)  Notes from 11/07/22 1245 through 11/08/22 1245   No notes of this type exist for this encounter.         Physician Progress Notes (last 24 hours)  Notes from 11/07/22 1246 through 11/08/22 1246   No notes of this type exist for this encounter.            Consult Notes (last 24 hours)      Juan Antonio De La Torre CRNA at 11/08/22 0842      Consult Orders    1. Anesthesia Follow-Up [[id number]] ordered by Florian Linn CRNA at 11/07/22 0919               Patient: Darnell PERERA  Jose  Procedure(s):  LAPROSCOPIC RIGHT COLON RESECTION HAND ASSIST  Anesthesia type: general    Patient location: [facility] Surgical Floor  Last vitals:   Vitals:    11/08/22 0732   BP: 128/58   Pulse: 70   Resp: 16   Temp: 98.4 °F (36.9 °C)   SpO2: 94%     Level of consciousness: awake, alert and oriented    Post-anesthesia pain: adequate analgesia  Airway patency: patent  Respiratory: unassisted  Cardiovascular: stable and blood pressure at baseline  Hydration: euvolemic    Anesthetic complications: no    Electronically signed by Juan Antonio De La Torre CRNA at 11/08/22 0833

## 2022-11-08 NOTE — PLAN OF CARE
Goal Outcome Evaluation:  Plan of Care Reviewed With: patient        Progress: no change  Outcome Evaluation: VSS, maintaining o2 sats >90% on RA, pain controlled with PRN meds, tolerating clear liquids, encouraged IS use

## 2022-11-08 NOTE — PROGRESS NOTES
LOS: 1 day   Patient Care Team:  Carlos Bella MD as PCP - General (Internal Medicine)    Chief Complaint:  POD#1    Subjective     Interval History:     No acute events reported overnight.  The patient has tolerated clear liquids.  He reports having some mild nausea immediately after arriving to the floor last evening, but has had no vomiting.  He has had no recurrent nausea today.  He has not passed flatus or had a bowel movement.  He is satisfied with his pain control.    Objective     Vital Signs  Temp:  [97.2 °F (36.2 °C)-98.8 °F (37.1 °C)] 98.4 °F (36.9 °C)  Heart Rate:  [70-95] 70  Resp:  [8-18] 16  BP: (128-170)/(58-97) 128/58    PO 480mL  IV 4129mL    UOP 1425mL      Physical Exam:    General Appearance:    Alert, cooperative, in no acute distress   Head:    Normocephalic, without obvious abnormality, atraumatic   Eyes:            Lids and lashes normal, conjunctivae and sclerae normal, no   icterus   Ears:    Ears appear intact with no abnormalities noted   Lungs:     Respirations regular, even and unlabored    Heart:    Regular rhythm and normal rate   Abdomen:    Soft, appropriately tender to palpation around the port site incisions, and extraction site in the upper midline.  Dressings in place without significant shadowing, or strikethrough.   Extremities:   Moves all extremities well, no edema, no cyanosis, no             redness   Skin:   No bleeding, bruising or rash   Neurologic:  Alert and oriented x3.  No facial asymmetry or gross deficits        Results Review:    I reviewed the patient's new clinical results.     Results from last 7 days   Lab Units 11/08/22  0559 11/04/22  1554   SODIUM mmol/L 139 135*   POTASSIUM mmol/L 4.9 4.6   CHLORIDE mmol/L 105 98   CO2 mmol/L 22.8 24.6   BUN mg/dL 20 21   CREATININE mg/dL 0.89 1.10   CALCIUM mg/dL 8.3* 9.0   GLUCOSE mg/dL 177* 235*       Results from last 7 days   Lab Units 11/08/22  0559 11/04/22  1554   WBC 10*3/mm3 10.78 7.57   HEMOGLOBIN g/dL  11.9* 13.6   HEMATOCRIT % 34.7* 39.3   PLATELETS 10*3/mm3 271 266         Medication Review:   Scheduled Meds:acetaminophen, 1,000 mg, Oral, Q6H  atorvastatin, 40 mg, Oral, Nightly  budesonide-formoterol, 2 puff, Inhalation, BID  cholecalciferol, 400 Units, Oral, Daily  famotidine, 20 mg, Oral, Nightly  gabapentin, 300 mg, Oral, Nightly  heparin (porcine), 5,000 Units, Subcutaneous, Q8H  Insulin Aspart, 0-9 Units, Subcutaneous, TID AC  ketorolac, 15 mg, Intravenous, Q6H  metoprolol succinate XL, 12.5 mg, Oral, Nightly  montelukast, 10 mg, Oral, Nightly  predniSONE, 5 mg, Oral, Daily      Continuous Infusions:Scopolamine, 1 patch  sodium chloride, 75 mL/hr, Last Rate: 75 mL/hr (11/08/22 0354)      PRN Meds:.•  albuterol  •  dextrose  •  dextrose  •  glucagon (human recombinant)  •  HYDROmorphone **AND** naloxone  •  influenza vaccine  •  LORazepam  •  ondansetron      Assessment & Plan       Adenomatous polyp of colon    Adenomatous polyp of colon, unspecified part of colon    Postoperative day #1 following a laparoscopic right colectomy for management of a adenomatous polypoid mass involving the ileocecal valve, which was not amenable to colonoscopic resection.  Overall, the patient is doing well.  I have asked the nursing staff to remove his Ace catheter today.  He will be continued on clear liquids while we await return of bowel function.  We discussed the importance of being up, and ambulating frequently throughout the day.  We discussed the importance of pulmonary toileting.  Labs reviewed, and appropriate for postoperative status.  Plan for repeat CBC and BMP tomorrow.  He will be continued on his current medication regimen, including his home medications as currently ordered.  Continue routine DVT prophylaxis.  Further recommendations and changes to be based on the patient's clinical course and progress postoperatively.  At the conclusion of our discussion, the patient understood, and was satisfied with the  plan of care.      Kalli Aburto MD  11/08/22  09:34 EST

## 2022-11-09 LAB
GLUCOSE BLDC GLUCOMTR-MCNC: 153 MG/DL (ref 70–130)
GLUCOSE BLDC GLUCOMTR-MCNC: 161 MG/DL (ref 70–130)
GLUCOSE BLDC GLUCOMTR-MCNC: 83 MG/DL (ref 70–130)
GLUCOSE BLDC GLUCOMTR-MCNC: 84 MG/DL (ref 70–130)

## 2022-11-09 PROCEDURE — 94799 UNLISTED PULMONARY SVC/PX: CPT

## 2022-11-09 PROCEDURE — 25010000002 HEPARIN (PORCINE) PER 1000 UNITS: Performed by: SURGERY

## 2022-11-09 PROCEDURE — 99024 POSTOP FOLLOW-UP VISIT: CPT | Performed by: SURGERY

## 2022-11-09 PROCEDURE — 82962 GLUCOSE BLOOD TEST: CPT

## 2022-11-09 PROCEDURE — 63710000001 INSULIN DETEMIR PER 5 UNITS: Performed by: SURGERY

## 2022-11-09 PROCEDURE — 63710000001 INSULIN ASPART PER 5 UNITS: Performed by: SURGERY

## 2022-11-09 PROCEDURE — 25010000002 KETOROLAC TROMETHAMINE PER 15 MG: Performed by: SURGERY

## 2022-11-09 PROCEDURE — 25010000002 HYDROMORPHONE 1 MG/ML SOLUTION: Performed by: SURGERY

## 2022-11-09 PROCEDURE — 63710000001 PREDNISONE PER 5 MG: Performed by: SURGERY

## 2022-11-09 RX ADMIN — ATORVASTATIN CALCIUM 40 MG: 40 TABLET, FILM COATED ORAL at 20:23

## 2022-11-09 RX ADMIN — ACETAMINOPHEN 1000 MG: 500 TABLET, FILM COATED ORAL at 06:49

## 2022-11-09 RX ADMIN — CHOLECALCIFEROL TAB 10 MCG (400 UNIT) 400 UNITS: 10 TAB at 08:59

## 2022-11-09 RX ADMIN — GABAPENTIN 300 MG: 300 CAPSULE ORAL at 20:23

## 2022-11-09 RX ADMIN — BUDESONIDE AND FORMOTEROL FUMARATE DIHYDRATE 2 PUFF: 160; 4.5 AEROSOL RESPIRATORY (INHALATION) at 19:00

## 2022-11-09 RX ADMIN — PREDNISONE 5 MG: 10 TABLET ORAL at 08:59

## 2022-11-09 RX ADMIN — ACETAMINOPHEN 1000 MG: 500 TABLET, FILM COATED ORAL at 11:59

## 2022-11-09 RX ADMIN — INSULIN DETEMIR 15 UNITS: 100 INJECTION, SOLUTION SUBCUTANEOUS at 08:59

## 2022-11-09 RX ADMIN — HYDROMORPHONE HYDROCHLORIDE 0.5 MG: 1 INJECTION, SOLUTION INTRAMUSCULAR; INTRAVENOUS; SUBCUTANEOUS at 20:26

## 2022-11-09 RX ADMIN — HEPARIN SODIUM 5000 UNITS: 5000 INJECTION INTRAVENOUS; SUBCUTANEOUS at 14:40

## 2022-11-09 RX ADMIN — MONTELUKAST 10 MG: 10 TABLET, FILM COATED ORAL at 20:23

## 2022-11-09 RX ADMIN — KETOROLAC TROMETHAMINE 15 MG: 30 INJECTION, SOLUTION INTRAMUSCULAR; INTRAVENOUS at 04:20

## 2022-11-09 RX ADMIN — FAMOTIDINE 20 MG: 20 TABLET ORAL at 20:23

## 2022-11-09 RX ADMIN — SODIUM CHLORIDE 75 ML/HR: 9 INJECTION, SOLUTION INTRAVENOUS at 08:59

## 2022-11-09 RX ADMIN — KETOROLAC TROMETHAMINE 15 MG: 30 INJECTION, SOLUTION INTRAMUSCULAR; INTRAVENOUS at 08:59

## 2022-11-09 RX ADMIN — INSULIN ASPART 2 UNITS: 100 INJECTION, SOLUTION INTRAVENOUS; SUBCUTANEOUS at 17:27

## 2022-11-09 RX ADMIN — METOPROLOL SUCCINATE 12.5 MG: 25 TABLET, FILM COATED, EXTENDED RELEASE ORAL at 20:23

## 2022-11-09 RX ADMIN — HEPARIN SODIUM 5000 UNITS: 5000 INJECTION INTRAVENOUS; SUBCUTANEOUS at 06:49

## 2022-11-09 NOTE — PROGRESS NOTES
LOS: 2 days   Patient Care Team:  Carlos Bella MD as PCP - General (Internal Medicine)    Chief Complaint: Postoperative day #2    Subjective     Interval History:   No acute events reported overnight.  The patient has tolerated clear liquids.  He is voiding without difficulty after removal of his Cae catheter.  He reports no nausea or vomiting.  He tells me that he has begun passing small amounts of flatus.  He is anxious to have his diet advanced.  He reports satisfaction with his pain control.      Objective     Vital Signs  Temp:  [97.4 °F (36.3 °C)-98 °F (36.7 °C)] 97.5 °F (36.4 °C)  Heart Rate:  [60-80] 71  Resp:  [14-18] 16  BP: (124-139)/(56-72) 126/70      PO 1077mL  UOP 2190mL    Physical Exam:    General Appearance:    Alert, cooperative, in no acute distress   Head:    Normocephalic, without obvious abnormality, atraumatic   Eyes:            Lids and lashes normal, conjunctivae and sclerae normal, no   icterus, no pallor, corneas clear, PERRLA   Ears:    Ears appear intact with no abnormalities noted   Lungs:     Respirations regular, even and unlabored    Heart:    Regular rhythm and normal rate   Abdomen:    Soft, minimally distended, appropriately tender to palpation.  Dressings removed, and port sites are appropriately covered with Steri-Strips.  No periincisional erythema.  No drainage.   Extremities:   Moves all extremities well, no edema, no cyanosis, no             redness   Skin:   No bleeding, bruising or rash   Neurologic:  Alert and oriented x3.  No facial asymmetry or focal deficits.        Results Review:    I reviewed the patient's new clinical results.     Results from last 7 days   Lab Units 11/08/22 0559 11/04/22  1554   SODIUM mmol/L 139 135*   POTASSIUM mmol/L 4.9 4.6   CHLORIDE mmol/L 105 98   CO2 mmol/L 22.8 24.6   BUN mg/dL 20 21   CREATININE mg/dL 0.89 1.10   CALCIUM mg/dL 8.3* 9.0   GLUCOSE mg/dL 177* 235*       Results from last 7 days   Lab Units 11/08/22 0559  11/04/22  1554   WBC 10*3/mm3 10.78 7.57   HEMOGLOBIN g/dL 11.9* 13.6   HEMATOCRIT % 34.7* 39.3   PLATELETS 10*3/mm3 271 266         Medication Review:   Scheduled Meds:acetaminophen, 1,000 mg, Oral, Q6H  atorvastatin, 40 mg, Oral, Nightly  budesonide-formoterol, 2 puff, Inhalation, BID  cholecalciferol, 400 Units, Oral, Daily  famotidine, 20 mg, Oral, Nightly  gabapentin, 300 mg, Oral, Nightly  heparin (porcine), 5,000 Units, Subcutaneous, Q8H  Insulin Aspart, 0-9 Units, Subcutaneous, TID AC  insulin detemir, 15 Units, Subcutaneous, Daily  metoprolol succinate XL, 12.5 mg, Oral, Nightly  montelukast, 10 mg, Oral, Nightly  predniSONE, 5 mg, Oral, Daily      Continuous Infusions:Scopolamine, 1 patch  sodium chloride, 75 mL/hr, Last Rate: 75 mL/hr (11/09/22 0859)      PRN Meds:.•  albuterol  •  cyclobenzaprine  •  dextrose  •  dextrose  •  diazePAM  •  glucagon (human recombinant)  •  HYDROmorphone **AND** naloxone  •  influenza vaccine  •  LORazepam  •  ondansetron      Assessment & Plan       Adenomatous polyp of colon    Adenomatous polyp of colon, unspecified part of colon    Postoperative day #2 following a laparoscopic right hemicolectomy for management of colonoscopically unresectable adenomatous polyp involving the entire ileocecal valve.  The surgical pathology is pending.  Overall, the patient is doing well.  We will advance him to a GI soft diet now that he has had some return of bowel function.  I encouraged him to use caution with his diet and avoid overindulgence.  He understands the importance of continuing to be out of bed, and ambulating frequently.  I have decreased his IV fluids to 25 mL/h.  Plan for repeat labs tomorrow.  Continue current medication regimen as ordered.  Continue current pain control.  I am hopeful that if he is able to continue to tolerate a diet after advancement today, and through this evening, that he will be able to be discharged home tomorrow.      Kalli Aburto,  MD  11/09/22  11:19 EST

## 2022-11-09 NOTE — BRIEF OP NOTE
Darnell PERERA Jose  11/7/2022    Pre-op Diagnosis:   Adenomatous polyp of colon, unspecified part of colon [D12.6]       Post-Op Diagnosis:     * Adenomatous polyp of colon, unspecified part of colon [D12.6]      Procedure(s):  LAPROSCOPIC RIG hello HT COLON RESECTION HAND ASSIST        Surgeon(s):  Kalli Aburto MD    Anesthesia: General    Staff:   Circulator: Zena Landry, LANI; Sharon Lizarraga RN; Evie Rea RN  Scrub Person: Lindy Gill; Gloria Murphy CSA         Estimated Blood Loss: 100mL    Urine Voided: * No values recorded between 11/7/2022  9:46 AM and 11/7/2022  1:13 PM *    Specimens:                Specimens     ID Source Type Tests Collected By Collected At Frozen?    A Large Intestine, Right / Ascending Colon Tissue · TISSUE EXAM, [facility])   Kalli Aburto MD 11/7/22 1242     Description: right colon, terminal ileum , and appendix     This specimen was not marked as sent.                Drains:   [REMOVED] Urethral Catheter Silicone 16 Fr. (Removed)   Daily Indications Required activity restriction from trauma, surgery, (e.g. unstable spine, fracture, hemodynamics) 11/08/22 0800   Site Assessment Clean;Skin intact 11/08/22 0800   Collection Container Standard drainage bag 11/08/22 0800   Securement Method Securing device 11/08/22 0800   Catheter care complete Yes 11/08/22 0800   Irrigation/Instillation Indication patency maintenance 11/07/22 1820   Irrigation Ease no resistance met 11/07/22 1600   Output (mL) 170 mL 11/08/22 1005       Findings: Abnormally dilated, somewhat calcified appendix with questionable intraluminal mass.  No evidence of intraperitoneal metastatic disease.        Complications: none          Kalli Aburto MD

## 2022-11-09 NOTE — PLAN OF CARE
Goal Outcome Evaluation:  Plan of Care Reviewed With: patient        Progress: improving  Outcome Evaluation: VSS.  Pt reports passing gas but has not had any bowel movement.  Pt diet advanced to GI soft and he reports tolerating it well.  No other changes in pt condition to report.  Will monitor.

## 2022-11-09 NOTE — PLAN OF CARE
Goal Outcome Evaluation:  Plan of Care Reviewed With: patient           Outcome Evaluation: Patient received pain medication as needed.  vital signs stable. No overnight events to report.

## 2022-11-10 LAB
ANION GAP SERPL CALCULATED.3IONS-SCNC: 10.7 MMOL/L (ref 5–15)
BASOPHILS # BLD AUTO: 0.06 10*3/MM3 (ref 0–0.2)
BASOPHILS NFR BLD AUTO: 0.8 % (ref 0–1.5)
BUN SERPL-MCNC: 9 MG/DL (ref 8–23)
BUN/CREAT SERPL: 12 (ref 7–25)
CALCIUM SPEC-SCNC: 8.6 MG/DL (ref 8.6–10.5)
CHLORIDE SERPL-SCNC: 106 MMOL/L (ref 98–107)
CO2 SERPL-SCNC: 25.3 MMOL/L (ref 22–29)
CREAT SERPL-MCNC: 0.75 MG/DL (ref 0.76–1.27)
DEPRECATED RDW RBC AUTO: 40.8 FL (ref 37–54)
EGFRCR SERPLBLD CKD-EPI 2021: 102 ML/MIN/1.73
EOSINOPHIL # BLD AUTO: 0.27 10*3/MM3 (ref 0–0.4)
EOSINOPHIL NFR BLD AUTO: 3.8 % (ref 0.3–6.2)
ERYTHROCYTE [DISTWIDTH] IN BLOOD BY AUTOMATED COUNT: 12 % (ref 12.3–15.4)
GLUCOSE BLDC GLUCOMTR-MCNC: 115 MG/DL (ref 70–130)
GLUCOSE BLDC GLUCOMTR-MCNC: 123 MG/DL (ref 70–130)
GLUCOSE BLDC GLUCOMTR-MCNC: 127 MG/DL (ref 70–130)
GLUCOSE BLDC GLUCOMTR-MCNC: 127 MG/DL (ref 70–130)
GLUCOSE SERPL-MCNC: 104 MG/DL (ref 65–99)
HCT VFR BLD AUTO: 35 % (ref 37.5–51)
HGB BLD-MCNC: 12.1 G/DL (ref 13–17.7)
IMM GRANULOCYTES # BLD AUTO: 0.02 10*3/MM3 (ref 0–0.05)
IMM GRANULOCYTES NFR BLD AUTO: 0.3 % (ref 0–0.5)
LYMPHOCYTES # BLD AUTO: 2.31 10*3/MM3 (ref 0.7–3.1)
LYMPHOCYTES NFR BLD AUTO: 32.7 % (ref 19.6–45.3)
MCH RBC QN AUTO: 32.2 PG (ref 26.6–33)
MCHC RBC AUTO-ENTMCNC: 34.6 G/DL (ref 31.5–35.7)
MCV RBC AUTO: 93.1 FL (ref 79–97)
MONOCYTES # BLD AUTO: 0.44 10*3/MM3 (ref 0.1–0.9)
MONOCYTES NFR BLD AUTO: 6.2 % (ref 5–12)
NEUTROPHILS NFR BLD AUTO: 3.96 10*3/MM3 (ref 1.7–7)
NEUTROPHILS NFR BLD AUTO: 56.2 % (ref 42.7–76)
NRBC BLD AUTO-RTO: 0 /100 WBC (ref 0–0.2)
PLATELET # BLD AUTO: 250 10*3/MM3 (ref 140–450)
PMV BLD AUTO: 9.6 FL (ref 6–12)
POTASSIUM SERPL-SCNC: 4.1 MMOL/L (ref 3.5–5.2)
RBC # BLD AUTO: 3.76 10*6/MM3 (ref 4.14–5.8)
REF LAB TEST METHOD: NORMAL
SODIUM SERPL-SCNC: 142 MMOL/L (ref 136–145)
WBC NRBC COR # BLD: 7.06 10*3/MM3 (ref 3.4–10.8)

## 2022-11-10 PROCEDURE — 63710000001 PREDNISONE PER 5 MG: Performed by: SURGERY

## 2022-11-10 PROCEDURE — 25010000002 HEPARIN (PORCINE) PER 1000 UNITS: Performed by: SURGERY

## 2022-11-10 PROCEDURE — 80048 BASIC METABOLIC PNL TOTAL CA: CPT | Performed by: SURGERY

## 2022-11-10 PROCEDURE — 82962 GLUCOSE BLOOD TEST: CPT

## 2022-11-10 PROCEDURE — 94760 N-INVAS EAR/PLS OXIMETRY 1: CPT

## 2022-11-10 PROCEDURE — 85025 COMPLETE CBC W/AUTO DIFF WBC: CPT | Performed by: SURGERY

## 2022-11-10 PROCEDURE — 94799 UNLISTED PULMONARY SVC/PX: CPT

## 2022-11-10 PROCEDURE — 63710000001 INSULIN DETEMIR PER 5 UNITS: Performed by: SURGERY

## 2022-11-10 PROCEDURE — 99024 POSTOP FOLLOW-UP VISIT: CPT | Performed by: SURGERY

## 2022-11-10 RX ORDER — TRAMADOL HYDROCHLORIDE 50 MG/1
50 TABLET ORAL EVERY 6 HOURS PRN
Status: DISCONTINUED | OUTPATIENT
Start: 2022-11-10 | End: 2022-11-11 | Stop reason: HOSPADM

## 2022-11-10 RX ORDER — DIAZEPAM 5 MG/ML
2.5 INJECTION, SOLUTION INTRAMUSCULAR; INTRAVENOUS EVERY 4 HOURS PRN
Status: DISCONTINUED | OUTPATIENT
Start: 2022-11-10 | End: 2022-11-11 | Stop reason: HOSPADM

## 2022-11-10 RX ORDER — ACETAMINOPHEN 500 MG
1000 TABLET ORAL EVERY 6 HOURS SCHEDULED
Status: DISCONTINUED | OUTPATIENT
Start: 2022-11-10 | End: 2022-11-11 | Stop reason: HOSPADM

## 2022-11-10 RX ORDER — BISACODYL 10 MG
10 SUPPOSITORY, RECTAL RECTAL ONCE
Status: COMPLETED | OUTPATIENT
Start: 2022-11-10 | End: 2022-11-10

## 2022-11-10 RX ADMIN — HEPARIN SODIUM 5000 UNITS: 5000 INJECTION INTRAVENOUS; SUBCUTANEOUS at 13:33

## 2022-11-10 RX ADMIN — BISACODYL 10 MG: 10 SUPPOSITORY RECTAL at 13:33

## 2022-11-10 RX ADMIN — INSULIN DETEMIR 15 UNITS: 100 INJECTION, SOLUTION SUBCUTANEOUS at 08:44

## 2022-11-10 RX ADMIN — DIAZEPAM 5 MG: 5 TABLET ORAL at 20:58

## 2022-11-10 RX ADMIN — ACETAMINOPHEN 1000 MG: 500 TABLET, FILM COATED ORAL at 06:57

## 2022-11-10 RX ADMIN — SODIUM CHLORIDE 50 ML/HR: 9 INJECTION, SOLUTION INTRAVENOUS at 15:49

## 2022-11-10 RX ADMIN — TRAMADOL HYDROCHLORIDE 50 MG: 50 TABLET ORAL at 20:58

## 2022-11-10 RX ADMIN — GABAPENTIN 300 MG: 300 CAPSULE ORAL at 20:50

## 2022-11-10 RX ADMIN — METOPROLOL SUCCINATE 12.5 MG: 25 TABLET, FILM COATED, EXTENDED RELEASE ORAL at 20:50

## 2022-11-10 RX ADMIN — BUDESONIDE AND FORMOTEROL FUMARATE DIHYDRATE 2 PUFF: 160; 4.5 AEROSOL RESPIRATORY (INHALATION) at 18:56

## 2022-11-10 RX ADMIN — ACETAMINOPHEN 1000 MG: 500 TABLET, FILM COATED ORAL at 17:35

## 2022-11-10 RX ADMIN — HEPARIN SODIUM 5000 UNITS: 5000 INJECTION INTRAVENOUS; SUBCUTANEOUS at 06:55

## 2022-11-10 RX ADMIN — MONTELUKAST 10 MG: 10 TABLET, FILM COATED ORAL at 20:50

## 2022-11-10 RX ADMIN — FAMOTIDINE 20 MG: 20 TABLET ORAL at 20:50

## 2022-11-10 RX ADMIN — HEPARIN SODIUM 5000 UNITS: 5000 INJECTION INTRAVENOUS; SUBCUTANEOUS at 00:19

## 2022-11-10 RX ADMIN — ACETAMINOPHEN 1000 MG: 500 TABLET, FILM COATED ORAL at 12:43

## 2022-11-10 RX ADMIN — ACETAMINOPHEN 1000 MG: 500 TABLET, FILM COATED ORAL at 00:19

## 2022-11-10 RX ADMIN — DIAZEPAM 5 MG: 5 TABLET ORAL at 00:24

## 2022-11-10 RX ADMIN — ATORVASTATIN CALCIUM 40 MG: 40 TABLET, FILM COATED ORAL at 20:50

## 2022-11-10 RX ADMIN — CHOLECALCIFEROL TAB 10 MCG (400 UNIT) 400 UNITS: 10 TAB at 08:44

## 2022-11-10 RX ADMIN — BUDESONIDE AND FORMOTEROL FUMARATE DIHYDRATE 2 PUFF: 160; 4.5 AEROSOL RESPIRATORY (INHALATION) at 06:58

## 2022-11-10 RX ADMIN — PREDNISONE 5 MG: 10 TABLET ORAL at 08:45

## 2022-11-10 RX ADMIN — HEPARIN SODIUM 5000 UNITS: 5000 INJECTION INTRAVENOUS; SUBCUTANEOUS at 20:50

## 2022-11-10 NOTE — PROGRESS NOTES
LOS: 3 days   Patient Care Team:  Carlos Bella MD as PCP - General (Internal Medicine)    Chief Complaint: Postoperative day #3     Subjective     Interval History:     No acute events reported overnight.  Continues to tolerate clear liquids without nausea or vomiting, although he does have some abdominal bloating..  The patient continues to pass small amounts of flatus.  He continues to walk in the hallway.  He reports that his pain is well controlled.    Objective     Vital Signs  Temp:  [97.8 °F (36.6 °C)-98.3 °F (36.8 °C)] 98.1 °F (36.7 °C)  Heart Rate:  [55-85] 85  Resp:  [16-18] 18  BP: (132-163)/(69-76) 149/72    Physical Exam: Patient seen and examined at the bedside on 11/10/2022     General Appearance:    Alert, cooperative, in no acute distress   Head:    Normocephalic, without obvious abnormality, atraumatic   Eyes:            Lids and lashes normal, conjunctivae and sclerae normal, no   icterus, no pallor, corneas clear, PERRLA   Ears:    Ears appear intact with no abnormalities noted   Lungs:     Respirations regular, even and unlabored    Heart:    Regular rhythm and normal rate   Abdomen:    Soft, minimally distended, appropriately tender to palpation.  Dressings removed, and port sites are appropriately covered with Steri-Strips.  No periincisional erythema.  No drainage.   Extremities:   Moves all extremities well, no edema, no cyanosis, no             redness   Skin:   No bleeding, bruising or rash   Neurologic:  Alert and oriented x3.  No facial asymmetry or focal deficits.           Results Review:    I reviewed the patient's new clinical results.     Results from last 7 days   Lab Units 11/10/22  0726 11/08/22  0559 11/04/22  1554   SODIUM mmol/L 142 139 135*   POTASSIUM mmol/L 4.1 4.9 4.6   CHLORIDE mmol/L 106 105 98   CO2 mmol/L 25.3 22.8 24.6   BUN mg/dL 9 20 21   CREATININE mg/dL 0.75* 0.89 1.10   CALCIUM mg/dL 8.6 8.3* 9.0   GLUCOSE mg/dL 104* 177* 235*       Results from last 7  days   Lab Units 11/10/22  0726 11/08/22  0559 11/04/22  1554   WBC 10*3/mm3 7.06 10.78 7.57   HEMOGLOBIN g/dL 12.1* 11.9* 13.6   HEMATOCRIT % 35.0* 34.7* 39.3   PLATELETS 10*3/mm3 250 271 266         Medication Review: Reviewed in the EMR      Assessment & Plan       Adenomatous polyp of colon    Adenomatous polyp of colon, unspecified part of colon      Postoperative day #3 following a laparoscopic right hemicolectomy for management of a colonoscopically unresectable adenomatous polyp involving the entire ileocecal valve.  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  There is additionally a tubular adenoma with focal high-grade dysplasia.  Surgical margins are free of carcinoma or dysplasia.  18 examined lymph nodes negative for metastatic carcinoma.  Results discussed with the patient.  With regards to the low-grade mucinous neoplasm of the appendix, he has had definitive therapy with the surgery that was performed.  No additional therapy is necessary.  He continues to do well overall, although we continue to await full return of bowel function.  Continue current medication regimen.  Continue diet as tolerated.  Monitor abdominal bloating, and monitor for the development of nausea, or vomiting.  Hopefully, the patient's bowel function will return, and he will be able to be discharged home in the next 24 hours.    Kalli Aburto MD  11/10/22  12:50 EST

## 2022-11-10 NOTE — PAYOR COMM NOTE
"Darnell Padgett (62 y.o. Male)     Date of Birth   1960    Social Security Number       Address   [facility] [address]  [place] [address]    Home Phone   [phone]    MRN   [id number]       Advent   None    Marital Status                               Admission Date   11/7/22    Admission Type   Elective    Admitting Provider   Kalli Aburto MD    Attending Provider   Kalli Aburto MD    Department, Room/Bed   [facility] MED SURG  4, 427/1       Discharge Date       Discharge Disposition       Discharge Destination                               Attending Provider: Kalli Aburto MD    Allergies: Aspirin    Isolation: None   Infection: None   Code Status: CPR    Ht: 177.8 cm (70\")   Wt: 95.7 kg (210 lb 15.7 oz)    Admission Cmt: None   Principal Problem: Adenomatous polyp of colon [D12.6]                 Active Insurance as of 11/7/2022     Primary Coverage     Payor Plan Insurance Group Employer/Plan Group    [employer] BL[employer]OSS [employer] BLUE [employer]OSS BLUE [employer] PPO [id number]     Payor Plan Address Payor Plan Phone Number Payor Plan Fax Number Effective Dates    [address] [phone]  2/1/2021 - None Entered    [facility] [address]       Subscriber Name Subscriber Birth Date Member ID       DARNELL PADGETT 1960 [id number]                 Emergency Contacts      (Rel.) Home Phone Work Phone Mobile Phone    Naz Padgett (Spouse) [phone] -- [phone]    MANOLO PADGETT (Mother) -- -- [phone]    MITCHELL PADGETT (Father) -- -- [phone]      Continued stay   [facility]  [phone]   Fax [phone]    Lab Results (last 48 hours)     Procedure Component Value Units Date/Time    TISSUE EXAM, [facility]) [[id number]] Collected: 11/07/22 1242    Specimen: Tissue from Large Intestine, Right / Ascending Colon Updated: 11/10/22 1247     Reference Lab Report --     [facility]  [address] [place] [address]  Phone: [phone] " or [phone]  Fax: [phone]  Ronnell Love M.D., Medical Director    PATIENT NAME                           LABORATORY NO.  YAHAIRA PACE                        [id number]  [id number]                         AGE              SEX  SSN           CLIENT REF #  [facility] [place]            62      1960      [id number]   [id number]    [address]                REQUESTING M.D.     ATTENDING M.D.     COPY TO.  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, pTis  Tubular adenoma with focal high-grade dysplasia  The surgical margins are free of carcinoma or dysplasia  Eighteen lymph nodes, negative for metastatic carcinoma (0/18), pN0  Please see CAP staging protocol below for full report      APPENDIX    SPECIMEN:  Procedure: Appendectomy and right colectomy    TUMOR:  Histologic Type: Low-grade appendiceal mucinous neoplasm  Histologic Grade: G1: Well differentiated  Tumor Size: 7-8 cm  Tumor Deposits: Not identified  Tumor Extension: Tumor invades lamina propria or muscularis mucosa  Lymphovascular Invasion: Not identified    MARGINS:  Proximal Margin:  - Uninvolved by invasive carcinoma  Status of Mucinous Neoplasm at Proximal Margin: Uninvolved by appendiceal  mucinous neoplasm  Mesenteric Margin:  - Uninvolved by invasive carcinoma  Status of Mucinous Neoplasm at Mesenteric Margin: Uninvolved by  appendiceal mucinous neoplasm    LYMPH NODES:  Number of Lymph Nodes Involved: 0  Number of Lymph Nodes Examined: 18    PATHOLOGIC STAGE CLASSIFICATION (pTNM, AJCC 8th Edition):  Note: Reporting of pT, pN, and (when applicable) pM categories is based on  information  "available to the pathologist at the time the report is issued. As per  the AJCC (Chapter 1, 8th Ed.) it is the managing physician-s responsibility to  establish the final pathologic stage based upon all pertinent information,  including but potentially not limited to this pathology report.  Primary Tumor (pT): pTis (LAMN)  Regional Lymph Nodes (pN): pN0      CLINICAL HISTORY:  Adenomatous polyp of colon, unspecified part of colon    SPECIMENS RECEIVED:  COLON RESECTION FOR TUMOR , RIGHT, TERMINAL ILEUM, APPENDIX    MICROSCOPIC DESCRIPTION:  Tissue blocks are prepared and slides are examined microscopically on all  specimens. See diagnosis for details.    Professional interpretation rendered by Manish Gannon M.D.,FEILEEN at [employer], [place], [address] [place] [address].    GROSS DESCRIPTION:  Specimen received in formalin labeled \"large intestine, right/ascending colon\"  and consists of a 14.0 cm in length portion of right colon and cecum with  attached fat having an average diameter of 4.5 cm.  There is an attached portion  of terminal ileum (11.0 cm in length with an average diameter of 3.0 cm) and  attached intact vermiform appendix (8.3 cm in length with a diameter ranging  from 1.0 to 2.5 cm).  Also received are 2 separate portions of apparent  anastomotic donut measuring 1.5 x 1.5 x 0.6 cm.  The serosal surface of the  appendix is injected.  No perforation is grossly identified.  Sectioning reveals the  lumen to be dilated and filled with a yellow tacky mucoid material.  No areas of  hemorrhage or necrosis are grossly identified.  The serosal surface of the  segment of colon is tan grey smooth and glistening with fibrovascular adhesions.  No perforation is grossly identified.  Sectioning reveals a 2.0 x 1.2 x 0.5 cm  polyp located 0.7 cm from the ileocecal valve and to within 12.0 cm from the  distal margin.  The remainder of the mucosal surface is tan wrinkled and  otherwise grossly unremarkable.  No " diverticula or other discrete mass lesions  are grossly identified.  Sectioning through the attached pericolonic fat reveals 22  possible lymph nodes ranging in size from 0.2 x 0.2 x 0.1 cm to 0.8 x 0.7 x 0.5  cm.  Representative sections are submitted in 28 cassettes as follows: A1-entirety of  apparent anastomotic donut A2-proximal margin A3-distal margin A4-A20-  entirety of appendix moving from distal to proximal Y25-T30-ijpuxgrf of polyp  T74-dflrhjwqqm margin nearest polyp A24-28-lymph nodes with largest bisected  in A28.  JTM/RLL    REVIEWED, DIAGNOSED AND ELECTRONICALLY  SIGNED BY:    Manish Gannon M.D.,F.C.A.P.  CPT CODES:  97347      POC Glucose Once [[id number]]  (Normal) Collected: 11/10/22 1115    Specimen: Blood Updated: 11/10/22 1117     Glucose 127 mg/dL      Comment: Serial Number: [id number]:  [id number]       Basic Metabolic Panel [[id number]]  (Abnormal) Collected: 11/10/22 0726    Specimen: Blood Updated: 11/10/22 0802     Glucose 104 mg/dL      BUN 9 mg/dL      Creatinine 0.75 mg/dL      Sodium 142 mmol/L      Potassium 4.1 mmol/L      Chloride 106 mmol/L      CO2 25.3 mmol/L      Calcium 8.6 mg/dL      BUN/Creatinine Ratio 12.0     Anion Gap 10.7 mmol/L      eGFR 102.0 mL/min/1.73      Comment: National Kidney Foundation and American Society of Nephrology (ASN) Task Force recommended calculation based on the Chronic Kidney Disease Epidemiology Collaboration (CKD-EPI) equation refit without adjustment for race.       Narrative:      GFR Normal >60  Chronic Kidney Disease <60  Kidney Failure <15      CBC & Differential [[id number]]  (Abnormal) Collected: 11/10/22 0726    Specimen: Blood Updated: 11/10/22 0744    Narrative:      The following orders were created for panel order CBC & Differential.  Procedure                               Abnormality         Status                     ---------                               -----------         ------                     CBC Auto  Differential[[id number]]        Abnormal            Final result                 Please view results for these tests on the individual orders.    CBC Auto Differential [[id number]]  (Abnormal) Collected: 11/10/22 0726    Specimen: Blood Updated: 11/10/22 0744     WBC 7.06 10*3/mm3      RBC 3.76 10*6/mm3      Hemoglobin 12.1 g/dL      Hematocrit 35.0 %      MCV 93.1 fL      MCH 32.2 pg      MCHC 34.6 g/dL      RDW 12.0 %      RDW-SD 40.8 fl      MPV 9.6 fL      Platelets 250 10*3/mm3      Neutrophil % 56.2 %      Lymphocyte % 32.7 %      Monocyte % 6.2 %      Eosinophil % 3.8 %      Basophil % 0.8 %      Immature Grans % 0.3 %      Neutrophils, Absolute 3.96 10*3/mm3      Lymphocytes, Absolute 2.31 10*3/mm3      Monocytes, Absolute 0.44 10*3/mm3      Eosinophils, Absolute 0.27 10*3/mm3      Basophils, Absolute 0.06 10*3/mm3      Immature Grans, Absolute 0.02 10*3/mm3      nRBC 0.0 /100 WBC     POC Glucose Once [[id number]]  (Normal) Collected: 11/10/22 0650    Specimen: Blood Updated: 11/10/22 0653     Glucose 115 mg/dL      Comment: Serial Number: [id number]:  [id number]       POC Glucose Once [[id number]]  (Abnormal) Collected: 11/09/22 2035    Specimen: Blood Updated: 11/09/22 2103     Glucose 153 mg/dL      Comment: Serial Number: [id number]:  [id number]       POC Glucose Once [[id number]]  (Abnormal) Collected: 11/09/22 1629    Specimen: Blood Updated: 11/09/22 1631     Glucose 161 mg/dL      Comment: Serial Number: [id number]:  [id number]       POC Glucose Once [[id number]]  (Normal) Collected: 11/09/22 1044    Specimen: Blood Updated: 11/09/22 1052     Glucose 83 mg/dL      Comment: Serial Number: [id number]:  [id number]       POC Glucose Once [[id number]]  (Normal) Collected: 11/09/22 0629    Specimen: Blood Updated: 11/09/22 0634     Glucose 84 mg/dL      Comment: Serial Number: [id number]:  [id number]       POC Glucose Once [[id number]]  (Abnormal) Collected: 11/08/22 2036    Specimen: Blood  Updated: 11/08/22 2039     Glucose 179 mg/dL      Comment: Serial Number: [id number]:  [id number]       POC Glucose Once [[id number]]  (Abnormal) Collected: 11/08/22 1553    Specimen: Blood Updated: 11/08/22 1623     Glucose 175 mg/dL      Comment: Serial Number: [id number]:  [id number]             Imaging Results (Last 48 Hours)     ** No results found for the last 48 hours. **           Physician Progress Notes (last 72 hours)      Kalli Aburto MD at 11/09/22 1119               LOS: 2 days   Patient Care Team:  Carlos Bella MD as PCP - General (Internal Medicine)    Chief Complaint: Postoperative day #2    Subjective     Interval History:   No acute events reported overnight.  The patient has tolerated clear liquids.  He is voiding without difficulty after removal of his Ace catheter.  He reports no nausea or vomiting.  He tells me that he has begun passing small amounts of flatus.  He is anxious to have his diet advanced.  He reports satisfaction with his pain control.      Objective     Vital Signs  Temp:  [97.4 °F (36.3 °C)-98 °F (36.7 °C)] 97.5 °F (36.4 °C)  Heart Rate:  [60-80] 71  Resp:  [14-18] 16  BP: (124-139)/(56-72) 126/70      PO 1077mL  UOP 2190mL    Physical Exam:    General Appearance:    Alert, cooperative, in no acute distress   Head:    Normocephalic, without obvious abnormality, atraumatic   Eyes:            Lids and lashes normal, conjunctivae and sclerae normal, no   icterus, no pallor, corneas clear, PERRLA   Ears:    Ears appear intact with no abnormalities noted   Lungs:     Respirations regular, even and unlabored    Heart:    Regular rhythm and normal rate   Abdomen:    Soft, minimally distended, appropriately tender to palpation.  Dressings removed, and port sites are appropriately covered with Steri-Strips.  No periincisional erythema.  No drainage.   Extremities:   Moves all extremities well, no edema, no cyanosis, no             redness   Skin:   No bleeding, bruising  or rash   Neurologic:  Alert and oriented x3.  No facial asymmetry or focal deficits.        Results Review:    I reviewed the patient's new clinical results.     Results from last 7 days   Lab Units 11/08/22  0559 11/04/22  1554   SODIUM mmol/L 139 135*   POTASSIUM mmol/L 4.9 4.6   CHLORIDE mmol/L 105 98   CO2 mmol/L 22.8 24.6   BUN mg/dL 20 21   CREATININE mg/dL 0.89 1.10   CALCIUM mg/dL 8.3* 9.0   GLUCOSE mg/dL 177* 235*       Results from last 7 days   Lab Units 11/08/22  0559 11/04/22  1554   WBC 10*3/mm3 10.78 7.57   HEMOGLOBIN g/dL 11.9* 13.6   HEMATOCRIT % 34.7* 39.3   PLATELETS 10*3/mm3 271 266         Medication Review:   Scheduled Meds:acetaminophen, 1,000 mg, Oral, Q6H  atorvastatin, 40 mg, Oral, Nightly  budesonide-formoterol, 2 puff, Inhalation, BID  cholecalciferol, 400 Units, Oral, Daily  famotidine, 20 mg, Oral, Nightly  gabapentin, 300 mg, Oral, Nightly  heparin (porcine), 5,000 Units, Subcutaneous, Q8H  Insulin Aspart, 0-9 Units, Subcutaneous, TID AC  insulin detemir, 15 Units, Subcutaneous, Daily  metoprolol succinate XL, 12.5 mg, Oral, Nightly  montelukast, 10 mg, Oral, Nightly  predniSONE, 5 mg, Oral, Daily      Continuous Infusions:Scopolamine, 1 patch  sodium chloride, 75 mL/hr, Last Rate: 75 mL/hr (11/09/22 0859)      PRN Meds:.•  albuterol  •  cyclobenzaprine  •  dextrose  •  dextrose  •  diazePAM  •  glucagon (human recombinant)  •  HYDROmorphone **AND** naloxone  •  influenza vaccine  •  LORazepam  •  ondansetron      Assessment & Plan       Adenomatous polyp of colon    Adenomatous polyp of colon, unspecified part of colon    Postoperative day #2 following a laparoscopic right hemicolectomy for management of colonoscopically unresectable adenomatous polyp involving the entire ileocecal valve.  The surgical pathology is pending.  Overall, the patient is doing well.  We will advance him to a GI soft diet now that he has had some return of bowel function.  I encouraged him to use caution  with his diet and avoid overindulgence.  He understands the importance of continuing to be out of bed, and ambulating frequently.  I have decreased his IV fluids to 25 mL/h.  Plan for repeat labs tomorrow.  Continue current medication regimen as ordered.  Continue current pain control.  I am hopeful that if he is able to continue to tolerate a diet after advancement today, and through this evening, that he will be able to be discharged home tomorrow.      Kalli Aburto MD  11/09/22  11:19 EST        Electronically signed by Kalli Aburto MD at 11/09/22 1412     Kalli Aburto MD at 11/08/22 0958               LOS: 1 day   Patient Care Team:  Carlos Bella MD as PCP - General (Internal Medicine)    Chief Complaint:  POD#1    Subjective     Interval History:     No acute events reported overnight.  The patient has tolerated clear liquids.  He reports having some mild nausea immediately after arriving to the floor last evening, but has had no vomiting.  He has had no recurrent nausea today.  He has not passed flatus or had a bowel movement.  He is satisfied with his pain control.    Objective     Vital Signs  Temp:  [97.2 °F (36.2 °C)-98.8 °F (37.1 °C)] 98.4 °F (36.9 °C)  Heart Rate:  [70-95] 70  Resp:  [8-18] 16  BP: (128-170)/(58-97) 128/58    PO 480mL  IV 4129mL    UOP 1425mL      Physical Exam:    General Appearance:    Alert, cooperative, in no acute distress   Head:    Normocephalic, without obvious abnormality, atraumatic   Eyes:            Lids and lashes normal, conjunctivae and sclerae normal, no   icterus   Ears:    Ears appear intact with no abnormalities noted   Lungs:     Respirations regular, even and unlabored    Heart:    Regular rhythm and normal rate   Abdomen:    Soft, appropriately tender to palpation around the port site incisions, and extraction site in the upper midline.  Dressings in place without significant shadowing, or strikethrough.   Extremities:   Moves all extremities well,  no edema, no cyanosis, no             redness   Skin:   No bleeding, bruising or rash   Neurologic:  Alert and oriented x3.  No facial asymmetry or gross deficits        Results Review:    I reviewed the patient's new clinical results.     Results from last 7 days   Lab Units 11/08/22  0559 11/04/22  1554   SODIUM mmol/L 139 135*   POTASSIUM mmol/L 4.9 4.6   CHLORIDE mmol/L 105 98   CO2 mmol/L 22.8 24.6   BUN mg/dL 20 21   CREATININE mg/dL 0.89 1.10   CALCIUM mg/dL 8.3* 9.0   GLUCOSE mg/dL 177* 235*       Results from last 7 days   Lab Units 11/08/22  0559 11/04/22  1554   WBC 10*3/mm3 10.78 7.57   HEMOGLOBIN g/dL 11.9* 13.6   HEMATOCRIT % 34.7* 39.3   PLATELETS 10*3/mm3 271 266         Medication Review:   Scheduled Meds:acetaminophen, 1,000 mg, Oral, Q6H  atorvastatin, 40 mg, Oral, Nightly  budesonide-formoterol, 2 puff, Inhalation, BID  cholecalciferol, 400 Units, Oral, Daily  famotidine, 20 mg, Oral, Nightly  gabapentin, 300 mg, Oral, Nightly  heparin (porcine), 5,000 Units, Subcutaneous, Q8H  Insulin Aspart, 0-9 Units, Subcutaneous, TID AC  ketorolac, 15 mg, Intravenous, Q6H  metoprolol succinate XL, 12.5 mg, Oral, Nightly  montelukast, 10 mg, Oral, Nightly  predniSONE, 5 mg, Oral, Daily      Continuous Infusions:Scopolamine, 1 patch  sodium chloride, 75 mL/hr, Last Rate: 75 mL/hr (11/08/22 0354)      PRN Meds:.•  albuterol  •  dextrose  •  dextrose  •  glucagon (human recombinant)  •  HYDROmorphone **AND** naloxone  •  influenza vaccine  •  LORazepam  •  ondansetron      Assessment & Plan       Adenomatous polyp of colon    Adenomatous polyp of colon, unspecified part of colon    Postoperative day #1 following a laparoscopic right colectomy for management of a adenomatous polypoid mass involving the ileocecal valve, which was not amenable to colonoscopic resection.  Overall, the patient is doing well.  I have asked the nursing staff to remove his Ace catheter today.  He will be continued on clear liquids  while we await return of bowel function.  We discussed the importance of being up, and ambulating frequently throughout the day.  We discussed the importance of pulmonary toileting.  Labs reviewed, and appropriate for postoperative status.  Plan for repeat CBC and BMP tomorrow.  He will be continued on his current medication regimen, including his home medications as currently ordered.  Continue routine DVT prophylaxis.  Further recommendations and changes to be based on the patient's clinical course and progress postoperatively.  At the conclusion of our discussion, the patient understood, and was satisfied with the plan of care.      Kalli Aburto MD  11/08/22  09:34 EST          Electronically signed by Kalli Aburto MD at 11/08/22 1724       Consult Notes (last 24 hours)  Notes from 11/09/22 1316 through 11/10/22 1316   No notes of this type exist for this encounter.

## 2022-11-10 NOTE — PLAN OF CARE
Goal Outcome Evaluation:  Plan of Care Reviewed With: patient        Progress: no change  Outcome Evaluation: VSS.  Pt did not have a bowel movement during shift.  Pt reports feeling bloated and not passing gas. No changes in pt condition to report.  Will monitor.

## 2022-11-11 ENCOUNTER — READMISSION MANAGEMENT (OUTPATIENT)
Dept: CALL CENTER | Facility: HOSPITAL | Age: 62
End: 2022-11-11

## 2022-11-11 ENCOUNTER — APPOINTMENT (OUTPATIENT)
Dept: GENERAL RADIOLOGY | Facility: HOSPITAL | Age: 62
End: 2022-11-11

## 2022-11-11 VITALS
HEIGHT: 70 IN | SYSTOLIC BLOOD PRESSURE: 148 MMHG | DIASTOLIC BLOOD PRESSURE: 77 MMHG | BODY MASS INDEX: 30.33 KG/M2 | TEMPERATURE: 97.9 F | HEART RATE: 58 BPM | OXYGEN SATURATION: 95 % | RESPIRATION RATE: 18 BRPM | WEIGHT: 211.86 LBS

## 2022-11-11 PROBLEM — D37.3 LOW GRADE MUCINOUS NEOPLASM OF APPENDIX: Status: ACTIVE | Noted: 2022-11-11

## 2022-11-11 LAB
GLUCOSE BLDC GLUCOMTR-MCNC: 108 MG/DL (ref 70–130)
GLUCOSE BLDC GLUCOMTR-MCNC: 80 MG/DL (ref 70–130)

## 2022-11-11 PROCEDURE — 25010000002 HYDROMORPHONE 1 MG/ML SOLUTION: Performed by: SURGERY

## 2022-11-11 PROCEDURE — 94664 DEMO&/EVAL PT USE INHALER: CPT

## 2022-11-11 PROCEDURE — 63710000001 INSULIN DETEMIR PER 5 UNITS: Performed by: SURGERY

## 2022-11-11 PROCEDURE — 74018 RADEX ABDOMEN 1 VIEW: CPT

## 2022-11-11 PROCEDURE — 82962 GLUCOSE BLOOD TEST: CPT

## 2022-11-11 PROCEDURE — 94799 UNLISTED PULMONARY SVC/PX: CPT

## 2022-11-11 PROCEDURE — 94761 N-INVAS EAR/PLS OXIMETRY MLT: CPT

## 2022-11-11 PROCEDURE — 25010000002 HEPARIN (PORCINE) PER 1000 UNITS: Performed by: SURGERY

## 2022-11-11 PROCEDURE — 63710000001 PREDNISONE PER 5 MG: Performed by: SURGERY

## 2022-11-11 RX ORDER — ONDANSETRON 4 MG/1
4 TABLET, ORALLY DISINTEGRATING ORAL EVERY 8 HOURS PRN
Qty: 20 TABLET | Refills: 0 | Status: SHIPPED | OUTPATIENT
Start: 2022-11-11

## 2022-11-11 RX ORDER — HYDROCODONE BITARTRATE AND ACETAMINOPHEN 7.5; 325 MG/1; MG/1
1 TABLET ORAL EVERY 4 HOURS PRN
Qty: 12 TABLET | Refills: 0 | Status: SHIPPED | OUTPATIENT
Start: 2022-11-11 | End: 2022-12-15

## 2022-11-11 RX ADMIN — SODIUM CHLORIDE 50 ML/HR: 9 INJECTION, SOLUTION INTRAVENOUS at 12:05

## 2022-11-11 RX ADMIN — INSULIN DETEMIR 15 UNITS: 100 INJECTION, SOLUTION SUBCUTANEOUS at 08:57

## 2022-11-11 RX ADMIN — CHOLECALCIFEROL TAB 10 MCG (400 UNIT) 400 UNITS: 10 TAB at 08:57

## 2022-11-11 RX ADMIN — CYCLOBENZAPRINE 5 MG: 10 TABLET, FILM COATED ORAL at 04:55

## 2022-11-11 RX ADMIN — ACETAMINOPHEN 1000 MG: 500 TABLET, FILM COATED ORAL at 04:55

## 2022-11-11 RX ADMIN — HYDROMORPHONE HYDROCHLORIDE 0.5 MG: 1 INJECTION, SOLUTION INTRAMUSCULAR; INTRAVENOUS; SUBCUTANEOUS at 00:10

## 2022-11-11 RX ADMIN — TRAMADOL HYDROCHLORIDE 50 MG: 50 TABLET ORAL at 04:55

## 2022-11-11 RX ADMIN — HEPARIN SODIUM 5000 UNITS: 5000 INJECTION INTRAVENOUS; SUBCUTANEOUS at 04:56

## 2022-11-11 RX ADMIN — HEPARIN SODIUM 5000 UNITS: 5000 INJECTION INTRAVENOUS; SUBCUTANEOUS at 15:00

## 2022-11-11 RX ADMIN — ACETAMINOPHEN 1000 MG: 500 TABLET, FILM COATED ORAL at 12:07

## 2022-11-11 RX ADMIN — BUDESONIDE AND FORMOTEROL FUMARATE DIHYDRATE 2 PUFF: 160; 4.5 AEROSOL RESPIRATORY (INHALATION) at 07:23

## 2022-11-11 RX ADMIN — PREDNISONE 5 MG: 10 TABLET ORAL at 08:57

## 2022-11-11 RX ADMIN — ACETAMINOPHEN 1000 MG: 500 TABLET, FILM COATED ORAL at 00:09

## 2022-11-11 NOTE — DISCHARGE SUMMARY
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     Presenting Problem/History of Present Illness   Mr. Garcia is a 62-year-old gentleman who was recently seen in my office for evaluation for segmental colectomy due to recent discovery of a large adenomatous polyp involving the ileocecal valve.  He previously underwent colonoscopy by Dr. Art initially on 5/13/2022, and was noted to have a less than adequate prep.  A 15 to 20 mm polyp was resected in the cecum.  A large polyp was also found involving the ileocecal valve but was not resected due to poor endoscopic visualization due to the poor prep.  The patient then returned on 9/2/2022 for repeat colonoscopy, and was noted to have multiple polyps in the right colon including an 18 to 20mm area of adenomatous polypoid mucosa along the ileocecal valve.  The polyp mucosa was flat and lateral spreading was suspected.  This involved the upper lip of the ileocecal valve.  The area was biopsied, but not resected.  The other polyps were resected without difficulty.  He was also noted to have diverticulosis.  The biopsies from the ileocecal valve polyp demonstrated fragments of adenomatous polyp without high-grade dysplasia.  Biopsies of the prior EMR site in the cecum showed normal colon mucosa.  Additional polyps removed from the cecum, and ascending colon were consistent with tubular adenomas.  These results were discussed with the patient by his gastroenterologist, and he was offered a referral to [facility] for possible EMR of the erythematous lesion involving the ileocecal valve.  Alternatively, he was also offered surgical referral for laparoscopic assisted versus  open segmental resection.  He elected to proceed with evaluation for segmental resection, and was evaluated my office on 10/18/2022.  I recommended to him that we proceed with a laparoscopic, possible open right hemicolectomy for management of the colonoscopically unresectable adenomatous lesion involving the entire ileocecal valve.  He was counseled accordingly, and arrangements were made for the planned surgical procedure.    Hospital Course  On November 7, 2022, Mr. Garcia underwent an uneventful laparoscopic, hand-assisted right hemicolectomy.  For complete details regarding the surgical procedure, please refer to the separately dictated operative report.  Notably, at the time of his surgical procedure, he was noted to have a markedly abnormal appendix in addition to the known abnormality of the ileocecal valve.  He was admitted to the surgical service postoperatively, and was managed using an enhanced recovery pathway.  He was started on clear liquid diet several hours postoperatively, and tolerated this without difficulty. On postoperative day #1, he was found to be doing well.  He had tolerated clear liquids overnight, had acceptable lab values, and his pain was well controlled.  His urine output has been excellent, and his Ace catheter was removed.  He was managed expectantly, and had no acute postoperative events overnight.  On postoperative day #2, he was noted to have experienced initial return of bowel function, and was passing flatus .  His diet was advanced as tolerated to a GI soft, low residue, consistent carbohydrate diet.  The hope was that he would be able to be discharged home on the morning of postoperative day 3 if he tolerated dietary advancement without difficulty.  However, when he was evaluated on postoperative day #3, he was noted to have developed some abdominal distention, and had actually experienced a decrease in his bowel function, passing only minimal flatus.  He had also  experienced increased abdominal pain.  His labs remained reassuring, and in fact his electrolytes, and acid-base balance were completely normal.  It was my feeling that he was at risk for developing postoperative ileus, and his diet was decreased back to sips of clear liquids, and ice chips only.  He was encouraged to continue ambulating, while we awaited increased bowel function before we advancing his diet.  He received a Dulcolax suppository for stimulation from below, and did have a small smear of stool on the evening of postoperative day #3.  He continued to pass flatus throughout the night, and continued to tolerate clear liquids.  He was evaluated on postoperative day 4, and although he had not had a significant bowel movement, was passing increased amounts of flatus, and had demonstrated improvement in his abdominal distention, and pain.  Plain films were obtained to further assess for developing postoperative ileus.  These were reassuring and demonstrated a nonobstructive bowel gas pattern with normal amounts of gas in the small bowel, and gas throughout the colon.  On my personal review of these films, gas was noted throughout the left colon extending into the upper rectum.  There was no evidence of ileus, or obstruction.  The patient was reevaluated in the afternoon, and it was felt that it was a reasonable consideration to allow him to be discharged home on a liquid diet with strict instructions for dietary advancement after discharge.  This was discussed extensively with the patient and his wife, and they were agreeable.  As an alternative, he was offered the option to remain in house while continuing to await full return of bowel function.  He preferred to be discharged home.  Arrangements were made, and he was discharged home in good condition with his family.  He was given detailed instructions regarding dietary advancement including educational handouts.  We discussed the signs and symptoms of  concern in detail.  He was provided with prescriptions for antiemetics, and additional pain medication for breakthrough pain at home.  He will follow-up with me as scheduled in the office next week.  He knows to call should he develop any difficulties over the weekend, and was provided with contact information for our office answering service, and the hospital switchboard, either of which can contact me on the patient's behalf.  Prior to hospital discharge, the patient's surgical pathology report was received.  This was reviewed with the patient and his wife in detail. 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  Along the ileocecal valve, this abnormality proved to be a tubular adenoma with focal high-grade dysplasia.  Surgical margins were free of carcinoma, or dysplastic changes.  18 lymph nodes were negative for metastatic carcinoma.  The patient was advised that the previously performed surgical procedure was effective for definitive management of the above problems.  No additional intervention will need to be planned.    Procedures Performed    Procedure(s):  LAPROSCOPIC RIGHT COLON RESECTION HAND ASSIST  -------------------       Consults:   Consults     No orders found from 10/9/2022 to 11/8/2022.          Pertinent Test Results:   Results from last 7 days   Lab Units 11/10/22  0726 11/08/22  0559   SODIUM mmol/L 142 139   POTASSIUM mmol/L 4.1 4.9   CHLORIDE mmol/L 106 105   CO2 mmol/L 25.3 22.8   BUN mg/dL 9 20   CREATININE mg/dL 0.75* 0.89   CALCIUM mg/dL 8.6 8.3*   GLUCOSE mg/dL 104* 177*       Results from last 7 days   Lab Units 11/10/22  0726 11/08/22  0559   WBC 10*3/mm3 7.06 10.78   HEMOGLOBIN g/dL 12.1* 11.9*   HEMATOCRIT % 35.0* 34.7*   PLATELETS 10*3/mm3 250 271     PROCEDURE: XR ABDOMEN KUB-     HISTORY: ileus; D12.6-Benign neoplasm of colon, unspecified     COMPARISON: None.     FINDINGS: An AP view of the abdomen and pelvis demonstrates  postsurgical  changes from ventral hernia repair and bowel resection with surgical  sutures in the right mid abdomen. There is no small bowel dilatation.  There is no large bowel dilatation. There is gas present in the small  and large bowel. There is a nonobstructive bowel gas pattern.     IMPRESSION:  Postoperative findings with a nonobstructive bowel gas  pattern.               Images were reviewed, interpreted, and dictated by JUNG Pacheco  Transcribed by Debbie Moody PA-C.      Condition on Discharge:  stable    Vital Signs  Temp:  [97.5 °F (36.4 °C)-98 °F (36.7 °C)] 97.9 °F (36.6 °C)  Heart Rate:  [58-66] 58  Resp:  [18-20] 18  BP: (118-182)/(65-81) 148/77    Physical Exam:    General Appearance:    Alert, cooperative, in no acute distress   Head:    Normocephalic, without obvious abnormality, atraumatic   Eyes:            Lids and lashes normal, conjunctivae and sclerae normal, no   icterus, no pallor, corneas clear, PERRLA   Ears:    Ears appear intact with no abnormalities noted   Lungs:     Respirations regular, even and unlabored    Heart:    Regular rhythm and normal rate   Abdomen:     Soft, non-tender, non-distended, no guarding, no rebound          tenderness.  Incisions clean and dry with Steri-Strips in place.   Extremities:   Moves all extremities well, no edema, no cyanosis, no             redness   Skin:   No bleeding, bruising or rash   Neurologic:  Alert and oriented x3, no facial droop or asymmetry, no gross deficits.       Discharge Disposition  Home or Self Care    Discharge Medications     Discharge Medications      New Medications      Instructions Start Date   ondansetron ODT 4 MG disintegrating tablet  Commonly known as: ZOFRAN-ODT   4 mg, Translingual, Every 8 Hours PRN         Changes to Medications      Instructions Start Date   albuterol sulfate  (90 Base) MCG/ACT inhaler  Commonly known as: PROVENTIL HFA;VENTOLIN HFA;PROAIR HFA  What changed:   · how much to  take  · how to take this  · when to take this  · reasons to take this   INHALE TWO PUFFS BY MOUTH EVERY 4 HOURS AS NEEDED FOR WHEEZING      atorvastatin 40 MG tablet  Commonly known as: LIPITOR  What changed: when to take this   TAKE ONE TABLET BY MOUTH EVERY DAY      Eliquis 5 MG tablet tablet  Generic drug: apixaban  What changed:   · how much to take  · when to take this   TAKE ONE TABLET BY MOUTH EVERY TWELVE HOURS      glimepiride 4 MG tablet  Commonly known as: AMARYL  What changed: See the new instructions.   TAKE ONE TABLET BY MOUTH EVERY MORNING BEFORE A MEAL      HYDROcodone-acetaminophen 5-325 MG per tablet  Commonly known as: NORCO  What changed: Another medication with the same name was added. Make sure you understand how and when to take each.   1 tablet, Oral, Every 8 Hours PRN      HYDROcodone-acetaminophen 7.5-325 MG per tablet  Commonly known as: NORCO  What changed: You were already taking a medication with the same name, and this prescription was added. Make sure you understand how and when to take each.   1 tablet, Oral, Every 4 Hours PRN      Jardiance 25 MG tablet tablet  Generic drug: empagliflozin  What changed: when to take this   TAKE ONE TABLET BY MOUTH EVERY DAY      metFORMIN 1000 MG tablet  Commonly known as: GLUCOPHAGE  What changed:   · how to take this  · when to take this  · additional instructions   TAKE ONE TABLET BY MOUTH TWICE DAILY WITH MEALS      metoprolol succinate XL 25 MG 24 hr tablet  Commonly known as: TOPROL-XL  What changed: when to take this   12.5 mg, Oral, Daily      montelukast 10 MG tablet  Commonly known as: SINGULAIR  What changed: how to take this   TAKE ONE TABLET BY MOUTH AT BEDTIME      predniSONE 5 MG tablet  Commonly known as: DELTASONE  What changed: when to take this   TAKE ONE TABLET BY MOUTH EVERY DAY         Continue These Medications      Instructions Start Date   Accu-Chek Guide Me w/Device kit   USE AS DIRECTED      ACETAMINOPHEN ER PO   1,000 mg,  Oral, 2 Times Daily PRN, Take 1R268yd tablets by mouth twice daily for arthritis pain.      budesonide-formoterol 160-4.5 MCG/ACT inhaler  Commonly known as: Symbicort   2 puffs, Inhalation, 2 Times Daily      celecoxib 200 MG capsule  Commonly known as: CeleBREX   TAKE ONE CAPSULE BY MOUTH EVERY DAY      cholecalciferol 10 MCG (400 UNIT) tablet  Commonly known as: VITAMIN D3   400 Units, Oral, Daily      cyclobenzaprine 5 MG tablet  Commonly known as: FLEXERIL   5 mg, Oral, 4 Times Daily PRN, Patient takes 1 tablet by mouth once daily      diazePAM 5 MG tablet  Commonly known as: VALIUM   5 mg, Oral, Every 6 Hours PRN      famotidine 20 MG tablet  Commonly known as: PEPCID   20 mg, Oral, Nightly      gabapentin 300 MG capsule  Commonly known as: NEURONTIN   300 mg, Oral, Every Night at Bedtime      glucose blood test strip   Use as instructed      Insulin Glargine 100 UNIT/ML injection pen  Commonly known as: LANTUS SOLOSTAR   15 Units, Subcutaneous, Every Morning      Insulin Pen Needle 30G X 8 MM misc   10 Units, Does not apply, Daily      onetouch ultrasoft lancets   Use as instructed         ASK your doctor about these medications      Instructions Start Date   nitroglycerin 0.4 MG SL tablet  Commonly known as: NITROSTAT   Place 1 tablet under the tongue Every FIVE Minutes As Needed for Chest Pain. Take no more than 3 doses in 15 minutes.             Discharge Diet:   Diet Instructions     Diet:      Diet Texture / Consistency: Regular    Common Modifiers:  Consistent Carbohydrate  Low Fiber / Low Residue             Activity at Discharge:   Activity Instructions     Discharge Activity      1) No driving while taking narcotic pain medications (i.e. lortab, vicodin, percocet, hydrocodone, oxycodone)   2) Remain off work as per dr. Aburto's instructions.   3) May shower.  No tub soaks, submersion in water or baths for 2 weeks.  4) Do not lift / push / pull/ tug more than 20 lbs x 4 weeks.          Follow-up  Appointments  Future Appointments   Date Time Provider Department Center   11/17/2022 10:10 AM Kalli Aburto MD [facility] [place] [facility]   11/28/2022  2:45 PM Batsheva Zavala, APRN [facility]     Additional Instructions for the Follow-ups that You Need to Schedule     Discharge Follow-up with Specified Provider: Dr. Aburto   As directed      To: Dr. Aburto    Follow Up Details: follow up as scheduled               Test Results Pending at Discharge: none       Kalli Aburto MD  11/11/22  16:39 EST    Time: Discharge 20 min

## 2022-11-11 NOTE — PLAN OF CARE
Goal Outcome Evaluation:  Plan of Care Reviewed With: patient        Progress: no change  Outcome Evaluation: No acute events during the night. Patient slightly hypertensive throughout the night. Complaints of pain, see MAR. Patient states he does continue to pass gas, however still no bowel movement. Patient encouraged to walk in hallway. Will continue to monitor.

## 2022-11-12 NOTE — CASE MANAGEMENT/SOCIAL WORK
Case Management Discharge Note           Provided Post Acute Provider List?: N/A  Provided Post Acute Provider Quality & Resource List?: N/A    Selected Continued Care - Discharged on 11/11/2022 Admission date: 11/7/2022 - Discharge disposition: Home or Self Care    Destination    No services have been selected for the patient.              Durable Medical Equipment    No services have been selected for the patient.              Dialysis/Infusion    No services have been selected for the patient.              Home Medical Care    No services have been selected for the patient.              Therapy    No services have been selected for the patient.              Community Resources    No services have been selected for the patient.              Community & DME    No services have been selected for the patient.                  Transportation Services  Private: Car    Final Discharge Disposition Code: 01 - home or self-care

## 2022-11-14 ENCOUNTER — TRANSITIONAL CARE MANAGEMENT TELEPHONE ENCOUNTER (OUTPATIENT)
Dept: CALL CENTER | Facility: HOSPITAL | Age: 62
End: 2022-11-14

## 2022-11-14 NOTE — OUTREACH NOTE
Call Center TCM Note    Flowsheet Row Responses   [facility] patient discharged from? [facility]   Does the patient have one of the following disease processes/diagnoses(primary or secondary)? General Surgery   TCM attempt successful? Yes   Call start time 0906   Call end time 0909   Discharge diagnosis laparoscopic, hand-assisted right hemicolectomy   Meds reviewed with patient/caregiver? Yes   Is the patient having any side effects they believe may be caused by any medication additions or changes? No   Does the patient have all medications related to this admission filled (includes all antibiotics, pain medications, etc.) Yes   Is the patient taking all medications as directed (includes completed medication regime)? Yes   Comments PATIENT HAS FOLLOW UP APPOINTMENT WITH THE SURGEON ON 11/17/22. PCP APPOINTMENT IS 11/28/22. HE STATES THIS IS NOT A GOOD DATE AND HE WILL HAVE TO RESCHEULE THIS APPOINTMENT.    Does the patient have an appointment with their PCP within 7 days of discharge? Yes   Has home health visited the patient within 72 hours of discharge? N/A   Psychosocial issues? No   Did the patient receive a copy of their discharge instructions? Yes   Nursing interventions Reviewed instructions with patient   What is the patient's perception of their health status since discharge? Improving   Nursing interventions Nurse provided patient education   Is the patient /caregiver able to teach back basic post-op care? Drive as instructed by MD in discharge instructions, Take showers only when approved by MD-sponge bathe until then, No tub bath, swimming, or hot tub until instructed by MD, Keep incision areas clean,dry and protected, Do not remove steri-strips, Lifting as instructed by MD in discharge instructions   Is the patient/caregiver able to teach back signs and symptoms of incisional infection? Increased redness, swelling or pain at the incisonal site, Increased drainage or bleeding, Incisional warmth,  Fever, Pus or odor from incision   Is the patient/caregiver able to teach back steps to recovery at home? Set small, achievable goals for return to baseline health, Rest and rebuild strength, gradually increase activity, Make a list of questions for surgeon's appointment   If the patient is a current smoker, are they able to teach back resources for cessation? Not a smoker   Is the patient/caregiver able to teach back the hierarchy of who to call/visit for symptoms/problems? PCP, Specialist, Home health nurse, Urgent Care, ED, 911 Yes   TCM call completed? Yes   Call end time 0909   Would this patient benefit from a Referral to [facility] Social Work? No   Is the patient interested in additional calls from an ambulatory ?  NOTE:  applies to high risk patients requiring additional follow-up. No          Eliz Ellis LPN    11/14/2022, 09:14 EST

## 2022-11-15 ENCOUNTER — TELEPHONE (OUTPATIENT)
Dept: SURGERY | Facility: CLINIC | Age: 62
End: 2022-11-15

## 2022-11-15 NOTE — TELEPHONE ENCOUNTER
If he is not nauseated and not having fevers or chills, he does not have to come in today, he can just come in for his usual follow up on Thursday.  If he is having fevers or chills or if he is having nausea or vomiting he needs to come in today to be evaluated.

## 2022-11-15 NOTE — TELEPHONE ENCOUNTER
Patient was released from the hospital on Friday.  He has not had a bowel movement since released from the hospital.  He is passing gas and eating/drinking ok.  He is a little lightheaded today.  He has been walking around the house.  He doesn't feel like he needs to have a bowel movement.

## 2022-11-17 ENCOUNTER — OFFICE VISIT (OUTPATIENT)
Dept: SURGERY | Facility: CLINIC | Age: 62
End: 2022-11-17

## 2022-11-17 VITALS
HEIGHT: 70 IN | DIASTOLIC BLOOD PRESSURE: 60 MMHG | BODY MASS INDEX: 28.8 KG/M2 | WEIGHT: 201.2 LBS | TEMPERATURE: 97.7 F | SYSTOLIC BLOOD PRESSURE: 118 MMHG | OXYGEN SATURATION: 96 % | HEART RATE: 73 BPM

## 2022-11-17 DIAGNOSIS — Z90.49 S/P PARTIAL RESECTION OF COLON: Primary | ICD-10-CM

## 2022-11-17 PROCEDURE — 99024 POSTOP FOLLOW-UP VISIT: CPT | Performed by: SURGERY

## 2022-11-17 RX ORDER — NEOMYCIN SULFATE 500 MG/1
TABLET ORAL
COMMUNITY
Start: 2022-11-03

## 2022-11-17 RX ORDER — INSULIN GLARGINE 100 [IU]/ML
INJECTION, SOLUTION SUBCUTANEOUS
COMMUNITY
Start: 2022-11-03

## 2022-11-17 RX ORDER — ALBUTEROL SULFATE 2.5 MG/3ML
SOLUTION RESPIRATORY (INHALATION)
COMMUNITY
Start: 2022-11-03

## 2022-11-17 RX ORDER — TRAMADOL HYDROCHLORIDE 50 MG/1
TABLET ORAL
COMMUNITY
Start: 2022-11-04 | End: 2022-11-19

## 2022-11-22 ENCOUNTER — READMISSION MANAGEMENT (OUTPATIENT)
Dept: CALL CENTER | Facility: HOSPITAL | Age: 62
End: 2022-11-22

## 2022-11-22 NOTE — OUTREACH NOTE
General Surgery Week 2 Survey    Flowsheet Row Responses   [facility] patient discharged from? [facility]   Does the patient have one of the following disease processes/diagnoses(primary or secondary)? General Surgery   Week 2 attempt successful? Yes   Call start time 1151   Call end time 1154   Discharge diagnosis laparoscopic, hand-assisted right hemicolectomy   Is the patient taking all medications as directed (includes completed medication regime)? Yes   Does the patient have a follow up appointment scheduled with their surgeon? Yes   Has the patient kept scheduled appointments due by today? Yes   Has home health visited the patient within 72 hours of discharge? N/A   Psychosocial issues? No   What is the patient's perception of their health status since discharge? Improving   Nursing interventions Nurse provided patient education   Is the patient /caregiver able to teach back basic post-op care? Practice 'cough and deep breath', Keep incision areas clean,dry and protected, No tub bath, swimming, or hot tub until instructed by MD, Lifting as instructed by MD in discharge instructions   Is the patient/caregiver able to teach back signs and symptoms of incisional infection? Increased redness, swelling or pain at the incisonal site, Increased drainage or bleeding, Incisional warmth, Pus or odor from incision, Fever   Is the patient/caregiver able to teach back steps to recovery at home? Set small, achievable goals for return to baseline health, Rest and rebuild strength, gradually increase activity, Eat a well-balance diet   If the patient is a current smoker, are they able to teach back resources for cessation? Not a smoker   Is the patient/caregiver able to teach back the hierarchy of who to call/visit for symptoms/problems? PCP, Specialist, Home health nurse, Urgent Care, ED, 911 Yes   Additional teach back comments State he is doing well.  Had follow up with surgeon and incision site is healing well.     Week 2 call completed? Yes   Graduated/Revoked comments Denies questions or needs at this time.          CORNEL VICTORIA - Licensed Nurse

## 2022-11-23 RX ORDER — BLOOD SUGAR DIAGNOSTIC
STRIP MISCELLANEOUS
Qty: 100 EACH | Refills: 12 | Status: SHIPPED | OUTPATIENT
Start: 2022-11-23

## 2022-11-30 ENCOUNTER — TRANSCRIBE ORDERS (OUTPATIENT)
Dept: ADMINISTRATIVE | Facility: HOSPITAL | Age: 62
End: 2022-11-30

## 2022-11-30 DIAGNOSIS — M54.2 CERVICAL PAIN: Primary | ICD-10-CM

## 2022-12-13 DIAGNOSIS — E11.49 TYPE 2 DIABETES MELLITUS WITH OTHER DIABETIC NEUROLOGICAL COMPLICATION: ICD-10-CM

## 2022-12-13 RX ORDER — GABAPENTIN 300 MG/1
300 CAPSULE ORAL
Qty: 90 CAPSULE | Refills: 2 | Status: SHIPPED | OUTPATIENT
Start: 2022-12-13 | End: 2023-02-13 | Stop reason: SDUPTHER

## 2022-12-13 NOTE — PROGRESS NOTES
Subjective   Darnell Garcia is a 62 y.o. male.   Chief Complaint   Patient presents with   • Follow-up     Follow-up / Laparoscopic right colectomy 11/7/2022     History of Present Illness   Patient is in the office today for follow up of recent LAP RT Colectomy. Patient states he is doing well with no problems or concerns today.  The following portions of the patient's history were reviewed and updated as appropriate: allergies, current medications, past family history, past medical history, past social history, past surgical history and problem list.    Review of Systems   Constitutional: Negative for chills, fever and unexpected weight change.   HENT: Negative for trouble swallowing and voice change.    Eyes: Negative for visual disturbance.   Respiratory: Negative for apnea, cough, chest tightness, shortness of breath and wheezing.    Cardiovascular: Negative for chest pain, palpitations and leg swelling.   Gastrointestinal: Negative for abdominal distention, abdominal pain, anal bleeding, blood in stool, constipation, diarrhea, nausea, rectal pain and vomiting.   Endocrine: Negative for cold intolerance and heat intolerance.   Genitourinary: Negative for difficulty urinating, dysuria, flank pain, scrotal swelling and testicular pain.   Musculoskeletal: Negative for back pain, gait problem and joint swelling.   Skin: Negative for color change, rash and wound.   Neurological: Negative for dizziness, syncope, speech difficulty, weakness, numbness and headaches.   Hematological: Negative for adenopathy. Does not bruise/bleed easily.   Psychiatric/Behavioral: Negative for confusion. The patient is not nervous/anxious.        Objective   Physical Exam    Assessment & Plan   Diagnoses and all orders for this visit:    1. Low grade mucinous neoplasm of appendix (Primary)

## 2022-12-15 ENCOUNTER — OFFICE VISIT (OUTPATIENT)
Dept: SURGERY | Facility: CLINIC | Age: 62
End: 2022-12-15

## 2022-12-15 VITALS
BODY MASS INDEX: 27.63 KG/M2 | HEIGHT: 72 IN | HEART RATE: 87 BPM | SYSTOLIC BLOOD PRESSURE: 136 MMHG | OXYGEN SATURATION: 98 % | DIASTOLIC BLOOD PRESSURE: 80 MMHG | WEIGHT: 204 LBS

## 2022-12-15 DIAGNOSIS — D37.3 LOW GRADE MUCINOUS NEOPLASM OF APPENDIX: Primary | ICD-10-CM

## 2022-12-15 PROCEDURE — 99024 POSTOP FOLLOW-UP VISIT: CPT | Performed by: SURGERY

## 2022-12-15 RX ORDER — ATORVASTATIN CALCIUM 40 MG/1
TABLET, FILM COATED ORAL DAILY
COMMUNITY
Start: 2022-10-18 | End: 2023-02-13 | Stop reason: SDUPTHER

## 2022-12-19 RX ORDER — PREDNISONE 1 MG/1
TABLET ORAL
Qty: 90 TABLET | Refills: 3 | Status: SHIPPED | OUTPATIENT
Start: 2022-12-19

## 2022-12-19 NOTE — TELEPHONE ENCOUNTER
Rx Refill Note  Requested Prescriptions     Pending Prescriptions Disp Refills   • predniSONE (DELTASONE) 5 MG tablet [Pharmacy Med Name: prednisone 5 mg tablet] 90 tablet 3     Sig: TAKE ONE TABLET BY MOUTH EVERY DAY      Last office visit with prescribing clinician: 6/13/2022     Next office visit with prescribing clinician: Visit date not found                         Would you like a call back once the refill request has been completed: [] Yes [] No    If the office needs to give you a call back, can they leave a voicemail: [] Yes [] No    Charissa Ball MA  12/19/22, 16:04 EST     Please advise since medication is not usually for long term use    Attempted to contact patient to schedule; no answer and no voicemail available

## 2022-12-21 ENCOUNTER — HOSPITAL ENCOUNTER (OUTPATIENT)
Dept: CT IMAGING | Facility: HOSPITAL | Age: 62
Discharge: HOME | End: 2022-12-21
Admitting: ORTHOPAEDIC SURGERY

## 2022-12-21 DIAGNOSIS — M54.2 CERVICAL PAIN: ICD-10-CM

## 2022-12-21 PROCEDURE — 72125 CT NECK SPINE W/O DYE: CPT

## 2023-01-24 RX ORDER — PEN NEEDLE, DIABETIC, SAFETY 30 GX5/16"
NEEDLE, DISPOSABLE MISCELLANEOUS
Qty: 100 EACH | Refills: 12 | Status: SHIPPED | OUTPATIENT
Start: 2023-01-24

## 2023-01-24 NOTE — TELEPHONE ENCOUNTER
"Rx Refill Note  Requested Prescriptions     Pending Prescriptions Disp Refills   • Unifine SafeControl Pen Needle 30G X 8 MM misc [Pharmacy Med Name: Unifine SafeControl 30 gauge x 5/16\" needle] 100 each 12     Sig: USE AS DIRECTED      Last office visit with prescribing clinician: 6/13/2022   Last telemedicine visit with prescribing clinician: Visit date not found   Next office visit with prescribing clinician: Visit date not found     Talia Gottlieb MA  01/24/23, 10:25 EST  "

## 2023-01-25 ENCOUNTER — TELEPHONE (OUTPATIENT)
Dept: INTERNAL MEDICINE | Facility: CLINIC | Age: 63
End: 2023-01-25
Payer: COMMERCIAL

## 2023-01-25 NOTE — TELEPHONE ENCOUNTER
Ayesha with dr tamayo's office called and states patient is on eloquis and dr tamayo is wanting to do a procedure on patient. They need clearance for patient to be off blood thinner due to eloquis. Call them at [phone] fax [phone]

## 2023-02-01 ENCOUNTER — OFFICE VISIT (OUTPATIENT)
Dept: INTERNAL MEDICINE | Facility: CLINIC | Age: 63
End: 2023-02-01
Payer: COMMERCIAL

## 2023-02-01 ENCOUNTER — HOSPITAL ENCOUNTER (OUTPATIENT)
Dept: GENERAL RADIOLOGY | Facility: HOSPITAL | Age: 63
Discharge: HOME | End: 2023-02-01
Admitting: INTERNAL MEDICINE
Payer: COMMERCIAL

## 2023-02-01 VITALS
SYSTOLIC BLOOD PRESSURE: 120 MMHG | TEMPERATURE: 96.9 F | HEART RATE: 65 BPM | HEIGHT: 72 IN | DIASTOLIC BLOOD PRESSURE: 80 MMHG | BODY MASS INDEX: 28.99 KG/M2 | WEIGHT: 214 LBS | OXYGEN SATURATION: 98 %

## 2023-02-01 DIAGNOSIS — I48.0 PAROXYSMAL ATRIAL FIBRILLATION: Primary | ICD-10-CM

## 2023-02-01 DIAGNOSIS — E11.65 TYPE 2 DIABETES MELLITUS WITH HYPERGLYCEMIA, WITHOUT LONG-TERM CURRENT USE OF INSULIN: ICD-10-CM

## 2023-02-01 DIAGNOSIS — E78.2 MIXED HYPERLIPIDEMIA: ICD-10-CM

## 2023-02-01 DIAGNOSIS — I25.10 CORONARY ARTERY DISEASE INVOLVING NATIVE CORONARY ARTERY OF NATIVE HEART WITHOUT ANGINA PECTORIS: ICD-10-CM

## 2023-02-01 PROBLEM — K85.90 PANCREATITIS: Status: RESOLVED | Noted: 2022-10-26 | Resolved: 2023-02-01

## 2023-02-01 PROBLEM — D12.6 ADENOMATOUS POLYP OF COLON, UNSPECIFIED PART OF COLON: Status: RESOLVED | Noted: 2022-11-07 | Resolved: 2023-02-01

## 2023-02-01 PROCEDURE — 93000 ELECTROCARDIOGRAM COMPLETE: CPT | Performed by: INTERNAL MEDICINE

## 2023-02-01 PROCEDURE — 71046 X-RAY EXAM CHEST 2 VIEWS: CPT

## 2023-02-01 PROCEDURE — 99214 OFFICE O/P EST MOD 30 MIN: CPT | Performed by: INTERNAL MEDICINE

## 2023-02-01 NOTE — PROGRESS NOTES
Subjective   Darnell Garcia is a 62 y.o. male who presents to the office today for a preoperative consultation at the request of surgeon Dr Gallegos  who plans on performing  on February unknown . This consultation is requested for the specific conditions prompting preoperative evaluation (i.e. because of potential affect on operative risk): bleeding . Planned anesthesia: general. The patient has the following known anesthesia issues: without complication. Patients bleeding risk: no recent abnormal bleeding. Patient does not have objections to receiving blood products if needed.  Patient here for preop for neck surgery patient has paroxysmal atrial fibrillation on Eliquis 5 mg twice a day.  Diabetes is stable on medication.  Cardiology referred patient to to see me for clearance for Eliquis use.  Patient has no chest pain or short of breath  The following portions of the patient's history were reviewed and updated as appropriate: allergies, current medications, past family history, past medical history, past social history, past surgical history and problem list.    Review of Systems  Review of Systems   Constitutional: Negative.    Respiratory: Negative.    Cardiovascular: Negative.    Gastrointestinal: Negative.    Musculoskeletal: Negative.    Skin: Negative.    Neurological: Negative.    Psychiatric/Behavioral: Negative.        Objective   Physical Exam  Cardiovascular:      Rate and Rhythm: Normal rate and regular rhythm.      Heart sounds: Normal heart sounds.   Pulmonary:      Effort: Pulmonary effort is normal.      Breath sounds: Normal breath sounds.   Abdominal:      General: Bowel sounds are normal.   Musculoskeletal:      Cervical back: Neck supple.   Skin:     General: Skin is warm.   Neurological:      Mental Status: He is alert and oriented to person, place, and time.         All  tests have been reviewed.    Cardiographics  ECG: normal sinus rhythm, no blocks or conduction defects, no ischemic  changes  Echocardiogram: not done    Imaging  Chest x-ray: not done     Lab Review   not applicable    Assessment & Plan   62 y.o. male with planned surgery as above.    Known risk factors for perioperative complications: Anemia  Diabetes mellitus        Cardiac Risk Estimation: A.fib    Current medications which may produce withdrawal symptoms if withheld perioperatively:hold eliquis 24 hours priror to surgery, and one day after the surgery to resume. Hold DM pills, cont half dose of insulin surgery day am     1. Preoperative workup as follows chest x-ray, ECG, hemoglobin, hematocrit, electrolytes, creatinine, glucose, liver function studies, coagulation studies.  2. Change in medication regimen before surgery: as above.  3. Prophylaxis for cardiac events with perioperative beta-blockers: should be considered, specific regimen per anesthesia.  4. Deep vein thrombosis prophylaxis postoperatively:regimen to be chosen by surgical team.    Do labs     Patient has no sig contraindication for neck surgery if tests are all normal       ECG 12 Lead    Date/Time: 2/1/2023 4:13 PM  Performed by: Carlos Bella MD  Authorized by: Carlos Bella MD   Comparison: not compared with previous ECG   Rhythm: sinus rhythm and sinus bradycardia  Rate: bradycardic  Conduction: right bundle branch block  ST Segments: ST segments normal  T Waves: T waves normal    Clinical impression: abnormal EKG

## 2023-02-02 LAB
ALBUMIN SERPL-MCNC: 4.6 G/DL (ref 3.5–5.2)
ALBUMIN/GLOB SERPL: 2.3 G/DL
ALP SERPL-CCNC: 100 U/L (ref 39–117)
ALT SERPL-CCNC: 24 U/L (ref 1–41)
APTT PPP: 35.2 SECONDS (ref 23.5–35.5)
AST SERPL-CCNC: 19 U/L (ref 1–40)
BASOPHILS # BLD AUTO: 0.08 10*3/MM3 (ref 0–0.2)
BASOPHILS NFR BLD AUTO: 1.3 % (ref 0–1.5)
BILIRUB SERPL-MCNC: 1 MG/DL (ref 0–1.2)
BUN SERPL-MCNC: 16 MG/DL (ref 8–23)
BUN/CREAT SERPL: 17.6 (ref 7–25)
CALCIUM SERPL-MCNC: 9.4 MG/DL (ref 8.6–10.5)
CHLORIDE SERPL-SCNC: 100 MMOL/L (ref 98–107)
CO2 SERPL-SCNC: 27.3 MMOL/L (ref 22–29)
CREAT SERPL-MCNC: 0.91 MG/DL (ref 0.76–1.27)
EGFRCR SERPLBLD CKD-EPI 2021: 95.3 ML/MIN/1.73
EOSINOPHIL # BLD AUTO: 0.33 10*3/MM3 (ref 0–0.4)
EOSINOPHIL NFR BLD AUTO: 5.5 % (ref 0.3–6.2)
ERYTHROCYTE [DISTWIDTH] IN BLOOD BY AUTOMATED COUNT: 11.7 % (ref 12.3–15.4)
GLOBULIN SER CALC-MCNC: 2 GM/DL
GLUCOSE SERPL-MCNC: 253 MG/DL (ref 65–99)
HBA1C MFR BLD: 8.1 % (ref 4.8–5.6)
HCT VFR BLD AUTO: 37.6 % (ref 37.5–51)
HGB BLD-MCNC: 12.5 G/DL (ref 13–17.7)
IMM GRANULOCYTES # BLD AUTO: 0.01 10*3/MM3 (ref 0–0.05)
IMM GRANULOCYTES NFR BLD AUTO: 0.2 % (ref 0–0.5)
INR PPP: 1.01 (ref 0.9–1.1)
LYMPHOCYTES # BLD AUTO: 1.51 10*3/MM3 (ref 0.7–3.1)
LYMPHOCYTES NFR BLD AUTO: 25 % (ref 19.6–45.3)
MCH RBC QN AUTO: 31 PG (ref 26.6–33)
MCHC RBC AUTO-ENTMCNC: 33.2 G/DL (ref 31.5–35.7)
MCV RBC AUTO: 93.3 FL (ref 79–97)
MONOCYTES # BLD AUTO: 0.38 10*3/MM3 (ref 0.1–0.9)
MONOCYTES NFR BLD AUTO: 6.3 % (ref 5–12)
NEUTROPHILS # BLD AUTO: 3.74 10*3/MM3 (ref 1.7–7)
NEUTROPHILS NFR BLD AUTO: 61.7 % (ref 42.7–76)
NRBC BLD AUTO-RTO: 0 /100 WBC (ref 0–0.2)
PLATELET # BLD AUTO: 268 10*3/MM3 (ref 140–450)
POTASSIUM SERPL-SCNC: 5.2 MMOL/L (ref 3.5–5.2)
PROT SERPL-MCNC: 6.6 G/DL (ref 6–8.5)
PROTHROMBIN TIME: 13.6 SECONDS (ref 12.5–14.5)
RBC # BLD AUTO: 4.03 10*6/MM3 (ref 4.14–5.8)
SODIUM SERPL-SCNC: 138 MMOL/L (ref 136–145)
WBC # BLD AUTO: 6.05 10*3/MM3 (ref 3.4–10.8)

## 2023-02-13 ENCOUNTER — OFFICE VISIT (OUTPATIENT)
Dept: INTERNAL MEDICINE | Facility: CLINIC | Age: 63
End: 2023-02-13
Payer: COMMERCIAL

## 2023-02-13 VITALS
SYSTOLIC BLOOD PRESSURE: 120 MMHG | HEART RATE: 65 BPM | OXYGEN SATURATION: 98 % | BODY MASS INDEX: 28.71 KG/M2 | TEMPERATURE: 96.4 F | DIASTOLIC BLOOD PRESSURE: 80 MMHG | WEIGHT: 212 LBS | HEIGHT: 72 IN

## 2023-02-13 DIAGNOSIS — E11.49 TYPE 2 DIABETES MELLITUS WITH OTHER DIABETIC NEUROLOGICAL COMPLICATION: ICD-10-CM

## 2023-02-13 DIAGNOSIS — M54.12 CERVICAL RADICULOPATHY: Primary | ICD-10-CM

## 2023-02-13 PROCEDURE — 99214 OFFICE O/P EST MOD 30 MIN: CPT | Performed by: INTERNAL MEDICINE

## 2023-02-13 RX ORDER — INSULIN ASPART 100 [IU]/ML
5 INJECTION, SOLUTION INTRAVENOUS; SUBCUTANEOUS
Qty: 1 ML | Refills: 1 | Status: SHIPPED | OUTPATIENT
Start: 2023-02-13 | End: 2023-03-06

## 2023-02-13 RX ORDER — GABAPENTIN 300 MG/1
300 CAPSULE ORAL 4 TIMES DAILY
Qty: 120 CAPSULE | Refills: 2 | Status: SHIPPED | OUTPATIENT
Start: 2023-02-13

## 2023-02-13 NOTE — PROGRESS NOTES
Subjective   Darnell Garcia is a 62 y.o. male.     Chief Complaint   Patient presents with   • Follow-up     Recent lab results- seeking clearance for surgery    • Diabetes   • Hand Pain     Months; right hand; decreased ROM, weakness, pain        History of Present Illness   Patient here for preop follow-up.  Patient is pending to have cervical radiculopathy surgery.  Still has right hand weakness pain originated from C6-C7 radiculopathy determined by orthopedist per patient.  A1c improved but still high 8.1 even though sugar has been improved great deal recently.  Patient states morning sugar is usually low around 110.  After meal sugar usually is around 300.  Patient states the weakness and the pain in the right hand is gradually getting worse.  Both feet diabetic neuropathy also gradually getting worse patient requesting Neurontin to be increased.    Current Outpatient Medications:   •  Accu-Chek Guide test strip, USE AS DIRECTED, Disp: 100 each, Rfl: 12  •  ACETAMINOPHEN ER PO, Take 1,000 mg by mouth 2 (Two) Times a Day As Needed for Mild Pain. Take [id number] tablets by mouth twice daily for arthritis pain., Disp: , Rfl:   •  albuterol (PROVENTIL) (2.5 MG/3ML) 0.083% nebulizer solution, Albuterol Sulfate (2.5 MG/3ML) 0.083% Inhalation Nebulization solution QTY: 0  Days: 0 Refills: 0  Written: 11/03/22 Patient Instructions:, Disp: , Rfl:   •  ALBUTEROL SULFATE  (90 Base) MCG/ACT inhaler, INHALE TWO PUFFS BY MOUTH EVERY 4 HOURS AS NEEDED FOR WHEEZING (Patient taking differently: As Needed.), Disp: 8.5 g, Rfl: 5  •  atorvastatin (LIPITOR) 40 MG tablet, TAKE ONE TABLET BY MOUTH EVERY DAY (Patient taking differently: Take 40 mg by mouth Every Night.), Disp: 90 tablet, Rfl: 3  •  Blood Glucose Monitoring Suppl (Accu-Chek Guide Me) w/Device kit, USE AS DIRECTED, Disp: 1 kit, Rfl: 0  •  budesonide-formoterol (Symbicort) 160-4.5 MCG/ACT inhaler, Inhale 2 puffs 2 (Two) Times a Day., Disp: 10.2 g, Rfl: 3  •  celecoxib  (CeleBREX) 200 MG capsule, TAKE ONE CAPSULE BY MOUTH EVERY DAY (Patient taking differently: Take 200 mg by mouth Daily.), Disp: 90 capsule, Rfl: 1  •  cholecalciferol (VITAMIN D3) 10 MCG (400 UNIT) tablet, Take 400 Units by mouth Daily., Disp: , Rfl:   •  cyclobenzaprine (FLEXERIL) 5 MG tablet, Take 1 tablet by mouth 4 (Four) Times a Day As Needed for Muscle Spasms. Patient takes 1 tablet by mouth once daily, Disp: , Rfl:   •  diazePAM (VALIUM) 5 MG tablet, Take 1 tablet by mouth Every 6 (Six) Hours As Needed for Anxiety., Disp: , Rfl:   •  Eliquis 5 MG tablet tablet, TAKE ONE TABLET BY MOUTH EVERY TWELVE HOURS (Patient taking differently: Take 5 mg by mouth Every Night.), Disp: 180 tablet, Rfl: 3  •  famotidine (PEPCID) 20 MG tablet, Take 1 tablet by mouth Every Night., Disp: , Rfl:   •  gabapentin (NEURONTIN) 300 MG capsule, Take 1 capsule by mouth 4 (Four) Times a Day., Disp: 120 capsule, Rfl: 2  •  glimepiride (AMARYL) 4 MG tablet, TAKE ONE TABLET BY MOUTH EVERY MORNING BEFORE A MEAL (Patient taking differently: Take 4 mg by mouth Every Morning Before Breakfast.), Disp: 90 tablet, Rfl: 3  •  Insulin Glargine (LANTUS SOLOSTAR) 100 UNIT/ML injection pen, Inject 15 Units under the skin into the appropriate area as directed Every Morning., Disp: , Rfl:   •  insulin glargine (LANTUS, SEMGLEE) 100 UNIT/ML injection, Lantus 100 UNIT/ML Subcutaneous Solution QTY: 0  Days: 0 Refills: 0  Written: 11/03/22 Patient Instructions:, Disp: , Rfl:   •  Jardiance 25 MG tablet tablet, TAKE ONE TABLET BY MOUTH EVERY DAY (Patient taking differently: Take 25 mg by mouth Every Night.), Disp: 90 tablet, Rfl: 3  •  Lancets (onetouch ultrasoft) lancets, Use as instructed, Disp: 180 each, Rfl: 12  •  metFORMIN (GLUCOPHAGE) 1000 MG tablet, TAKE ONE TABLET BY MOUTH TWICE DAILY WITH MEALS (Patient taking differently: 1,000 mg Every Night. Takes [id number] tablet nightly), Disp: 180 tablet, Rfl: 3  •  metoprolol succinate XL (TOPROL-XL) 25 MG  24 hr tablet, Take 0.5 tablets by mouth Daily. (Patient taking differently: Take 12.5 mg by mouth Every Night.), Disp: 45 tablet, Rfl: 3  •  montelukast (SINGULAIR) 10 MG tablet, TAKE ONE TABLET BY MOUTH AT BEDTIME (Patient taking differently: 10 mg every night at bedtime.), Disp: 90 tablet, Rfl: 3  •  neomycin (MYCIFRADIN) 500 MG tablet, , Disp: , Rfl:   •  nitroglycerin (NITROSTAT) 0.4 MG SL tablet, Place 1 tablet under the tongue Every FIVE Minutes As Needed for Chest Pain. Take no more than 3 doses in 15 minutes., Disp: 25 tablet, Rfl: 2  •  ondansetron ODT (ZOFRAN-ODT) 4 MG disintegrating tablet, Place 1 tablet on the tongue Every 8 (Eight) Hours As Needed for Nausea or Vomiting., Disp: 20 tablet, Rfl: 0  •  predniSONE (DELTASONE) 5 MG tablet, TAKE ONE TABLET BY MOUTH EVERY DAY, Disp: 90 tablet, Rfl: 3  •  Unifine SafeControl Pen Needle 30G X 8 MM misc, USE AS DIRECTED, Disp: 100 each, Rfl: 12  •  insulin aspart (NovoLOG FlexPen) 100 UNIT/ML solution pen-injector sc pen, Inject 5 Units under the skin into the appropriate area as directed 3 (Three) Times a Day With Meals., Disp: 1 mL, Rfl: 1    The following portions of the patient's history were reviewed and updated as appropriate: allergies, current medications, past family history, past medical history, past social history, past surgical history and problem list.    Review of Systems   Constitutional: Negative.    Respiratory: Negative.    Cardiovascular: Negative.    Gastrointestinal: Negative.    Musculoskeletal: Negative.    Skin: Negative.    Neurological: Negative.         Right hand radiculopathy  Both feet diabetic neuropathy   Psychiatric/Behavioral: Negative.        Objective   Physical Exam  Cardiovascular:      Rate and Rhythm: Normal rate and regular rhythm.      Heart sounds: Normal heart sounds.   Pulmonary:      Effort: Pulmonary effort is normal.      Breath sounds: Normal breath sounds.   Abdominal:      General: Bowel sounds are normal.    Musculoskeletal:      Cervical back: Neck supple.      Comments: Weakness in right hand due to radiculopathy   Skin:     General: Skin is warm.   Neurological:      Mental Status: He is alert and oriented to person, place, and time.         All tests have been reviewed.    Assessment & Plan   Diagnoses and all orders for this visit:    Cervical radiculopathy  -     Ambulatory Referral to Neurology    Type 2 diabetes mellitus with other diabetic neurological complication (HCC) increase gabapentin and add NovoLog for mealtime glucose control.  Also asked patient to improve sugar by diet control.  -     gabapentin (NEURONTIN) 300 MG capsule; Take 1 capsule by mouth 4 (Four) Times a Day.  -     insulin aspart (NovoLOG FlexPen) 100 UNIT/ML solution pen-injector sc pen; Inject 5 Units under the skin into the appropriate area as directed 3 (Three) Times a Day With Meals.      Considering patient has the urgency for radiculopathy surgery since patient's right hand is getting weaker and the more painful even though patient has A1c 8.1 I would clear patient for surgery as soon as possible.  In the meantime I will add NovoLog for mealtime insulin control and asking patient to have a better diet try to lower the sugar as much as possible to close to 7.5.  A1c.  Patient is cleared for surgery.

## 2023-02-14 ENCOUNTER — TELEPHONE (OUTPATIENT)
Dept: INTERNAL MEDICINE | Facility: CLINIC | Age: 63
End: 2023-02-14
Payer: COMMERCIAL

## 2023-02-14 NOTE — TELEPHONE ENCOUNTER
Please advise. Patient is to see Cardiology 3/22/2023. She has not seen Cardiology since 2021. Patient was seen yesterday.

## 2023-02-14 NOTE — TELEPHONE ENCOUNTER
Caller: JoseNaz    Relationship: Emergency Contact    Best call back number: [phone]     What form or medical record are you requesting: SURGICAL CARDIAC CLEARANCE FORM FOR SSURGERY  Who is requesting this form or medical record from you: WIFE  How would you like to receive the form or medical records (pick-up, mail, fax): FAX  If fax, what is the fax number:  ON THE FORM  Timeframe paperwork needed: ASAP  Additional notes: PLEASE CALL WHEN COMPLETED

## 2023-03-01 ENCOUNTER — TELEPHONE (OUTPATIENT)
Dept: INTERNAL MEDICINE | Facility: CLINIC | Age: 63
End: 2023-03-01

## 2023-03-01 NOTE — TELEPHONE ENCOUNTER
Caller: Igor Garcia    Relationship: Emergency Contact    Best call back number: [phone]    What is the best time to reach you: ANYTIME, OK TO LEAVE VOICEMAIL.    Who are you requesting to speak with (clinical staff, provider,  specific staff member): CLINICAL STAFF    Do you know the name of the person who called: PATIENT'S WIFE IGOR    What was the call regarding: WIFE ADVISES THAT THE INSURANCE WILL NOT COVER THE NOVOLOG FLEX PEN AND IS REQUESTING THAT WE SEND ANOTHER PRESCRIPTION THAT WILL BE COVERED BY THE INSURANCE. PLEASE ADVISE.    Do you require a callback: YES

## 2023-03-02 ENCOUNTER — TELEPHONE (OUTPATIENT)
Dept: CARDIOLOGY | Facility: CLINIC | Age: 63
End: 2023-03-02
Payer: COMMERCIAL

## 2023-03-02 NOTE — TELEPHONE ENCOUNTER
Caller: ZAIN    Relationship: PA AT [facility]    Best call back number: [phone]    Who are you requesting to speak with (clinical staff, provider,  specific staff member): SCHEDULING    What was the call regarding: PT IS SCHEDULED FOR SURGERY ON 03.17.23 AND NEEDS CLEARANCE PRIOR. HIS CURRENT APPT IS ON 03.22.23. THERE IS NO AVAILABILITY FOR HUB SCHEDULING.     Do you require a callback: YES

## 2023-03-02 NOTE — TELEPHONE ENCOUNTER
MEHRDAD PITTMAN, THERE IS NO OTHER AVAILABLE APPT SPOTS TO MOVE PATIENT UP BEFORE 3-17-23.  HE WILL HAVE TO RS SURGERY UNTIL HE IS CLEARED.  ADDED PATIENT TO WAIT LIST.  STATES UNDERSTANDING.

## 2023-03-06 DIAGNOSIS — E11.65 TYPE 2 DIABETES MELLITUS WITH HYPERGLYCEMIA, WITHOUT LONG-TERM CURRENT USE OF INSULIN: Primary | ICD-10-CM

## 2023-03-16 DIAGNOSIS — M19.90 UNSPECIFIED OSTEOARTHRITIS, UNSPECIFIED SITE: ICD-10-CM

## 2023-03-16 RX ORDER — CELECOXIB 200 MG/1
CAPSULE ORAL
Qty: 90 CAPSULE | Refills: 1 | Status: SHIPPED | OUTPATIENT
Start: 2023-03-16

## 2023-03-16 NOTE — TELEPHONE ENCOUNTER
Rx Refill Note  Requested Prescriptions     Pending Prescriptions Disp Refills   • celecoxib (CeleBREX) 200 MG capsule [Pharmacy Med Name: celecoxib 200 mg capsule] 90 capsule 1     Sig: TAKE ONE CAPSULE BY MOUTH EVERY DAY      Last office visit with prescribing clinician: 2/13/2023   Last telemedicine visit with prescribing clinician: Visit date not found   Next office visit with prescribing clinician: Visit date not found     Talia Gottlieb MA  03/16/23, 11:28 EDT       Patient is due for an appointment in May. [employer] message sent asking patient to schedule an appointment.

## 2023-03-21 ENCOUNTER — TELEPHONE (OUTPATIENT)
Dept: INTERNAL MEDICINE | Facility: CLINIC | Age: 63
End: 2023-03-21
Payer: COMMERCIAL

## 2023-03-21 NOTE — TELEPHONE ENCOUNTER
Caller: Naz Garcia    Relationship to patient: Emergency Contact    Best call back number: [phone]    Date of exposure: DOESN'T KNOW    Date of positive COVID19 test: 03/20/23    Date if possible COVID19 exposure: DOESN'T KNOW    COVID19 symptoms: MAJOR COUGH, CHEST CONGESTION, NO FEVER. HAD NECK SURGERY ON Friday SO THE COUGHING IS HURTING HIS INCISION. IF YOU FEEL HE NEEDS TO COME IN THEY ASK YOU LET THEM KNOW.     Date of initial quarantine: 03/20/23    Additional information or concerns: HE IS NEEDING HIS INHALER  REFILLED DUE TO HIS ASTHMA SO HE IS ASKING FOR YOU TO SEND THIS IN AS WELL ALBUTEROL SULFATE  (90 Base) MCG/ACT inhaler       What is the patients preferred pharmacy:   [facility] [address] [phone] [facility] [phone] FX

## 2023-03-22 ENCOUNTER — TELEMEDICINE (OUTPATIENT)
Dept: INTERNAL MEDICINE | Facility: CLINIC | Age: 63
End: 2023-03-22
Payer: COMMERCIAL

## 2023-03-22 DIAGNOSIS — U07.1 COVID-19 VIRUS INFECTION: ICD-10-CM

## 2023-03-22 DIAGNOSIS — J06.9 UPPER RESPIRATORY TRACT INFECTION, UNSPECIFIED TYPE: Primary | ICD-10-CM

## 2023-03-22 RX ORDER — ALBUTEROL SULFATE 90 UG/1
2 AEROSOL, METERED RESPIRATORY (INHALATION) EVERY 4 HOURS PRN
Qty: 8.5 G | Refills: 5 | Status: SHIPPED | OUTPATIENT
Start: 2023-03-22

## 2023-03-22 RX ORDER — DOXYCYCLINE HYCLATE 100 MG/1
100 CAPSULE ORAL 2 TIMES DAILY
Qty: 20 CAPSULE | Refills: 0 | Status: SHIPPED | OUTPATIENT
Start: 2023-03-22

## 2023-03-22 RX ORDER — APIXABAN 5 MG/1
1 TABLET, FILM COATED ORAL EVERY 12 HOURS SCHEDULED
COMMUNITY
Start: 2023-03-16 | End: 2023-04-07

## 2023-03-22 RX ORDER — OXYCODONE HYDROCHLORIDE AND ACETAMINOPHEN 5; 325 MG/1; MG/1
TABLET ORAL
COMMUNITY
Start: 2023-03-16

## 2023-03-22 RX ORDER — PREDNISONE 20 MG/1
20 TABLET ORAL 2 TIMES DAILY
Qty: 10 TABLET | Refills: 0 | Status: SHIPPED | OUTPATIENT
Start: 2023-03-22

## 2023-03-22 NOTE — TELEPHONE ENCOUNTER
Patient's wife called to attempt to schedule same day video visit for patient- no availability in clinic. Patient's wife states he has covid and just had surgery so he can not leave his house to go to other sources of care ([facility] or ER). Wife states that patient was last seen by PCP a month ago and just needs a refill on inhaler.

## 2023-03-22 NOTE — TELEPHONE ENCOUNTER
Ignacio RAMIREZ was able to overbook PCP since patient is a video visit and COVID+    Attempted to contact patient; left detailed message per release notifying patient that he was scheduled as video visit today 03/22/2023 for further discussion with provider.

## 2023-03-22 NOTE — PROGRESS NOTES
Subjective   Darnell Garcia is a 62 y.o. male.     Chief Complaint   Patient presents with   • Covid-19 Home Monitoring Video Visit     5 days; cough, congestion, fever     Recent neck surgery (cervical fusion)       History of Present Illness   Patient complains last Saturday cough mild short of breath wheezing subjective fever no chill feeling very tired achy all over sore throat some congestion runny nose patient does have a yellow sputum over-the-counter medicine no help COVID test positive.    Current Outpatient Medications:   •  Accu-Chek Guide test strip, USE AS DIRECTED, Disp: 100 each, Rfl: 12  •  ACETAMINOPHEN ER PO, Take 1,000 mg by mouth 2 (Two) Times a Day As Needed for Mild Pain. Take [id number] tablets by mouth twice daily for arthritis pain., Disp: , Rfl:   •  albuterol (PROVENTIL) (2.5 MG/3ML) 0.083% nebulizer solution, Albuterol Sulfate (2.5 MG/3ML) 0.083% Inhalation Nebulization solution QTY: 0  Days: 0 Refills: 0  Written: 11/03/22 Patient Instructions:, Disp: , Rfl:   •  albuterol sulfate  (90 Base) MCG/ACT inhaler, Inhale 2 puffs Every 4 (Four) Hours As Needed for Wheezing., Disp: 8.5 g, Rfl: 5  •  atorvastatin (LIPITOR) 40 MG tablet, TAKE ONE TABLET BY MOUTH EVERY DAY (Patient taking differently: Take 1 tablet by mouth Every Night.), Disp: 90 tablet, Rfl: 3  •  Blood Glucose Monitoring Suppl (Accu-Chek Guide Me) w/Device kit, USE AS DIRECTED, Disp: 1 kit, Rfl: 0  •  budesonide-formoterol (Symbicort) 160-4.5 MCG/ACT inhaler, Inhale 2 puffs 2 (Two) Times a Day., Disp: 10.2 g, Rfl: 3  •  celecoxib (CeleBREX) 200 MG capsule, TAKE ONE CAPSULE BY MOUTH EVERY DAY, Disp: 90 capsule, Rfl: 1  •  cholecalciferol (VITAMIN D3) 10 MCG (400 UNIT) tablet, Take 1 tablet by mouth Daily., Disp: , Rfl:   •  cyclobenzaprine (FLEXERIL) 5 MG tablet, Take 1 tablet by mouth 4 (Four) Times a Day As Needed for Muscle Spasms. Patient takes 1 tablet by mouth once daily, Disp: , Rfl:   •  famotidine (PEPCID) 20 MG  tablet, Take 1 tablet by mouth Every Night., Disp: , Rfl:   •  gabapentin (NEURONTIN) 300 MG capsule, Take 1 capsule by mouth 4 (Four) Times a Day., Disp: 120 capsule, Rfl: 2  •  glimepiride (AMARYL) 4 MG tablet, TAKE ONE TABLET BY MOUTH EVERY MORNING BEFORE A MEAL (Patient taking differently: Take 1 tablet by mouth Every Morning Before Breakfast.), Disp: 90 tablet, Rfl: 3  •  Insulin Glargine (LANTUS SOLOSTAR) 100 UNIT/ML injection pen, Inject 15 Units under the skin into the appropriate area as directed Every Morning., Disp: , Rfl:   •  insulin glargine (LANTUS, SEMGLEE) 100 UNIT/ML injection, Lantus 100 UNIT/ML Subcutaneous Solution QTY: 0  Days: 0 Refills: 0  Written: 11/03/22 Patient Instructions:, Disp: , Rfl:   •  insulin lispro (HumaLOG) 100 UNIT/ML injection, 5 u 10 min before meals sc., Disp: 3 mL, Rfl: 12  •  Jardiance 25 MG tablet tablet, TAKE ONE TABLET BY MOUTH EVERY DAY (Patient taking differently: Take 1 tablet by mouth Every Night.), Disp: 90 tablet, Rfl: 3  •  Lancets (onetouch ultrasoft) lancets, Use as instructed, Disp: 180 each, Rfl: 12  •  metFORMIN (GLUCOPHAGE) 1000 MG tablet, TAKE ONE TABLET BY MOUTH TWICE DAILY WITH MEALS (Patient taking differently: 1 tablet Every Night. Takes [id number] tablet nightly), Disp: 180 tablet, Rfl: 3  •  metoprolol succinate XL (TOPROL-XL) 25 MG 24 hr tablet, Take 0.5 tablets by mouth Daily. (Patient taking differently: Take 12.5 mg by mouth Every Night.), Disp: 45 tablet, Rfl: 3  •  montelukast (SINGULAIR) 10 MG tablet, TAKE ONE TABLET BY MOUTH AT BEDTIME (Patient taking differently: 1 tablet every night at bedtime.), Disp: 90 tablet, Rfl: 3  •  neomycin (MYCIFRADIN) 500 MG tablet, , Disp: , Rfl:   •  nitroglycerin (NITROSTAT) 0.4 MG SL tablet, Place 1 tablet under the tongue Every FIVE Minutes As Needed for Chest Pain. Take no more than 3 doses in 15 minutes., Disp: 25 tablet, Rfl: 2  •  ondansetron ODT (ZOFRAN-ODT) 4 MG disintegrating tablet, Place 1 tablet on  the tongue Every 8 (Eight) Hours As Needed for Nausea or Vomiting., Disp: 20 tablet, Rfl: 0  •  predniSONE (DELTASONE) 5 MG tablet, TAKE ONE TABLET BY MOUTH EVERY DAY, Disp: 90 tablet, Rfl: 3  •  Unifine SafeControl Pen Needle 30G X 8 MM misc, USE AS DIRECTED, Disp: 100 each, Rfl: 12  •  diazePAM (VALIUM) 5 MG tablet, Take 1 tablet by mouth Every 6 (Six) Hours As Needed for Anxiety. (Patient not taking: Reported on 3/22/2023), Disp: , Rfl:   •  doxycycline (VIBRAMYCIN) 100 MG capsule, Take 1 capsule by mouth 2 (Two) Times a Day., Disp: 20 capsule, Rfl: 0  •  Eliquis 5 MG tablet tablet, Take 1 tablet by mouth Every 12 (Twelve) Hours., Disp: , Rfl:   •  Nirmatrelvir&Ritonavir 300/100 (PAXLOVID) 20 x 150 MG & 10 x 100MG tablet therapy pack tablet, Take 3 tablets by mouth 2 (Two) Times a Day for 5 days., Disp: 30 tablet, Rfl: 0  •  oxyCODONE-acetaminophen (PERCOCET) 5-325 MG per tablet, , Disp: , Rfl:   •  predniSONE (DELTASONE) 20 MG tablet, Take 1 tablet by mouth 2 (Two) Times a Day., Disp: 10 tablet, Rfl: 0    The following portions of the patient's history were reviewed and updated as appropriate: allergies, current medications, past family history, past medical history, past social history, past surgical history and problem list.    Review of Systems   Constitutional: Positive for fever. Negative for chills.   HENT: Positive for congestion, postnasal drip, sinus pressure and sore throat.    Respiratory: Positive for cough, shortness of breath and wheezing.    Cardiovascular: Negative.    Gastrointestinal: Negative.    Musculoskeletal: Positive for myalgias.   Skin: Negative.    Neurological: Negative.    Psychiatric/Behavioral: Negative.        Objective   Physical Exam  Pulmonary:      Effort: Pulmonary effort is normal.   Neurological:      Mental Status: He is alert.         All tests have been reviewed.    Assessment & Plan   Diagnoses and all orders for this visit:    Upper respiratory tract infection,  "unspecified type    COVID-19 virus infection    Other orders  -     Eliquis 5 MG tablet tablet; Take 1 tablet by mouth Every 12 (Twelve) Hours.  -     oxyCODONE-acetaminophen (PERCOCET) 5-325 MG per tablet  -     Nirmatrelvir&Ritonavir 300/100 (PAXLOVID) 20 x 150 MG & 10 x 100MG tablet therapy pack tablet; Take 3 tablets by mouth 2 (Two) Times a Day for 5 days.  -     doxycycline (VIBRAMYCIN) 100 MG capsule; Take 1 capsule by mouth 2 (Two) Times a Day.  -     predniSONE (DELTASONE) 20 MG tablet; Take 1 tablet by mouth 2 (Two) Times a Day.    Cut down Eliquis to 2.5 mg twice a day while taking Paxlovid.  Initiate Zyrtec Mucinex Tylenol for symptomatic treatment.  Good hydration and good rest.  Increase Lantus from 15 units to 20 units while on steroids  Go to emergency room if short of breath  Call if no better          You have chosen to receive care through a telehealth visit.  Do you consent to use a video/audio connection for your medical care today? Yes    Individuals involved in this encounter:    Chaparrita Garcia \"Carlos Manuel\"  MARIA INES [address]        Patient is at home and provider is in office at [facility]"

## 2023-04-07 RX ORDER — APIXABAN 5 MG/1
TABLET, FILM COATED ORAL
Qty: 180 TABLET | Refills: 0 | Status: SHIPPED | OUTPATIENT
Start: 2023-04-07

## 2023-04-07 NOTE — TELEPHONE ENCOUNTER
Rx Refill Note  Requested Prescriptions     Pending Prescriptions Disp Refills   • Eliquis 5 MG tablet tablet [Pharmacy Med Name: Eliquis 5 mg tablet] 180 tablet 3     Sig: TAKE ONE TABLET BY MOUTH EVERY TWELVE HOURS      Last office visit with prescribing clinician: 2/13/2023   Last telemedicine visit with prescribing clinician: Visit date not found   Next office visit with prescribing clinician: Visit date not found       Rosita Kilpatrick MA  04/07/23, 09:23 EDT

## 2023-05-04 DIAGNOSIS — E11.49 TYPE 2 DIABETES MELLITUS WITH OTHER DIABETIC NEUROLOGICAL COMPLICATION: ICD-10-CM

## 2023-05-04 RX ORDER — GABAPENTIN 300 MG/1
CAPSULE ORAL
Qty: 120 CAPSULE | Refills: 2 | Status: SHIPPED | OUTPATIENT
Start: 2023-05-04

## 2023-05-04 NOTE — TELEPHONE ENCOUNTER
Rx Refill Note  Requested Prescriptions     Pending Prescriptions Disp Refills   • gabapentin (NEURONTIN) 300 MG capsule [Pharmacy Med Name: gabapentin 300 mg capsule] 120 capsule 2     Sig: TAKE ONE CAPSULE BY MOUTH FOUR TIMES DAILY      Last office visit with prescribing clinician: 2/13/2023  Next office visit with prescribing clinician: 6/6/23    UDS: 9/30/19  CSA: not on file       Celia Buchanan MA  05/04/23, 11:51 EDT

## 2023-05-19 DIAGNOSIS — E11.00 TYPE 2 DIABETES MELLITUS WITH HYPEROSMOLARITY WITHOUT COMA, WITHOUT LONG-TERM CURRENT USE OF INSULIN: ICD-10-CM

## 2023-05-19 NOTE — TELEPHONE ENCOUNTER
Caller: Naz Garcia    Relationship: Emergency Contact    Best call back number: [phone]    Requested Prescriptions:   Requested Prescriptions     Pending Prescriptions Disp Refills   • Insulin Glargine (LANTUS SOLOSTAR) 100 UNIT/ML injection pen       Sig: Inject 15 Units under the skin into the appropriate area as directed Every Morning.        Pharmacy where request should be sent: [facility] [address] [phone] [facility] [phone]      Last office visit with prescribing clinician: 2/13/2023   Last telemedicine visit with prescribing clinician: 5/4/2023   Next office visit with prescribing clinician: 6/6/2023     Additional details provided by patient: PLEASE SEND REFILLS- PHARMACY STATES THEY NEED A NEW PRESCRIPTION.  Does the patient have less than a 3 day supply:  [x] Yes  [] No OUT COMPLETELY  Would you like a call back once the refill request has been completed: [] Yes [x] No    If the office needs to give you a call back, can they leave a voicemail: [x] Yes [] No    Jemal Singh Rep   05/19/23 14:08 EDT

## 2023-05-19 NOTE — TELEPHONE ENCOUNTER
Rx Refill Note  Requested Prescriptions     Pending Prescriptions Disp Refills    Insulin Glargine (LANTUS SOLOSTAR) 100 UNIT/ML injection pen       Sig: Inject 15 Units under the skin into the appropriate area as directed Every Morning.      Last office visit with prescribing clinician: 2/13/2023   Last telemedicine visit with prescribing clinician: 5/4/2023   Next office visit with prescribing clinician: 6/6/2023                         Would you like a call back once the refill request has been completed: [] Yes [] No    If the office needs to give you a call back, can they leave a voicemail: [] Yes [] No    Jemal Gutierrez Rep  05/19/23, 14:30 EDT

## 2023-06-06 ENCOUNTER — OFFICE VISIT (OUTPATIENT)
Dept: INTERNAL MEDICINE | Facility: CLINIC | Age: 63
End: 2023-06-06
Payer: COMMERCIAL

## 2023-06-06 VITALS
RESPIRATION RATE: 15 BRPM | SYSTOLIC BLOOD PRESSURE: 140 MMHG | TEMPERATURE: 97.6 F | BODY MASS INDEX: 28.44 KG/M2 | HEART RATE: 69 BPM | HEIGHT: 72 IN | DIASTOLIC BLOOD PRESSURE: 80 MMHG | WEIGHT: 210 LBS | OXYGEN SATURATION: 94 %

## 2023-06-06 DIAGNOSIS — I48.0 PAROXYSMAL ATRIAL FIBRILLATION: ICD-10-CM

## 2023-06-06 DIAGNOSIS — I99.8 VASCULAR CALCIFICATION: Primary | ICD-10-CM

## 2023-06-06 DIAGNOSIS — E55.9 VITAMIN D DEFICIENCY: ICD-10-CM

## 2023-06-06 DIAGNOSIS — M87.052 AVASCULAR NECROSIS OF BONE OF HIP, LEFT: ICD-10-CM

## 2023-06-06 DIAGNOSIS — E08.42 DIABETIC POLYNEUROPATHY ASSOCIATED WITH DIABETES MELLITUS DUE TO UNDERLYING CONDITION: ICD-10-CM

## 2023-06-06 DIAGNOSIS — M54.12 CERVICAL RADICULOPATHY: ICD-10-CM

## 2023-06-06 DIAGNOSIS — I25.10 CORONARY ARTERY DISEASE INVOLVING NATIVE CORONARY ARTERY OF NATIVE HEART WITHOUT ANGINA PECTORIS: ICD-10-CM

## 2023-06-06 DIAGNOSIS — D64.9 ANEMIA, UNSPECIFIED TYPE: ICD-10-CM

## 2023-06-06 DIAGNOSIS — K21.9 GASTROESOPHAGEAL REFLUX DISEASE WITHOUT ESOPHAGITIS: ICD-10-CM

## 2023-06-06 DIAGNOSIS — E11.65 TYPE 2 DIABETES MELLITUS WITH HYPERGLYCEMIA, WITHOUT LONG-TERM CURRENT USE OF INSULIN: ICD-10-CM

## 2023-06-06 DIAGNOSIS — N40.1 BENIGN PROSTATIC HYPERPLASIA WITH LOWER URINARY TRACT SYMPTOMS, SYMPTOM DETAILS UNSPECIFIED: ICD-10-CM

## 2023-06-06 DIAGNOSIS — Z79.52 LONG TERM CURRENT USE OF SYSTEMIC STEROIDS: ICD-10-CM

## 2023-06-06 DIAGNOSIS — E78.2 MIXED HYPERLIPIDEMIA: ICD-10-CM

## 2023-06-06 DIAGNOSIS — D37.3 LOW GRADE MUCINOUS NEOPLASM OF APPENDIX: ICD-10-CM

## 2023-06-06 DIAGNOSIS — J45.20 MILD INTERMITTENT ASTHMA WITHOUT COMPLICATION: ICD-10-CM

## 2023-06-06 PROBLEM — D12.6 ADENOMATOUS POLYP OF COLON: Status: RESOLVED | Noted: 2022-10-20 | Resolved: 2023-06-06

## 2023-06-06 PROBLEM — M25.572 CHRONIC PAIN OF LEFT ANKLE: Status: RESOLVED | Noted: 2021-07-22 | Resolved: 2023-06-06

## 2023-06-06 PROBLEM — Z86.0100 PERSONAL HISTORY OF COLONIC POLYPS: Status: RESOLVED | Noted: 2022-07-07 | Resolved: 2023-06-06

## 2023-06-06 PROBLEM — M19.90 OSTEOARTHRITIS: Status: RESOLVED | Noted: 2018-01-12 | Resolved: 2023-06-06

## 2023-06-06 PROBLEM — Z12.11 ENCOUNTER FOR SCREENING FOR MALIGNANT NEOPLASM OF COLON: Status: RESOLVED | Noted: 2022-05-03 | Resolved: 2023-06-06

## 2023-06-06 PROBLEM — Z86.010 PERSONAL HISTORY OF COLONIC POLYPS: Status: RESOLVED | Noted: 2022-07-07 | Resolved: 2023-06-06

## 2023-06-06 PROBLEM — U07.1 COVID-19 VIRUS INFECTION: Status: RESOLVED | Noted: 2023-03-22 | Resolved: 2023-06-06

## 2023-06-06 PROBLEM — J06.9 UPPER RESPIRATORY TRACT INFECTION: Status: RESOLVED | Noted: 2023-03-22 | Resolved: 2023-06-06

## 2023-06-06 PROBLEM — G89.29 CHRONIC PAIN OF LEFT ANKLE: Status: RESOLVED | Noted: 2021-07-22 | Resolved: 2023-06-06

## 2023-06-06 PROBLEM — M54.2 NECK PAIN: Status: RESOLVED | Noted: 2021-02-03 | Resolved: 2023-06-06

## 2023-06-06 PROBLEM — M19.079 ARTHRITIS OF ANKLE: Status: RESOLVED | Noted: 2017-02-16 | Resolved: 2023-06-06

## 2023-06-06 RX ORDER — METOPROLOL SUCCINATE 25 MG/1
TABLET, EXTENDED RELEASE ORAL
Qty: 45 TABLET | Refills: 3 | Status: SHIPPED | OUTPATIENT
Start: 2023-06-06

## 2023-06-06 NOTE — PROGRESS NOTES
Subjective   Darnell Garcia is a 63 y.o. male and is here for a comprehensive physical exam.     Do you take any herbs or supplements that were not prescribed by a doctor? no  Are you taking calcium supplements? no  Are you taking aspirin daily? no      The following portions of the patient's history were reviewed and updated as appropriate: allergies, current medications, past family history, past medical history, past social history, past surgical history, and problem list.    Patient Active Problem List   Diagnosis    Type 2 diabetes mellitus with hyperglycemia, without long-term current use of insulin    Diabetic neuropathy    Coronary artery disease    Hyperlipidemia    Vitamin D deficiency    Uncomplicated asthma    Vascular calcification    Benign prostatic hyperplasia with lower urinary tract symptoms    Atrial fibrillation    Long term current use of systemic steroids    Avascular necrosis of bone of hip, left    Low grade mucinous neoplasm of appendix    Cervical radiculopathy    Gastroesophageal reflux disease without esophagitis       Review of Systems   Constitutional: Negative.    HENT: Negative.     Eyes: Negative.    Respiratory: Negative.     Cardiovascular: Negative.    Gastrointestinal: Negative.    Endocrine: Negative.    Genitourinary: Negative.    Musculoskeletal: Negative.    Skin: Negative.    Allergic/Immunologic: Negative.    Neurological: Negative.    Hematological: Negative.    Psychiatric/Behavioral: Negative.     All other systems reviewed and are negative.    Physical Exam  Vitals and nursing note reviewed.   Constitutional:       Appearance: Normal appearance. He is well-developed.   HENT:      Head: Normocephalic and atraumatic.      Right Ear: Tympanic membrane, ear canal and external ear normal.      Left Ear: Tympanic membrane, ear canal and external ear normal.      Nose: Nose normal.      Mouth/Throat:      Mouth: Mucous membranes are moist.      Pharynx: Oropharynx is clear.    Eyes:      Conjunctiva/sclera: Conjunctivae normal.      Pupils: Pupils are equal, round, and reactive to light.   Neck:      Thyroid: No thyromegaly.   Cardiovascular:      Rate and Rhythm: Normal rate and regular rhythm.      Heart sounds: Normal heart sounds.   Pulmonary:      Effort: Pulmonary effort is normal.      Breath sounds: Normal breath sounds.   Abdominal:      General: Bowel sounds are normal.      Palpations: Abdomen is soft.   Genitourinary:     Penis: Normal.       Prostate: Normal.      Rectum: Normal.   Musculoskeletal:         General: Normal range of motion.      Cervical back: Normal range of motion and neck supple.   Skin:     General: Skin is warm and dry.   Neurological:      Mental Status: He is alert and oriented to person, place, and time.      Deep Tendon Reflexes: Reflexes are normal and symmetric.   Psychiatric:         Mood and Affect: Mood normal.         Behavior: Behavior normal.         Thought Content: Thought content normal.         Judgment: Judgment normal.       All  tests have been reviewed.    Assessment & Plan          1. Patient Counseling:  --Nutrition: Stressed importance of moderation in sodium/caffeine intake, saturated fat and cholesterol, caloric balance, sufficient intake of fresh fruits, vegetables, fiber, calcium and iron.  --Exercise: Stressed the importance of regular exercise.   --Injury prevention: Discussed safety belts, safety helmets, smoke detector, smoking near bedding or upholstery.   --Dental health: Discussed importance of regular tooth brushing, flossing, and dental visits.  --Immunizations reviewed.  --Discussed benefits of screening colonoscopy.  --After hours service discussed with patient    2. Discussed the patient's BMI with him.               Assessment & Plan   Diagnoses and all orders for this visit:    Vascular calcification    Mixed hyperlipidemia  -     Comprehensive Metabolic Panel  -     Lipid Panel    Coronary artery disease  involving native coronary artery of native heart without angina pectoris    Paroxysmal atrial fibrillation  -     TSH    Vitamin D deficiency  -     Vitamin D,25-Hydroxy    Type 2 diabetes mellitus with hyperglycemia, without long-term current use of insulin  -     MicroAlbumin, Urine, Random - Urine, Clean Catch  -     Hemoglobin A1c    Long term current use of systemic steroids  -     DEXA Bone Density Axial; Future    Benign prostatic hyperplasia with lower urinary tract symptoms, symptom details unspecified  -     PSA DIAGNOSTIC    Low grade mucinous neoplasm of appendix    Mild intermittent asthma without complication  -     Ambulatory Referral to Pulmonology    Diabetic polyneuropathy associated with diabetes mellitus due to underlying condition    Cervical radiculopathy    Avascular necrosis of bone of hip, left    Gastroesophageal reflux disease without esophagitis    Anemia, unspecified type  -     CBC & Differential      3 weeks

## 2023-06-13 ENCOUNTER — TELEPHONE (OUTPATIENT)
Dept: INTERNAL MEDICINE | Facility: CLINIC | Age: 63
End: 2023-06-13
Payer: COMMERCIAL

## 2023-06-13 NOTE — TELEPHONE ENCOUNTER
Patient' wife states that patient has had deep cough and congestion for several weeks due to what she believes to be a flare up of asthma from allergies. Wife states that patient recently had wellness visit but forgot to mention cough during visit. Wife states that patient has spinal surgery scheduled for 6/30 and can not have cough afterwards. Wife requests antibiotics/ steroids be sent for patient.

## 2023-06-16 ENCOUNTER — TELEPHONE (OUTPATIENT)
Dept: INTERNAL MEDICINE | Facility: CLINIC | Age: 63
End: 2023-06-16

## 2023-06-16 NOTE — TELEPHONE ENCOUNTER
Caller: [facility]    Relationship to patient:     Best call back number: [phone]     PATIENT IS HAVING SURGERY ON TUESDAY, 6/20 AND NEEDS A STATEMENT AS TO WHETHER HE CAN STOP HIS ELOQUIS.  THE FAX NUMBER [phone]

## 2023-06-16 NOTE — TELEPHONE ENCOUNTER
Pt hasn't seen cardiology in over 2 years and does not have any future appointments with cardiology. Dr. Bella has been prescribing his Eliquis. Can you advise on Pt holding medications prior to surgery Tuesday?

## 2023-06-23 ENCOUNTER — TELEPHONE (OUTPATIENT)
Dept: CARDIOLOGY | Facility: CLINIC | Age: 63
End: 2023-06-23

## 2023-06-23 NOTE — TELEPHONE ENCOUNTER
Caller:  [facility]    Chief complaint: PATIENT WAS SEEN AT [facility] WITH CHEST PAIN. PROVIDER WANTING PATIENT TO FOLLOW UP WITH  TO DISCUSS STRESS TREST. PATIENT WAS LAST SEEN ON 2.12.21    Type of visit: FU

## 2023-07-24 ENCOUNTER — APPOINTMENT (OUTPATIENT)
Dept: BONE DENSITY | Facility: HOSPITAL | Age: 63
End: 2023-07-24
Payer: COMMERCIAL

## 2023-07-24 DIAGNOSIS — Z79.52 LONG TERM CURRENT USE OF SYSTEMIC STEROIDS: ICD-10-CM

## 2023-07-24 PROCEDURE — 77080 DXA BONE DENSITY AXIAL: CPT

## 2023-08-10 DIAGNOSIS — E11.9 TYPE 2 DIABETES MELLITUS WITHOUT COMPLICATION, WITHOUT LONG-TERM CURRENT USE OF INSULIN: ICD-10-CM

## 2023-08-10 RX ORDER — EMPAGLIFLOZIN 25 MG/1
TABLET, FILM COATED ORAL
Qty: 90 TABLET | Refills: 3 | OUTPATIENT
Start: 2023-08-10

## 2023-08-22 DIAGNOSIS — E11.9 TYPE 2 DIABETES MELLITUS WITHOUT COMPLICATION, WITHOUT LONG-TERM CURRENT USE OF INSULIN: ICD-10-CM

## 2023-08-22 NOTE — TELEPHONE ENCOUNTER
Rx Refill Note  Requested Prescriptions     Pending Prescriptions Disp Refills    metFORMIN (GLUCOPHAGE) 1000 MG tablet [Pharmacy Med Name: metformin 1,000 mg tablet] 180 tablet 3     Sig: TAKE ONE TABLET BY MOUTH TWICE DAILY WITH MEALS      Last office visit with prescribing clinician: 6/6/2023   Last telemedicine visit with prescribing clinician: 3/22/2023   Next office visit with prescribing clinician: 9/28/23      Celia Buchanan MA  08/22/23, 08:10 EDT

## 2023-08-30 DIAGNOSIS — E11.9 TYPE 2 DIABETES MELLITUS WITHOUT COMPLICATION, WITHOUT LONG-TERM CURRENT USE OF INSULIN: ICD-10-CM

## 2023-08-30 DIAGNOSIS — E78.2 MIXED HYPERLIPIDEMIA: ICD-10-CM

## 2023-08-30 RX ORDER — ATORVASTATIN CALCIUM 40 MG/1
40 TABLET, FILM COATED ORAL DAILY
Qty: 90 TABLET | Refills: 1 | Status: SHIPPED | OUTPATIENT
Start: 2023-08-30

## 2023-08-30 RX ORDER — EMPAGLIFLOZIN 25 MG/1
TABLET, FILM COATED ORAL
Qty: 90 TABLET | Refills: 1 | Status: SHIPPED | OUTPATIENT
Start: 2023-08-30

## 2023-08-30 RX ORDER — ATORVASTATIN CALCIUM 40 MG/1
TABLET, FILM COATED ORAL
Qty: 90 TABLET | Refills: 1 | Status: SHIPPED | OUTPATIENT
Start: 2023-08-30 | End: 2023-08-30 | Stop reason: SDUPTHER

## 2023-08-30 NOTE — TELEPHONE ENCOUNTER
Rx Refill Note  Requested Prescriptions     Pending Prescriptions Disp Refills    atorvastatin (LIPITOR) 40 MG tablet [Pharmacy Med Name: atorvastatin 40 mg tablet] 90 tablet 3     Sig: TAKE ONE TABLET BY MOUTH EVERY DAY      Last office visit with prescribing clinician: 6/6/2023   Next office visit with prescribing clinician: 9/28/23      Celia Buchanan MA  08/30/23, 11:38 EDT

## 2023-08-30 NOTE — TELEPHONE ENCOUNTER
Rx Refill Note  Requested Prescriptions     Pending Prescriptions Disp Refills    Jardiance 25 MG tablet tablet [Pharmacy Med Name: Jardiance 25 mg tablet] 90 tablet 3     Sig: TAKE ONE TABLET BY MOUTH EVERY DAY      Last office visit with prescribing clinician: 6/6/2023   Last telemedicine visit with prescribing clinician: 3/22/2023   Next office visit with prescribing clinician: 9/28/23    Last A1c 6/16/23      Celia Buchanan MA  08/30/23, 11:48 EDT

## 2023-08-30 NOTE — TELEPHONE ENCOUNTER
Rx Refill Note  Requested Prescriptions     Pending Prescriptions Disp Refills    atorvastatin (LIPITOR) 40 MG tablet 90 tablet 1     Sig: Take 1 tablet by mouth Daily.      Last office visit with prescribing clinician: 6/6/23    Next office visit with prescribing clinician: 9/28/2023     Celia Buchanan MA  08/30/23, 11:40 EDT

## 2023-09-01 DIAGNOSIS — J45.909 UNCOMPLICATED ASTHMA, UNSPECIFIED ASTHMA SEVERITY, UNSPECIFIED WHETHER PERSISTENT: ICD-10-CM

## 2023-09-01 RX ORDER — BUDESONIDE AND FORMOTEROL FUMARATE DIHYDRATE 160; 4.5 UG/1; UG/1
2 AEROSOL RESPIRATORY (INHALATION) 2 TIMES DAILY
Qty: 10.2 G | Refills: 3 | Status: SHIPPED | OUTPATIENT
Start: 2023-09-01

## 2023-09-15 DIAGNOSIS — M19.90 UNSPECIFIED OSTEOARTHRITIS, UNSPECIFIED SITE: ICD-10-CM

## 2023-09-15 DIAGNOSIS — J45.909 UNCOMPLICATED ASTHMA, UNSPECIFIED ASTHMA SEVERITY, UNSPECIFIED WHETHER PERSISTENT: ICD-10-CM

## 2023-09-15 DIAGNOSIS — E11.49 TYPE 2 DIABETES MELLITUS WITH OTHER DIABETIC NEUROLOGICAL COMPLICATION: ICD-10-CM

## 2023-09-15 NOTE — TELEPHONE ENCOUNTER
Medication was originally prescribed by Dr. Del Valle.   Patient has an appointment with you as his PCP on 09/28/2023. Gabapentin pending.       Rx Refill Note  Requested Prescriptions     Pending Prescriptions Disp Refills    gabapentin (NEURONTIN) 300 MG capsule [Pharmacy Med Name: gabapentin 300 mg capsule] 120 capsule 2     Sig: TAKE ONE CAPSULE BY MOUTH FOUR TIMES DAILY      Last office visit with prescribing clinician: Visit date not found   Last telemedicine visit with prescribing clinician: 3/22/2023   Next office visit with prescribing clinician: 9/28/2023                         Would you like a call back once the refill request has been completed: [] Yes [] No    If the office needs to give you a call back, can they leave a voicemail: [] Yes [] No    Joi Goins MA  09/15/23, 16:37 EDT

## 2023-09-18 RX ORDER — GABAPENTIN 300 MG/1
CAPSULE ORAL
Qty: 120 CAPSULE | Refills: 0 | Status: SHIPPED | OUTPATIENT
Start: 2023-09-18

## 2023-09-19 RX ORDER — MONTELUKAST SODIUM 10 MG/1
TABLET ORAL
Qty: 30 TABLET | Refills: 0 | Status: SHIPPED | OUTPATIENT
Start: 2023-09-19

## 2023-09-19 RX ORDER — CELECOXIB 200 MG/1
CAPSULE ORAL
Qty: 30 CAPSULE | Refills: 0 | Status: SHIPPED | OUTPATIENT
Start: 2023-09-19

## 2023-09-26 DIAGNOSIS — E11.9 TYPE 2 DIABETES MELLITUS WITHOUT COMPLICATION, WITHOUT LONG-TERM CURRENT USE OF INSULIN: ICD-10-CM

## 2023-09-28 ENCOUNTER — OFFICE VISIT (OUTPATIENT)
Dept: INTERNAL MEDICINE | Facility: CLINIC | Age: 63
End: 2023-09-28
Payer: COMMERCIAL

## 2023-09-28 ENCOUNTER — TELEPHONE (OUTPATIENT)
Dept: INTERNAL MEDICINE | Facility: CLINIC | Age: 63
End: 2023-09-28

## 2023-09-28 VITALS
RESPIRATION RATE: 15 BRPM | OXYGEN SATURATION: 95 % | HEART RATE: 86 BPM | DIASTOLIC BLOOD PRESSURE: 66 MMHG | SYSTOLIC BLOOD PRESSURE: 112 MMHG | HEIGHT: 72 IN | BODY MASS INDEX: 28.04 KG/M2 | WEIGHT: 207 LBS

## 2023-09-28 DIAGNOSIS — I48.0 PAROXYSMAL ATRIAL FIBRILLATION: ICD-10-CM

## 2023-09-28 DIAGNOSIS — R06.89 DIFFICULTY BREATHING: ICD-10-CM

## 2023-09-28 DIAGNOSIS — J45.909 UNCOMPLICATED ASTHMA, UNSPECIFIED ASTHMA SEVERITY, UNSPECIFIED WHETHER PERSISTENT: ICD-10-CM

## 2023-09-28 DIAGNOSIS — N52.8 OTHER MALE ERECTILE DYSFUNCTION: ICD-10-CM

## 2023-09-28 DIAGNOSIS — E11.65 TYPE 2 DIABETES MELLITUS WITH HYPERGLYCEMIA, WITHOUT LONG-TERM CURRENT USE OF INSULIN: Primary | ICD-10-CM

## 2023-09-28 DIAGNOSIS — L08.9 SKIN INFECTION: ICD-10-CM

## 2023-09-28 DIAGNOSIS — E11.00 TYPE 2 DIABETES MELLITUS WITH HYPEROSMOLARITY WITHOUT COMA, WITHOUT LONG-TERM CURRENT USE OF INSULIN: ICD-10-CM

## 2023-09-28 DIAGNOSIS — E78.2 MIXED HYPERLIPIDEMIA: ICD-10-CM

## 2023-09-28 DIAGNOSIS — I25.10 CORONARY ARTERY DISEASE INVOLVING NATIVE CORONARY ARTERY OF NATIVE HEART WITHOUT ANGINA PECTORIS: ICD-10-CM

## 2023-09-28 DIAGNOSIS — E11.49 OTHER DIABETIC NEUROLOGICAL COMPLICATION ASSOCIATED WITH TYPE 2 DIABETES MELLITUS: ICD-10-CM

## 2023-09-28 DIAGNOSIS — K21.9 GASTROESOPHAGEAL REFLUX DISEASE WITHOUT ESOPHAGITIS: ICD-10-CM

## 2023-09-28 DIAGNOSIS — R21 PENILE RASH: ICD-10-CM

## 2023-09-28 LAB
EXPIRATION DATE: NORMAL
HBA1C MFR BLD: 8.6 %
Lab: NORMAL

## 2023-09-28 RX ORDER — ACYCLOVIR 400 MG/1
1 TABLET ORAL DAILY
Qty: 1 EACH | Refills: 0 | COMMUNITY
Start: 2023-09-28

## 2023-09-28 RX ORDER — MUPIROCIN CALCIUM 20 MG/G
1 CREAM TOPICAL 3 TIMES DAILY
Qty: 15 G | Refills: 1 | Status: SHIPPED | OUTPATIENT
Start: 2023-09-28

## 2023-09-28 NOTE — PROGRESS NOTES
Office Note     Name: Darnell Garcia    : 1960     MRN: [id number]     Chief Complaint  Establish Care (Offboarding Dr. Bella)    Subjective     History of Present Illness:  Darnell Garcia is a 63 y.o. male who presents today for follow up on chronic conditions.     HX of CAD: follows with cardiology  Dm type 2 with peripheral neuropathy: last A1c was 8.1, on lantus 16 units once daily, non compliant with with humalog TID. Only takes blood glucose levels once daily in the morning fasting. Neuropathy on bilateral, lower and upper extremities at tips of fingers and toes. On gabapentin 600mg BID  COPD: no recent PFTs, been on intermittent PO steroids and several inhalers. Symbicort helps  GERD: stable on meds daily  ED and penile skin lesion: has not had an erection for years now, at least more than 3 years. Asks for urology referral. Wife also in room and notes that they have been using the caging method. The cage for his penis is made of metal. He recently complained of redness and sensitivity on scrotal area. No fever noted. Rash has been improving with leftover mupirocin at home.    Colonoscopy: 2022 polyps, hx of Low-grade appendiceal mucinous neoplasm   Nonsmoker  DXA scan: osteopenia, on OTC clcium      Family History:   Family History   Problem Relation Age of Onset    Breast cancer Mother     Osteoarthritis Mother     Arthritis Mother     Cancer Mother         Breast    Coronary artery disease Father     Heart attack Father         late 60's    Cancer Father         Skin    Lymphoma Son     Leukemia Son     Cancer Son         Lymphoblastic Leukemia    Colon cancer Neg Hx        Social History:   Social History     Socioeconomic History    Marital status:    Tobacco Use    Smoking status: Never    Smokeless tobacco: Never   Vaping Use    Vaping Use: Never used   Substance and Sexual Activity    Alcohol use: Yes     Alcohol/week: 4.0 standard drinks     Types: 4 Cans of beer per week      "Comment: on weekends    Drug use: No    Sexual activity: Not Currently     Partners: Female     Birth control/protection: Vasectomy       Health Maintenance   Topic Date Due    URINE MICROALBUMIN  09/04/2022    DIABETIC EYE EXAM  01/09/2024 (Originally 8/29/2023)    Pneumococcal Vaccine 0-64 (2 - PCV) 02/01/2024 (Originally 6/15/2018)    ZOSTER VACCINE (1 of 2) 06/04/2024 (Originally 4/14/2010)    COVID-19 Vaccine (4 - Moderna series) 02/04/2026 (Originally 2/28/2022)    INFLUENZA VACCINE  10/01/2023    BMI FOLLOWUP  10/29/2023    HEMOGLOBIN A1C  03/28/2024    ANNUAL PHYSICAL  06/06/2024    DIABETIC FOOT EXAM  06/06/2024    LIPID PANEL  06/16/2024    TDAP/TD VACCINES (2 - Td or Tdap) 02/16/2027    COLORECTAL CANCER SCREENING  09/02/2032    HEPATITIS C SCREENING  Completed       Objective     Vital Signs  /66   Pulse 86   Resp 15   Ht 182.9 cm (72.01\")   Wt 93.9 kg (207 lb)   SpO2 95%   BMI 28.07 kg/m²   Estimated body mass index is 28.07 kg/m² as calculated from the following:    Height as of this encounter: 182.9 cm (72.01\").    Weight as of this encounter: 93.9 kg (207 lb).  Physical Exam  Vitals and nursing note reviewed.   Constitutional:       Appearance: Normal appearance.   HENT:      Head: Normocephalic and atraumatic.   Cardiovascular:      Rate and Rhythm: Normal rate and regular rhythm.      Pulses: Normal pulses.           Dorsalis pedis pulses are 2+ on the right side and 2+ on the left side.        Posterior tibial pulses are 2+ on the right side and 2+ on the left side.      Heart sounds: Normal heart sounds.   Pulmonary:      Effort: Pulmonary effort is normal. No respiratory distress.      Breath sounds: Normal breath sounds. No wheezing, rhonchi or rales.   Abdominal:      General: Abdomen is flat. Bowel sounds are normal.      Palpations: Abdomen is soft.      Tenderness: There is no abdominal tenderness. There is no guarding.   Genitourinary:     Comments: On inferior area of right " scrotum, there is minimal erythema and several papules, mild tenderness on area  Musculoskeletal:      Cervical back: Neck supple.   Feet:      Right foot:      Skin integrity: Skin integrity normal.      Left foot:      Skin integrity: Skin integrity normal.      Comments: Diabetic Foot Exam Performed and Monofilament Test Performed no neuropathy      Skin:     General: Skin is warm.      Capillary Refill: Capillary refill takes less than 2 seconds.   Neurological:      General: No focal deficit present.      Mental Status: He is alert. Mental status is at baseline.   Psychiatric:         Mood and Affect: Mood normal.         Behavior: Behavior normal.        Procedures     Assessment and Plan     Diagnoses and all orders for this visit:    1. Type 2 diabetes mellitus with hyperglycemia, without long-term current use of insulin (Primary)  Assessment & Plan:  Last A1c was 8.1. will need more detailed blood sugar levels at home TID. Advised glucose log daily for 2 weeks. Keep glimepiride, jardiance, insulin humalog 5 units and lantus 15 units daily, metformin 1000 bid    Orders:  -     POC Glycosylated Hemoglobin (Hb A1C)    2. Mixed hyperlipidemia  Assessment & Plan:  No recent lipid profile  On atorvastatin      3. Coronary artery disease involving native coronary artery of native heart without angina pectoris  Assessment & Plan:  Follows with cardiology, no stents      4. Gastroesophageal reflux disease without esophagitis  Assessment & Plan:  Stable on current medication and dosage. Will continue current management. Refill medication as necessary.  On famotidine      5. Other diabetic neurological complication associated with type 2 diabetes mellitus  Assessment & Plan:  On gabapentin 300mg QID  UDS done  CSA signed      6. Uncomplicated asthma, unspecified asthma severity, unspecified whether persistent  Assessment & Plan:  Will need to reevaluate with PFTs. Ordered  On symbicort, albuterol and  singulair    Orders:  -     Complete PFT - Pre & Post Bronchodilator; Future    7. Difficulty breathing  -     Complete PFT - Pre & Post Bronchodilator; Future    8. Type 2 diabetes mellitus with hyperosmolarity without coma, without long-term current use of insulin  -     Insulin Glargine (LANTUS SOLOSTAR) 100 UNIT/ML injection pen; Inject 16 Units under the skin into the appropriate area as directed Every Morning.  Dispense: 15 mL; Refill: 2    9. Paroxysmal atrial fibrillation  Assessment & Plan:  Stable on eliquis  No bleeding episodes      10. Other male erectile dysfunction  Assessment & Plan:  Chronic, refer to urology    Orders:  -     Ambulatory Referral to Urology    11. Skin infection  -     mupirocin (BACTROBAN) 2 % cream; Apply 1 application  topically to the appropriate area as directed 3 (Three) Times a Day.  Dispense: 15 g; Refill: 1    12. Penile rash  Assessment & Plan:  Improving, continue mupirocin at home until resolved. Discontinue use of cage. Refer to urology. Consider referral to dermatology if rash persists      Other orders  -     Continuous Blood Gluc Sensor (Dexcom G7 Sensor) misc; Use 1 each Daily.  Dispense: 1 each; Refill: 0         Counseling was given to patient and family for the following topics: instructions for management, risks and benefits of treatment options, and importance of treatment compliance.    Follow Up  Return in about 2 weeks (around 10/12/2023).    MD CELESTINA Dsouza [facility]  [address] [place] [id number] [phone]

## 2023-09-28 NOTE — ASSESSMENT & PLAN NOTE
Improving, continue mupirocin at home until resolved. Discontinue use of cage. Refer to urology. Consider referral to dermatology if rash persists

## 2023-09-28 NOTE — ASSESSMENT & PLAN NOTE
Stable on current medication and dosage. Will continue current management. Refill medication as necessary.  On famotidine

## 2023-09-28 NOTE — ASSESSMENT & PLAN NOTE
Last A1c was 8.1. will need more detailed blood sugar levels at home TID. Advised glucose log daily for 2 weeks. Keep glimepiride, jardiance, insulin humalog 5 units and lantus 15 units daily, metformin 1000 bid

## 2023-09-28 NOTE — TELEPHONE ENCOUNTER
Caller: [facility] [place] [address] [place] - [phone]  - [phone] FX    Relationship: Pharmacy    Best call back number: [phone]     What medications are you currently taking:   Current Outpatient Medications on File Prior to Visit   Medication Sig Dispense Refill    Accu-Chek Guide test strip USE AS DIRECTED 100 each 12    ACETAMINOPHEN ER PO Take 1,000 mg by mouth 2 (Two) Times a Day As Needed for Mild Pain. Take [id number] tablets by mouth twice daily for arthritis pain.      albuterol sulfate  (90 Base) MCG/ACT inhaler Inhale 2 puffs Every 4 (Four) Hours As Needed for Wheezing. 8.5 g 5    atorvastatin (LIPITOR) 40 MG tablet Take 1 tablet by mouth Daily. 90 tablet 1    Blood Glucose Monitoring Suppl (Accu-Chek Guide Me) w/Device kit USE AS DIRECTED 1 kit 0    budesonide-formoterol (Symbicort) 160-4.5 MCG/ACT inhaler Inhale 2 puffs 2 (Two) Times a Day. 10.2 g 3    celecoxib (CeleBREX) 200 MG capsule TAKE ONE CAPSULE BY MOUTH EVERY DAY 30 capsule 0    cholecalciferol (VITAMIN D3) 10 MCG (400 UNIT) tablet Take 1 tablet by mouth Daily.      Continuous Blood Gluc Sensor (Dexcom G7 Sensor) misc Use 1 each Daily. 1 each 0    Eliquis 5 MG tablet tablet TAKE ONE TABLET BY MOUTH EVERY TWELVE HOURS 180 tablet 0    empagliflozin (Jardiance) 25 MG tablet tablet TAKE ONE TABLET BY MOUTH EVERY DAY 90 tablet 1    famotidine (PEPCID) 20 MG tablet Take 1 tablet by mouth Every Night.      gabapentin (NEURONTIN) 300 MG capsule TAKE ONE CAPSULE BY MOUTH FOUR TIMES DAILY 120 capsule 0    glimepiride (AMARYL) 4 MG tablet TAKE ONE TABLET BY MOUTH EVERY MORNING BEFORE BREAKFAST 90 tablet 0    Insulin Glargine (LANTUS SOLOSTAR) 100 UNIT/ML injection pen Inject 16 Units under the skin into the appropriate area as directed Every Morning. 15 mL 2    insulin lispro (HumaLOG) 100 UNIT/ML injection 5 u 10 min before meals sc. 3 mL 12    Lancets (onetouch ultrasoft) lancets Use as instructed  180 each 12    metFORMIN (GLUCOPHAGE) 1000 MG tablet Take 1 tablet by mouth 2 (Two) Times a Day With Meals. 60 tablet 0    metoprolol succinate XL (TOPROL-XL) 25 MG 24 hr tablet TAKE 1/2 TABLET BY MOUTH EVERY DAY 45 tablet 3    montelukast (SINGULAIR) 10 MG tablet TAKE ONE TABLET BY MOUTH AT BEDTIME 30 tablet 0    mupirocin (BACTROBAN) 2 % cream Apply 1 application  topically to the appropriate area as directed 3 (Three) Times a Day. 15 g 1    nitroglycerin (NITROSTAT) 0.4 MG SL tablet Place 1 tablet under the tongue Every FIVE Minutes As Needed for Chest Pain. Take no more than 3 doses in 15 minutes. 25 tablet 2    ondansetron ODT (ZOFRAN-ODT) 4 MG disintegrating tablet Place 1 tablet on the tongue Every 8 (Eight) Hours As Needed for Nausea or Vomiting. 20 tablet 0    predniSONE (DELTASONE) 5 MG tablet TAKE ONE TABLET BY MOUTH EVERY DAY 90 tablet 3    Unifine SafeControl Pen Needle 30G X 8 MM misc USE AS DIRECTED 100 each 12    [DISCONTINUED] albuterol (PROVENTIL) (2.5 MG/3ML) 0.083% nebulizer solution Albuterol Sulfate (2.5 MG/3ML) 0.083% Inhalation Nebulization solution QTY: 0  Days: 0 Refills: 0  Written: 11/03/22 Patient Instructions:      [DISCONTINUED] albuterol (PROVENTIL) (2.5 MG/3ML) 0.083% nebulizer solution Take 2.5 mg by nebulization Every 4 (Four) Hours As Needed for Wheezing. 25 each 0    [DISCONTINUED] azithromycin (ZITHROMAX) 250 MG tablet Take 2 tablets the first day, then 1 tablet daily for 4 days. 6 tablet 0    [DISCONTINUED] cyclobenzaprine (FLEXERIL) 5 MG tablet Take 1 tablet by mouth 4 (Four) Times a Day As Needed for Muscle Spasms. Patient takes 1 tablet by mouth once daily      [DISCONTINUED] diazePAM (VALIUM) 5 MG tablet Take 1 tablet by mouth Every 6 (Six) Hours As Needed for Anxiety.      [DISCONTINUED] doxycycline (VIBRAMYCIN) 100 MG capsule Take 1 capsule by mouth 2 (Two) Times a Day. 20 capsule 0    [DISCONTINUED] Insulin Glargine (LANTUS SOLOSTAR) 100 UNIT/ML injection pen Inject 15  Units under the skin into the appropriate area as directed Every Morning. 15 mL 2     No current facility-administered medications on file prior to visit.              Which medication are you concerned about: mupirocin (BACTROBAN) 2 % cream    Who prescribed you this medication: EDILSON CARPIO    What are your concerns: PHARMACY WOULD LIKE TO KNOW IF THIS CAN BE SWITCHED TO THE OINTMENT BECAUSE HIS INSURANCE DOESN'T COVER THE CREAM AND IT IS VERY EXPENSIVE.

## 2023-10-02 RX ORDER — ACYCLOVIR 400 MG/1
1 TABLET ORAL CONTINUOUS
Qty: 1 EACH | Refills: 0 | COMMUNITY
Start: 2023-10-02

## 2023-10-03 ENCOUNTER — HOSPITAL ENCOUNTER (EMERGENCY)
Facility: HOSPITAL | Age: 63
Discharge: HOME | End: 2023-10-03
Attending: EMERGENCY MEDICINE
Payer: COMMERCIAL

## 2023-10-03 VITALS
RESPIRATION RATE: 16 BRPM | WEIGHT: 207.6 LBS | HEART RATE: 70 BPM | HEIGHT: 72 IN | DIASTOLIC BLOOD PRESSURE: 84 MMHG | SYSTOLIC BLOOD PRESSURE: 161 MMHG | BODY MASS INDEX: 28.12 KG/M2 | OXYGEN SATURATION: 96 % | TEMPERATURE: 98.2 F

## 2023-10-03 DIAGNOSIS — T24.001A BURN OF RIGHT LOWER EXTREMITY, UNSPECIFIED BURN DEGREE, INITIAL ENCOUNTER: Primary | ICD-10-CM

## 2023-10-03 PROCEDURE — 99283 EMERGENCY DEPT VISIT LOW MDM: CPT

## 2023-10-03 RX ORDER — CLINDAMYCIN HYDROCHLORIDE 150 MG/1
450 CAPSULE ORAL 3 TIMES DAILY
Qty: 63 CAPSULE | Refills: 0 | Status: SHIPPED | OUTPATIENT
Start: 2023-10-03 | End: 2023-10-10

## 2023-10-03 RX ORDER — CLINDAMYCIN HYDROCHLORIDE 150 MG/1
450 CAPSULE ORAL ONCE
Status: COMPLETED | OUTPATIENT
Start: 2023-10-03 | End: 2023-10-03

## 2023-10-03 RX ADMIN — CLINDAMYCIN HYDROCHLORIDE 450 MG: 150 CAPSULE ORAL at 08:42

## 2023-10-03 RX ADMIN — MUPIROCIN 1 APPLICATION: 20 OINTMENT TOPICAL at 08:42
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
## 2023-10-03 NOTE — DISCHARGE INSTRUCTIONS
Clean wound daily with gentle soap and water, pat dry, and place mupirocin ointment on the wound.  Wrap with clean bandage daily.  Take clindamycin as prescribed.  Follow-up with your PCP for wound recheck and further outpatient evaluation.  Follow-up with Dr. Yin, dermatologist, for further outpatient evaluation and definitive care if symptoms persist.  Return to the ER for new or worsening symptoms or acute concerns.

## 2023-10-11 DIAGNOSIS — E11.9 TYPE 2 DIABETES MELLITUS WITHOUT COMPLICATION, WITHOUT LONG-TERM CURRENT USE OF INSULIN: ICD-10-CM

## 2023-10-11 DIAGNOSIS — E78.5 HYPERLIPIDEMIA, UNSPECIFIED HYPERLIPIDEMIA TYPE: Primary | ICD-10-CM

## 2023-10-11 RX ORDER — GLIMEPIRIDE 4 MG/1
TABLET ORAL
Qty: 30 TABLET | Refills: 0 | Status: SHIPPED | OUTPATIENT
Start: 2023-10-11

## 2023-10-20 ENCOUNTER — TELEPHONE (OUTPATIENT)
Dept: SURGERY | Facility: CLINIC | Age: 63
End: 2023-10-20
Payer: COMMERCIAL

## 2023-10-20 ENCOUNTER — HOSPITAL ENCOUNTER (OUTPATIENT)
Dept: PULMONOLOGY | Facility: HOSPITAL | Age: 63
Discharge: HOME | End: 2023-10-20
Payer: COMMERCIAL

## 2023-10-20 DIAGNOSIS — J45.909 UNCOMPLICATED ASTHMA, UNSPECIFIED ASTHMA SEVERITY, UNSPECIFIED WHETHER PERSISTENT: ICD-10-CM

## 2023-10-20 DIAGNOSIS — R06.89 DIFFICULTY BREATHING: ICD-10-CM

## 2023-10-20 PROCEDURE — 94726 PLETHYSMOGRAPHY LUNG VOLUMES: CPT

## 2023-10-20 PROCEDURE — 94060 EVALUATION OF WHEEZING: CPT

## 2023-10-25 ENCOUNTER — PATIENT ROUNDING (BHMG ONLY) (OUTPATIENT)
Dept: PULMONOLOGY | Facility: CLINIC | Age: 63
End: 2023-10-25
Payer: COMMERCIAL

## 2023-10-25 ENCOUNTER — LAB (OUTPATIENT)
Dept: LAB | Facility: HOSPITAL | Age: 63
End: 2023-10-25
Payer: COMMERCIAL

## 2023-10-25 ENCOUNTER — OFFICE VISIT (OUTPATIENT)
Dept: PULMONOLOGY | Facility: CLINIC | Age: 63
End: 2023-10-25

## 2023-10-25 VITALS
RESPIRATION RATE: 18 BRPM | HEIGHT: 72 IN | OXYGEN SATURATION: 95 % | HEART RATE: 69 BPM | DIASTOLIC BLOOD PRESSURE: 68 MMHG | WEIGHT: 207.4 LBS | BODY MASS INDEX: 28.09 KG/M2 | SYSTOLIC BLOOD PRESSURE: 120 MMHG

## 2023-10-25 DIAGNOSIS — M19.90 UNSPECIFIED OSTEOARTHRITIS, UNSPECIFIED SITE: ICD-10-CM

## 2023-10-25 DIAGNOSIS — J30.89 OTHER ALLERGIC RHINITIS: ICD-10-CM

## 2023-10-25 DIAGNOSIS — J45.40 MODERATE PERSISTENT ASTHMA WITHOUT COMPLICATION: ICD-10-CM

## 2023-10-25 DIAGNOSIS — E11.49 TYPE 2 DIABETES MELLITUS WITH OTHER DIABETIC NEUROLOGICAL COMPLICATION: ICD-10-CM

## 2023-10-25 DIAGNOSIS — R06.02 SHORTNESS OF BREATH: Primary | ICD-10-CM

## 2023-10-25 DIAGNOSIS — E11.9 TYPE 2 DIABETES MELLITUS WITHOUT COMPLICATION, WITHOUT LONG-TERM CURRENT USE OF INSULIN: ICD-10-CM

## 2023-10-25 DIAGNOSIS — J45.909 UNCOMPLICATED ASTHMA, UNSPECIFIED ASTHMA SEVERITY, UNSPECIFIED WHETHER PERSISTENT: ICD-10-CM

## 2023-10-25 DIAGNOSIS — E78.5 HYPERLIPIDEMIA, UNSPECIFIED HYPERLIPIDEMIA TYPE: ICD-10-CM

## 2023-10-25 LAB
CHOLEST SERPL-MCNC: 127 MG/DL (ref 0–200)
HDLC SERPL-MCNC: 36 MG/DL (ref 40–60)
LDLC SERPL CALC-MCNC: 60 MG/DL (ref 0–100)
LDLC/HDLC SERPL: 1.5 {RATIO}
TRIGL SERPL-MCNC: 185 MG/DL (ref 0–150)
VLDLC SERPL-MCNC: 31 MG/DL (ref 5–40)

## 2023-10-25 PROCEDURE — 86003 ALLG SPEC IGE CRUDE XTRC EA: CPT

## 2023-10-25 PROCEDURE — 36415 COLL VENOUS BLD VENIPUNCTURE: CPT

## 2023-10-25 PROCEDURE — 82785 ASSAY OF IGE: CPT

## 2023-10-25 PROCEDURE — 95012 NITRIC OXIDE EXP GAS DETER: CPT | Performed by: INTERNAL MEDICINE

## 2023-10-25 PROCEDURE — 80061 LIPID PANEL: CPT

## 2023-10-25 RX ORDER — APIXABAN 5 MG/1
TABLET, FILM COATED ORAL
Qty: 180 TABLET | Refills: 0 | Status: SHIPPED | OUTPATIENT
Start: 2023-10-25

## 2023-10-25 RX ORDER — BISACODYL 5 MG/1
5 TABLET, DELAYED RELEASE ORAL DAILY PRN
Qty: 4 TABLET | Refills: 0 | Status: SHIPPED | OUTPATIENT
Start: 2023-10-25 | End: 2023-10-29

## 2023-10-25 RX ORDER — FLUTICASONE PROPIONATE 50 MCG
1 SPRAY, SUSPENSION (ML) NASAL DAILY
Qty: 16 G | Refills: 5 | Status: SHIPPED | OUTPATIENT
Start: 2023-10-25

## 2023-10-25 RX ORDER — POLYETHYLENE GLYCOL 3350 17 G/17G
238 POWDER, FOR SOLUTION ORAL DAILY
Qty: 238 G | Refills: 0 | Status: SHIPPED | OUTPATIENT
Start: 2023-10-25 | End: 2023-10-26

## 2023-10-25 NOTE — PROGRESS NOTES
CONSULT NOTE    Requested by:   Carlos Bella MD Montgomery, Katrina, MD      Chief Complaint   Patient presents with    Breathing Problem    Consult       Subjective:  Darnell Garcia is a 63 y.o. male.   Patient comes in today for consultation because of shortness of breath for the past few years    The patient says that his shortness of breath is more pronounced during the evening. It is also occasionally associated with wheezing.     The patient has 7 dogs at home.     The patient does not have a family history of asthma. He reports known personal history of asthma.    The patient denies a history of smoking.    he is currently not on oxygen.     The patient says that he has been on prednisone for about 20 years for asthma and although he has been successful at decreasing the dose to 5 mg, whenever he stops it for any extended amount of time he starts feeling worse and sometimes ends up taking much higher dose for a few days.    He has been on Symbicort but only uses it once a day    Patient also complains of runny nose and dribbling in the back of the throat for the past few weeks.       The following portions of the patient's history were reviewed and updated as appropriate: allergies, current medications, past family history, past medical history, past social history, and past surgical history.    Review of Systems   HENT:  Negative for sinus pressure, sneezing and sore throat.    Respiratory:  Positive for cough, chest tightness, shortness of breath and wheezing.    Cardiovascular:  Negative for palpitations and leg swelling.   All other systems reviewed and are negative.      Past Medical History:   Diagnosis Date    A-fib     Arthritis     Asthma     Cataracts, both eyes     COVID-19 vaccine series completed     with booster x2    Diabetes mellitus     GERD (gastroesophageal reflux disease)     History of cardiac catheterization     pt states approx 2017 -no stent needed    History of nuclear stress  "test     2019?    History of varicella     Neuropathy     feet    Sleep apnea     diagnosed but does not use CPAP    Wears glasses        Social History     Tobacco Use    Smoking status: Never    Smokeless tobacco: Never   Substance Use Topics    Alcohol use: Yes     Alcohol/week: 4.0 standard drinks of alcohol     Types: 4 Cans of beer per week     Comment: on weekends         Objective:  Visit Vitals  /68   Pulse 69   Resp 18   Ht 182.9 cm (72.01\")   Wt 94.1 kg (207 lb 6.4 oz)   SpO2 95%   BMI 28.12 kg/m²       Physical Exam  Vitals reviewed.   Constitutional:       Appearance: He is well-developed.   HENT:      Head:      Comments: No acute lesions noted     Nose:      Comments: Nasal erythema noted.     Mouth/Throat:      Mouth: Mucous membranes are moist.      Comments: Oropharynx was somewhat cobblestoned.  Neck:      Thyroid: No thyromegaly.      Vascular: No JVD.   Cardiovascular:      Rate and Rhythm: Normal rate and regular rhythm.      Heart sounds: No murmur heard.  Pulmonary:      Effort: Pulmonary effort is normal. No respiratory distress.      Breath sounds: No wheezing or rales.   Musculoskeletal:      Cervical back: Neck supple.      Comments: Gait was normal.   Skin:     General: Skin is warm and dry.   Neurological:      Mental Status: He is alert and oriented to person, place, and time.   Psychiatric:         Behavior: Behavior normal.         Assessment/Plan:  Diagnoses and all orders for this visit:    1. Shortness of breath (Primary)  -     Nitric Oxide  -     Peak Flow    2. Moderate persistent asthma without complication  -     Nitric Oxide  -     Peak Flow    3. Other allergic rhinitis  -     IgE; Future  -     Allergens, Zone 8; Future    Other orders  -     Fluticasone-Umeclidin-Vilant 200-62.5-25 MCG/ACT aerosol powder ; Inhale 1 puff Daily. Rinse mouth with water after use.  Dispense: 60 each; Refill: 5  -     fluticasone (FLONASE) 50 MCG/ACT nasal spray; 1 spray into the " nostril(s) as directed by provider Daily. Administer 1 spray in each nostril for each dose.  Dispense: 16 g; Refill: 5        Return in about 3 months (around 1/25/2024) for Recheck, Labs, For Nancy Denton), ....Also 7 mths w/ Dr. Horn.    DISCUSSION(if any):  Last chest x-ray was reviewed personally and the results were shared with the patient.  Images reviewed personally.   Results for orders placed in visit on 02/01/23    XR Chest PA & Lateral    Narrative  XR CHEST PA AND LATERAL    Date of Exam: 2/1/2023 2:33 PM EST    Indication: a.fib.    Comparison: CT 10/26/2022    FINDINGS: No focal airspace opacity. No pleural effusion or pneumothorax. Normal heart and mediastinal contours.    Impression  No evidence of acute disease in the chest.    Electronically Signed: Fletcher Singh  2/1/2023 3:40 PM EST  Workstation ID: [id number]      Last CT scan results was reviewed in great detail with the patient.  Results for orders placed during the hospital encounter of 10/26/22    CT Angiogram Chest    Narrative  DATE OF EXAM: 10/26/2022 2:40 PM    PROCEDURE: CT ANGIOGRAM CHEST-, CT ABDOMEN PELVIS W CONTRAST-    INDICATIONS: right side chest pain    COMPARISON: CT abdomen and pelvis 2/23/2017, CT chest 1/28/2006    TECHNIQUE: Contiguous axial imaging was obtained from the thoracic inlet  through the pelvis following the intravenous administration of 100 mL of  Isovue 370. Reconstructed coronal and sagittal images were also  obtained. Automated exposure control and iterative reconstruction  methods were used.    The radiation dose reduction device was turned on for each scan per the  ALARA (As Low as Reasonably Achievable) protocol.    FINDINGS:  Chest-    Pulmonary arteries: Adequately opacified. No emboli demonstrated    Jerica/mediastinum: No adenopathy. Thoracic aorta normal in caliber.  Coronary artery calcification. No pericardial effusion    Lungs/pleura: Bronchial wall thickening, with endobronchial secretions  in  the lower lobes. Bandlike opacities in both lower lobes. No  suspicious infiltrate. 4 mm nodule in the superior segment of the right  lower lobe unchanged since 2006. No pleural effusion    Bones/soft tissues: No acute bony abnormality. Dorsal spurring at T9-T10      Abdomen/pelvis-    Organs: Solid organs and gallbladder unremarkable other than small left  renal cysts. No hydronephrosis. No pericholecystic stranding. No biliary  duct dilatation    GI tract: The appendix is dilated and fluid-filled with wall  calcification. No periappendiceal inflammatory stranding. Diverticula of  the descending and sigmoid colon with no associated inflammation. No  mesenteric adenopathy    Pelvis: No abnormal fluid collection. Urinary bladder unremarkable    Peritoneum/retroperitoneum: No ascites or peritoneal tumor. Normal  caliber aorta    Bones/soft tissues: No acute bony abnormality. Geographic sclerosis of  both femoral heads which demonstrate normal contours.    Impression  Chest-    1. No evidence of pulmonary embolism  2. Bronchitis, with atelectasis in the lower lobes  3. Calcific coronary atherosclerosis    Abdomen/pelvis-    1. No acute abnormality  2. Mucocele of the appendix  3. Colonic diverticulosis  4. Bilateral femoral head AVN without collapse    This report was finalized on 10/26/2022 3:20 PM by Pradeep Zhao.      I have also reviewed his primary care / referring provider's last note that mentions asthma and shortness of breath.     I also reviewed his last echocardiogram and shared the results with him.   Results for orders placed during the hospital encounter of 02/03/21    Adult Transesophageal Echo (GENESIS) W/ Cont if Necessary Per Protocol (Cardiology Department)    Interpretation Summary  1.  Grossly normal LV systolic function, assessment limited by tachycardia.  2.  Trace mitral regurgitation.  3.  No evidence of left atrial appendage thrombus.  4.  Successful synchronized cardioversion x1 with  restoration of sinus rhythm.      ===========================  ===========================    Last PFTs were reviewed.  Mild obstruction noted    FeNO level was 161 today.    Peak flow was 500 LPM today.    Laboratory workup also showed   Lab Results   Component Value Date    HGB 12.5 (L) 02/01/2023    HGB 12.1 (L) 11/10/2022    HGB 11.9 (L) 11/08/2022   ,   Lab Results   Component Value Date    HCT 37.6 02/01/2023    HCT 35.0 (L) 11/10/2022    HCT 34.7 (L) 11/08/2022       Lab Results   Component Value Date    EOSABS 0.33 02/01/2023    EOSABS 0.27 11/10/2022    EOSABS 0.00 11/08/2022    & Laboratory workup also showed   Lab Results   Component Value Date    CO2 27.3 02/01/2023   ,   Lab Results   Component Value Date    HGBA1C 8.6 09/28/2023    HGBA1C 8.10 (H) 02/01/2023    HGBA1C 8.30 (H) 10/27/2022   ,   Lab Results   Component Value Date    TSH 2.370 09/04/2021    TSH 1.190 02/03/2021    TSH 1.480 09/26/2019     ===========================  ===========================    Patient will be started on nasal spray for symptoms which are definitely consistent with allergic rhinitis.     I have ordered IgE/RAST panel.    I had a detailed discussion with the patient regarding his symptoms that are very suggestive of asthma    Orders as above.    The patient was started on Trelegy with instructions to rinse his mouth with water after use.     He was asked to change Prednisone to every other day, around Thanksgiving, if he does fairly well with Trelegy.     I told the patient that if he does want to switch back to Symbicort, he will need to use it twice a day and not once a day especially given his overall symptoms which do suggest somewhat poorly/partially controlled asthma.    he is already on montelukast and should continue it.     Patient was informed about the black box warning for montelukast regarding suicidal thoughts and tendencies.  he denies any ongoing issues for now.     Other contributing factors may also  need to be treated and this will be discussed with the patient, if and when applicable    Patient was advised to use rescue inhaler for when necessary purposes    Patient was also advised to keep a log of the use of rescue inhaler.    Patient was also advised to keep a close eye on peak flow readings and to maintain record of any significant changes in it.    Patient was given reading material.        Dictated utilizing Dragon dictation.    This document was electronically signed by Mariama Horn MD on 10/25/23 at 13:42 EDT

## 2023-10-25 NOTE — TELEPHONE ENCOUNTER
Caller: Naz Garcia    Relationship: Emergency Contact    Best call back number: [phone]     Requested Prescriptions:   Requested Prescriptions     Pending Prescriptions Disp Refills    celecoxib (CeleBREX) 200 MG capsule [Pharmacy Med Name: celecoxib 200 mg capsule] 30 capsule 0     Sig: TAKE ONE CAPSULE BY MOUTH EVERY DAY    montelukast (SINGULAIR) 10 MG tablet [Pharmacy Med Name: montelukast 10 mg tablet] 30 tablet 0     Sig: TAKE ONE TABLET BY MOUTH AT BEDTIME    gabapentin (NEURONTIN) 300 MG capsule [Pharmacy Med Name: gabapentin 300 mg capsule] 120 capsule 0     Sig: TAKE ONE CAPSULE BY MOUTH FOUR TIMES DAILY    metFORMIN (GLUCOPHAGE) 1000 MG tablet [Pharmacy Med Name: metformin 1,000 mg tablet] 60 tablet 0     Sig: Take 1 tablet by mouth 2 (Two) Times a Day With Meals.    glimepiride (AMARYL) 4 MG tablet [Pharmacy Med Name: glimepiride 4 mg tablet] 30 tablet 0     Sig: TAKE ONE TABLET BY MOUTH EVERY MORNING BEFORE BREAKFAST        Pharmacy where request should be sent: [facility] [address] [phone] [facility] [phone]      Last office visit with prescribing clinician: 9/28/2023   Last telemedicine visit with prescribing clinician: Visit date not found   Next office visit with prescribing clinician: 10/31/2023     Additional details provided by patient: OUT OF MEDICATION. PHARMACY HAS NOT HEARD BACK AND CAN NOT DO HIS TRAY UNTIL THEY GET A NEW PRESCRIPTION FOR ALL OF THE ABOVE MEDICATIONS    Does the patient have less than a 3 day supply:  [x] Yes  [] No OUT OF MEDICATION    Would you like a call back once the refill request has been completed: [] Yes [x] No    If the office needs to give you a call back, can they leave a voicemail: [x] Yes [] No    Jemal Singh Rep   10/25/23 14:35 EDT

## 2023-10-26 RX ORDER — MONTELUKAST SODIUM 10 MG/1
TABLET ORAL
Qty: 90 TABLET | Refills: 1 | Status: SHIPPED | OUTPATIENT
Start: 2023-10-26

## 2023-10-26 RX ORDER — CELECOXIB 200 MG/1
CAPSULE ORAL
Qty: 90 CAPSULE | Refills: 1 | Status: SHIPPED | OUTPATIENT
Start: 2023-10-26

## 2023-10-26 RX ORDER — GABAPENTIN 300 MG/1
CAPSULE ORAL
Qty: 120 CAPSULE | Refills: 0 | Status: SHIPPED | OUTPATIENT
Start: 2023-10-26

## 2023-10-26 RX ORDER — GLIMEPIRIDE 4 MG/1
TABLET ORAL
Qty: 90 TABLET | Refills: 1 | Status: SHIPPED | OUTPATIENT
Start: 2023-10-26

## 2023-10-29 LAB
A ALTERNATA IGE QN: 5.88 KU/L
A FUMIGATUS IGE QN: 0.25 KU/L
AMER ROACH IGE QN: <0.1 KU/L
BAHIA GRASS IGE QN: <0.1 KU/L
BERMUDA GRASS IGE QN: <0.1 KU/L
BOXELDER IGE QN: <0.1 KU/L
C HERBARUM IGE QN: 0.37 KU/L
CAT DANDER IGE QN: <0.1 KU/L
CMN PIGWEED IGE QN: <0.1 KU/L
COMMON RAGWEED IGE QN: 2.53 KU/L
CONV CLASS DESCRIPTION: ABNORMAL
D FARINAE IGE QN: 0.32 KU/L
D PTERONYSS IGE QN: 0.35 KU/L
DOG DANDER IGE QN: <0.1 KU/L
ENGL PLANTAIN IGE QN: <0.1 KU/L
HAZELNUT POLN IGE QN: <0.1 KU/L
IGE SERPL-ACNC: 81 IU/ML (ref 6–495)
JOHNSON GRASS IGE QN: <0.1 KU/L
KENT BLUE GRASS IGE QN: <0.1 KU/L
LONDON PLANE IGE QN: <0.1 KU/L
M RACEMOSUS IGE QN: 0.18 KU/L
MT JUNIPER IGE QN: 0.15 KU/L
MUGWORT IGE QN: <0.1 KU/L
NETTLE IGE QN: <0.1 KU/L
P NOTATUM IGE QN: 0.12 KU/L
S BOTRYOSUM IGE QN: 1.95 KU/L
SHEEP SORREL IGE QN: <0.1 KU/L
SWEET GUM IGE QN: <0.1 KU/L
WHITE ELM IGE QN: <0.1 KU/L
WHITE HICKORY IGE QN: <0.1 KU/L
WHITE MULBERRY IGE QN: <0.1 KU/L
WHITE OAK IGE QN: <0.1 KU/L

## 2023-11-02 DIAGNOSIS — L08.9 SKIN INFECTION: ICD-10-CM

## 2023-11-15 RX ORDER — POLYETHYLENE GLYCOL 3350 17 G/17G
238 POWDER, FOR SOLUTION ORAL DAILY
Qty: 238 G | Refills: 0 | Status: SHIPPED | OUTPATIENT
Start: 2023-11-15 | End: 2023-11-16

## 2023-11-15 RX ORDER — BISACODYL 5 MG/1
5 TABLET, DELAYED RELEASE ORAL DAILY PRN
Qty: 4 TABLET | Refills: 0 | Status: SHIPPED | OUTPATIENT
Start: 2023-11-15 | End: 2023-11-19

## 2023-11-17 ENCOUNTER — TELEPHONE (OUTPATIENT)
Dept: CARDIOLOGY | Facility: CLINIC | Age: 63
End: 2023-11-17

## 2023-11-22 ENCOUNTER — PREP FOR SURGERY (OUTPATIENT)
Dept: OTHER | Facility: HOSPITAL | Age: 63
End: 2023-11-22
Payer: COMMERCIAL

## 2023-11-22 ENCOUNTER — TELEPHONE (OUTPATIENT)
Dept: SURGERY | Facility: CLINIC | Age: 63
End: 2023-11-22
Payer: COMMERCIAL

## 2023-11-22 DIAGNOSIS — Z12.11 COLON CANCER SCREENING: Primary | ICD-10-CM

## 2023-11-22 RX ORDER — SODIUM CHLORIDE 0.9 % (FLUSH) 0.9 %
10 SYRINGE (ML) INJECTION EVERY 12 HOURS SCHEDULED
OUTPATIENT
Start: 2023-11-22

## 2023-11-22 RX ORDER — SODIUM CHLORIDE 9 MG/ML
40 INJECTION, SOLUTION INTRAVENOUS AS NEEDED
OUTPATIENT
Start: 2023-11-22

## 2023-11-22 RX ORDER — SODIUM CHLORIDE 0.9 % (FLUSH) 0.9 %
10 SYRINGE (ML) INJECTION AS NEEDED
OUTPATIENT
Start: 2023-11-22

## 2023-11-22 RX ORDER — SODIUM CHLORIDE, SODIUM LACTATE, POTASSIUM CHLORIDE, CALCIUM CHLORIDE 600; 310; 30; 20 MG/100ML; MG/100ML; MG/100ML; MG/100ML
50 INJECTION, SOLUTION INTRAVENOUS CONTINUOUS
OUTPATIENT
Start: 2023-11-22

## 2023-11-28 ENCOUNTER — ANESTHESIA (OUTPATIENT)
Dept: GASTROENTEROLOGY | Facility: HOSPITAL | Age: 63
End: 2023-11-28
Payer: COMMERCIAL

## 2023-11-28 ENCOUNTER — HOSPITAL ENCOUNTER (OUTPATIENT)
Facility: HOSPITAL | Age: 63
Setting detail: HOSPITAL OUTPATIENT SURGERY
Discharge: HOME | End: 2023-11-28
Attending: SURGERY | Admitting: SURGERY
Payer: COMMERCIAL

## 2023-11-28 ENCOUNTER — ANESTHESIA EVENT (OUTPATIENT)
Dept: GASTROENTEROLOGY | Facility: HOSPITAL | Age: 63
End: 2023-11-28
Payer: COMMERCIAL

## 2023-11-28 VITALS
SYSTOLIC BLOOD PRESSURE: 98 MMHG | WEIGHT: 207 LBS | BODY MASS INDEX: 28.04 KG/M2 | TEMPERATURE: 97 F | HEART RATE: 78 BPM | RESPIRATION RATE: 20 BRPM | DIASTOLIC BLOOD PRESSURE: 56 MMHG | OXYGEN SATURATION: 95 % | HEIGHT: 72 IN

## 2023-11-28 DIAGNOSIS — Z12.11 COLON CANCER SCREENING: ICD-10-CM

## 2023-11-28 LAB — GLUCOSE BLDC GLUCOMTR-MCNC: 139 MG/DL (ref 70–130)

## 2023-11-28 PROCEDURE — S0260 H&P FOR SURGERY: HCPCS | Performed by: SURGERY

## 2023-11-28 PROCEDURE — 82948 REAGENT STRIP/BLOOD GLUCOSE: CPT

## 2023-11-28 PROCEDURE — 25810000003 LACTATED RINGERS PER 1000 ML: Performed by: SURGERY

## 2023-11-28 PROCEDURE — 25010000002 PROPOFOL 200 MG/20ML EMULSION: Performed by: NURSE ANESTHETIST, CERTIFIED REGISTERED

## 2023-11-28 RX ORDER — SODIUM CHLORIDE, SODIUM LACTATE, POTASSIUM CHLORIDE, CALCIUM CHLORIDE 600; 310; 30; 20 MG/100ML; MG/100ML; MG/100ML; MG/100ML
50 INJECTION, SOLUTION INTRAVENOUS CONTINUOUS
Status: DISCONTINUED | OUTPATIENT
Start: 2023-11-28 | End: 2023-11-28 | Stop reason: HOSPADM

## 2023-11-28 RX ORDER — PHENYLEPHRINE HCL IN 0.9% NACL 1 MG/10 ML
SYRINGE (ML) INTRAVENOUS AS NEEDED
Status: DISCONTINUED | OUTPATIENT
Start: 2023-11-28 | End: 2023-11-28 | Stop reason: SURG

## 2023-11-28 RX ORDER — PROPOFOL 10 MG/ML
INJECTION, EMULSION INTRAVENOUS AS NEEDED
Status: DISCONTINUED | OUTPATIENT
Start: 2023-11-28 | End: 2023-11-28 | Stop reason: SURG

## 2023-11-28 RX ORDER — MAGNESIUM HYDROXIDE 1200 MG/15ML
LIQUID ORAL AS NEEDED
Status: DISCONTINUED | OUTPATIENT
Start: 2023-11-28 | End: 2023-11-28 | Stop reason: HOSPADM

## 2023-11-28 RX ORDER — LIDOCAINE HCL/PF 100 MG/5ML
SYRINGE (ML) INJECTION AS NEEDED
Status: DISCONTINUED | OUTPATIENT
Start: 2023-11-28 | End: 2023-11-28 | Stop reason: SURG

## 2023-11-28 RX ORDER — SODIUM CHLORIDE 0.9 % (FLUSH) 0.9 %
10 SYRINGE (ML) INJECTION EVERY 12 HOURS SCHEDULED
Status: DISCONTINUED | OUTPATIENT
Start: 2023-11-28 | End: 2023-11-28 | Stop reason: HOSPADM

## 2023-11-28 RX ORDER — SODIUM CHLORIDE 9 MG/ML
40 INJECTION, SOLUTION INTRAVENOUS AS NEEDED
Status: DISCONTINUED | OUTPATIENT
Start: 2023-11-28 | End: 2023-11-28 | Stop reason: HOSPADM

## 2023-11-28 RX ORDER — SODIUM CHLORIDE 0.9 % (FLUSH) 0.9 %
10 SYRINGE (ML) INJECTION AS NEEDED
Status: DISCONTINUED | OUTPATIENT
Start: 2023-11-28 | End: 2023-11-28 | Stop reason: HOSPADM

## 2023-11-28 RX ADMIN — GLYCOPYRROLATE 0.2 MCG: 0.2 INJECTION, SOLUTION INTRAMUSCULAR; INTRAVENOUS at 08:41

## 2023-11-28 RX ADMIN — Medication 100 MCG: at 08:57

## 2023-11-28 RX ADMIN — Medication 100 MCG: at 09:04

## 2023-11-28 RX ADMIN — GLYCOPYRROLATE 0.2 MCG: 0.2 INJECTION, SOLUTION INTRAMUSCULAR; INTRAVENOUS at 08:53

## 2023-11-28 RX ADMIN — PROPOFOL 180 MCG/KG/MIN: 10 INJECTION, EMULSION INTRAVENOUS at 08:37

## 2023-11-28 RX ADMIN — Medication 100 MCG: at 08:49

## 2023-11-28 RX ADMIN — PROPOFOL 100 MG: 10 INJECTION, EMULSION INTRAVENOUS at 08:36

## 2023-11-28 RX ADMIN — SODIUM CHLORIDE, POTASSIUM CHLORIDE, SODIUM LACTATE AND CALCIUM CHLORIDE 50 ML/HR: 600; 310; 30; 20 INJECTION, SOLUTION INTRAVENOUS at 07:05

## 2023-11-28 RX ADMIN — Medication 60 MG: at 08:37

## 2023-11-28 NOTE — H&P
General Surgery H&P    Name:Darnell Garcia  Age: 63 y.o.  Gender: male  : 1960  MRN: [id number]  Visit Number: [id number]  Admit Date: 2023  Date of Service: 23    Patient Care Team:  Irina Cam MD as PCP - General (Family Medicine)      Chief complaint : colon cancer surveillance      History of Present Illness:     Darnell Garcia is a 63 y.o. male patient who presents for surveillance colonoscopy.  His last colonoscopy was performed in , by Dr. Art, 2 small polyps in the right colon which were resected, and a larger polyp at the ileocecal valve measuring 18 to 20 mm.  The polyp was an adenomatous polyp without grade dysplasia.  However, it was felt that this could not be endoscopically managed, and ultimately, the patient underwent a right hemicolectomy on 2022 by this MD.  He recovered well, and final pathology from the right colectomy demonstrated a tubular adenoma with focal high-grade dysplasia with clear surgical margins, and no involvement of the lymph nodes.  Incidentally, the patient was also found to have a low-grade appendiceal mucinous neoplasm.  He is here for his 1 year surveillance exam.    Patient Active Problem List   Diagnosis    Type 2 diabetes mellitus with hyperglycemia, without long-term current use of insulin    Diabetic neuropathy    Coronary artery disease    Hyperlipidemia    Vitamin D deficiency    Uncomplicated asthma    Vascular calcification    Benign prostatic hyperplasia with lower urinary tract symptoms    Atrial fibrillation    Long term current use of systemic steroids    Avascular necrosis of bone of hip, left    Low grade mucinous neoplasm of appendix    Cervical radiculopathy    Gastroesophageal reflux disease without esophagitis    Difficulty breathing    Other male erectile dysfunction    Penile rash    Colon cancer screening         Past Medical History:   Diagnosis Date    A-fib     Arthritis     Asthma     Cataracts, both eyes      COPD (chronic obstructive pulmonary disease)     Coronary artery disease     COVID-19 vaccine series completed     with booster x2    Diabetes mellitus     GERD (gastroesophageal reflux disease)     History of cardiac catheterization     pt states approx 2017 -no stent needed    History of nuclear stress test     2019?    History of varicella     Hyperlipidemia     Neuropathy     feet    Sleep apnea     diagnosed but does not use CPAP    Wears glasses        Past Surgical History:   Procedure Laterality Date    ANKLE FUSION Bilateral     screws in both    COLON RESECTION Right 11/07/2022    Procedure: LAPROSCOPIC RIGHT COLON RESECTION HAND ASSIST;  Surgeon: Kalli Aburto MD;  Location: [facility] OR;  Service: General;  Laterality: Right;    COLONOSCOPY  10 years ago    COLONOSCOPY N/A 05/13/2022    Procedure: COLONOSCOPY EMR POLYPECTOMY AND ORISE INJECTION;  Surgeon: Latonia Art MD;  Location: [facility] ENDOSCOPY;  Service: Gastroenterology;  Laterality: N/A;    COLONOSCOPY N/A 09/02/2022    Procedure: COLONOSCOPY WITH BIOPSY AND POLYPECTOMY;  Surgeon: Latonia Art MD;  Location: [facility] ENDOSCOPY;  Service: Gastroenterology;  Laterality: N/A;    NASAL SEPTUM SURGERY  2009    SPINE SURGERY  03/17/2023    fusion of C6, C7, T1    VASECTOMY  1988    VENTRAL HERNIA REPAIR      2007 and 2009 (mesh placed)    WISDOM TOOTH EXTRACTION         Family History   Problem Relation Age of Onset    Breast cancer Mother     Osteoarthritis Mother     Arthritis Mother     Cancer Mother         Breast    Coronary artery disease Father     Heart attack Father         late 60's    Cancer Father         Skin    Lymphoma Son     Leukemia Son     Cancer Son         Lymphoblastic Leukemia    Colon cancer Neg Hx        Social History     Socioeconomic History    Marital status:    Tobacco Use    Smoking status: Never    Smokeless tobacco: Never   Vaping Use    Vaping Use: Never used   Substance and Sexual Activity     Alcohol use: Yes     Alcohol/week: 4.0 standard drinks of alcohol     Types: 4 Cans of beer per week     Comment: on weekends    Drug use: No    Sexual activity: Not Currently     Partners: Female     Birth control/protection: Vasectomy         Current Facility-Administered Medications:     lactated ringers infusion, 50 mL/hr, Intravenous, Continuous, Kalli Aburto MD, Last Rate: 50 mL/hr at 11/28/23 0705, 50 mL/hr at 11/28/23 0705    sodium chloride 0.9 % flush 10 mL, 10 mL, Intravenous, Q12H, Kalli Aburto MD    sodium chloride 0.9 % flush 10 mL, 10 mL, Intravenous, PRN, Kalli Aburto MD    sodium chloride 0.9 % infusion 40 mL, 40 mL, Intravenous, PRN, Kalli Aburto MD    Medications Prior to Admission   Medication Sig Dispense Refill Last Dose    Accu-Chek Guide test strip USE AS DIRECTED 100 each 12 11/22/2023    ACETAMINOPHEN ER PO Take 1,000 mg by mouth 2 (Two) Times a Day As Needed for Mild Pain. Take 0O645ch tablets by mouth twice daily for arthritis pain.   Past Month    albuterol sulfate  (90 Base) MCG/ACT inhaler Inhale 2 puffs Every 4 (Four) Hours As Needed for Wheezing. 8.5 g 5 Past Week    atorvastatin (LIPITOR) 40 MG tablet Take 1 tablet by mouth Daily. 90 tablet 1 Past Week    Blood Glucose Monitoring Suppl (Accu-Chek Guide Me) w/Device kit USE AS DIRECTED 1 kit 0 11/22/2023    celecoxib (CeleBREX) 200 MG capsule TAKE ONE CAPSULE BY MOUTH EVERY DAY 90 capsule 1 Past Week    cholecalciferol (VITAMIN D3) 10 MCG (400 UNIT) tablet Take 1 tablet by mouth Daily.   Past Week    Continuous Blood Gluc Sensor (Dexcom G7 Sensor) misc Use 1 each Daily. 1 each 0 11/22/2023    Continuous Blood Gluc Sensor (Dexcom G7 Sensor) misc Use 1 each Continuous. 1 each 0 11/22/2023    Eliquis 5 MG tablet tablet TAKE ONE TABLET BY MOUTH EVERY TWELVE HOURS 180 tablet 0 Past Week    empagliflozin (Jardiance) 25 MG tablet tablet TAKE ONE TABLET BY MOUTH EVERY DAY 90 tablet 1 Past Week    famotidine  (PEPCID) 20 MG tablet Take 1 tablet by mouth Every Night.   Past Week    fluticasone (FLONASE) 50 MCG/ACT nasal spray 1 spray into the nostril(s) as directed by provider Daily. Administer 1 spray in each nostril for each dose. 16 g 5 Past Week    Fluticasone-Umeclidin-Vilant 200-62.5-25 MCG/ACT aerosol powder  Inhale 1 puff Daily. Rinse mouth with water after use. 60 each 5 Past Week    gabapentin (NEURONTIN) 300 MG capsule TAKE ONE CAPSULE BY MOUTH FOUR TIMES DAILY 120 capsule 0 Past Week    glimepiride (AMARYL) 4 MG tablet TAKE ONE TABLET BY MOUTH EVERY MORNING BEFORE BREAKFAST 90 tablet 1 Past Week    Insulin Glargine (LANTUS SOLOSTAR) 100 UNIT/ML injection pen Inject 16 Units under the skin into the appropriate area as directed Every Morning. 15 mL 2 11/22/2023    insulin lispro (HumaLOG) 100 UNIT/ML injection 5 u 10 min before meals sc. 3 mL 12 11/22/2023    Lancets (onetouch ultrasoft) lancets Use as instructed 180 each 12 11/22/2023    metFORMIN (GLUCOPHAGE) 1000 MG tablet Take 1 tablet by mouth 2 (Two) Times a Day With Meals. 180 tablet 1 Past Week    metoprolol succinate XL (TOPROL-XL) 25 MG 24 hr tablet TAKE 1/2 TABLET BY MOUTH EVERY DAY 45 tablet 3 Past Week    montelukast (SINGULAIR) 10 MG tablet TAKE ONE TABLET BY MOUTH AT BEDTIME 90 tablet 1 Past Week    mupirocin (BACTROBAN) 2 % ointment APPLY TO THE AFFECTED AREA(S) THREE TIMES DAILY AS DIRECTED 22 g 1 Past Week    predniSONE (DELTASONE) 5 MG tablet TAKE ONE TABLET BY MOUTH EVERY DAY 90 tablet 3 Past Week    Unifine SafeControl Pen Needle 30G X 8 MM misc USE AS DIRECTED 100 each 12 11/22/2023    nitroglycerin (NITROSTAT) 0.4 MG SL tablet Place 1 tablet under the tongue Every FIVE Minutes As Needed for Chest Pain. Take no more than 3 doses in 15 minutes. (Patient not taking: Reported on 11/22/2023) 25 tablet 2 Not Taking    ondansetron ODT (ZOFRAN-ODT) 4 MG disintegrating tablet Place 1 tablet on the tongue Every 8 (Eight) Hours As Needed for Nausea  or Vomiting. (Patient not taking: Reported on 11/22/2023) 20 tablet 0 Not Taking       Allergies   Allergen Reactions    Aspirin Shortness Of Breath     As a child       Review of Systems   Constitutional: Negative.    HENT: Negative.     Eyes: Negative.    Respiratory: Negative.     Cardiovascular: Negative.    Gastrointestinal: Negative.    Endocrine: Negative.    Genitourinary: Negative.    Musculoskeletal: Negative.    Skin: Negative.    Allergic/Immunologic: Negative.    Neurological: Negative.    Hematological: Negative.    Psychiatric/Behavioral: Negative.         OBJECTIVE:     Vital Signs  Temp:  [97.8 °F (36.6 °C)] 97.8 °F (36.6 °C)  Heart Rate:  [70] 70  Resp:  [18] 18  BP: (101)/(57) 101/57    No intake/output data recorded.  No intake/output data recorded.      Physical Exam:      General Appearance:    Alert, cooperative, in no acute distress   Head:    Normocephalic, without obvious abnormality, atraumatic   Eyes:            Lids and lashes normal, conjunctivae and sclerae normal, no icterus   Ears:    Ears appear intact with no abnormalities noted   Lungs:     Respirations regular, even and unlabored    Heart:    Regular rhythm and normal rate   Abdomen:     Soft, non-tender, non-distended, no guarding, no rebound   tenderness   Genitalia:    Deferred   Extremities:   Moves all extremities well, no edema, no cyanosis, no  redness   Pulses:   Pulses palpable and equal bilaterally   Skin:   No bleeding, bruising or rash   Neurologic:   AAOx3, no gross deficits         Results Review:   I have reviewed the entirety of the patient's clinical lab results.  I have also personally reviewed the patient's imaging      Lab Results (last 72 hours)       Procedure Component Value Units Date/Time    POC Glucose Once [[id number]]  (Abnormal) Collected: 11/28/23 0819    Specimen: Blood Updated: 11/28/23 0820     Glucose 139 mg/dL      Comment: Serial Number: [id number]:  [id number]                                ASSESSMENT/PLAN:      Colon cancer screening      We discussed colonoscopy for colon cancer screening/surveillance purposes.  We discussed the indications for screening colonoscopy as well as the risks, benefits and alternatives to this procedure. Risks including but not limited to perforation, bleeding,need for blood transfusion or emergent surgery ,and missed neoplasm were reviewed in detail with the patient.   The patient was given an opportunity to ask questions.  The patient verbalized understanding of these recommendations and the plan of care. The patient is willing to proceed with colonoscopy and has signed informed consent .      Kalli Aburto MD  11/28/23  08:32 EST

## 2023-11-28 NOTE — ANESTHESIA POSTPROCEDURE EVALUATION
Patient: Darnell Garcia    Procedure Summary       Date: 11/28/23 Room / Location: [facility] ENDOSCOPY 3 / [facility] ENDOSCOPY    Anesthesia Start: 0832 Anesthesia Stop: 0921    Procedure: COLONOSCOPY Diagnosis:       Colon cancer screening      (Colon cancer screening [Z12.11])    Surgeons: Kalli Aburto MD Provider: Gonzales Aquino CRNA    Anesthesia Type: general ASA Status: 3            Anesthesia Type: general    Vitals  No vitals data found for the desired time range.          Post Anesthesia Care and Evaluation    Patient location during evaluation: bedside  Patient participation: complete - patient participated  Level of consciousness: awake and alert  Pain score: 0  Pain management: satisfactory to patient    Airway patency: patent  Anesthetic complications: No anesthetic complications  PONV Status: none  Cardiovascular status: acceptable and stable  Respiratory status: acceptable  Hydration status: acceptable    Comments: Vitals signs as noted in nursing documentation as per protocol.

## 2023-11-28 NOTE — ANESTHESIA PREPROCEDURE EVALUATION
Anesthesia Evaluation     Patient summary reviewed and Nursing notes reviewed   no history of anesthetic complications:   NPO Solid Status: > 8 hours  NPO Liquid Status: > 8 hours           Airway   Mallampati: II  TM distance: <3 FB  Neck ROM: limited  Possible difficult intubation  Dental - normal exam   (+) edentulous    Pulmonary - normal exam   (+) asthma,sleep apnea (Non compliant with cpap)  Cardiovascular     ECG reviewed  PT is on anticoagulation therapy  Patient on routine beta blocker and Beta blocker given within 24 hours of surgery  Rhythm: irregular    (+) valvular problems/murmurs MR, CAD, dysrhythmias Atrial Fib, Tachycardia, hyperlipidemia    ROS comment: Echo/GENESIS 2021  Interpretation Summary    1.  Grossly normal LV systolic function, assessment limited by tachycardia.  2.  Trace mitral regurgitation.  3.  No evidence of left atrial appendage thrombus.  4.  Successful synchronized cardioversion x1 with restoration of sinus rhythm.    Nuclear med stress 2019  Interpretation Summary    1. Normal pharmacologic stress test with no evidence of inducible ischemia.   2. Normal LV systolic function, LVEF 77%.           Neuro/Psych  (+) numbness  GI/Hepatic/Renal/Endo    (+) GERD, diabetes mellitus type 2 poorly controlled using insulin    Musculoskeletal     (+) arthralgias, back pain, chronic pain, neck pain, neck stiffness  Abdominal    Substance History   (+) alcohol use     OB/GYN negative ob/gyn ROS         Other   arthritis,     ROS/Med Hx Other: EKG RBBB                Anesthesia Plan    ASA 3     general     (Risks and benefits discussed including risk of aspiration, recall and dental damage. All patient questions answered. Will continue with POC.    )  intravenous induction     Anesthetic plan, risks, benefits, and alternatives have been provided, discussed and informed consent has been obtained with: patient.  Pre-procedure education provided  Plan discussed with CRNA.      CODE STATUS:

## 2023-12-07 ENCOUNTER — CLINICAL SUPPORT (OUTPATIENT)
Dept: INTERNAL MEDICINE | Facility: CLINIC | Age: 63
End: 2023-12-07
Payer: COMMERCIAL

## 2023-12-07 ENCOUNTER — FLU SHOT (OUTPATIENT)
Dept: INTERNAL MEDICINE | Facility: CLINIC | Age: 63
End: 2023-12-07
Payer: COMMERCIAL

## 2023-12-07 DIAGNOSIS — Z91.89 COMPLIANCE WITH MEDICATION REGIMEN: Primary | ICD-10-CM

## 2023-12-07 DIAGNOSIS — Z23 NEEDS FLU SHOT: Primary | ICD-10-CM

## 2023-12-07 PROCEDURE — 90471 IMMUNIZATION ADMIN: CPT | Performed by: STUDENT IN AN ORGANIZED HEALTH CARE EDUCATION/TRAINING PROGRAM

## 2023-12-07 PROCEDURE — 90686 IIV4 VACC NO PRSV 0.5 ML IM: CPT | Performed by: STUDENT IN AN ORGANIZED HEALTH CARE EDUCATION/TRAINING PROGRAM

## 2023-12-08 LAB
CHOLEST SERPL-MCNC: 128 MG/DL (ref 0–200)
HDLC SERPL-MCNC: 42 MG/DL (ref 40–60)
LDLC SERPL CALC-MCNC: 66 MG/DL (ref 0–100)
TRIGL SERPL-MCNC: 111 MG/DL (ref 0–150)
VLDLC SERPL CALC-MCNC: 20 MG/DL (ref 5–40)

## 2023-12-09 LAB
AMPHETAMINES UR QL SCN: NEGATIVE NG/ML
BARBITURATES UR QL SCN: NEGATIVE NG/ML
BENZODIAZ UR QL SCN: NEGATIVE NG/ML
BZE UR QL SCN: NEGATIVE NG/ML
CANNABINOIDS UR QL SCN: NEGATIVE NG/ML
CREAT UR-MCNC: 57.4 MG/DL (ref 20–300)
LABORATORY COMMENT REPORT: NORMAL
METHADONE UR QL SCN: NEGATIVE NG/ML
OPIATES UR QL SCN: NEGATIVE NG/ML
OXYCODONE+OXYMORPHONE UR QL SCN: NEGATIVE NG/ML
PCP UR QL: NEGATIVE NG/ML
PH UR: 5.2 [PH] (ref 4.5–8.9)
PROPOXYPH UR QL SCN: NEGATIVE NG/ML

## 2023-12-12 DIAGNOSIS — E11.49 TYPE 2 DIABETES MELLITUS WITH OTHER DIABETIC NEUROLOGICAL COMPLICATION: ICD-10-CM

## 2023-12-12 RX ORDER — GABAPENTIN 300 MG/1
300 CAPSULE ORAL 4 TIMES DAILY
Qty: 120 CAPSULE | Refills: 0 | Status: SHIPPED | OUTPATIENT
Start: 2023-12-12

## 2023-12-12 NOTE — TELEPHONE ENCOUNTER
Caller: Naz Garcia    Relationship: Emergency Contact    Best call back number: [phone]    Requested Prescriptions:   Requested Prescriptions     Pending Prescriptions Disp Refills    gabapentin (NEURONTIN) 300 MG capsule 120 capsule 0     Sig: Take 1 capsule by mouth 4 (Four) Times a Day.        Pharmacy where request should be sent: [facility] [address] [phone] [facility] [phone]      Last office visit with prescribing clinician: 9/28/2023   Last telemedicine visit with prescribing clinician: Visit date not found   Next office visit with prescribing clinician: 12/14/2023     Additional details provided by patient: WILL BE OUT AFTER TONIGHT. WHEN TRAY COMES FROM PHARMACY TOMORROW IT WON'T HAVE ANY REFILLS IN IT. PATIENT CAME IN AND DID UDS.    Does the patient have less than a 3 day supply:  [x] Yes  [] No    Would you like a call back once the refill request has been completed: [] Yes [x] No    If the office needs to give you a call back, can they leave a voicemail: [x] Yes [] No    Louise Tobin MA   12/12/23 14:11 EST

## 2023-12-19 ENCOUNTER — TELEPHONE (OUTPATIENT)
Dept: INTERNAL MEDICINE | Facility: CLINIC | Age: 63
End: 2023-12-19
Payer: COMMERCIAL

## 2023-12-19 DIAGNOSIS — E55.9 VITAMIN D DEFICIENCY: ICD-10-CM

## 2023-12-19 DIAGNOSIS — E11.65 TYPE 2 DIABETES MELLITUS WITH HYPERGLYCEMIA, WITHOUT LONG-TERM CURRENT USE OF INSULIN: ICD-10-CM

## 2023-12-19 DIAGNOSIS — E78.2 MIXED HYPERLIPIDEMIA: Primary | ICD-10-CM

## 2023-12-19 DIAGNOSIS — Z12.5 PROSTATE CANCER SCREENING: ICD-10-CM

## 2023-12-19 DIAGNOSIS — I48.0 PAROXYSMAL ATRIAL FIBRILLATION: ICD-10-CM

## 2023-12-19 NOTE — TELEPHONE ENCOUNTER
"Caller: Darnell Garcia \"HEENA\"    Relationship: Self    Best call back number: [phone]     What orders are you requesting (i.e. lab or imaging): LABS THAT INCLUDE A1C    In what timeframe would the patient need to come in: BEFORE NEXT APPOINTMENT THIS THURSDAY 12/21/23    Where will you receive your lab/imaging services: DOWNSTAIRS    Additional notes: PATIENT ASKING IF ANY LABS WILL BE NEEDED. WANTS TO MAKE SURE EVERYTHING IS AVAILABLE FOR THE APPOINTMENT.    "

## 2023-12-22 RX ORDER — PREDNISONE 5 MG/1
5 TABLET ORAL DAILY
Qty: 90 TABLET | Refills: 3 | OUTPATIENT
Start: 2023-12-22

## 2024-01-16 DIAGNOSIS — E11.49 TYPE 2 DIABETES MELLITUS WITH OTHER DIABETIC NEUROLOGICAL COMPLICATION: ICD-10-CM

## 2024-01-16 RX ORDER — APIXABAN 5 MG/1
TABLET, FILM COATED ORAL
Qty: 30 TABLET | Refills: 0 | Status: SHIPPED | OUTPATIENT
Start: 2024-01-16

## 2024-01-16 NOTE — TELEPHONE ENCOUNTER
Patient is non compliant with Appointments and has cancelled 5 appointments. Do you want to dismiss patient from practice?

## 2024-01-17 RX ORDER — PREDNISONE 5 MG/1
5 TABLET ORAL DAILY
Qty: 90 TABLET | Refills: 3 | OUTPATIENT
Start: 2024-01-17

## 2024-01-17 RX ORDER — GABAPENTIN 300 MG/1
300 CAPSULE ORAL 4 TIMES DAILY
Qty: 120 CAPSULE | Refills: 0 | OUTPATIENT
Start: 2024-01-17

## 2024-01-17 NOTE — TELEPHONE ENCOUNTER
Needs appointment within 3 months from last follow up. Has cancelled several times. Will not fill this medication

## 2024-01-17 NOTE — TELEPHONE ENCOUNTER
Orders pended for provider to review and sign off on   Detail Level: Generalized Detail Level: Detailed Detail Level: Zone

## 2024-01-18 ENCOUNTER — TELEPHONE (OUTPATIENT)
Dept: INTERNAL MEDICINE | Facility: CLINIC | Age: 64
End: 2024-01-18
Payer: COMMERCIAL

## 2024-01-18 ENCOUNTER — PRIOR AUTHORIZATION (OUTPATIENT)
Dept: INTERNAL MEDICINE | Facility: CLINIC | Age: 64
End: 2024-01-18
Payer: COMMERCIAL

## 2024-01-18 NOTE — TELEPHONE ENCOUNTER
Outcome  Approvedtoday  CaseId:[id number];Status:Approved;Review Type:Prior Auth;Coverage Start Date:01/18/2024;Coverage End Date:01/17/2025;

## 2024-01-18 NOTE — TELEPHONE ENCOUNTER
Darnell Garcia Rahman: [id number] - PA Case ID: [id number] - Rx #: [id number] help? Call us at [phone]  Status  Sent to [facility]  Drug  Eliquis 5MG tablets  Form  [employer] PA Form (2017 NCPDP)

## 2024-01-18 NOTE — TELEPHONE ENCOUNTER
"    Caller: Darnell Garcia \"HEENA\"    Relationship: Self    Best call back number: [phone]     What orders are you requesting (i.e. lab or imaging): LABS    In what timeframe would the patient need to come in: ASAP    Where will you receive your lab/imaging services: IN OFFICE    Additional notes: CALL WHEN READY        "

## 2024-01-22 LAB
25(OH)D3+25(OH)D2 SERPL-MCNC: 19.6 NG/ML (ref 30–100)
ALBUMIN SERPL-MCNC: 4.4 G/DL (ref 3.5–5.2)
ALBUMIN/GLOB SERPL: 2.2 G/DL
ALP SERPL-CCNC: 93 U/L (ref 39–117)
ALT SERPL-CCNC: 19 U/L (ref 1–41)
AST SERPL-CCNC: 17 U/L (ref 1–40)
BASOPHILS # BLD AUTO: 0.06 10*3/MM3 (ref 0–0.2)
BASOPHILS NFR BLD AUTO: 1 % (ref 0–1.5)
BILIRUB SERPL-MCNC: 1.1 MG/DL (ref 0–1.2)
BUN SERPL-MCNC: 23 MG/DL (ref 8–23)
BUN/CREAT SERPL: 21.7 (ref 7–25)
CALCIUM SERPL-MCNC: 9 MG/DL (ref 8.6–10.5)
CHLORIDE SERPL-SCNC: 105 MMOL/L (ref 98–107)
CHOLEST SERPL-MCNC: 119 MG/DL (ref 0–200)
CO2 SERPL-SCNC: 28.2 MMOL/L (ref 22–29)
CREAT SERPL-MCNC: 1.06 MG/DL (ref 0.76–1.27)
EGFRCR SERPLBLD CKD-EPI 2021: 78.9 ML/MIN/1.73
EOSINOPHIL # BLD AUTO: 0.16 10*3/MM3 (ref 0–0.4)
EOSINOPHIL NFR BLD AUTO: 2.7 % (ref 0.3–6.2)
ERYTHROCYTE [DISTWIDTH] IN BLOOD BY AUTOMATED COUNT: 12.5 % (ref 12.3–15.4)
GLOBULIN SER CALC-MCNC: 2 GM/DL
GLUCOSE SERPL-MCNC: 159 MG/DL (ref 65–99)
HBA1C MFR BLD: 9.1 % (ref 4.8–5.6)
HCT VFR BLD AUTO: 35.6 % (ref 37.5–51)
HDLC SERPL-MCNC: 39 MG/DL (ref 40–60)
HGB BLD-MCNC: 12.1 G/DL (ref 13–17.7)
IMM GRANULOCYTES # BLD AUTO: 0.03 10*3/MM3 (ref 0–0.05)
IMM GRANULOCYTES NFR BLD AUTO: 0.5 % (ref 0–0.5)
LDLC SERPL CALC-MCNC: 63 MG/DL (ref 0–100)
LYMPHOCYTES # BLD AUTO: 1.2 10*3/MM3 (ref 0.7–3.1)
LYMPHOCYTES NFR BLD AUTO: 20 % (ref 19.6–45.3)
MCH RBC QN AUTO: 31 PG (ref 26.6–33)
MCHC RBC AUTO-ENTMCNC: 34 G/DL (ref 31.5–35.7)
MCV RBC AUTO: 91.3 FL (ref 79–97)
MONOCYTES # BLD AUTO: 0.3 10*3/MM3 (ref 0.1–0.9)
MONOCYTES NFR BLD AUTO: 5 % (ref 5–12)
NEUTROPHILS # BLD AUTO: 4.25 10*3/MM3 (ref 1.7–7)
NEUTROPHILS NFR BLD AUTO: 70.8 % (ref 42.7–76)
NRBC BLD AUTO-RTO: 0 /100 WBC (ref 0–0.2)
PLATELET # BLD AUTO: 220 10*3/MM3 (ref 140–450)
POTASSIUM SERPL-SCNC: 5.2 MMOL/L (ref 3.5–5.2)
PROT SERPL-MCNC: 6.4 G/DL (ref 6–8.5)
PSA SERPL-MCNC: 0.92 NG/ML (ref 0–4)
RBC # BLD AUTO: 3.9 10*6/MM3 (ref 4.14–5.8)
SODIUM SERPL-SCNC: 141 MMOL/L (ref 136–145)
TRIGL SERPL-MCNC: 87 MG/DL (ref 0–150)
TSH SERPL DL<=0.005 MIU/L-ACNC: 1.31 UIU/ML (ref 0.27–4.2)
VLDLC SERPL CALC-MCNC: 17 MG/DL (ref 5–40)
WBC # BLD AUTO: 6 10*3/MM3 (ref 3.4–10.8)

## 2024-01-23 ENCOUNTER — OFFICE VISIT (OUTPATIENT)
Dept: INTERNAL MEDICINE | Facility: CLINIC | Age: 64
End: 2024-01-23
Payer: COMMERCIAL

## 2024-01-23 ENCOUNTER — REFERRAL TRIAGE (OUTPATIENT)
Dept: CASE MANAGEMENT | Facility: OTHER | Age: 64
End: 2024-01-23
Payer: COMMERCIAL

## 2024-01-23 VITALS
SYSTOLIC BLOOD PRESSURE: 130 MMHG | BODY MASS INDEX: 28.44 KG/M2 | RESPIRATION RATE: 16 BRPM | HEART RATE: 61 BPM | OXYGEN SATURATION: 96 % | WEIGHT: 210 LBS | HEIGHT: 72 IN | DIASTOLIC BLOOD PRESSURE: 74 MMHG

## 2024-01-23 DIAGNOSIS — K21.9 GASTROESOPHAGEAL REFLUX DISEASE WITHOUT ESOPHAGITIS: ICD-10-CM

## 2024-01-23 DIAGNOSIS — I25.10 CORONARY ARTERY DISEASE INVOLVING NATIVE CORONARY ARTERY OF NATIVE HEART WITHOUT ANGINA PECTORIS: ICD-10-CM

## 2024-01-23 DIAGNOSIS — E78.2 MIXED HYPERLIPIDEMIA: ICD-10-CM

## 2024-01-23 DIAGNOSIS — E11.49 OTHER DIABETIC NEUROLOGICAL COMPLICATION ASSOCIATED WITH TYPE 2 DIABETES MELLITUS: ICD-10-CM

## 2024-01-23 DIAGNOSIS — N52.8 OTHER MALE ERECTILE DYSFUNCTION: ICD-10-CM

## 2024-01-23 DIAGNOSIS — E11.65 TYPE 2 DIABETES MELLITUS WITH HYPERGLYCEMIA, WITHOUT LONG-TERM CURRENT USE OF INSULIN: Primary | ICD-10-CM

## 2024-01-23 DIAGNOSIS — E08.41 DIABETIC MONONEUROPATHY ASSOCIATED WITH DIABETES MELLITUS DUE TO UNDERLYING CONDITION: ICD-10-CM

## 2024-01-23 DIAGNOSIS — D64.89 ANEMIA DUE TO OTHER CAUSE, NOT CLASSIFIED: ICD-10-CM

## 2024-01-23 DIAGNOSIS — N40.1 BENIGN PROSTATIC HYPERPLASIA WITH LOWER URINARY TRACT SYMPTOMS, SYMPTOM DETAILS UNSPECIFIED: ICD-10-CM

## 2024-01-23 DIAGNOSIS — I48.0 PAROXYSMAL ATRIAL FIBRILLATION: ICD-10-CM

## 2024-01-23 PROBLEM — R21 PENILE RASH: Status: RESOLVED | Noted: 2023-09-28 | Resolved: 2024-01-23

## 2024-01-23 PROBLEM — R06.89 DIFFICULTY BREATHING: Status: RESOLVED | Noted: 2023-09-28 | Resolved: 2024-01-23

## 2024-01-23 PROBLEM — Z12.11 COLON CANCER SCREENING: Status: RESOLVED | Noted: 2023-11-22 | Resolved: 2024-01-23

## 2024-01-23 PROCEDURE — 99214 OFFICE O/P EST MOD 30 MIN: CPT | Performed by: STUDENT IN AN ORGANIZED HEALTH CARE EDUCATION/TRAINING PROGRAM

## 2024-01-23 RX ORDER — OMEGA-3S/DHA/EPA/FISH OIL/D3 300MG-1000
400 CAPSULE ORAL DAILY
Qty: 90 TABLET | Refills: 1 | Status: SHIPPED | OUTPATIENT
Start: 2024-01-23

## 2024-01-23 RX ORDER — MULTIPLE VITAMINS W/ MINERALS TAB 9MG-400MCG
1 TAB ORAL DAILY
COMMUNITY

## 2024-01-23 RX ORDER — ACYCLOVIR 400 MG/1
1 TABLET ORAL DAILY
Qty: 1 EACH | Refills: 0 | Status: SHIPPED | OUTPATIENT
Start: 2024-01-23

## 2024-01-23 NOTE — ASSESSMENT & PLAN NOTE
LDL normal  Stable on current medication and dosage. Will continue current management. Refill medication as necessary.  Atorvastatin

## 2024-01-23 NOTE — ASSESSMENT & PLAN NOTE
Pepcid  Stable on current medication and dosage. Will continue current management. Refill medication as necessary.

## 2024-01-23 NOTE — PROGRESS NOTES
Office Note     Name: Darnell Garcia    : 1960     MRN: [id number]     Chief Complaint  Follow-up (Lab results and med refills)    Subjective     History of Present Illness:  Darnell Garcia is a 63 y.o. male who presents today for chronic conditions    HX of CAD, Afib: on eliquis follows with cardiology  HLD on atorvastatin stable LDL  Dm type 2 with peripheral neuropathy: A1c 9.1, on jardiance 25, glimepiride 4, Metformin, Lantus 16 units in the morning, and supposedly on humalog but noncompliant. Only checks once a day in the morning, fasting, this morning it was 171. He goes to work and finds it difficult to check his sugar levels at work.   DM Neuropathy on bilateral, lower and upper extremities at tips of fingers and toes. On gabapentin 600mg BID  Vitamin D deficiency on vitamin D  Chronic Anemia: hgb 12.1, no bleeding episodes, recent colonoscopy done  COPD: no recent PFTs, been on intermittent PO steroids and several inhalers. Symbicort helps  GERD: stable on pepcid  ED and BPH follows with urology  Neck pain and left shoulder pain: hx of plates, follows with ortho and was referred to neurology     Colonoscopy: 2023, repeat in 5 years. 2022 polyps, hx of Low-grade appendiceal mucinous neoplasm   Nonsmoker  DXA scan: osteopenia, on OTC clcium  Psa normal    Family History:   Family History   Problem Relation Age of Onset   • Breast cancer Mother    • Osteoarthritis Mother    • Arthritis Mother    • Cancer Mother         Breast   • Coronary artery disease Father    • Heart attack Father         late 60's   • Cancer Father         Skin   • Lymphoma Son    • Leukemia Son    • Cancer Son         Lymphoblastic Leukemia   • Colon cancer Neg Hx        Social History:   Social History     Socioeconomic History   • Marital status:    Tobacco Use   • Smoking status: Never   • Smokeless tobacco: Never   Vaping Use   • Vaping Use: Never used   Substance and Sexual Activity   • Alcohol use: Yes      "Alcohol/week: 4.0 standard drinks of alcohol     Types: 4 Cans of beer per week     Comment: on weekends   • Drug use: No   • Sexual activity: Not Currently     Partners: Female     Birth control/protection: Vasectomy       Health Maintenance   Topic Date Due   • URINE MICROALBUMIN  09/04/2022   • DIABETIC EYE EXAM  08/29/2023   • COVID-19 Vaccine (4 - 2023-24 season) 09/01/2023   • BMI FOLLOWUP  10/29/2023   • Pneumococcal Vaccine 0-64 (2 of 2 - PCV) 02/01/2024 (Originally 6/15/2018)   • ZOSTER VACCINE (1 of 2) 06/04/2024 (Originally 4/14/2010)   • ANNUAL PHYSICAL  06/06/2024   • HEMOGLOBIN A1C  07/22/2024   • DIABETIC FOOT EXAM  09/28/2024   • LIPID PANEL  01/22/2025   • TDAP/TD VACCINES (2 - Td or Tdap) 02/16/2027   • COLORECTAL CANCER SCREENING  11/28/2033   • HEPATITIS C SCREENING  Completed   • INFLUENZA VACCINE  Completed       Objective     Vital Signs  /74   Pulse 61   Resp 16   Ht 182.9 cm (72.01\")   Wt 95.3 kg (210 lb)   SpO2 96%   BMI 28.47 kg/m²   Estimated body mass index is 28.47 kg/m² as calculated from the following:    Height as of this encounter: 182.9 cm (72.01\").    Weight as of this encounter: 95.3 kg (210 lb).  BMI is >= 25 and <30. (Overweight) The following options were offered after discussion;: exercise counseling/recommendations and nutrition counseling/recommendations    Physical Exam  Vitals and nursing note reviewed.   Constitutional:       Appearance: Normal appearance.   HENT:      Head: Normocephalic and atraumatic.   Cardiovascular:      Rate and Rhythm: Normal rate and regular rhythm.      Pulses: Normal pulses.      Heart sounds: Normal heart sounds.   Pulmonary:      Effort: Pulmonary effort is normal. No respiratory distress.      Breath sounds: Normal breath sounds. No wheezing, rhonchi or rales.   Abdominal:      General: Abdomen is flat. Bowel sounds are normal.      Palpations: Abdomen is soft.      Tenderness: There is no abdominal tenderness. There is no " guarding.   Musculoskeletal:      Cervical back: Neck supple.   Skin:     General: Skin is warm.      Capillary Refill: Capillary refill takes less than 2 seconds.   Neurological:      General: No focal deficit present.      Mental Status: He is alert. Mental status is at baseline.   Psychiatric:         Mood and Affect: Mood normal.         Behavior: Behavior normal.          Procedures     Assessment and Plan     Diagnoses and all orders for this visit:    1. Type 2 diabetes mellitus with hyperglycemia, without long-term current use of insulin (Primary)  Assessment & Plan:  Uncontrolled due to noncompliance with short acting insulin Humalog.  Goal A1c is less than 7%  Dexcom ordered  I advised patient that I will not adjust any of his medications because his uncontrolled diabetes is due to noncompliance.  Patient agreed and showed complete understanding.  Continue metformin twice a day, Jardiance 25, glimepiride 4 daily.  Continue Lantus 16 units in the morning and Humalog 5 units 3 times a day.  Hold if blood sugars are less than 120.  Referred to chronic care management.  Close follow-up in 4 months    Orders:  -     Ambulatory Referral for Diabetic Eye Exam-Ophthalmology  -     Continuous Blood Gluc Sensor (Dexcom G7 Sensor) misc; Use 1 each Daily.  Dispense: 1 each; Refill: 0  -     Ambulatory Referral to Chronic Care Management    2. Mixed hyperlipidemia  Assessment & Plan:  LDL normal  Stable on current medication and dosage. Will continue current management. Refill medication as necessary.  Atorvastatin      3. Gastroesophageal reflux disease without esophagitis  Assessment & Plan:  Pepcid  Stable on current medication and dosage. Will continue current management. Refill medication as necessary.        4. Benign prostatic hyperplasia with lower urinary tract symptoms, symptom details unspecified  Assessment & Plan:  Follows with urology      5. Other male erectile dysfunction  Assessment & Plan:  Follows  with urology      6. Other diabetic neurological complication associated with type 2 diabetes mellitus  Assessment & Plan:  I strongly advised patient that we would need to control his diabetes given that his neuropathy is stemming from that.  On gabapentin 600 twice a day.  Will continue for now      7. Paroxysmal atrial fibrillation  Assessment & Plan:  On Eliquis and metoprolol follows with cardiology      8. Coronary artery disease involving native coronary artery of native heart without angina pectoris  Assessment & Plan:  Stable, follows with cardiology.  Beta-blocker, statin, as needed nitroglycerin      9. Anemia due to other cause, not classified  Assessment & Plan:  Unknown etiology  Will obtain anemia labs    Orders:  -     Ferritin  -     Iron Profile         Counseling was given to patient for the following topics: instructions for management, risks and benefits of treatment options, and importance of treatment compliance.    Follow Up  Return in about 4 months (around 5/23/2024) for Recheck diabetes.    MD CELESTINA Dsouza [facility]  [address] [place] [id number] [phone]

## 2024-01-23 NOTE — ASSESSMENT & PLAN NOTE
Uncontrolled due to noncompliance with short acting insulin Humalog.  Goal A1c is less than 7%  Dexcom ordered  I advised patient that I will not adjust any of his medications because his uncontrolled diabetes is due to noncompliance.  Patient agreed and showed complete understanding.  Continue metformin twice a day, Jardiance 25, glimepiride 4 daily.  Continue Lantus 16 units in the morning and Humalog 5 units 3 times a day.  Hold if blood sugars are less than 120.  Referred to chronic care management.  Close follow-up in 4 months

## 2024-01-23 NOTE — ASSESSMENT & PLAN NOTE
I strongly advised patient that we would need to control his diabetes given that his neuropathy is stemming from that.  On gabapentin 600 twice a day.  Will continue for now

## 2024-01-25 ENCOUNTER — TELEPHONE (OUTPATIENT)
Dept: CASE MANAGEMENT | Facility: OTHER | Age: 64
End: 2024-01-25
Payer: COMMERCIAL

## 2024-01-25 DIAGNOSIS — E11.65 TYPE 2 DIABETES MELLITUS WITH HYPERGLYCEMIA, WITHOUT LONG-TERM CURRENT USE OF INSULIN: Primary | ICD-10-CM

## 2024-01-25 NOTE — TELEPHONE ENCOUNTER
ALEISHA CM outreach with Patient; left a voicemail requesting a return call to LANI Tucker with Chronic Care Management at [phone].

## 2024-02-01 ENCOUNTER — TELEPHONE (OUTPATIENT)
Dept: CASE MANAGEMENT | Facility: OTHER | Age: 64
End: 2024-02-01
Payer: COMMERCIAL

## 2024-02-01 DIAGNOSIS — E11.65 TYPE 2 DIABETES MELLITUS WITH HYPERGLYCEMIA, WITHOUT LONG-TERM CURRENT USE OF INSULIN: Primary | ICD-10-CM

## 2024-02-01 NOTE — TELEPHONE ENCOUNTER
ALEISHA LO outreach with Patient; no answer and no option to leave a voicemail requesting a return call to LANI Tucker with Chronic Care Management at [phone].

## 2024-02-06 DIAGNOSIS — E11.49 TYPE 2 DIABETES MELLITUS WITH OTHER DIABETIC NEUROLOGICAL COMPLICATION: ICD-10-CM

## 2024-02-06 DIAGNOSIS — J45.909 UNCOMPLICATED ASTHMA, UNSPECIFIED ASTHMA SEVERITY, UNSPECIFIED WHETHER PERSISTENT: ICD-10-CM

## 2024-02-06 DIAGNOSIS — Z79.52 LONG TERM CURRENT USE OF SYSTEMIC STEROIDS: ICD-10-CM

## 2024-02-06 DIAGNOSIS — E11.49 OTHER DIABETIC NEUROLOGICAL COMPLICATION ASSOCIATED WITH TYPE 2 DIABETES MELLITUS: Primary | ICD-10-CM

## 2024-02-06 LAB
FERRITIN SERPL-MCNC: 42.5 NG/ML (ref 30–400)
IRON SATN MFR SERPL: 22 % (ref 20–50)
IRON SERPL-MCNC: 99 MCG/DL (ref 59–158)
TIBC SERPL-MCNC: 447 MCG/DL
UIBC SERPL-MCNC: 348 MCG/DL (ref 112–346)

## 2024-02-06 RX ORDER — GABAPENTIN 300 MG/1
600 CAPSULE ORAL 2 TIMES DAILY
Qty: 120 CAPSULE | Refills: 0 | Status: SHIPPED | OUTPATIENT
Start: 2024-02-06

## 2024-02-06 RX ORDER — GABAPENTIN 300 MG/1
300 CAPSULE ORAL 4 TIMES DAILY
Qty: 120 CAPSULE | Refills: 0 | OUTPATIENT
Start: 2024-02-06

## 2024-02-06 RX ORDER — PREDNISONE 5 MG/1
5 TABLET ORAL DAILY
Qty: 90 TABLET | Refills: 1 | Status: SHIPPED | OUTPATIENT
Start: 2024-02-06

## 2024-02-06 RX ORDER — PREDNISONE 5 MG/1
5 TABLET ORAL DAILY
Qty: 90 TABLET | Refills: 3 | OUTPATIENT
Start: 2024-02-06

## 2024-02-09 ENCOUNTER — TELEPHONE (OUTPATIENT)
Dept: CASE MANAGEMENT | Facility: OTHER | Age: 64
End: 2024-02-09
Payer: COMMERCIAL

## 2024-02-09 DIAGNOSIS — E08.41 DIABETIC MONONEUROPATHY ASSOCIATED WITH DIABETES MELLITUS DUE TO UNDERLYING CONDITION: ICD-10-CM

## 2024-02-09 DIAGNOSIS — E11.65 TYPE 2 DIABETES MELLITUS WITH HYPERGLYCEMIA, WITHOUT LONG-TERM CURRENT USE OF INSULIN: Primary | ICD-10-CM

## 2024-02-12 ENCOUNTER — CLINICAL SUPPORT (OUTPATIENT)
Dept: INTERNAL MEDICINE | Facility: CLINIC | Age: 64
End: 2024-02-12
Payer: COMMERCIAL

## 2024-02-12 PROCEDURE — 90472 IMMUNIZATION ADMIN EACH ADD: CPT | Performed by: STUDENT IN AN ORGANIZED HEALTH CARE EDUCATION/TRAINING PROGRAM

## 2024-02-12 PROCEDURE — 90632 HEPA VACCINE ADULT IM: CPT | Performed by: STUDENT IN AN ORGANIZED HEALTH CARE EDUCATION/TRAINING PROGRAM

## 2024-02-12 PROCEDURE — 90746 HEPB VACCINE 3 DOSE ADULT IM: CPT | Performed by: STUDENT IN AN ORGANIZED HEALTH CARE EDUCATION/TRAINING PROGRAM

## 2024-02-12 PROCEDURE — 90471 IMMUNIZATION ADMIN: CPT | Performed by: STUDENT IN AN ORGANIZED HEALTH CARE EDUCATION/TRAINING PROGRAM

## 2024-02-13 RX ORDER — APIXABAN 5 MG/1
TABLET, FILM COATED ORAL
Qty: 90 TABLET | Refills: 1 | Status: SHIPPED | OUTPATIENT
Start: 2024-02-13

## 2024-02-23 DIAGNOSIS — E11.9 TYPE 2 DIABETES MELLITUS WITHOUT COMPLICATION, WITHOUT LONG-TERM CURRENT USE OF INSULIN: ICD-10-CM

## 2024-02-23 RX ORDER — EMPAGLIFLOZIN 25 MG/1
25 TABLET, FILM COATED ORAL DAILY
Qty: 90 TABLET | Refills: 1 | Status: SHIPPED | OUTPATIENT
Start: 2024-02-23

## 2024-03-13 DIAGNOSIS — E11.49 TYPE 2 DIABETES MELLITUS WITH OTHER DIABETIC NEUROLOGICAL COMPLICATION: ICD-10-CM

## 2024-03-14 RX ORDER — GABAPENTIN 300 MG/1
600 CAPSULE ORAL 2 TIMES DAILY
Qty: 120 CAPSULE | Refills: 1 | Status: SHIPPED | OUTPATIENT
Start: 2024-03-14

## 2024-03-14 NOTE — TELEPHONE ENCOUNTER
UDS: 12/7/23  CSA: 9/28/23    Rx Refill Note  Requested Prescriptions     Pending Prescriptions Disp Refills    gabapentin (NEURONTIN) 300 MG capsule [Pharmacy Med Name: gabapentin 300 mg capsule] 120 capsule 0     Sig: TAKE TWO CAPSULES BY MOUTH TWICE DAILY      Last office visit with prescribing clinician: 1/23/2024   Last telemedicine visit with prescribing clinician: Visit date not found   Next office visit with prescribing clinician: Visit date not found                         Dede Bland MA  03/14/24, 16:06 EDT

## 2024-04-04 DIAGNOSIS — E11.9 TYPE 2 DIABETES MELLITUS WITHOUT COMPLICATION, WITHOUT LONG-TERM CURRENT USE OF INSULIN: ICD-10-CM

## 2024-04-13 DIAGNOSIS — J45.909 UNCOMPLICATED ASTHMA, UNSPECIFIED ASTHMA SEVERITY, UNSPECIFIED WHETHER PERSISTENT: ICD-10-CM

## 2024-04-13 DIAGNOSIS — M19.90 UNSPECIFIED OSTEOARTHRITIS, UNSPECIFIED SITE: ICD-10-CM

## 2024-04-15 RX ORDER — CELECOXIB 200 MG/1
CAPSULE ORAL
Qty: 90 CAPSULE | Refills: 1 | Status: SHIPPED | OUTPATIENT
Start: 2024-04-15

## 2024-04-15 RX ORDER — MONTELUKAST SODIUM 10 MG/1
TABLET ORAL
Qty: 90 TABLET | Refills: 1 | Status: SHIPPED | OUTPATIENT
Start: 2024-04-15

## 2024-05-03 DIAGNOSIS — E11.9 TYPE 2 DIABETES MELLITUS WITHOUT COMPLICATION, WITHOUT LONG-TERM CURRENT USE OF INSULIN: ICD-10-CM

## 2024-05-03 RX ORDER — APIXABAN 5 MG/1
TABLET, FILM COATED ORAL
Qty: 90 TABLET | Refills: 1 | OUTPATIENT
Start: 2024-05-03

## 2024-05-03 RX ORDER — GLIMEPIRIDE 4 MG/1
TABLET ORAL
Qty: 90 TABLET | Refills: 1 | OUTPATIENT
Start: 2024-05-03

## 2024-05-06 DIAGNOSIS — E11.49 TYPE 2 DIABETES MELLITUS WITH OTHER DIABETIC NEUROLOGICAL COMPLICATION: ICD-10-CM

## 2024-05-06 RX ORDER — GABAPENTIN 300 MG/1
600 CAPSULE ORAL 2 TIMES DAILY
Qty: 120 CAPSULE | Refills: 0 | Status: SHIPPED | OUTPATIENT
Start: 2024-05-06

## 2024-05-15 ENCOUNTER — TELEMEDICINE (OUTPATIENT)
Dept: INTERNAL MEDICINE | Facility: CLINIC | Age: 64
End: 2024-05-15
Payer: COMMERCIAL

## 2024-05-15 ENCOUNTER — CLINICAL SUPPORT (OUTPATIENT)
Dept: INTERNAL MEDICINE | Facility: CLINIC | Age: 64
End: 2024-05-15
Payer: COMMERCIAL

## 2024-05-15 ENCOUNTER — TELEPHONE (OUTPATIENT)
Dept: INTERNAL MEDICINE | Facility: CLINIC | Age: 64
End: 2024-05-15

## 2024-05-15 VITALS — HEIGHT: 72 IN | BODY MASS INDEX: 28.04 KG/M2 | WEIGHT: 207 LBS

## 2024-05-15 DIAGNOSIS — E78.2 MIXED HYPERLIPIDEMIA: ICD-10-CM

## 2024-05-15 DIAGNOSIS — R35.0 URINARY FREQUENCY: Primary | ICD-10-CM

## 2024-05-15 DIAGNOSIS — E11.49 OTHER DIABETIC NEUROLOGICAL COMPLICATION ASSOCIATED WITH TYPE 2 DIABETES MELLITUS: ICD-10-CM

## 2024-05-15 DIAGNOSIS — J45.40 MODERATE PERSISTENT ASTHMA WITHOUT COMPLICATION: ICD-10-CM

## 2024-05-15 DIAGNOSIS — E11.65 TYPE 2 DIABETES MELLITUS WITH HYPERGLYCEMIA, WITHOUT LONG-TERM CURRENT USE OF INSULIN: Primary | ICD-10-CM

## 2024-05-15 DIAGNOSIS — N40.1 BENIGN PROSTATIC HYPERPLASIA WITH LOWER URINARY TRACT SYMPTOMS, SYMPTOM DETAILS UNSPECIFIED: ICD-10-CM

## 2024-05-15 DIAGNOSIS — N52.8 OTHER MALE ERECTILE DYSFUNCTION: ICD-10-CM

## 2024-05-15 DIAGNOSIS — E11.65 TYPE 2 DIABETES MELLITUS WITH HYPERGLYCEMIA, WITHOUT LONG-TERM CURRENT USE OF INSULIN: ICD-10-CM

## 2024-05-15 DIAGNOSIS — K21.9 GASTROESOPHAGEAL REFLUX DISEASE WITHOUT ESOPHAGITIS: ICD-10-CM

## 2024-05-15 DIAGNOSIS — Z79.52 LONG TERM CURRENT USE OF SYSTEMIC STEROIDS: ICD-10-CM

## 2024-05-15 DIAGNOSIS — I48.0 PAROXYSMAL ATRIAL FIBRILLATION: ICD-10-CM

## 2024-05-15 PROBLEM — J45.20 MILD INTERMITTENT ASTHMA WITHOUT COMPLICATION: Status: RESOLVED | Noted: 2024-05-15 | Resolved: 2024-05-15

## 2024-05-15 PROBLEM — J45.909 UNCOMPLICATED ASTHMA: Status: RESOLVED | Noted: 2018-03-27 | Resolved: 2024-05-15

## 2024-05-15 PROBLEM — J45.20 MILD INTERMITTENT ASTHMA WITHOUT COMPLICATION: Status: ACTIVE | Noted: 2024-05-15

## 2024-05-15 LAB
ALBUMIN SERPL-MCNC: 4.4 G/DL (ref 3.5–5.2)
ALBUMIN/GLOB SERPL: 1.8 G/DL
ALP SERPL-CCNC: 110 U/L (ref 39–117)
ALT SERPL-CCNC: 14 U/L (ref 1–41)
AST SERPL-CCNC: 17 U/L (ref 1–40)
BASOPHILS # BLD AUTO: 0.05 10*3/MM3 (ref 0–0.2)
BASOPHILS NFR BLD AUTO: 1 % (ref 0–1.5)
BILIRUB SERPL-MCNC: 1.1 MG/DL (ref 0–1.2)
BUN SERPL-MCNC: 20 MG/DL (ref 8–23)
BUN/CREAT SERPL: 19.2 (ref 7–25)
CALCIUM SERPL-MCNC: 9.5 MG/DL (ref 8.6–10.5)
CHLORIDE SERPL-SCNC: 102 MMOL/L (ref 98–107)
CHOLEST SERPL-MCNC: 132 MG/DL (ref 0–200)
CO2 SERPL-SCNC: 25 MMOL/L (ref 22–29)
CREAT SERPL-MCNC: 1.04 MG/DL (ref 0.76–1.27)
EGFRCR SERPLBLD CKD-EPI 2021: 80.2 ML/MIN/1.73
EOSINOPHIL # BLD AUTO: 0.22 10*3/MM3 (ref 0–0.4)
EOSINOPHIL NFR BLD AUTO: 4.4 % (ref 0.3–6.2)
ERYTHROCYTE [DISTWIDTH] IN BLOOD BY AUTOMATED COUNT: 13.3 % (ref 12.3–15.4)
EXPIRATION DATE: ABNORMAL
GLOBULIN SER CALC-MCNC: 2.4 GM/DL
GLUCOSE SERPL-MCNC: 171 MG/DL (ref 65–99)
HBA1C MFR BLD: 8 % (ref 4.8–5.6)
HCT VFR BLD AUTO: 38.7 % (ref 37.5–51)
HDLC SERPL-MCNC: 41 MG/DL (ref 40–60)
HGB BLD-MCNC: 12.7 G/DL (ref 13–17.7)
IMM GRANULOCYTES # BLD AUTO: 0.01 10*3/MM3 (ref 0–0.05)
IMM GRANULOCYTES NFR BLD AUTO: 0.2 % (ref 0–0.5)
LDLC SERPL CALC-MCNC: 66 MG/DL (ref 0–100)
LYMPHOCYTES # BLD AUTO: 1.2 10*3/MM3 (ref 0.7–3.1)
LYMPHOCYTES NFR BLD AUTO: 23.9 % (ref 19.6–45.3)
Lab: ABNORMAL
MCH RBC QN AUTO: 30.2 PG (ref 26.6–33)
MCHC RBC AUTO-ENTMCNC: 32.8 G/DL (ref 31.5–35.7)
MCV RBC AUTO: 91.9 FL (ref 79–97)
MONOCYTES # BLD AUTO: 0.35 10*3/MM3 (ref 0.1–0.9)
MONOCYTES NFR BLD AUTO: 7 % (ref 5–12)
NEUTROPHILS # BLD AUTO: 3.2 10*3/MM3 (ref 1.7–7)
NEUTROPHILS NFR BLD AUTO: 63.5 % (ref 42.7–76)
NRBC BLD AUTO-RTO: 0 /100 WBC (ref 0–0.2)
PLATELET # BLD AUTO: 249 10*3/MM3 (ref 140–450)
POC CREATININE URINE: 50
POC MICROALBUMIN URINE: 30
POTASSIUM SERPL-SCNC: 5.2 MMOL/L (ref 3.5–5.2)
PROT SERPL-MCNC: 6.8 G/DL (ref 6–8.5)
RBC # BLD AUTO: 4.21 10*6/MM3 (ref 4.14–5.8)
SODIUM SERPL-SCNC: 139 MMOL/L (ref 136–145)
TRIGL SERPL-MCNC: 143 MG/DL (ref 0–150)
TSH SERPL DL<=0.005 MIU/L-ACNC: 1.28 UIU/ML (ref 0.27–4.2)
VLDLC SERPL CALC-MCNC: 25 MG/DL (ref 5–40)
WBC # BLD AUTO: 5.03 10*3/MM3 (ref 3.4–10.8)

## 2024-05-15 PROCEDURE — 99214 OFFICE O/P EST MOD 30 MIN: CPT | Performed by: STUDENT IN AN ORGANIZED HEALTH CARE EDUCATION/TRAINING PROGRAM

## 2024-05-15 PROCEDURE — 82044 UR ALBUMIN SEMIQUANTITATIVE: CPT | Performed by: STUDENT IN AN ORGANIZED HEALTH CARE EDUCATION/TRAINING PROGRAM

## 2024-05-15 RX ORDER — ACYCLOVIR 400 MG/1
1 TABLET ORAL DAILY
Qty: 4 EACH | Refills: 2 | Status: SHIPPED | OUTPATIENT
Start: 2024-05-15

## 2024-05-15 NOTE — ASSESSMENT & PLAN NOTE
Gabapentin 600 bid  Stable on current medication and dosage. Will continue current management. Refill medication as necessary. Giacomo reviewed.   UDS 12/2023. CSA 9/2023

## 2024-05-15 NOTE — PROGRESS NOTES
"Chief Complaint  Diabetes (4 month follow up)    This was an audio and video enabled telemedicine encounter.    Subjective      Darnell Garcia presents to [facility]  History of Present Illness  HX of CAD, Afib: on eliquis follows with cardiology  HLD on atorvastatin stable LDL  Dm type 2 with peripheral neuropathy: last A1c 9.1, on jardiance 25, glimepiride 4, Metformin, Lantus 16 units in the morning and humalog 3-5 units, and supposedly on humalog but noncompliant. Dexcom usually 125-180s. Usually in the afternoon, around 145-150. Only checks once a day in the morning, fasting, this morning it was 171. He goes to work and finds it difficult to check his sugar levels at work.   DM Neuropathy on bilateral, lower and upper extremities at tips of fingers and toes. On gabapentin 600mg BID  Vitamin D deficiency on vitamin D  Chronic Anemia: hgb 12.1, no bleeding episodes, recent colonoscopy done  Moderate persistent asthma: been on intermittent PO steroids, triple therapy inhaler, prn albuterol. Symbicort helps  GERD: stable on pepcid  ED and BPH follows with urology  Neck pain and left shoulder pain: hx of plates, follows with ortho      Colonoscopy: 11/2023, repeat in 5 years. 9/2022 polyps, hx of Low-grade appendiceal mucinous neoplasm   Nonsmoker  DXA scan: osteopenia, on OTC clcium  Psa normal    Objective   Vital Signs:   Ht 182.9 cm (72.01\")   Wt 93.9 kg (207 lb)   BMI 28.07 kg/m²     Body mass index is 28.07 kg/m².            Current Outpatient Medications:     Accu-Chek Guide test strip, USE AS DIRECTED, Disp: 100 each, Rfl: 12    ACETAMINOPHEN ER PO, Take 1,000 mg by mouth 2 (Two) Times a Day As Needed for Mild Pain. Take [id number] tablets by mouth twice daily for arthritis pain., Disp: , Rfl:     albuterol sulfate  (90 Base) MCG/ACT inhaler, Inhale 2 puffs Every 4 (Four) Hours As Needed for Wheezing., Disp: 8.5 g, Rfl: 5    apixaban (Eliquis) 5 MG tablet tablet, TAKE ONE " TABLET BY MOUTH EVERY TWELVE HOURS, Disp: 90 tablet, Rfl: 1    atorvastatin (LIPITOR) 40 MG tablet, Take 1 tablet by mouth Daily., Disp: 90 tablet, Rfl: 1    Blood Glucose Monitoring Suppl (Accu-Chek Guide Me) w/Device kit, USE AS DIRECTED, Disp: 1 kit, Rfl: 0    celecoxib (CeleBREX) 200 MG capsule, TAKE ONE CAPSULE BY MOUTH EVERY DAY, Disp: 90 capsule, Rfl: 1    cholecalciferol (VITAMIN D3) 10 MCG (400 UNIT) tablet, Take 1 tablet by mouth Daily., Disp: 90 tablet, Rfl: 1    Continuous Glucose Sensor (Dexcom G7 Sensor) misc, Use 1 each Daily., Disp: 4 each, Rfl: 2    famotidine (PEPCID) 20 MG tablet, Take 1 tablet by mouth Every Night., Disp: , Rfl:     fluticasone (FLONASE) 50 MCG/ACT nasal spray, 1 spray into the nostril(s) as directed by provider Daily. Administer 1 spray in each nostril for each dose., Disp: 16 g, Rfl: 5    Fluticasone-Umeclidin-Vilant 200-62.5-25 MCG/ACT aerosol powder , Inhale 1 puff Daily. Rinse mouth with water after use., Disp: 60 each, Rfl: 5    gabapentin (NEURONTIN) 300 MG capsule, TAKE TWO CAPSULES BY MOUTH TWICE DAILY, Disp: 120 capsule, Rfl: 0    glimepiride (AMARYL) 4 MG tablet, TAKE ONE TABLET BY MOUTH EVERY MORNING BEFORE BREAKFAST, Disp: 90 tablet, Rfl: 1    Insulin Glargine (LANTUS SOLOSTAR) 100 UNIT/ML injection pen, Inject 16 Units under the skin into the appropriate area as directed Every Morning., Disp: 15 mL, Rfl: 2    insulin lispro (HumaLOG) 100 UNIT/ML injection, 5 u 10 min before meals sc., Disp: 3 mL, Rfl: 12    Jardiance 25 MG tablet tablet, TAKE ONE TABLET BY MOUTH DAILY, Disp: 90 tablet, Rfl: 1    Lancets (onetouch ultrasoft) lancets, Use as instructed, Disp: 180 each, Rfl: 12    metFORMIN (GLUCOPHAGE) 1000 MG tablet, TAKE ONE TABLET BY MOUTH TWICE DAILY WITH MEALS, Disp: 180 tablet, Rfl: 1    metoprolol succinate XL (TOPROL-XL) 25 MG 24 hr tablet, TAKE 1/2 TABLET BY MOUTH EVERY DAY, Disp: 45 tablet, Rfl: 3    montelukast (SINGULAIR) 10 MG tablet, TAKE ONE TABLET BY  MOUTH AT BEDTIME, Disp: 90 tablet, Rfl: 1    multivitamin with minerals tablet tablet, Take 1 tablet by mouth Daily., Disp: , Rfl:     nitroglycerin (NITROSTAT) 0.4 MG SL tablet, Place 1 tablet under the tongue Every FIVE Minutes As Needed for Chest Pain. Take no more than 3 doses in 15 minutes., Disp: 25 tablet, Rfl: 2    ondansetron ODT (ZOFRAN-ODT) 4 MG disintegrating tablet, Place 1 tablet on the tongue Every 8 (Eight) Hours As Needed for Nausea or Vomiting., Disp: 20 tablet, Rfl: 0    predniSONE (DELTASONE) 5 MG tablet, Take 1 tablet by mouth Daily., Disp: 90 tablet, Rfl: 1    Unifine SafeControl Pen Needle 30G X 8 MM misc, USE AS DIRECTED, Disp: 100 each, Rfl: 12      Allergies: Aspirin    Physical Exam  Vitals reviewed.   HENT:      Head: Normocephalic.   Neurological:      General: No focal deficit present.      Mental Status: He is alert and oriented to person, place, and time. Mental status is at baseline.          Result Review :                  Assessment and Plan   Diagnoses and all orders for this visit:    1. Type 2 diabetes mellitus with hyperglycemia, without long-term current use of insulin (Primary)  Assessment & Plan:  Compliant with lantus and humalog, metformin, gimepiride and jardiance  Have A1c and urine microalbumin done in office asap  Stable on current medication and dosage. Will continue current management. Refill medication as necessary.      Orders:  -     Continuous Glucose Sensor (Dexcom G7 Sensor) misc; Use 1 each Daily.  Dispense: 4 each; Refill: 2  -     CBC & Differential  -     Comprehensive Metabolic Panel  -     Cancel: Hemoglobin A1c  -     Hemoglobin A1c    2. Mixed hyperlipidemia  Assessment & Plan:  Stable on current medication and dosage. Will continue current management. Refill medication as necessary.  atorvastatin     Orders:  -     Lipid Panel    3. Paroxysmal atrial fibrillation  Assessment & Plan:  Stable on current medication and dosage. Will continue current  management. Refill medication as necessary.  Eliquis, follows with cardiology    Orders:  -     TSH Rfx On Abnormal To Free T4    4. Gastroesophageal reflux disease without esophagitis  Assessment & Plan:  On pepcid  Stable on current medication and dosage. Will continue current management. Refill medication as necessary.        5. Long term current use of systemic steroids  Assessment & Plan:  Stable at this time      6. Other male erectile dysfunction  Assessment & Plan:  Follows with urology      7. Benign prostatic hyperplasia with lower urinary tract symptoms, symptom details unspecified  Assessment & Plan:  Follows with urology      8. Other diabetic neurological complication associated with type 2 diabetes mellitus  Assessment & Plan:  Gabapentin 600 bid  Stable on current medication and dosage. Will continue current management. Refill medication as necessary. Giacomo reviewed.   UDS 12/2023. CSA 9/2023      9. Moderate persistent asthma without complication  Assessment & Plan:  Follows with pulmonology  On trelegy ellipta, prn albuterol                              Follow Up   Return in about 4 months (around 9/15/2024) for Annual.  Patient was given instructions and counseling regarding his condition or for health maintenance advice. Please see specific information pulled into the AVS if appropriate.     Irina Cam MD

## 2024-05-15 NOTE — TELEPHONE ENCOUNTER
Called pt earlier to see if he could come back to get his A1C checked in office but me and the manager checked with [facility] where it was taken along with his other labs. Informed him that is wasn't necessary to come back, he was happy to hear it.

## 2024-05-15 NOTE — ASSESSMENT & PLAN NOTE
Stable on current medication and dosage. Will continue current management. Refill medication as necessary.  Eliquis, follows with cardiology

## 2024-05-15 NOTE — ASSESSMENT & PLAN NOTE
Compliant with lantus and humalog, metformin, gimepiride and jardiance  Have A1c and urine microalbumin done in office asap  Stable on current medication and dosage. Will continue current management. Refill medication as necessary.

## 2024-05-15 NOTE — ASSESSMENT & PLAN NOTE
On pepcid  Stable on current medication and dosage. Will continue current management. Refill medication as necessary.

## 2024-05-17 DIAGNOSIS — E11.9 TYPE 2 DIABETES MELLITUS WITHOUT COMPLICATION, WITHOUT LONG-TERM CURRENT USE OF INSULIN: ICD-10-CM

## 2024-05-18 RX ORDER — GLIMEPIRIDE 4 MG/1
TABLET ORAL
Qty: 90 TABLET | Refills: 1 | Status: SHIPPED | OUTPATIENT
Start: 2024-05-18

## 2024-05-22 RX ORDER — FLUTICASONE FUROATE, UMECLIDINIUM BROMIDE AND VILANTEROL TRIFENATATE 200; 62.5; 25 UG/1; UG/1; UG/1
POWDER RESPIRATORY (INHALATION)
Qty: 60 EACH | Refills: 5 | Status: SHIPPED | OUTPATIENT
Start: 2024-05-22

## 2024-05-24 NOTE — TELEPHONE ENCOUNTER
Caller: Naz Garcia    Relationship: Emergency Contact, WIFE    Best call back number: [phone]     Requested Prescriptions:   Requested Prescriptions     Pending Prescriptions Disp Refills    apixaban (Eliquis) 5 MG tablet tablet 90 tablet 1     Sig: Take 1 tablet by mouth Every 12 (Twelve) Hours.        Pharmacy where request should be sent: [address] [phone] [facility] [phone]      Last office visit with prescribing clinician: 1/23/2024   Last telemedicine visit with prescribing clinician: 5/15/2024   Next office visit with prescribing clinician: 9/19/2024     Additional details provided by patient: PLEASE SEND 90 DAY SUPPLY WITH REFILLS. THE PHARMACY STATED THEY NEED A NEW PRESCRIPTION.     Does the patient have less than a 3 day supply:  [x] Yes  [] No 2 DAYS LEFT    Would you like a call back once the refill request has been completed: [] Yes [x] No    If the office needs to give you a call back, can they leave a voicemail: [x] Yes [] No    Jemal Singh Rep   05/24/24 10:46 EDT

## 2024-05-24 NOTE — TELEPHONE ENCOUNTER
Caller: [facility] [address] [facility] - [phone]  - [phone]    Relationship: Pharmacy    Best call back number: [phone]     Requested Prescriptions:   Requested Prescriptions     Pending Prescriptions Disp Refills    apixaban (Eliquis) 5 MG tablet tablet 90 tablet 1     Sig: Take 1 tablet by mouth Every 12 (Twelve) Hours.        Pharmacy where request should be sent:    [facility] [address] - [phone]  - [phone]     Last office visit with prescribing clinician: 1/23/2024   Last telemedicine visit with prescribing clinician: 5/15/2024   Next office visit with prescribing clinician: 9/19/2024     Additional details provided by patient: PATIENT HAS NONE LEFT,     PHARMACY IS DOING HIS TRAY NOW.      Does the patient have less than a 3 day supply:  [] Yes  [] No    Would you like a call back once the refill request has been completed: [] Yes [] No    If the office needs to give you a call back, can they leave a voicemail: [] Yes [] No    Jemal Salas Rep   05/24/24 10:42 EDT

## 2024-06-03 ENCOUNTER — OFFICE VISIT (OUTPATIENT)
Dept: NEUROLOGY | Facility: CLINIC | Age: 64
End: 2024-06-03
Payer: COMMERCIAL

## 2024-06-03 VITALS
WEIGHT: 207.01 LBS | HEIGHT: 72 IN | SYSTOLIC BLOOD PRESSURE: 132 MMHG | HEART RATE: 66 BPM | BODY MASS INDEX: 28.04 KG/M2 | DIASTOLIC BLOOD PRESSURE: 74 MMHG | OXYGEN SATURATION: 97 %

## 2024-06-03 DIAGNOSIS — M54.12 CERVICAL RADICULOPATHY: Primary | ICD-10-CM

## 2024-06-03 PROCEDURE — 99204 OFFICE O/P NEW MOD 45 MIN: CPT | Performed by: PSYCHIATRY & NEUROLOGY

## 2024-06-03 NOTE — PROGRESS NOTES
Subjective   Patient ID: Darnell Garcia is a 64 y.o. male     Chief Complaint   Patient presents with    Extremity Weakness    MUSCLE LOSS        History of Present Illness    64 y.o. male referred by Matheus Rubio PA-C for peripheral neuropathy.    C/O numbness in right hand and 1 - 3 fingers.  Losing strength in right hand.      Recently has worsening weakness in left shoulder.      Hand has been getting weak for 2 and 1/2 years.      Trouble typing.      N/T in feet toes.      Last EMG/NCS 2  1/2 years ago prior to surgery.     MRI Cervical C3-4 severe R NFN, mod L NFN, C4-5 severe B NFN, C5-6 moderate R and severe L NFN   Reviewed medical records:    T2DM with PN A1C 9.1     Distal extremities numbness and tingling,      mg BID    Neck pain and left shoulder pain.     C3 - C5 ACDF   C5 - T1 ACDF   C5-6 zero P fusion         Past Medical History:   Diagnosis Date    A-fib     Arthritis     Asthma     Cataracts, both eyes     COPD (chronic obstructive pulmonary disease)     Coronary artery disease     COVID-19 vaccine series completed     with booster x2    Diabetes mellitus     Difficulty walking     Both ankle surgeries    GERD (gastroesophageal reflux disease)     History of cardiac catheterization     pt states approx 2017 -no stent needed    History of nuclear stress test     2019?    History of varicella     Hyperlipidemia     Neuropathy     feet    Neuropathy in diabetes     Gabapentin    Peripheral neuropathy     Sleep apnea     diagnosed but does not use CPAP    Wears glasses      Family History   Problem Relation Age of Onset    Breast cancer Mother     Osteoarthritis Mother     Arthritis Mother     Cancer Mother         Breast    Coronary artery disease Father     Heart attack Father         late 60's    Cancer Father         Skin    Lymphoma Son     Leukemia Son     Cancer Son         Lymphoblastic Leukemia    Colon cancer Neg Hx      Social History     Socioeconomic History    Marital status:  "   Tobacco Use    Smoking status: Never     Passive exposure: Never    Smokeless tobacco: Never   Vaping Use    Vaping status: Never Used   Substance and Sexual Activity    Alcohol use: Yes     Alcohol/week: 4.0 standard drinks of alcohol     Types: 4 Cans of beer per week     Comment: on weekends    Drug use: No    Sexual activity: Not Currently     Partners: Female     Birth control/protection: Vasectomy       Review of Systems   Constitutional:  Negative for activity change, fatigue and unexpected weight change.   HENT:  Negative for facial swelling, hearing loss, tinnitus, trouble swallowing and voice change.    Eyes:  Negative for photophobia, pain and visual disturbance.   Respiratory:  Negative for apnea, cough and choking.    Cardiovascular:  Negative for chest pain.   Gastrointestinal:  Negative for constipation, nausea and vomiting.   Endocrine: Negative for cold intolerance.   Genitourinary:  Negative for difficulty urinating, frequency and urgency.   Musculoskeletal:  Positive for back pain, neck pain and neck stiffness. Negative for arthralgias, gait problem and myalgias.   Skin:  Negative for rash.   Allergic/Immunologic: Negative for immunocompromised state.   Neurological:  Positive for weakness and numbness. Negative for dizziness, tremors, seizures, syncope, facial asymmetry, speech difficulty, light-headedness and headaches.   Hematological:  Negative for adenopathy.   Psychiatric/Behavioral:  Negative for confusion, decreased concentration, hallucinations and sleep disturbance. The patient is not nervous/anxious.        Objective     Vitals:    06/03/24 1454   BP: 132/74   Pulse: 66   SpO2: 97%   Weight: 93.9 kg (207 lb 0.2 oz)   Height: 182.9 cm (72.01\")       Neurologic Exam     Mental Status   Oriented to person, place, and time.   Speech: speech is normal   Level of consciousness: alert  Knowledge: good and consistent with education.   Normal comprehension.     Cranial Nerves   Cranial " nerves II through XII intact.     CN II   Visual fields full to confrontation.   Visual acuity: normal  Right visual field deficit: none  Left visual field deficit: none     CN III, IV, VI   Pupils are equal, round, and reactive to light.  Extraocular motions are normal.   Nystagmus: none   Diplopia: none  Ophthalmoparesis: none  Upgaze: normal  Downgaze: normal  Conjugate gaze: present    CN V   Facial sensation intact.   Right corneal reflex: normal  Left corneal reflex: normal    CN VII   Right facial weakness: none  Left facial weakness: none    CN VIII   Hearing: intact    CN IX, X   Palate: symmetric  Right gag reflex: normal  Left gag reflex: normal    CN XI   Right sternocleidomastoid strength: normal  Left sternocleidomastoid strength: normal    CN XII   Tongue: not atrophic  Fasciculations: absent  Tongue deviation: none    Motor Exam   Muscle bulk: normal  Overall muscle tone: normal    Strength   Strength 5/5 except as noted.   Right deltoid: 4/5  Right biceps: 4/5  Right triceps: 4/5  Right wrist flexion: 3/5  Right wrist extension: 3/5  Right interossei: 3/5Atrophy of R UE      Sensory Exam   Right leg light touch: decreased from ankle  Left leg light touch: decreased from ankle  Right leg pinprick: decreased from ankle  Left leg pinprick: decreased from ankle    Gait, Coordination, and Reflexes     Gait  Gait: normal    Tremor   Resting tremor: absent  Intention tremor: absent  Action tremor: absent    Reflexes   Right brachioradialis: 0  Left brachioradialis: 0  Right biceps: 0  Left biceps: 0  Right triceps: 0  Left triceps: 0  Right patellar: 0  Left patellar: 0  Right achilles: 0  Left achilles: 0      Physical Exam  Eyes:      Extraocular Movements: EOM normal.      Pupils: Pupils are equal, round, and reactive to light.   Neurological:      Mental Status: He is oriented to person, place, and time.      Cranial Nerves: Cranial nerves 2-12 are intact.      Gait: Gait is intact.      Deep Tendon  Reflexes:      Reflex Scores:       Tricep reflexes are 0 on the right side and 0 on the left side.       Bicep reflexes are 0 on the right side and 0 on the left side.       Brachioradialis reflexes are 0 on the right side and 0 on the left side.       Patellar reflexes are 0 on the right side and 0 on the left side.       Achilles reflexes are 0 on the right side and 0 on the left side.  Psychiatric:         Speech: Speech normal.         Clinical Support on 05/15/2024   Component Date Value Ref Range Status    Microalbumin, Urine 05/15/2024 30   Final    Creatinine, Urine 05/15/2024 50   Final    Lot Number 05/15/2024 [id number]   Final    Expiration Date 05/15/2024 11,[id number]   Final         Assessment & Plan     Problem List Items Addressed This Visit          Neuro    Cervical radiculopathy - Primary    Current Assessment & Plan     EMG/NCS B UE                    No follow-ups on file.

## 2024-06-07 DIAGNOSIS — E11.49 TYPE 2 DIABETES MELLITUS WITH OTHER DIABETIC NEUROLOGICAL COMPLICATION: ICD-10-CM

## 2024-06-07 NOTE — TELEPHONE ENCOUNTER
Rx Refill Note  Requested Prescriptions     Pending Prescriptions Disp Refills    gabapentin (NEURONTIN) 300 MG capsule [Pharmacy Med Name: gabapentin 300 mg capsule] 120 capsule 0     Sig: TAKE TWO CAPSULES BY MOUTH TWICE DAILY      Last office visit with prescribing clinician: 1/23/2024   Last telemedicine visit with prescribing clinician: 5/15/2024   Next office visit with prescribing clinician: 9/19/2024                         Would you like a call back once the refill request has been completed: [] Yes [] No    If the office needs to give you a call back, can they leave a voicemail: [] Yes [] No    Sandie Allen MA  06/07/24, 10:34 EDT

## 2024-06-10 RX ORDER — GABAPENTIN 300 MG/1
600 CAPSULE ORAL 2 TIMES DAILY
Qty: 120 CAPSULE | Refills: 0 | Status: SHIPPED | OUTPATIENT
Start: 2024-06-10

## 2024-07-08 DIAGNOSIS — E11.49 TYPE 2 DIABETES MELLITUS WITH OTHER DIABETIC NEUROLOGICAL COMPLICATION: ICD-10-CM

## 2024-07-08 RX ORDER — GABAPENTIN 300 MG/1
600 CAPSULE ORAL 2 TIMES DAILY
Qty: 120 CAPSULE | Refills: 0 | Status: SHIPPED | OUTPATIENT
Start: 2024-07-08

## 2024-07-18 RX ORDER — BLOOD SUGAR DIAGNOSTIC
100 STRIP MISCELLANEOUS SEE ADMIN INSTRUCTIONS
Qty: 100 EACH | Refills: 12 | Status: SHIPPED | OUTPATIENT
Start: 2024-07-18

## 2024-07-22 RX ORDER — OMEGA-3S/DHA/EPA/FISH OIL/D3 300MG-1000
400 CAPSULE ORAL DAILY
Qty: 90 TABLET | Refills: 1 | Status: SHIPPED | OUTPATIENT
Start: 2024-07-22

## 2024-07-24 DIAGNOSIS — Z79.52 LONG TERM CURRENT USE OF SYSTEMIC STEROIDS: ICD-10-CM

## 2024-07-24 DIAGNOSIS — J45.909 UNCOMPLICATED ASTHMA, UNSPECIFIED ASTHMA SEVERITY, UNSPECIFIED WHETHER PERSISTENT: ICD-10-CM

## 2024-07-24 RX ORDER — PREDNISONE 5 MG/1
5 TABLET ORAL DAILY
Qty: 90 TABLET | Refills: 1 | Status: SHIPPED | OUTPATIENT
Start: 2024-07-24

## 2024-08-05 DIAGNOSIS — E11.49 TYPE 2 DIABETES MELLITUS WITH OTHER DIABETIC NEUROLOGICAL COMPLICATION: ICD-10-CM

## 2024-08-05 RX ORDER — GABAPENTIN 300 MG/1
600 CAPSULE ORAL 2 TIMES DAILY
Qty: 120 CAPSULE | Refills: 0 | Status: SHIPPED | OUTPATIENT
Start: 2024-08-05

## 2024-08-08 RX ORDER — FLUTICASONE PROPIONATE 50 MCG
SPRAY, SUSPENSION (ML) NASAL
Qty: 16 G | Refills: 0 | Status: SHIPPED | OUTPATIENT
Start: 2024-08-08

## 2024-08-10 RX ORDER — APIXABAN 5 MG/1
5 TABLET, FILM COATED ORAL EVERY 12 HOURS
Qty: 90 TABLET | Refills: 1 | OUTPATIENT
Start: 2024-08-10

## 2024-08-28 DIAGNOSIS — E11.65 TYPE 2 DIABETES MELLITUS WITH HYPERGLYCEMIA, WITHOUT LONG-TERM CURRENT USE OF INSULIN: ICD-10-CM

## 2024-08-28 RX ORDER — ACYCLOVIR 400 MG/1
TABLET ORAL SEE ADMIN INSTRUCTIONS
Qty: 3 EACH | Refills: 5 | Status: SHIPPED | OUTPATIENT
Start: 2024-08-28

## 2024-08-30 DIAGNOSIS — E11.9 TYPE 2 DIABETES MELLITUS WITHOUT COMPLICATION, WITHOUT LONG-TERM CURRENT USE OF INSULIN: ICD-10-CM

## 2024-08-30 RX ORDER — APIXABAN 5 MG/1
5 TABLET, FILM COATED ORAL EVERY 12 HOURS
Qty: 90 TABLET | Refills: 1 | Status: SHIPPED | OUTPATIENT
Start: 2024-08-30

## 2024-09-08 DIAGNOSIS — E78.2 MIXED HYPERLIPIDEMIA: ICD-10-CM

## 2024-09-09 RX ORDER — ATORVASTATIN CALCIUM 40 MG/1
40 TABLET, FILM COATED ORAL EVERY EVENING
Qty: 90 TABLET | Refills: 1 | Status: SHIPPED | OUTPATIENT
Start: 2024-09-09

## 2024-09-12 ENCOUNTER — PROCEDURE VISIT (OUTPATIENT)
Dept: NEUROLOGY | Facility: CLINIC | Age: 64
End: 2024-09-12
Payer: COMMERCIAL

## 2024-09-12 DIAGNOSIS — G56.21 CUBITAL TUNNEL SYNDROME ON RIGHT: ICD-10-CM

## 2024-09-12 DIAGNOSIS — E11.49 OTHER DIABETIC NEUROLOGICAL COMPLICATION ASSOCIATED WITH TYPE 2 DIABETES MELLITUS: ICD-10-CM

## 2024-09-12 DIAGNOSIS — M54.12 CERVICAL RADICULOPATHY: ICD-10-CM

## 2024-09-12 DIAGNOSIS — G56.01 CARPAL TUNNEL SYNDROME OF RIGHT WRIST: Primary | ICD-10-CM

## 2024-09-12 NOTE — LETTER
September 12, 2024     Joi Blanco MD  [address] [facility] [address]  [facility] [address]    Patient: Darnell Garcia   YOB: 1960   Date of Visit: 9/12/2024     Dear Joi Blanco MD:       Thank you for referring Darnell Garcia to me for evaluation. Below are the relevant portions of my assessment and plan of care.    If you have questions, please do not hesitate to call me. I look forward to following Darnell along with you.         Sincerely,        Jose Estrada MD        CC: No Recipients    Jose Estrada MD  09/12/24 1422  Sign when Signing Visit      [facility]   Electrodiagnostic Laboratory    Nerve Conduction & EMG Report        Patient: Darnell Garcia   Patient ID: [id number]   YOB: 1960  Sex: male      Exam Physician:  Jose Estrada MD      Electromyogram and Nerve Conduction Velocity Procedure Note    Hx: 64 y.o. right handed male with complaint of numbness involving the the upper extremities. Symptoms have been present for several months and were provoked by no clear event. Significant past medical history includes C6 - T1 fusion.  Family history no family history of nerve or muscle disease.    Exam: Motor power is R UE weakness. There is distal atrophy R UE. There are no fasciculations. Deep tendon reflexes are absent. Sensory exam is abnormal distal symmetrical loss of pin and light in the distal LE.      Edx studies of the B UE were performed to evaluate for peripheral neuropathy    NCS Examination   For sensory nerve conduction studies, the amplitude is measured peak-to-peak, the latency reported is the distal peak latency, and the conduction velocity, if measured, is determined from onset latencies and is over the forearm.   For motor nerve conduction studies, the amplitude is measured baseline-to-peak, the latency reported is the distal onset latency, the conduction velocity is calculated over the forearm, and the F wave latency is the  minimum latency.   Unless otherwise noted, the hand temperature was monitored continuously and remained between 32°C and 36°C during the performance of the NCSs.          Nerve Conduction Studies  Anti Sensory Summary Table     Stim Site NR Norm Peak (ms) O-P Amp (µV) Norm O-P Amp Onset (ms) Site1 Site2 Delta-0 (ms) Dist (cm) Husam (m/s) Norm Husam (m/s)   Left Median Anti Sensory (2nd Digit)   Wrist *NR <3.6  >10  Wrist 2nd Digit  14.0     Right Median Anti Sensory (2nd Digit)   Wrist *NR <3.6  >10  Wrist 2nd Digit  14.0     Left Radial Anti Sensory (Base 1st Digit)   Wrist *NR <3.1    Wrist Base 1st Digit  0.0     Right Radial Anti Sensory (Base 1st Digit)   Wrist *NR <3.1    Wrist Base 1st Digit  0.0     Left Ulnar Anti Sensory (5th Digit)   Wrist *NR <3.7  >15.0  Wrist 5th Digit  0.0     Right   Ulnar Anti Sensory (5th Digit)   Wrist *NR <3.7  >15.0  Wrist 5th Digit  0.0       Motor Summary Table     Stim Site NR Onset (ms) Norm Onset (ms) O-P Amp (mV) Norm O-P Amp Site1 Site2 Delta-0 (ms) Dist (cm) Husam (m/s) Norm Husam (m/s)   Left Median Motor (Abd Poll Brev)   Wrist    4.1 <4.2 *3.1 >5 Elbow Wrist 5.4 24.0 *44 >50   Elbow    9.5  2.8          Right Median Motor (Abd Poll Brev)   Wrist    *5.1 <4.2 *2.8 >5 Elbow Wrist 6.4 26.0 *41 >50   Elbow    11.5  0.1          Left Ulnar Motor (Abd Dig Minimi)   Wrist    3.4 <4.2 6.2 >3 B Elbow Wrist 5.7 24.0 *42 >53   B Elbow    9.1  3.3  A Elbow B Elbow 1.7 9.0 53 >53   A Elbow    10.8  4.3          Right Ulnar Motor (Abd Dig Minimi)   Wrist    3.4 <4.2 5.1 >3 B Elbow Wrist 5.8 25.0 *43 >53   B Elbow    9.2  3.4  A Elbow B Elbow 3.5 9.0 *26 >53   A Elbow    12.7  3.8            F Wave Studies     NR F-Lat (ms) Lat Norm (ms) L-R F-Lat (ms) L-R Lat Norm   Left Median (Mrkrs) (Abd Poll Brev)      *39.32 <33  <2.2   Right Median (Mrkrs) (Abd Poll Brev)   *NR  <33  <2.2   Left Ulnar (Mrkrs) (Abd Dig Min)      *46.41 <36 *3.20 <2.5   Right Ulnar (Mrkrs) (Abd Dig Min)      *43.20 <36  *3.20 <2.5       EMG Examination   The study was performed with a concentric needle electrode. Fibrillation and fasciculation activity is graded from none (0) to continuous (4+). The configuration and recruitment pattern of motor unit action potentials under voluntary control, if not normal, are described below     Side Muscle Nerve Root Ins Act Fibs Psw Amp Dur Poly Recrt Int Pat Comment   Left 1stDorInt Ulnar C8-T1 Nml Nml Nml Nml Nml 0 Nml Nml    Left Abd Poll Brev Median C8-T1 Nml Nml Nml Nml Nml 0 Nml Nml    Left Biceps Musculocut C5-6 Nml Nml Nml *Incr *>12ms *1+ *Reduced *50%    Left Cervical Parasp Low Rami C7-8 Nml Nml Nml Nml Nml 0 Nml Nml    Left Deltoid Axillary C5-6 Nml Nml Nml *Incr *>12ms *1+ *Reduced *50%    Left PronatorTeres Median C6-7 Nml Nml Nml *Incr *>12ms *1+ *Reduced *50%    Left Triceps Radial C6-7-8 Nml Nml Nml *Incr *>12ms *1+ *Reduced *50%    Right 1stDorInt Ulnar C8-T1 *Incr *4+ *4+ *Incr *>12ms *3+ *Rapid *25%    Right Abd Poll Brev Median C8-T1 *Incr *4+ *4+ *Incr *>12ms *3+ *Rapid *25%    Right Biceps Musculocut C5-6 Nml Nml Nml *Incr *>12ms *1+ *Reduced *25%    Right Cervical Parasp Low Rami C7-8 Nml Nml Nml Nml Nml 0 Nml Nml    Right Deltoid Axillary C5-6 Nml Nml Nml *Incr *>12ms *1+ *Reduced *50%    Right FlexCarpiUln Ulnar C8,T1 *Incr *3+ *3+ *Incr *>12ms *2+ *Rapid *25%    Right PronatorTeres Median C6-7 Nml Nml Nml *Incr *>12ms *1+ *Reduced *50%    Right Triceps Radial C6-7-8 Nml Nml Nml *Incr *>12ms *1+ *Reduced *50%        NCV FINDINGS:  Evaluation of the left median motor nerve showed reduced amplitude (3.1 mV) and decreased conduction velocity (Elbow-Wrist, 44 m/s).  The right median motor nerve showed prolonged distal onset latency (5.1 ms), reduced amplitude (2.8 mV), and decreased conduction velocity (Elbow-Wrist, 41 m/s).  The left ulnar motor nerve showed decreased conduction velocity (B Elbow-Wrist, 42 m/s).  The right ulnar motor nerve showed decreased conduction  velocity (B Elbow-Wrist, 43 m/s) and decreased conduction velocity (A Elbow-B Elbow, 26 m/s).  The left median sensory, the right median sensory, the left radial sensory, the right radial sensory, the left ulnar sensory, and the right ulnar sensory nerves showed no response (Wrist).  F Wave studies indicate that the left median F wave has prolonged latency (39.32 ms).  The right median F wave has no response.  The left ulnar F wave has prolonged latency (46.41 ms).  The right ulnar F wave has prolonged latency (43.20 ms).        EMG FINDINGS:  Needle evaluation of the left deltoid, the left triceps, the left biceps, the left pronator teres, the right deltoid, the right triceps, and the right pronator teres muscles showed increased motor unit amplitude, increased motor unit duration, slightly increased polyphasic potentials, diminished recruitment, and decreased interference pattern.  The right biceps muscle showed increased motor unit amplitude, increased motor unit duration, slightly increased polyphasic potentials, diminished recruitment, and very decreased interference pattern.  The right flexor carpi ulnaris muscle showed increased insertional activity, increased spontaneous activity, increased motor unit amplitude, increased motor unit duration, moderately increased polyphasic potentials, early recruitment, and very decreased interference pattern.  The right first dorsal interosseous and the right abductor pollicis brevis muscles showed increased insertional activity, widespread spontaneous activity, increased motor unit amplitude, increased motor unit duration, increased polyphasic potentials, early recruitment, and very decreased interference pattern.  All remaining muscles (as indicated in the following table) showed no evidence of electrical instability.      Conclusion: This study showed neurophysiologic evidence of several abnormalities:    Severe right median neuropathy at the wrist, ie carpal tunnel  syndrome.  Severe right ulnar neuropathy at the elbow, ie cubital tunnel syndrome.  Chronic bilateral C5-6, C6-7 radiculopathy.  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           Instrument used:  Teca Synergy        Performed by:          Jose Estrada MD

## 2024-09-12 NOTE — PROGRESS NOTES
[facility]   Electrodiagnostic Laboratory    Nerve Conduction & EMG Report        Patient: Darnell Garcia   Patient ID: [id number]   YOB: 1960  Sex: male      Exam Physician:  Jose Estrada MD      Electromyogram and Nerve Conduction Velocity Procedure Note    Hx: 64 y.o. right handed male with complaint of numbness involving the the upper extremities. Symptoms have been present for several months and were provoked by no clear event. Significant past medical history includes C6 - T1 fusion.  Family history no family history of nerve or muscle disease.    Exam: Motor power is R UE weakness. There is distal atrophy R UE. There are no fasciculations. Deep tendon reflexes are absent. Sensory exam is abnormal distal symmetrical loss of pin and light in the distal LE.      Edx studies of the B UE were performed to evaluate for peripheral neuropathy    NCS Examination   For sensory nerve conduction studies, the amplitude is measured peak-to-peak, the latency reported is the distal peak latency, and the conduction velocity, if measured, is determined from onset latencies and is over the forearm.   For motor nerve conduction studies, the amplitude is measured baseline-to-peak, the latency reported is the distal onset latency, the conduction velocity is calculated over the forearm, and the F wave latency is the minimum latency.   Unless otherwise noted, the hand temperature was monitored continuously and remained between 32°C and 36°C during the performance of the NCSs.          Nerve Conduction Studies  Anti Sensory Summary Table     Stim Site NR Norm Peak (ms) O-P Amp (µV) Norm O-P Amp Onset (ms) Site1 Site2 Delta-0 (ms) Dist (cm) Husam (m/s) Norm Husam (m/s)   Left Median Anti Sensory (2nd Digit)   Wrist *NR <3.6  >10  Wrist 2nd Digit  14.0     Right Median Anti Sensory (2nd Digit)   Wrist *NR <3.6  >10  Wrist 2nd Digit  14.0     Left Radial Anti Sensory (Base 1st Digit)   Wrist *NR <3.1     Wrist Base 1st Digit  0.0     Right Radial Anti Sensory (Base 1st Digit)   Wrist *NR <3.1    Wrist Base 1st Digit  0.0     Left Ulnar Anti Sensory (5th Digit)   Wrist *NR <3.7  >15.0  Wrist 5th Digit  0.0     Right   Ulnar Anti Sensory (5th Digit)   Wrist *NR <3.7  >15.0  Wrist 5th Digit  0.0       Motor Summary Table     Stim Site NR Onset (ms) Norm Onset (ms) O-P Amp (mV) Norm O-P Amp Site1 Site2 Delta-0 (ms) Dist (cm) Husam (m/s) Norm Husam (m/s)   Left Median Motor (Abd Poll Brev)   Wrist    4.1 <4.2 *3.1 >5 Elbow Wrist 5.4 24.0 *44 >50   Elbow    9.5  2.8          Right Median Motor (Abd Poll Brev)   Wrist    *5.1 <4.2 *2.8 >5 Elbow Wrist 6.4 26.0 *41 >50   Elbow    11.5  0.1          Left Ulnar Motor (Abd Dig Minimi)   Wrist    3.4 <4.2 6.2 >3 B Elbow Wrist 5.7 24.0 *42 >53   B Elbow    9.1  3.3  A Elbow B Elbow 1.7 9.0 53 >53   A Elbow    10.8  4.3          Right Ulnar Motor (Abd Dig Minimi)   Wrist    3.4 <4.2 5.1 >3 B Elbow Wrist 5.8 25.0 *43 >53   B Elbow    9.2  3.4  A Elbow B Elbow 3.5 9.0 *26 >53   A Elbow    12.7  3.8            F Wave Studies     NR F-Lat (ms) Lat Norm (ms) L-R F-Lat (ms) L-R Lat Norm   Left Median (Mrkrs) (Abd Poll Brev)      *39.32 <33  <2.2   Right Median (Mrkrs) (Abd Poll Brev)   *NR  <33  <2.2   Left Ulnar (Mrkrs) (Abd Dig Min)      *46.41 <36 *3.20 <2.5   Right Ulnar (Mrkrs) (Abd Dig Min)      *43.20 <36 *3.20 <2.5       EMG Examination   The study was performed with a concentric needle electrode. Fibrillation and fasciculation activity is graded from none (0) to continuous (4+). The configuration and recruitment pattern of motor unit action potentials under voluntary control, if not normal, are described below     Side Muscle Nerve Root Ins Act Fibs Psw Amp Dur Poly Recrt Int Pat Comment   Left 1stDorInt Ulnar C8-T1 Nml Nml Nml Nml Nml 0 Nml Nml    Left Abd Poll Brev Median C8-T1 Nml Nml Nml Nml Nml 0 Nml Nml    Left Biceps Musculocut C5-6 Nml Nml Nml *Incr *>12ms *1+ *Reduced *50%     Left Cervical Parasp Low Rami C7-8 Nml Nml Nml Nml Nml 0 Nml Nml    Left Deltoid Axillary C5-6 Nml Nml Nml *Incr *>12ms *1+ *Reduced *50%    Left PronatorTeres Median C6-7 Nml Nml Nml *Incr *>12ms *1+ *Reduced *50%    Left Triceps Radial C6-7-8 Nml Nml Nml *Incr *>12ms *1+ *Reduced *50%    Right 1stDorInt Ulnar C8-T1 *Incr *4+ *4+ *Incr *>12ms *3+ *Rapid *25%    Right Abd Poll Brev Median C8-T1 *Incr *4+ *4+ *Incr *>12ms *3+ *Rapid *25%    Right Biceps Musculocut C5-6 Nml Nml Nml *Incr *>12ms *1+ *Reduced *25%    Right Cervical Parasp Low Rami C7-8 Nml Nml Nml Nml Nml 0 Nml Nml    Right Deltoid Axillary C5-6 Nml Nml Nml *Incr *>12ms *1+ *Reduced *50%    Right FlexCarpiUln Ulnar C8,T1 *Incr *3+ *3+ *Incr *>12ms *2+ *Rapid *25%    Right PronatorTeres Median C6-7 Nml Nml Nml *Incr *>12ms *1+ *Reduced *50%    Right Triceps Radial C6-7-8 Nml Nml Nml *Incr *>12ms *1+ *Reduced *50%        NCV FINDINGS:  Evaluation of the left median motor nerve showed reduced amplitude (3.1 mV) and decreased conduction velocity (Elbow-Wrist, 44 m/s).  The right median motor nerve showed prolonged distal onset latency (5.1 ms), reduced amplitude (2.8 mV), and decreased conduction velocity (Elbow-Wrist, 41 m/s).  The left ulnar motor nerve showed decreased conduction velocity (B Elbow-Wrist, 42 m/s).  The right ulnar motor nerve showed decreased conduction velocity (B Elbow-Wrist, 43 m/s) and decreased conduction velocity (A Elbow-B Elbow, 26 m/s).  The left median sensory, the right median sensory, the left radial sensory, the right radial sensory, the left ulnar sensory, and the right ulnar sensory nerves showed no response (Wrist).  F Wave studies indicate that the left median F wave has prolonged latency (39.32 ms).  The right median F wave has no response.  The left ulnar F wave has prolonged latency (46.41 ms).  The right ulnar F wave has prolonged latency (43.20 ms).        EMG FINDINGS:  Needle evaluation of the left deltoid, the  left triceps, the left biceps, the left pronator teres, the right deltoid, the right triceps, and the right pronator teres muscles showed increased motor unit amplitude, increased motor unit duration, slightly increased polyphasic potentials, diminished recruitment, and decreased interference pattern.  The right biceps muscle showed increased motor unit amplitude, increased motor unit duration, slightly increased polyphasic potentials, diminished recruitment, and very decreased interference pattern.  The right flexor carpi ulnaris muscle showed increased insertional activity, increased spontaneous activity, increased motor unit amplitude, increased motor unit duration, moderately increased polyphasic potentials, early recruitment, and very decreased interference pattern.  The right first dorsal interosseous and the right abductor pollicis brevis muscles showed increased insertional activity, widespread spontaneous activity, increased motor unit amplitude, increased motor unit duration, increased polyphasic potentials, early recruitment, and very decreased interference pattern.  All remaining muscles (as indicated in the following table) showed no evidence of electrical instability.      Conclusion: This study showed neurophysiologic evidence of several abnormalities:    Severe right median neuropathy at the wrist, ie carpal tunnel syndrome.  Severe right ulnar neuropathy at the elbow, ie cubital tunnel syndrome.  Chronic bilateral C5-6, C6-7 radiculopathy.  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           Instrument used:  Teca Synergy        Performed by:          Jose Estrada MD

## 2024-09-13 ENCOUNTER — TELEPHONE (OUTPATIENT)
Dept: NEUROLOGY | Facility: CLINIC | Age: 64
End: 2024-09-13

## 2024-09-13 NOTE — TELEPHONE ENCOUNTER
Provider: EDILBERTO    Caller: IGOR    Relationship to Patient:     Phone Number: [phone]     Reason for Call: PT'S WIFE CALLED AND IS WANTING TO KNOW IF REFERRAL FOR ORTH CAN BE SENT TO [facility] TO SEE .    THANK YOU

## 2024-09-15 DIAGNOSIS — E11.9 TYPE 2 DIABETES MELLITUS WITHOUT COMPLICATION, WITHOUT LONG-TERM CURRENT USE OF INSULIN: ICD-10-CM

## 2024-09-16 DIAGNOSIS — E11.49 TYPE 2 DIABETES MELLITUS WITH OTHER DIABETIC NEUROLOGICAL COMPLICATION: ICD-10-CM

## 2024-09-16 RX ORDER — EMPAGLIFLOZIN 25 MG/1
25 TABLET, FILM COATED ORAL DAILY
Qty: 90 TABLET | Refills: 1 | Status: SHIPPED | OUTPATIENT
Start: 2024-09-16 | End: 2024-09-19 | Stop reason: SDUPTHER

## 2024-09-17 RX ORDER — GABAPENTIN 300 MG/1
600 CAPSULE ORAL 2 TIMES DAILY
Qty: 120 CAPSULE | Refills: 0 | Status: SHIPPED | OUTPATIENT
Start: 2024-09-17

## 2024-09-19 ENCOUNTER — OFFICE VISIT (OUTPATIENT)
Dept: INTERNAL MEDICINE | Facility: CLINIC | Age: 64
End: 2024-09-19
Payer: COMMERCIAL

## 2024-09-19 VITALS
HEART RATE: 59 BPM | SYSTOLIC BLOOD PRESSURE: 125 MMHG | DIASTOLIC BLOOD PRESSURE: 67 MMHG | HEIGHT: 72 IN | OXYGEN SATURATION: 96 % | RESPIRATION RATE: 16 BRPM | BODY MASS INDEX: 29.12 KG/M2 | WEIGHT: 215 LBS

## 2024-09-19 DIAGNOSIS — K21.9 GASTROESOPHAGEAL REFLUX DISEASE WITHOUT ESOPHAGITIS: ICD-10-CM

## 2024-09-19 DIAGNOSIS — E11.65 TYPE 2 DIABETES MELLITUS WITH HYPERGLYCEMIA, WITHOUT LONG-TERM CURRENT USE OF INSULIN: Primary | ICD-10-CM

## 2024-09-19 DIAGNOSIS — N52.8 OTHER MALE ERECTILE DYSFUNCTION: ICD-10-CM

## 2024-09-19 DIAGNOSIS — M54.12 CERVICAL RADICULOPATHY: ICD-10-CM

## 2024-09-19 DIAGNOSIS — Z00.00 ANNUAL PHYSICAL EXAM: ICD-10-CM

## 2024-09-19 DIAGNOSIS — Z79.52 LONG TERM CURRENT USE OF SYSTEMIC STEROIDS: ICD-10-CM

## 2024-09-19 DIAGNOSIS — J45.40 MODERATE PERSISTENT ASTHMA WITHOUT COMPLICATION: ICD-10-CM

## 2024-09-19 DIAGNOSIS — I25.10 CORONARY ARTERY DISEASE INVOLVING NATIVE CORONARY ARTERY OF NATIVE HEART WITHOUT ANGINA PECTORIS: ICD-10-CM

## 2024-09-19 DIAGNOSIS — I48.0 PAROXYSMAL ATRIAL FIBRILLATION: ICD-10-CM

## 2024-09-19 DIAGNOSIS — N40.1 BENIGN PROSTATIC HYPERPLASIA WITH LOWER URINARY TRACT SYMPTOMS, SYMPTOM DETAILS UNSPECIFIED: ICD-10-CM

## 2024-09-19 DIAGNOSIS — E11.49 OTHER DIABETIC NEUROLOGICAL COMPLICATION ASSOCIATED WITH TYPE 2 DIABETES MELLITUS: ICD-10-CM

## 2024-09-19 DIAGNOSIS — E78.2 MIXED HYPERLIPIDEMIA: ICD-10-CM

## 2024-09-19 LAB
EXPIRATION DATE: ABNORMAL
HBA1C MFR BLD: 7.4 % (ref 4.5–5.7)
Lab: ABNORMAL

## 2024-09-19 PROCEDURE — 99396 PREV VISIT EST AGE 40-64: CPT | Performed by: STUDENT IN AN ORGANIZED HEALTH CARE EDUCATION/TRAINING PROGRAM

## 2024-09-19 PROCEDURE — 83036 HEMOGLOBIN GLYCOSYLATED A1C: CPT | Performed by: STUDENT IN AN ORGANIZED HEALTH CARE EDUCATION/TRAINING PROGRAM

## 2024-09-19 RX ORDER — METOPROLOL SUCCINATE 25 MG/1
12.5 TABLET, EXTENDED RELEASE ORAL DAILY
Qty: 45 TABLET | Refills: 3 | Status: SHIPPED | OUTPATIENT
Start: 2024-09-19

## 2024-09-19 RX ORDER — FLUTICASONE FUROATE, UMECLIDINIUM BROMIDE AND VILANTEROL TRIFENATATE 200; 62.5; 25 UG/1; UG/1; UG/1
1 POWDER RESPIRATORY (INHALATION)
Qty: 60 EACH | Refills: 5 | Status: SHIPPED | OUTPATIENT
Start: 2024-09-19

## 2024-09-19 RX ORDER — ATORVASTATIN CALCIUM 40 MG/1
40 TABLET, FILM COATED ORAL EVERY EVENING
Qty: 90 TABLET | Refills: 1 | Status: SHIPPED | OUTPATIENT
Start: 2024-09-19

## 2024-09-22 DIAGNOSIS — M19.90 UNSPECIFIED OSTEOARTHRITIS, UNSPECIFIED SITE: ICD-10-CM

## 2024-09-23 RX ORDER — CELECOXIB 200 MG/1
CAPSULE ORAL
Qty: 90 CAPSULE | Refills: 1 | OUTPATIENT
Start: 2024-09-23

## 2024-10-03 ENCOUNTER — OFFICE VISIT (OUTPATIENT)
Dept: PULMONOLOGY | Facility: CLINIC | Age: 64
End: 2024-10-03
Payer: COMMERCIAL

## 2024-10-03 VITALS
OXYGEN SATURATION: 98 % | BODY MASS INDEX: 29.12 KG/M2 | RESPIRATION RATE: 18 BRPM | HEIGHT: 72 IN | HEART RATE: 62 BPM | WEIGHT: 215 LBS | SYSTOLIC BLOOD PRESSURE: 112 MMHG | DIASTOLIC BLOOD PRESSURE: 66 MMHG

## 2024-10-03 DIAGNOSIS — J30.89 OTHER ALLERGIC RHINITIS: ICD-10-CM

## 2024-10-03 DIAGNOSIS — J45.40 MODERATE PERSISTENT ASTHMA WITHOUT COMPLICATION: Primary | ICD-10-CM

## 2024-10-03 NOTE — PROGRESS NOTES
"  Chief Complaint   Patient presents with    Breathing Problem    Follow-up       Subjective   Darnell Garcia is a 64 y.o. male.   Patient was evaluated today for follow up of asthma, and allergic rhinitis.     Patient does not report any recent exacerbations requiring emergency room visits or hospitalizations.     Patient is compliant with pulmonary medicines, as prescribed.     he is currently on Trelegy. he is using the rescue inhalers minimally.     Patient says that he has been using his nasal sprays on a seasonal basis and describes no significant ongoing issues other than occasional congestion.     Still on Prednisone 5 mg daily.       The following portions of the patient's history were reviewed and updated as appropriate: allergies, current medications, past family history, past medical history, past social history, and past surgical history.    Review of Systems   HENT:  Negative for sinus pain.    Respiratory:  Positive for shortness of breath and wheezing.    Psychiatric/Behavioral:  Negative for sleep disturbance.        Objective   Visit Vitals  /66   Pulse 62   Resp 18   Ht 182.9 cm (72.01\")   Wt 97.5 kg (215 lb)   SpO2 98%   BMI 29.15 kg/m²       BMI Readings from Last 8 Encounters:   10/03/24 29.15 kg/m²   09/19/24 29.15 kg/m²   06/03/24 28.07 kg/m²   05/15/24 28.07 kg/m²   01/23/24 28.47 kg/m²   11/22/23 28.07 kg/m²   10/25/23 28.12 kg/m²   10/03/23 28.16 kg/m²       Physical Exam  Vitals reviewed.   Constitutional:       Appearance: He is well-developed.   HENT:      Head: Normocephalic and atraumatic.   Eyes:      Extraocular Movements: Extraocular movements intact.   Cardiovascular:      Rate and Rhythm: Normal rate.   Pulmonary:      Comments: Somewhat hyperresonant to percussion.  Somewhat decreased air entry.  No obvious wheezing noted.   Musculoskeletal:      Cervical back: Neck supple.   Neurological:      Mental Status: He is alert.         Assessment & Plan   Diagnoses and all orders " for this visit:    1. Moderate persistent asthma without complication (Primary)    2. Other allergic rhinitis    Other orders  -     Fluzone >6mos           Return in about 4 months (around 2/3/2025) for Recheck, FeNO F/U, For Lindy [place]).    DISCUSSION (if any):  PFTs were reviewed. Mild obstruction noted.    Laboratory workup was also reviewed which showed   Lab Results   Component Value Date    EOSABS 0.22 05/15/2024    EOSABS 0.16 01/22/2024    EOSABS 0.33 02/01/2023    &   Lab Results   Component Value Date    IGE 81 10/25/2023     Laboratory workup also showed   Lab Results   Component Value Date    CO2 25.0 05/15/2024     ===========================  ===========================    Since the patient is doing fairly well as far as respiratory symptoms are concerned, I have recommended that he change the Prednisone to every other day.    If the patient is doing fairly well as far as respiratory symptoms are concerned, at the time of follow-up, he may recommend that he decrease the dose of Trelegy    It appears that his symptoms of asthma are under good control with the current regimen.    Patient's medications for underlying asthma were reviewed in great detail.    Compliance with medications stressed.     Side effects of prescribed medications discussed with the patient.    The need to continue to be aware of triggers that may cause asthma exacerbation versus progression of disease, was also discussed.    I have discussed asthma action plan with him.    The patient was asked to call this office if the symptoms worsen.    Patient was advised to continue his nasal spray, especially given improvement in symptoms overall.    Vaccination status addressed.    Up-to-date with influenza vaccinations (today)    Up-to-date with pneumonia vaccinations.     It was recommended that the patient consider Prevnar-20 vaccination and discuss it with his PCP, if not already obtained.     ============================   ============================    From Pulmonary stand point, his risk for the proposed procedure appears to be.  Low      Postoperative recommendations:            * Out of bed to chair, as soon as feasible.            * Albuterol & Atrovent nebulizers Q4hr PRN.            * Incentive spirometry every hour.            * Minimize the use of NG tube.            * Keep O2sat at 88% or more, with minimum supplemental O2.            * Minimize anesthesia time, as much as possible.            * Inform anesthesia about 5 mg prednisone being taken every day.    Pulmonary risk stratification needs to be discussed with the physician performing the procedure and, apart from procedures involving pulmonary resection, should not be used alone to determine patient's appropriateness for the planned procedure.      Dictated utilizing Dragon dictation.    This document was electronically signed by Mariama Horn MD on 10/03/24 at 09:32 EDT

## 2024-10-03 NOTE — LETTER
"October 3, 2024     Irina Cam MD  [address] [facility]  [address] [place] [id number]    Patient: Darnell Garcia   YOB: 1960   Date of Visit: 10/3/2024     Dear Dr. Tutu MD:    Thank you for referring Darnell Garcia to me for evaluation. Below are the relevant portions of my assessment and plan of care.    If you have questions, please do not hesitate to call me. I look forward to following Darnell along with you.         Sincerely,        Mariama Horn MD        CC: [facility]      Progress Notes:    Chief Complaint   Patient presents with   • Breathing Problem   • Follow-up       Subjective  Darnell Garcia is a 64 y.o. male.   Patient was evaluated today for follow up of asthma, and allergic rhinitis.     Patient does not report any recent exacerbations requiring emergency room visits or hospitalizations.     Patient is compliant with pulmonary medicines, as prescribed.     he is currently on Trelegy. he is using the rescue inhalers minimally.     Patient says that he has been using his nasal sprays on a seasonal basis and describes no significant ongoing issues other than occasional congestion.     Still on Prednisone 5 mg daily.       The following portions of the patient's history were reviewed and updated as appropriate: allergies, current medications, past family history, past medical history, past social history, and past surgical history.    Review of Systems   HENT:  Negative for sinus pain.    Respiratory:  Positive for shortness of breath and wheezing.    Psychiatric/Behavioral:  Negative for sleep disturbance.        Objective  Visit Vitals  /66   Pulse 62   Resp 18   Ht 182.9 cm (72.01\")   Wt 97.5 kg (215 lb)   SpO2 98%   BMI 29.15 kg/m²       BMI Readings from Last 8 Encounters:   10/03/24 29.15 kg/m²   09/19/24 29.15 kg/m²   06/03/24 28.07 kg/m²   05/15/24 28.07 kg/m²   01/23/24 28.47 kg/m²   11/22/23 28.07 kg/m²   10/25/23 28.12 kg/m²   10/03/23 28.16 kg/m² "       Physical Exam  Vitals reviewed.   Constitutional:       Appearance: He is well-developed.   HENT:      Head: Normocephalic and atraumatic.   Eyes:      Extraocular Movements: Extraocular movements intact.   Cardiovascular:      Rate and Rhythm: Normal rate.   Pulmonary:      Comments: Somewhat hyperresonant to percussion.  Somewhat decreased air entry.  No obvious wheezing noted.   Musculoskeletal:      Cervical back: Neck supple.   Neurological:      Mental Status: He is alert.         Assessment & Plan  Diagnoses and all orders for this visit:    1. Moderate persistent asthma without complication (Primary)    2. Other allergic rhinitis    Other orders  -     Fluzone >6mos           Return in about 4 months (around 2/3/2025) for Recheck, FeNO F/U, For Lindy Denton).    DISCUSSION (if any):  PFTs were reviewed. Mild obstruction noted.    Laboratory workup was also reviewed which showed   Lab Results   Component Value Date    EOSABS 0.22 05/15/2024    EOSABS 0.16 01/22/2024    EOSABS 0.33 02/01/2023    &   Lab Results   Component Value Date    IGE 81 10/25/2023     Laboratory workup also showed   Lab Results   Component Value Date    CO2 25.0 05/15/2024     ===========================  ===========================    Since the patient is doing fairly well as far as respiratory symptoms are concerned, I have recommended that he change the Prednisone to every other day.    If the patient is doing fairly well as far as respiratory symptoms are concerned, at the time of follow-up, he may recommend that he decrease the dose of Trelegy    It appears that his symptoms of asthma are under good control with the current regimen.    Patient's medications for underlying asthma were reviewed in great detail.    Compliance with medications stressed.     Side effects of prescribed medications discussed with the patient.    The need to continue to be aware of triggers that may cause asthma exacerbation versus progression of  disease, was also discussed.    I have discussed asthma action plan with him.    The patient was asked to call this office if the symptoms worsen.    Patient was advised to continue his nasal spray, especially given improvement in symptoms overall.    Vaccination status addressed.    Up-to-date with influenza vaccinations (today)    Up-to-date with pneumonia vaccinations.     It was recommended that the patient consider Prevnar-20 vaccination and discuss it with his PCP, if not already obtained.     ============================  ============================    From Pulmonary stand point, his risk for the proposed procedure appears to be.  Low      Postoperative recommendations:            * Out of bed to chair, as soon as feasible.            * Albuterol & Atrovent nebulizers Q4hr PRN.            * Incentive spirometry every hour.            * Minimize the use of NG tube.            * Keep O2sat at 88% or more, with minimum supplemental O2.            * Minimize anesthesia time, as much as possible.            * Inform anesthesia about 5 mg prednisone being taken every day.    Pulmonary risk stratification needs to be discussed with the physician performing the procedure and, apart from procedures involving pulmonary resection, should not be used alone to determine patient's appropriateness for the planned procedure.      Dictated utilizing Dragon dictation.    This document was electronically signed by Mariama Horn MD on 10/03/24 at 09:32 EDT

## 2024-10-10 DIAGNOSIS — J45.909 UNCOMPLICATED ASTHMA, UNSPECIFIED ASTHMA SEVERITY, UNSPECIFIED WHETHER PERSISTENT: ICD-10-CM

## 2024-10-10 RX ORDER — MONTELUKAST SODIUM 10 MG/1
TABLET ORAL
Qty: 90 TABLET | Refills: 1 | Status: SHIPPED | OUTPATIENT
Start: 2024-10-10

## 2024-10-20 DIAGNOSIS — E11.9 TYPE 2 DIABETES MELLITUS WITHOUT COMPLICATION, WITHOUT LONG-TERM CURRENT USE OF INSULIN: ICD-10-CM

## 2024-10-21 RX ORDER — FLUTICASONE PROPIONATE 50 UG/1
SPRAY, METERED NASAL
Qty: 16 G | Refills: 0 | Status: SHIPPED | OUTPATIENT
Start: 2024-10-21

## 2024-10-21 RX ORDER — GLIMEPIRIDE 4 MG/1
TABLET ORAL
Qty: 90 TABLET | Refills: 1 | Status: SHIPPED | OUTPATIENT
Start: 2024-10-21

## 2024-10-30 DIAGNOSIS — E11.49 TYPE 2 DIABETES MELLITUS WITH OTHER DIABETIC NEUROLOGICAL COMPLICATION: ICD-10-CM

## 2024-10-30 DIAGNOSIS — M19.90 UNSPECIFIED OSTEOARTHRITIS, UNSPECIFIED SITE: ICD-10-CM

## 2024-10-30 RX ORDER — CELECOXIB 200 MG/1
CAPSULE ORAL
Qty: 90 CAPSULE | Refills: 1 | OUTPATIENT
Start: 2024-10-30

## 2024-10-30 RX ORDER — GABAPENTIN 300 MG/1
600 CAPSULE ORAL 2 TIMES DAILY
Qty: 120 CAPSULE | Refills: 0 | Status: SHIPPED | OUTPATIENT
Start: 2024-10-30

## 2024-11-04 ENCOUNTER — TELEPHONE (OUTPATIENT)
Dept: INTERNAL MEDICINE | Facility: CLINIC | Age: 64
End: 2024-11-04
Payer: COMMERCIAL

## 2024-11-04 NOTE — TELEPHONE ENCOUNTER
I can see where Dr. Cam stopped this but I don't see why. Pt states that he was bit aware of this being discontinued. Can you see any reason why this was stopped?

## 2024-11-04 NOTE — TELEPHONE ENCOUNTER
Patient is calling because his Celebrex keeps getting denied and has been removed from his med list.  He says that Dr. Cam did not relay to him that it was discontinued and reason is not on visit note.  Please return call to patient with explanation and if it really needed to be discontinued.

## 2024-11-06 ENCOUNTER — TELEPHONE (OUTPATIENT)
Dept: INTERNAL MEDICINE | Facility: CLINIC | Age: 64
End: 2024-11-06
Payer: COMMERCIAL

## 2024-11-06 ENCOUNTER — TELEPHONE (OUTPATIENT)
Dept: CARDIOLOGY | Facility: CLINIC | Age: 64
End: 2024-11-06
Payer: COMMERCIAL

## 2024-11-06 NOTE — TELEPHONE ENCOUNTER
CHARLINE FROM THE [facility] [place] CALLED IN REGARDS TO GETTING CARDIAC CLEARANCE FOR PT. FAX # [phone]. PLEASE ADVISE.

## 2024-11-06 NOTE — TELEPHONE ENCOUNTER
Caller: Naz Garcia    Relationship: Emergency Contact    Best call back number: [phone]     What is the medical concern/diagnosis: NEEDS CLEARED FOR ORTHOPEDIC SURGERY ON RIGHT ARM    What specialty or service is being requested: CARDIOLOGIST     What is the provider, practice or medical service name: PROVIDER IN [place]         Any additional details: PLEASE CALL TO FURTHER DISCUSS.

## 2024-11-07 ENCOUNTER — TELEPHONE (OUTPATIENT)
Dept: INTERNAL MEDICINE | Facility: CLINIC | Age: 64
End: 2024-11-07
Payer: COMMERCIAL

## 2024-11-07 ENCOUNTER — PATIENT MESSAGE (OUTPATIENT)
Dept: INTERNAL MEDICINE | Facility: CLINIC | Age: 64
End: 2024-11-07
Payer: COMMERCIAL

## 2024-11-07 DIAGNOSIS — Z01.810 PREOP CARDIOVASCULAR EXAM: Primary | ICD-10-CM

## 2024-11-07 NOTE — TELEPHONE ENCOUNTER
Caller: Naz Garcia    Relationship to patient: Emergency Contact    Best call back number: [phone]     PATIENT'S WIFE IS CALLING TO STATE THAT HE NEEDS AN UPDATED REFERRAL TO DR. HEENA WHITTAKER'S OFFICE FOR HIS RIGHT ARM SURGERY.

## 2024-11-07 NOTE — TELEPHONE ENCOUNTER
Spoke with patients wife, is upset because we are requiring appointment for cardiologist referral. Patient had appointment scheduled for tomorrow but cancelled. Appointment scheduled for this afternoon. Advised that appointments are required for referrals because of insurance.

## 2024-11-12 ENCOUNTER — TELEPHONE (OUTPATIENT)
Dept: INTERNAL MEDICINE | Facility: CLINIC | Age: 64
End: 2024-11-12
Payer: COMMERCIAL

## 2024-11-12 PROBLEM — Z01.810 PREOPERATIVE CARDIOVASCULAR EXAMINATION: Status: ACTIVE | Noted: 2023-11-22

## 2024-11-12 NOTE — ASSESSMENT & PLAN NOTE
-Last Coronary Angio: 3/2/2017--Mild to moderate single-vessel disease involving 30 to 50% stenosis in the mid LAD  -Continue statin therapy    Orders:    ECG 12 Lead

## 2024-11-12 NOTE — TELEPHONE ENCOUNTER
I do not feel comfortable refilling as it looks like Dr. Cam did discontinue it in September during their visit note and he is on Eliquis, taking both of these medications can increase the risk of gastrointestinal bleeding.

## 2024-11-12 NOTE — ASSESSMENT & PLAN NOTE
-Continue Metoprolol for rate control  -Continue Eliquis for CVA prophylaxis  -Asymptomatic with episodes in the past  -No evidence/ report of recurrence

## 2024-11-12 NOTE — TELEPHONE ENCOUNTER
Caller: Naz Garcia    Relationship: Emergency Contact    Best call back number: [phone]     Requested Prescriptions:   CELEXA- CELEBREX        Pharmacy where request should be sent: [facility] [address] - [phone] [facility] [phone]      Last office visit with prescribing clinician: 9/19/2024   Last telemedicine visit with prescribing clinician: Visit date not found   Next office visit with prescribing clinician: 3/19/2025     Additional details provided by patient: PATIENT OUT OF MEDICATION     Does the patient have less than a 3 day supply:  [x] Yes  [] No    Would you like a call back once the refill request has been completed: [] Yes [] No    If the office needs to give you a call back, can they leave a voicemail: [] Yes [] No    Jemal Olvera Rep   11/12/24 13:15 EST

## 2024-11-12 NOTE — ASSESSMENT & PLAN NOTE
-Presents today for preoperative cardiac risk assessment in consideration of undergoing orthopedic surgery  -ECG today shows sinus rhythm with right BBB   -No chest pain or exertional anginal symptoms  -No evidence of decompensated CHF, valvulopathy, or arrhythmia  -The patient is currently at moderate risk for adverse perioperative cardiac events with a low risk revised cardiac risk index of 6.6%.  Given functional status is > 4 METS without anginal symptoms it is reasonable to proceed with the proposed procedure without further cardiac testing or interventions.    -Currently holding Eliquis per patient report--may resume POD #1  -Follow up in one year with Dr. Ríos, sooner if needed    Orders:    ECG 12 Lead

## 2024-11-13 ENCOUNTER — OFFICE VISIT (OUTPATIENT)
Dept: CARDIOLOGY | Facility: CLINIC | Age: 64
End: 2024-11-13
Payer: COMMERCIAL

## 2024-11-13 VITALS
SYSTOLIC BLOOD PRESSURE: 132 MMHG | RESPIRATION RATE: 18 BRPM | WEIGHT: 210 LBS | HEART RATE: 64 BPM | BODY MASS INDEX: 28.44 KG/M2 | HEIGHT: 72 IN | OXYGEN SATURATION: 99 % | DIASTOLIC BLOOD PRESSURE: 78 MMHG

## 2024-11-13 DIAGNOSIS — E78.2 MIXED HYPERLIPIDEMIA: ICD-10-CM

## 2024-11-13 DIAGNOSIS — Z79.4 TYPE 2 DIABETES MELLITUS WITHOUT COMPLICATION, WITH LONG-TERM CURRENT USE OF INSULIN: ICD-10-CM

## 2024-11-13 DIAGNOSIS — Z01.810 PREOPERATIVE CARDIOVASCULAR EXAMINATION: ICD-10-CM

## 2024-11-13 DIAGNOSIS — I48.0 PAROXYSMAL ATRIAL FIBRILLATION: ICD-10-CM

## 2024-11-13 DIAGNOSIS — E11.9 TYPE 2 DIABETES MELLITUS WITHOUT COMPLICATION, WITH LONG-TERM CURRENT USE OF INSULIN: ICD-10-CM

## 2024-11-13 DIAGNOSIS — I25.10 CORONARY ARTERY DISEASE INVOLVING NATIVE CORONARY ARTERY OF NATIVE HEART WITHOUT ANGINA PECTORIS: Primary | ICD-10-CM

## 2024-11-14 PROBLEM — E11.9 TYPE 2 DIABETES MELLITUS, WITH LONG-TERM CURRENT USE OF INSULIN: Status: ACTIVE | Noted: 2024-11-14

## 2024-11-14 PROBLEM — Z79.4 TYPE 2 DIABETES MELLITUS, WITH LONG-TERM CURRENT USE OF INSULIN: Status: ACTIVE | Noted: 2024-11-14

## 2024-11-14 PROBLEM — E11.65 TYPE 2 DIABETES MELLITUS WITH HYPERGLYCEMIA, WITHOUT LONG-TERM CURRENT USE OF INSULIN: Status: RESOLVED | Noted: 2017-02-16 | Resolved: 2024-11-14

## 2024-11-14 NOTE — TELEPHONE ENCOUNTER
Not suppose to take any antiinflammatories while on a blood thinner- ok to take tylenol arthritis 650 mg 2 tablets up to 3 times a day as needed

## 2024-11-15 ENCOUNTER — PATIENT ROUNDING (BHMG ONLY) (OUTPATIENT)
Dept: CARDIOLOGY | Facility: CLINIC | Age: 64
End: 2024-11-15
Payer: COMMERCIAL

## 2024-11-15 DIAGNOSIS — M19.90 UNSPECIFIED OSTEOARTHRITIS, UNSPECIFIED SITE: ICD-10-CM

## 2024-11-15 RX ORDER — CELECOXIB 200 MG/1
CAPSULE ORAL
Qty: 90 CAPSULE | Refills: 1 | OUTPATIENT
Start: 2024-11-15

## 2024-11-15 NOTE — PROGRESS NOTES
[facility] welcome email and rounding message has been sent to patient via [employer].

## 2024-11-15 NOTE — TELEPHONE ENCOUNTER
Left VM per RAMON advising patient of provider recommendation. Asked that he call back with any questions.

## 2024-11-23 DIAGNOSIS — E11.49 TYPE 2 DIABETES MELLITUS WITH OTHER DIABETIC NEUROLOGICAL COMPLICATION: ICD-10-CM

## 2024-11-25 ENCOUNTER — TELEPHONE (OUTPATIENT)
Dept: INTERNAL MEDICINE | Facility: CLINIC | Age: 64
End: 2024-11-25
Payer: COMMERCIAL

## 2024-11-25 DIAGNOSIS — E11.49 TYPE 2 DIABETES MELLITUS WITH OTHER DIABETIC NEUROLOGICAL COMPLICATION: ICD-10-CM

## 2024-11-25 NOTE — TELEPHONE ENCOUNTER
THERE IS A REQUEST ON FILE FOR THIS PATIENTS   GABAPENTIN 300MG 2X [facility] CALLED TO GET A REFILL FOR THIS MEDICATION.   JUST ADDING A NOTE SO THAT IT WON'T BE A DUPLICATE REQUEST.    PEDRO

## 2024-11-26 RX ORDER — GABAPENTIN 300 MG/1
600 CAPSULE ORAL 2 TIMES DAILY
Qty: 120 CAPSULE | Refills: 0 | Status: SHIPPED | OUTPATIENT
Start: 2024-11-26

## 2024-11-26 RX ORDER — GABAPENTIN 300 MG/1
600 CAPSULE ORAL 2 TIMES DAILY
Qty: 120 CAPSULE | Refills: 0 | OUTPATIENT
Start: 2024-11-26

## 2024-11-26 NOTE — TELEPHONE ENCOUNTER
Rx Refill Note  Requested Prescriptions      No prescriptions requested or ordered in this encounter      Last office visit with prescribing clinician: 2/13/2023     Next office visit with prescribing clinician: 3/22/2023                         Would you like a call back once the refill request has been completed: [] Yes [] No    If the office needs to give you a call back, can they leave a voicemail: [] Yes [] No    Charissa Ball MA  03/22/23, 11:15 EDT   Yes

## 2024-12-31 DIAGNOSIS — E11.49 TYPE 2 DIABETES MELLITUS WITH OTHER DIABETIC NEUROLOGICAL COMPLICATION: ICD-10-CM

## 2024-12-31 RX ORDER — GABAPENTIN 300 MG/1
600 CAPSULE ORAL 2 TIMES DAILY
Qty: 120 CAPSULE | Refills: 0 | Status: SHIPPED | OUTPATIENT
Start: 2024-12-31

## 2024-12-31 NOTE — TELEPHONE ENCOUNTER
Rx Refill Note  Requested Prescriptions     Pending Prescriptions Disp Refills    gabapentin (NEURONTIN) 300 MG capsule [Pharmacy Med Name: gabapentin 300 mg capsule] 120 capsule 0     Sig: TAKE TWO CAPSULES BY MOUTH TWICE DAILY      Last office visit with prescribing clinician: 9/19/24  Last telemedicine visit with prescribing clinician: Visit date not found   Next office visit with prescribing clinician: Visit date not found                         Would you like a call back once the refill request has been completed: [] Yes [] No    If the office needs to give you a call back, can they leave a voicemail: [] Yes [] No    Howard Vu MA  12/31/24, 09:57 EST

## 2025-01-03 DIAGNOSIS — J45.909 UNCOMPLICATED ASTHMA, UNSPECIFIED ASTHMA SEVERITY, UNSPECIFIED WHETHER PERSISTENT: ICD-10-CM

## 2025-01-03 DIAGNOSIS — Z79.52 LONG TERM CURRENT USE OF SYSTEMIC STEROIDS: ICD-10-CM

## 2025-01-03 RX ORDER — PREDNISONE 5 MG/1
5 TABLET ORAL DAILY
Qty: 90 TABLET | Refills: 1 | Status: SHIPPED | OUTPATIENT
Start: 2025-01-03

## 2025-01-03 NOTE — TELEPHONE ENCOUNTER
Rx Refill Note  Requested Prescriptions     Pending Prescriptions Disp Refills    predniSONE (DELTASONE) 5 MG tablet [Pharmacy Med Name: prednisone 5 mg tablet] 90 tablet 1     Sig: TAKE ONE TABLET BY MOUTH EVERY DAY      Last office visit with prescribing clinician: 9/19/2024   Last telemedicine visit with prescribing clinician: Visit date not found   Next office visit with prescribing clinician: 3/19/2025                         Would you like a call back once the refill request has been completed: [] Yes [] No    If the office needs to give you a call back, can they leave a voicemail: [] Yes [] No    Howard Vu MA  01/03/25, 13:56 EST

## 2025-01-13 RX ORDER — OMEGA-3S/DHA/EPA/FISH OIL/D3 300MG-1000
400 CAPSULE ORAL DAILY
Qty: 90 TABLET | Refills: 1 | Status: SHIPPED | OUTPATIENT
Start: 2025-01-13

## 2025-01-18 DIAGNOSIS — E11.00 TYPE 2 DIABETES MELLITUS WITH HYPEROSMOLARITY WITHOUT COMA, WITHOUT LONG-TERM CURRENT USE OF INSULIN: ICD-10-CM

## 2025-01-20 DIAGNOSIS — E11.00 TYPE 2 DIABETES MELLITUS WITH HYPEROSMOLARITY WITHOUT COMA, WITHOUT LONG-TERM CURRENT USE OF INSULIN: ICD-10-CM

## 2025-01-20 RX ORDER — INSULIN GLARGINE-YFGN 100 [IU]/ML
INJECTION, SOLUTION SUBCUTANEOUS
Qty: 15 ML | Refills: 2 | OUTPATIENT
Start: 2025-01-20

## 2025-01-21 ENCOUNTER — TELEPHONE (OUTPATIENT)
Dept: INTERNAL MEDICINE | Facility: CLINIC | Age: 65
End: 2025-01-21
Payer: COMMERCIAL

## 2025-01-21 DIAGNOSIS — Z79.4 TYPE 2 DIABETES MELLITUS WITHOUT COMPLICATION, WITH LONG-TERM CURRENT USE OF INSULIN: Primary | ICD-10-CM

## 2025-01-21 DIAGNOSIS — E11.9 TYPE 2 DIABETES MELLITUS WITHOUT COMPLICATION, WITH LONG-TERM CURRENT USE OF INSULIN: Primary | ICD-10-CM

## 2025-01-21 NOTE — TELEPHONE ENCOUNTER
"    Caller: Darnell Garcia \"HEENA\"    Relationship: Self    Best call back number: [phone]      Which medication are you concerned about: JARDIANCE NO LONGER COVERED ON HIS NEW INSURANCE. HEENA NEEDS A SUBSTITUTE.  HE HAS 1 WEEK LEFT.  "

## 2025-01-21 NOTE — TELEPHONE ENCOUNTER
Upcoming appt 3/19, spoke with patient and he states insurance did not provide what medication would be covered.

## 2025-01-23 DIAGNOSIS — E11.49 TYPE 2 DIABETES MELLITUS WITH OTHER DIABETIC NEUROLOGICAL COMPLICATION: ICD-10-CM

## 2025-01-23 RX ORDER — GABAPENTIN 300 MG/1
600 CAPSULE ORAL 2 TIMES DAILY
Qty: 120 CAPSULE | Refills: 0 | OUTPATIENT
Start: 2025-01-23

## 2025-01-23 NOTE — TELEPHONE ENCOUNTER
Rx Refill Note  Requested Prescriptions     Pending Prescriptions Disp Refills    gabapentin (NEURONTIN) 300 MG capsule [Pharmacy Med Name: gabapentin 300 mg capsule] 120 capsule 0     Sig: TAKE TWO CAPSULES BY MOUTH TWICE DAILY      Last office visit with prescribing clinician: Visit date not found   Last telemedicine visit with prescribing clinician: Visit date not found   Next office visit with prescribing clinician: Visit date not found   Uds 12-27-23  Csa 9-19-24    Garth Zavala MA  01/23/25, 10:30 EST

## 2025-01-27 ENCOUNTER — REFERRAL TRIAGE (OUTPATIENT)
Age: 65
End: 2025-01-27
Payer: COMMERCIAL

## 2025-01-27 ENCOUNTER — OFFICE VISIT (OUTPATIENT)
Dept: INTERNAL MEDICINE | Facility: CLINIC | Age: 65
End: 2025-01-27
Payer: COMMERCIAL

## 2025-01-27 ENCOUNTER — TELEPHONE (OUTPATIENT)
Dept: INTERNAL MEDICINE | Facility: CLINIC | Age: 65
End: 2025-01-27

## 2025-01-27 VITALS
BODY MASS INDEX: 28.04 KG/M2 | HEART RATE: 73 BPM | SYSTOLIC BLOOD PRESSURE: 125 MMHG | WEIGHT: 207 LBS | OXYGEN SATURATION: 96 % | DIASTOLIC BLOOD PRESSURE: 67 MMHG | RESPIRATION RATE: 17 BRPM | HEIGHT: 72 IN | TEMPERATURE: 97.7 F

## 2025-01-27 DIAGNOSIS — Z79.52 LONG TERM CURRENT USE OF SYSTEMIC STEROIDS: ICD-10-CM

## 2025-01-27 DIAGNOSIS — I48.0 PAROXYSMAL ATRIAL FIBRILLATION: ICD-10-CM

## 2025-01-27 DIAGNOSIS — E11.49 OTHER DIABETIC NEUROLOGICAL COMPLICATION ASSOCIATED WITH TYPE 2 DIABETES MELLITUS: ICD-10-CM

## 2025-01-27 DIAGNOSIS — J45.40 MODERATE PERSISTENT ASTHMA WITHOUT COMPLICATION: ICD-10-CM

## 2025-01-27 DIAGNOSIS — N40.1 BENIGN PROSTATIC HYPERPLASIA WITH LOWER URINARY TRACT SYMPTOMS, SYMPTOM DETAILS UNSPECIFIED: ICD-10-CM

## 2025-01-27 DIAGNOSIS — E78.2 MIXED HYPERLIPIDEMIA: ICD-10-CM

## 2025-01-27 DIAGNOSIS — I25.10 CORONARY ARTERY DISEASE INVOLVING NATIVE CORONARY ARTERY OF NATIVE HEART WITHOUT ANGINA PECTORIS: ICD-10-CM

## 2025-01-27 DIAGNOSIS — J15.9 COMMUNITY ACQUIRED BACTERIAL PNEUMONIA: ICD-10-CM

## 2025-01-27 DIAGNOSIS — K21.9 GASTROESOPHAGEAL REFLUX DISEASE WITHOUT ESOPHAGITIS: ICD-10-CM

## 2025-01-27 DIAGNOSIS — E11.9 TYPE 2 DIABETES MELLITUS WITHOUT COMPLICATION, WITH LONG-TERM CURRENT USE OF INSULIN: Primary | ICD-10-CM

## 2025-01-27 DIAGNOSIS — Z79.4 TYPE 2 DIABETES MELLITUS WITHOUT COMPLICATION, WITH LONG-TERM CURRENT USE OF INSULIN: Primary | ICD-10-CM

## 2025-01-27 PROCEDURE — 99214 OFFICE O/P EST MOD 30 MIN: CPT | Performed by: STUDENT IN AN ORGANIZED HEALTH CARE EDUCATION/TRAINING PROGRAM

## 2025-01-27 RX ORDER — AZITHROMYCIN 250 MG/1
TABLET, FILM COATED ORAL
Qty: 6 TABLET | Refills: 0 | Status: SHIPPED | OUTPATIENT
Start: 2025-01-27

## 2025-01-27 RX ORDER — GUAIFENESIN 600 MG/1
600 TABLET, EXTENDED RELEASE ORAL 2 TIMES DAILY
Qty: 20 TABLET | Refills: 2 | Status: SHIPPED | OUTPATIENT
Start: 2025-01-27

## 2025-01-27 NOTE — ASSESSMENT & PLAN NOTE
advised that we have to manage diabetes as this is worsening his polyneuropathy.  Gabapentin 600 twice a day  Stable on current medication and dosage. Will continue current management. Refill medication as necessary.  CSA up-to-date 9/2024, UDS done today, pending.  Giacomo patel

## 2025-01-27 NOTE — TELEPHONE ENCOUNTER
Pharmacy Name: [facility] [address] [place] - [phone] [facility] [phone]          Pharmacy representative phone number: [phone]     What medication are you calling in regards to: Insulin Lispro 200 UNIT/ML solution pen-injector     What question does the pharmacy have: PHARMACY IS CONCERNED BECAUSE PATIENT HAS NEVER BEEN ON THIS BEFORE AND IT IS A 90 DAY PRESCRIPTION BUT WILL NOT BE GOOD AFTER 28 DAYS    Who is the provider that prescribed the medication: DR CARPIO    Additional notes:

## 2025-01-27 NOTE — PROGRESS NOTES
Office Note     Name: Darnell Garcia    : 1960     MRN: [id number]     Chief Complaint  Diabetes (Discuss medications)    Subjective     History of Present Illness:  Darnell Garcia is a 64 y.o. male who presents today for chronic conditions.    HX of CAD, Afib: on eliquis, metoprolol 12.5 follows with cardiology  HLD on atorvastatin stable LDL  Dm type 2 with peripheral neuropathy: A1c on 2024 was 7.4%, on jardiance 25 which is not going to be covered, canagliflozin was $90 and too expensive for patient, glimepiride 4, Metformin, Lantus 20units in the morning and humalog 3-5 units, but noncompliant with humalog. Dexcom usually 203-230, in the morning 70-75 at 8am then elevated when he eats.   DM Neuropathy on bilateral, lower and upper extremities at tips of fingers and toes. On gabapentin 600mg BID  Vitamin D deficiency on vitamin D  Chronic Anemia: hgb 12.1, no bleeding episodes, recent colonoscopy done  Moderate persistent asthma: been on intermittent PO steroids, triple therapy inhaler, prn albuterol. Symbicort helps  GERD: stable on pepcid  ED and BPH follows with urology  Neck pain and left shoulder pain: hx of plates, follows with ortho      Colonoscopy: 2023, repeat in 5 years. 2022 polyps, hx of Low-grade appendiceal mucinous neoplasm   Nonsmoker  DXA scan: osteopenia, on OTC clcium  Psa normal    He complains about cough, congestion.  Positive sick contact diagnosed with pneumonia.  Family History:   Family History   Problem Relation Age of Onset    Breast cancer Mother     Osteoarthritis Mother     Arthritis Mother     Cancer Mother         Breast    Coronary artery disease Father     Heart attack Father         late 60's    Cancer Father         Skin    Lymphoma Son     Leukemia Son     Cancer Son         Lymphoblastic Leukemia    Colon cancer Neg Hx        Social History:   Social History     Socioeconomic History    Marital status:    Tobacco Use    Smoking status: Never      "Passive exposure: Never    Smokeless tobacco: Never   Vaping Use    Vaping status: Never Used   Substance and Sexual Activity    Alcohol use: Not Currently     Alcohol/week: 4.0 standard drinks of alcohol     Comment: on weekends    Drug use: No    Sexual activity: Not Currently     Partners: Female     Birth control/protection: Vasectomy       Health Maintenance   Topic Date Due    Pneumococcal Vaccine 0-64 (2 of 2 - PCV) 06/15/2018    DIABETIC EYE EXAM  08/29/2023    DIABETIC FOOT EXAM  09/28/2024    BMI FOLLOWUP  01/23/2025    HEMOGLOBIN A1C  03/19/2025    COVID-19 Vaccine (4 - 2024-25 season) 09/18/2025 (Originally 9/1/2024)    ZOSTER VACCINE (1 of 2) 09/18/2025 (Originally 4/14/2010)    LIPID PANEL  05/15/2025    URINE MICROALBUMIN  05/15/2025    ANNUAL PHYSICAL  09/19/2025    TDAP/TD VACCINES (2 - Td or Tdap) 02/16/2027    COLORECTAL CANCER SCREENING  11/28/2033    HEPATITIS C SCREENING  Completed    INFLUENZA VACCINE  Completed       Patient Care Team:  Irina Cam MD as PCP - General (Family Medicine)    Objective     Vital Signs  /67   Pulse 73   Temp 97.7 °F (36.5 °C) (Infrared)   Resp 17   Ht 182.9 cm (72.01\")   Wt 93.9 kg (207 lb)   SpO2 96%   BMI 28.07 kg/m²   Estimated body mass index is 28.07 kg/m² as calculated from the following:    Height as of this encounter: 182.9 cm (72.01\").    Weight as of this encounter: 93.9 kg (207 lb).  BMI is >= 25 and <30. (Overweight) The following options were offered after discussion;: exercise counseling/recommendations and nutrition counseling/recommendations    Physical Exam  Vitals and nursing note reviewed.   Constitutional:       Appearance: Normal appearance.   HENT:      Head: Normocephalic and atraumatic.   Cardiovascular:      Rate and Rhythm: Normal rate and regular rhythm.      Pulses: Normal pulses.      Heart sounds: Normal heart sounds.   Pulmonary:      Effort: Pulmonary effort is normal. No respiratory distress.      Breath " sounds: Wheezing and rhonchi present. No rales.   Abdominal:      General: Abdomen is flat. Bowel sounds are normal.      Palpations: Abdomen is soft.      Tenderness: There is no abdominal tenderness. There is no guarding.   Musculoskeletal:      Cervical back: Neck supple.   Skin:     General: Skin is warm.      Capillary Refill: Capillary refill takes less than 2 seconds.   Neurological:      General: No focal deficit present.      Mental Status: He is alert. Mental status is at baseline.   Psychiatric:         Mood and Affect: Mood normal.         Behavior: Behavior normal.          Procedures     Assessment and Plan     Diagnoses and all orders for this visit:    1. Type 2 diabetes mellitus without complication, with long-term current use of insulin (Primary)  Assessment & Plan:  A1c done today with labs, still pending. Patient notes that he has not been taking humalog and only asked for refills a month ago. Dexcom showed uncontrolled sugar levels with lows in the morning. Start taking invokana in PM, continue glimepiride in AM, Metformin BID, Lantus in AM and Humalog 4 units if BG >150.     Orders:  -     Insulin Lispro 200 UNIT/ML solution pen-injector; Inject 4 Units under the skin into the appropriate area as directed 3 times a day.  Dispense: 3 mL; Refill: 5  -     Ambulatory Referral to Social Care Services (Amb Case Mgmt)    2. Mixed hyperlipidemia  Assessment & Plan:  Stable on current medication and dosage. Will continue current management. Refill medication as necessary.  atorvastatin       3. Coronary artery disease involving native coronary artery of native heart without angina pectoris  Assessment & Plan:  Follows with cardiology on Eliquis, metoprolol       4. Paroxysmal atrial fibrillation  Assessment & Plan:  Follows with cardiology on Eliquis and metoprolol      5. Long term current use of systemic steroids  Assessment & Plan:  Stable at this time  Prednisone daily      6. Gastroesophageal  reflux disease without esophagitis  Assessment & Plan:  On pepcid  Stable on current medication and dosage. Will continue current management. Refill medication as necessary.        7. Benign prostatic hyperplasia with lower urinary tract symptoms, symptom details unspecified  Assessment & Plan:  Follows with urology      8. Other diabetic neurological complication associated with type 2 diabetes mellitus  Assessment & Plan:  advised that we have to manage diabetes as this is worsening his polyneuropathy.  Gabapentin 600 twice a day  Stable on current medication and dosage. Will continue current management. Refill medication as necessary.  CSA up-to-date 9/2024, UDS done today, pending.  Giacomo patel    Orders:  -     ToxAssure Flex 23, Ur -    9. Moderate persistent asthma without complication  Assessment & Plan:  Follows with pulmonology  On trelegy ellipta, prn albuterol      10. Community acquired bacterial pneumonia  Assessment & Plan:  Start antibiotics as prescribed, if symptoms worsening or with difficulty breathing or fever, call clinic    Orders:  -     azithromycin (Zithromax Z-Edward) 250 MG tablet; Take 2 tablets by mouth on day 1, then 1 tablet daily on days 2-5  Dispense: 6 tablet; Refill: 0  -     guaiFENesin (Mucinex) 600 MG 12 hr tablet; Take 1 tablet by mouth 2 (Two) Times a Day.  Dispense: 20 tablet; Refill: 2         Counseling was given to patient for the following topics: instructions for management, risks and benefits of treatment options, and importance of treatment compliance.    Follow Up  Return for Next scheduled follow up.    MD CELESTINA Dsouza MR  [facility]  [address] [place] [id number] [phone]

## 2025-01-27 NOTE — ASSESSMENT & PLAN NOTE
A1c done today with labs, still pending. Patient notes that he has not been taking humalog and only asked for refills a month ago. Dexcom showed uncontrolled sugar levels with lows in the morning. Start taking invokana in PM, continue glimepiride in AM, Metformin BID, Lantus in AM and Humalog 4 units if BG >150.

## 2025-01-27 NOTE — ASSESSMENT & PLAN NOTE
Start antibiotics as prescribed, if symptoms worsening or with difficulty breathing or fever, call clinic

## 2025-01-28 ENCOUNTER — TELEPHONE (OUTPATIENT)
Dept: INTERNAL MEDICINE | Facility: CLINIC | Age: 65
End: 2025-01-28
Payer: COMMERCIAL

## 2025-01-28 LAB
BUN SERPL-MCNC: 16 MG/DL (ref 8–23)
BUN/CREAT SERPL: 16.5 (ref 7–25)
CALCIUM SERPL-MCNC: 9.3 MG/DL (ref 8.6–10.5)
CHLORIDE SERPL-SCNC: 101 MMOL/L (ref 98–107)
CHOLEST SERPL-MCNC: 123 MG/DL (ref 0–200)
CO2 SERPL-SCNC: 26.6 MMOL/L (ref 22–29)
CREAT SERPL-MCNC: 0.97 MG/DL (ref 0.76–1.27)
EGFRCR SERPLBLD CKD-EPI 2021: 87.2 ML/MIN/1.73
ERYTHROCYTE [DISTWIDTH] IN BLOOD BY AUTOMATED COUNT: 12.2 % (ref 12.3–15.4)
GLUCOSE SERPL-MCNC: 269 MG/DL (ref 65–99)
HBA1C MFR BLD: 8.1 % (ref 4.8–5.6)
HCT VFR BLD AUTO: 37.7 % (ref 37.5–51)
HDLC SERPL-MCNC: 35 MG/DL (ref 40–60)
HGB BLD-MCNC: 12.7 G/DL (ref 13–17.7)
LDLC SERPL CALC-MCNC: 58 MG/DL (ref 0–100)
MCH RBC QN AUTO: 30.8 PG (ref 26.6–33)
MCHC RBC AUTO-ENTMCNC: 33.7 G/DL (ref 31.5–35.7)
MCV RBC AUTO: 91.3 FL (ref 79–97)
PLATELET # BLD AUTO: 262 10*3/MM3 (ref 140–450)
POTASSIUM SERPL-SCNC: 4.5 MMOL/L (ref 3.5–5.2)
RBC # BLD AUTO: 4.13 10*6/MM3 (ref 4.14–5.8)
SODIUM SERPL-SCNC: 139 MMOL/L (ref 136–145)
TRIGL SERPL-MCNC: 182 MG/DL (ref 0–150)
TSH SERPL DL<=0.005 MIU/L-ACNC: 2.06 UIU/ML (ref 0.27–4.2)
VLDLC SERPL CALC-MCNC: 30 MG/DL (ref 5–40)
WBC # BLD AUTO: 5.91 10*3/MM3 (ref 3.4–10.8)

## 2025-01-28 NOTE — TELEPHONE ENCOUNTER
"LVM for pt to return call to our office for lab results.    Relay     \"A1c back up to 8.1%, NEED to be compliant with humalog. Referral sent to social work for invokana/jardiance coverage. \"                  "

## 2025-01-29 ENCOUNTER — PATIENT OUTREACH (OUTPATIENT)
Age: 65
End: 2025-01-29
Payer: COMMERCIAL

## 2025-01-29 NOTE — OUTREACH NOTE
Social Work Assessment  Questions/Answers      Flowsheet Row Most Recent Value   Referral Source physician   Reason for Consult medication concerns   Preferred Language English   People in Home spouse   Current Living Arrangements home   Potentially Unsafe Housing Conditions none   In the past 12 months has the electric, gas, oil, or water company threatened to shut off services in your home? Pt Unable   Primary Care Provided by self   Provides Primary Care For no one   Family Caregiver if Needed spouse   Quality of Family Relationships unable to assess   Source of Income salary/wages   Financial/Environmental Concerns unable to afford medication(s)   Application for Public Assistance obtained public assistance pending number        SDOH updated and reviewed with the patient during this program:  --     Alcohol Use: Alcohol Misuse (11/7/2022)    AUDIT-C     Frequency of Alcohol Consumption: 2-4 times a month     Average Number of Drinks: 3 or 4     Frequency of Binge Drinking: Less than monthly      --     Depression: Not at risk (1/27/2025)    PHQ-2     PHQ-2 Score: 0      --     Disabilities: Not At Risk (11/22/2023)    Disabilities     Concentrating, Remembering, or Making Decisions Difficulty: no     Doing Errands Independently Difficulty: no      --      Received from [employer], [employer] [facility]    Employment      Financial Resource Strain: Low Risk  (1/29/2025)    Overall Financial Resource Strain (CARDI)     Difficulty of Paying Living Expenses: Not very hard      --     Food Insecurity: Patient Unable To Answer (1/29/2025)    Hunger Vital Sign     Worried About Running Out of Food in the Last Year: Patient unable to answer     Ran Out of Food in the Last Year: Patient unable to answer      --     Health Literacy: Unknown (1/29/2025)    Education     Preferred Language: English      --     Housing Stability: Patient Unable To Answer (1/29/2025)    Housing Stability Vital Sign      Unable to Pay for Housing in the Last Year: Patient unable to answer     Homeless in the Last Year: Patient unable to answer      --     Interpersonal Safety: Patient Unable To Answer (1/29/2025)    Humiliation, Afraid, Rape, and Kick questionnaire     Fear of Current or Ex-Partner: Patient unable to answer     Emotionally Abused: Patient unable to answer     Physically Abused: Patient unable to answer     Sexually Abused: Patient unable to answer        --     Social Connections: Unknown (1/29/2025)    Social Connection and Isolation Panel [NHANES]     Frequency of Communication with Friends and Family: Patient unable to answer     Frequency of Social Gatherings with Friends and Family: Patient unable to answer     Attends Mandaeism Services: Patient unable to answer     Active Member of Clubs or Organizations: Patient unable to answer     Attends Club or Organization Meetings: Patient unable to answer     Marital Status:       --     Stress: Patient Unable To Answer (1/29/2025)    [employer] [place] [employer] - Occupational Stress Questionnaire     Feeling of Stress : Patient unable to answer      --     Tobacco Use: Low Risk  (1/27/2025)    Patient History     Smoking Tobacco Use: Never     Smokeless Tobacco Use: Never     Passive Exposure: Never      --     Transportation Needs: Patient Unable To Answer (1/29/2025)    PRAPARE - Transportation     Lack of Transportation (Medical): Patient unable to answer     Lack of Transportation (Non-Medical): Patient unable to answer      --     Utilities: Patient Unable To Answer (1/29/2025)    [facility] Utilities     Threatened with loss of utilities: Patient unable to answer      Continuing Care   [facility]   MEIDCATION ASSISTANCE [facility] [place] [facility]    [address], [facility] [address]    Phone: [phone]    Request Status: Accepted    Services: Financial Assistance    Resource for: Financial Resource Strain, Utilities   Patient  Outreach    SW spoke with pt spouse re his referral. SW discussed that [employer] has a saving's card that pt may qualify for with his commercial insurance. SW and pt spouse also discussed that the [facility] program may help with prescription assistance as well. SW offered to email these resources to the pt. Pt spouse was agreeable to this plan and expressive of thanks. SW will F/u in a week to monitor for progress and assist as needed.     Dony BAXTER -   Ambulatory Case Management    1/29/2025, 12:06 EST

## 2025-01-29 NOTE — TELEPHONE ENCOUNTER
Notified Sara at [facility] that Dr. Cam has confirmed this to be a 90 day supply. Sara stated that she would inform the pharmacist.

## 2025-02-03 LAB
1OH-MIDAZOLAM UR QL SCN: NOT DETECTED NG/MG CREAT
6MAM UR QL SCN: NEGATIVE NG/ML
7AMINOCLONAZEPAM/CREAT UR: NOT DETECTED NG/MG CREAT
A-OH ALPRAZ/CREAT UR: NOT DETECTED NG/MG CREAT
A-OH-TRIAZOLAM/CREAT UR CFM: NOT DETECTED NG/MG CREAT
ACP UR QL CFM: NOT DETECTED
ALPRAZ/CREAT UR CFM: NOT DETECTED NG/MG CREAT
AMPHETAMINES UR QL SCN: NEGATIVE NG/ML
APAP UR QL SCN: NORMAL UG/ML
APAP UR QL: NORMAL
APAP UR-MCNC: PRESENT UG/ML
BARBITURATES UR QL SCN: NEGATIVE NG/ML
BENZODIAZ SCN METH UR: NEGATIVE
BUPRENORPHINE UR QL SCN: NEGATIVE
BUPRENORPHINE/CREAT UR: NOT DETECTED NG/MG CREAT
CANNABINOIDS UR QL SCN: NEGATIVE NG/ML
CARISOPRODOL UR QL: NEGATIVE NG/ML
CLONAZEPAM/CREAT UR CFM: NOT DETECTED NG/MG CREAT
COCAINE+BZE UR QL SCN: NEGATIVE NG/ML
CREAT UR-MCNC: 56 MG/DL
D-METHORPHAN UR-MCNC: NOT DETECTED NG/ML
D-METHORPHAN+LEVORPHANOL UR QL: PRESENT
DESALKYLFLURAZ/CREAT UR: NOT DETECTED NG/MG CREAT
DIAZEPAM/CREAT UR: NOT DETECTED NG/MG CREAT
ETHANOL UR QL SCN: NEGATIVE G/DL
ETHANOL UR QL SCN: NEGATIVE NG/ML
FENTANYL CTO UR SCN-MCNC: NEGATIVE NG/ML
FENTANYL/CREAT UR: NOT DETECTED NG/MG CREAT
FLUNITRAZEPAM UR QL SCN: NOT DETECTED NG/MG CREAT
GABAPENTIN UR CFM-MCNC: PRESENT NG/ML
GABAPENTIN UR QL CFM: NORMAL
GABAPENTIN UR-MCNC: NORMAL UG/ML
HALLUCINOGENS UR: NEGATIVE
HYPNOTICS UR QL SCN: NEGATIVE
KETAMINE UR QL: NOT DETECTED
LORAZEPAM/CREAT UR: NOT DETECTED NG/MG CREAT
MEPERIDINE UR QL SCN: NEGATIVE NG/ML
METHADONE UR QL SCN: NEGATIVE NG/ML
METHADONE+METAB UR QL SCN: NEGATIVE NG/ML
MIDAZOLAM/CREAT UR CFM: NOT DETECTED NG/MG CREAT
MISCELLANEOUS, UR: NORMAL
NORBUPRENORPHINE/CREAT UR: NOT DETECTED NG/MG CREAT
NORDIAZEPAM/CREAT UR: NOT DETECTED NG/MG CREAT
NORFENTANYL/CREAT UR: NOT DETECTED NG/MG CREAT
NORFLUNITRAZEPAM UR-MCNC: NOT DETECTED NG/MG CREAT
NORKETAMINE UR-MCNC: NOT DETECTED UG/ML
OPIATES UR SCN-MCNC: NEGATIVE NG/ML
OXAZEPAM/CREAT UR: NOT DETECTED NG/MG CREAT
OXYCODONE CTO UR SCN-MCNC: NEGATIVE NG/ML
PCP UR QL SCN: NEGATIVE NG/ML
PRESCRIBED MEDICATIONS: NORMAL
PROPOXYPH UR QL SCN: NEGATIVE NG/ML
TAPENTADOL CTO UR SCN-MCNC: NEGATIVE NG/ML
TEMAZEPAM/CREAT UR: NOT DETECTED NG/MG CREAT
TRAMADOL UR QL SCN: NEGATIVE NG/ML
ZALEPLON UR-MCNC: NOT DETECTED NG/ML
ZOLPIDEM PHENYL-4-CARB UR QL SCN: NOT DETECTED
ZOLPIDEM UR QL SCN: NOT DETECTED
ZOPICLONE-N-OXIDE UR-MCNC: NOT DETECTED NG/ML

## 2025-02-07 ENCOUNTER — PATIENT OUTREACH (OUTPATIENT)
Age: 65
End: 2025-02-07
Payer: COMMERCIAL

## 2025-02-07 NOTE — OUTREACH NOTE
Patient Outreach    SW F/u with pt spouse re the progress on Jardiance issues. Pt spouse stated they do have the saving's card, and require no further assistance at this time. MEHRDAD will D/c since initial outreach goal has been met.     Dony BAXTER -   Ambulatory Case Management    2/7/2025, 12:13 EST

## 2025-02-15 DIAGNOSIS — J15.9 COMMUNITY ACQUIRED BACTERIAL PNEUMONIA: ICD-10-CM

## 2025-02-15 RX ORDER — GUAIFENESIN 600 MG/1
600 TABLET, EXTENDED RELEASE ORAL 2 TIMES DAILY
Qty: 20 TABLET | Refills: 2 | Status: SHIPPED | OUTPATIENT
Start: 2025-02-15

## 2025-03-03 DIAGNOSIS — I48.0 PAROXYSMAL ATRIAL FIBRILLATION: ICD-10-CM

## 2025-03-03 NOTE — TELEPHONE ENCOUNTER
Caller: [facility] [address] [facility] [address] [facility] [address] [facility] - [phone]  - [phone] FX    Relationship: Pharmacy    Best call back number: [phone]     Requested Prescriptions:   Requested Prescriptions     Pending Prescriptions Disp Refills    apixaban (Eliquis) 5 MG tablet tablet 90 tablet 1     Sig: Take 1 tablet by mouth.        Pharmacy where request should be sent:   [facility] [address] - [phone]  - [phone]      Last office visit with prescribing clinician: 1/27/2025   Last telemedicine visit with prescribing clinician: Visit date not found   Next office visit with prescribing clinician: 3/19/2025     Additional details provided by patient: HEENA IS A TRAY PATIENT, MATTY NEEDS TO GET THE TRAY DONE SO THEY CAN SEND OUT TOMORROW AFTERNOON.    Does the patient have less than a 3 day supply:  [x] Yes  [] No       Jemal Salas Rep   03/03/25 15:33 EST

## 2025-03-05 DIAGNOSIS — E11.49 TYPE 2 DIABETES MELLITUS WITH OTHER DIABETIC NEUROLOGICAL COMPLICATION: ICD-10-CM

## 2025-03-05 DIAGNOSIS — E11.65 TYPE 2 DIABETES MELLITUS WITH HYPERGLYCEMIA, WITHOUT LONG-TERM CURRENT USE OF INSULIN: ICD-10-CM

## 2025-03-05 RX ORDER — GABAPENTIN 300 MG/1
600 CAPSULE ORAL 2 TIMES DAILY
Qty: 120 CAPSULE | Refills: 0 | Status: SHIPPED | OUTPATIENT
Start: 2025-03-05

## 2025-03-08 DIAGNOSIS — J15.9 COMMUNITY ACQUIRED BACTERIAL PNEUMONIA: ICD-10-CM

## 2025-03-08 RX ORDER — GUAIFENESIN 600 MG/1
600 TABLET, EXTENDED RELEASE ORAL 2 TIMES DAILY
Qty: 20 TABLET | Refills: 2 | OUTPATIENT
Start: 2025-03-08

## 2025-03-19 ENCOUNTER — OFFICE VISIT (OUTPATIENT)
Dept: INTERNAL MEDICINE | Facility: CLINIC | Age: 65
End: 2025-03-19
Payer: COMMERCIAL

## 2025-03-19 VITALS
OXYGEN SATURATION: 94 % | SYSTOLIC BLOOD PRESSURE: 100 MMHG | BODY MASS INDEX: 27.09 KG/M2 | WEIGHT: 200 LBS | HEART RATE: 62 BPM | RESPIRATION RATE: 18 BRPM | TEMPERATURE: 97.6 F | HEIGHT: 72 IN | DIASTOLIC BLOOD PRESSURE: 60 MMHG

## 2025-03-19 DIAGNOSIS — E11.49 OTHER DIABETIC NEUROLOGICAL COMPLICATION ASSOCIATED WITH TYPE 2 DIABETES MELLITUS: ICD-10-CM

## 2025-03-19 DIAGNOSIS — E11.9 TYPE 2 DIABETES MELLITUS WITHOUT COMPLICATION, WITH LONG-TERM CURRENT USE OF INSULIN: Primary | ICD-10-CM

## 2025-03-19 DIAGNOSIS — E78.2 MIXED HYPERLIPIDEMIA: ICD-10-CM

## 2025-03-19 DIAGNOSIS — I48.0 PAROXYSMAL ATRIAL FIBRILLATION: ICD-10-CM

## 2025-03-19 DIAGNOSIS — K21.9 GASTROESOPHAGEAL REFLUX DISEASE WITHOUT ESOPHAGITIS: ICD-10-CM

## 2025-03-19 DIAGNOSIS — J45.40 MODERATE PERSISTENT ASTHMA WITHOUT COMPLICATION: ICD-10-CM

## 2025-03-19 DIAGNOSIS — Z79.4 TYPE 2 DIABETES MELLITUS WITHOUT COMPLICATION, WITH LONG-TERM CURRENT USE OF INSULIN: Primary | ICD-10-CM

## 2025-03-19 PROBLEM — J15.9 COMMUNITY ACQUIRED BACTERIAL PNEUMONIA: Status: RESOLVED | Noted: 2025-01-27 | Resolved: 2025-03-19

## 2025-03-19 PROBLEM — Z01.810 PREOPERATIVE CARDIOVASCULAR EXAMINATION: Status: RESOLVED | Noted: 2023-11-22 | Resolved: 2025-03-19

## 2025-03-19 PROBLEM — G56.21 CUBITAL TUNNEL SYNDROME ON RIGHT: Status: RESOLVED | Noted: 2024-09-12 | Resolved: 2025-03-19

## 2025-03-19 PROBLEM — G56.01 CARPAL TUNNEL SYNDROME OF RIGHT WRIST: Status: RESOLVED | Noted: 2024-09-12 | Resolved: 2025-03-19

## 2025-03-19 RX ORDER — EMPAGLIFLOZIN 25 MG/1
1 TABLET, FILM COATED ORAL DAILY
COMMUNITY
Start: 2025-03-01

## 2025-03-19 RX ORDER — CHLORHEXIDINE GLUCONATE ORAL RINSE 1.2 MG/ML
SOLUTION DENTAL
COMMUNITY
Start: 2025-03-10

## 2025-03-19 NOTE — ASSESSMENT & PLAN NOTE
advised that we have to manage diabetes as this is worsening his polyneuropathy.  Gabapentin 600 twice a day  Stable on current medication and dosage. Will continue current management. Refill medication as necessary.  CSA up-to-date 9/2024, UDS 1/2025.  Giacomo patel

## 2025-03-19 NOTE — ASSESSMENT & PLAN NOTE
Patient says that Jardiance will not be covered anymore this year.  Advised to increase Lantus back to 20 units daily and compliance with Humalog.  Continue glimepiride 4 mg once a day, metformin 1000 mg twice a day.  And start Januvia 50 mg daily.  If A1c still is not at goal and if patient is still noncompliant will refer to endocrinology

## 2025-03-19 NOTE — ASSESSMENT & PLAN NOTE
Stable on current medication and dosage. Will continue current management. Refill medication as necessary.  Pepcid

## 2025-03-19 NOTE — PROGRESS NOTES
Office Note     Name: Darnell Garcia    : 1960     MRN: [id number]     Chief Complaint  Diabetes (6 month follow up/)    Subjective     History of Present Illness:  Darnell Garcia is a 64 y.o. male who presents today for chronic conditions.    HX of CAD, Afib: on eliquis, metoprolol 12.5 follows with cardiology  HLD on atorvastatin high LDL  Dm type 2 with peripheral neuropathy: A1c 8.1% glimepiride 4 mg once a day, metformin 1000 mg twice a day, Lantus 16 units in the morning but noncompliant with Humalog 4 units and says he does not use it when out of the house. Did not start invokana.  Average blood sugar at home is around 200s    DM Neuropathy on bilateral, lower and upper extremities at tips of fingers and toes. On gabapentin 600mg BID  Vitamin D deficiency on vitamin D  Chronic Anemia: hgb 12.7, no bleeding episodes, recent colonoscopy done  Moderate persistent asthma: been on intermittent PO steroids, triple therapy inhaler, prn albuterol. Symbicort helps  GERD: stable on pepcid  ED and BPH follows with urology  Neck pain and left shoulder pain: hx of plates, follows with ortho      Colonoscopy: 2023, repeat in 5 years. 2022 polyps, hx of Low-grade appendiceal mucinous neoplasm   Nonsmoker  DXA scan: osteopenia, on OTC clcium  Psa normal    Family History:   Family History   Problem Relation Age of Onset    Breast cancer Mother     Osteoarthritis Mother     Arthritis Mother     Cancer Mother         Breast    Coronary artery disease Father     Heart attack Father         late 60's    Cancer Father         Skin    Lymphoma Son     Leukemia Son     Cancer Son         Lymphoblastic Leukemia    Colon cancer Neg Hx        Social History:   Social History     Socioeconomic History    Marital status:    Tobacco Use    Smoking status: Never     Passive exposure: Never    Smokeless tobacco: Never   Vaping Use    Vaping status: Never Used   Substance and Sexual Activity    Alcohol use: Not  "Currently     Alcohol/week: 4.0 standard drinks of alcohol     Types: 4 Cans of beer per week     Comment: on weekends    Drug use: No    Sexual activity: Not Currently     Partners: Female     Birth control/protection: Vasectomy       Health Maintenance   Topic Date Due    URINE MICROALBUMIN-CREATININE RATIO (uACR)  Never done    Pneumococcal Vaccine 50+ (2 of 2 - PCV) 06/15/2018    DIABETIC EYE EXAM  08/29/2023    DIABETIC FOOT EXAM  09/28/2024    COVID-19 Vaccine (4 - 2024-25 season) 09/18/2025 (Originally 9/1/2024)    ZOSTER VACCINE (1 of 2) 09/18/2025 (Originally 4/14/2010)    HEMOGLOBIN A1C  07/27/2025    ANNUAL PHYSICAL  09/19/2025    LIPID PANEL  01/27/2026    BMI FOLLOWUP  01/27/2026    TDAP/TD VACCINES (2 - Td or Tdap) 02/16/2027    COLORECTAL CANCER SCREENING  11/28/2033    HEPATITIS C SCREENING  Completed    INFLUENZA VACCINE  Completed       Patient Care Team:  Irina Cam MD as PCP - General (Family Medicine)    Objective     Vital Signs  /60   Pulse 62   Temp 97.6 °F (36.4 °C) (Infrared)   Resp 18   Ht 182.9 cm (72.01\")   Wt 90.7 kg (200 lb)   SpO2 94%   BMI 27.12 kg/m²   Estimated body mass index is 27.12 kg/m² as calculated from the following:    Height as of this encounter: 182.9 cm (72.01\").    Weight as of this encounter: 90.7 kg (200 lb).        Physical Exam  Vitals and nursing note reviewed.   Constitutional:       Appearance: Normal appearance.   HENT:      Head: Normocephalic and atraumatic.   Cardiovascular:      Rate and Rhythm: Normal rate and regular rhythm.      Pulses: Normal pulses.      Heart sounds: Normal heart sounds.   Pulmonary:      Effort: Pulmonary effort is normal. No respiratory distress.      Breath sounds: Normal breath sounds. No wheezing, rhonchi or rales.   Abdominal:      General: Abdomen is flat. Bowel sounds are normal.      Palpations: Abdomen is soft.      Tenderness: There is no abdominal tenderness. There is no guarding.   Musculoskeletal: "      Cervical back: Neck supple.   Skin:     General: Skin is warm.      Capillary Refill: Capillary refill takes less than 2 seconds.   Neurological:      General: No focal deficit present.      Mental Status: He is alert. Mental status is at baseline.   Psychiatric:         Mood and Affect: Mood normal.         Behavior: Behavior normal.          Procedures     Assessment and Plan     Diagnoses and all orders for this visit:    1. Type 2 diabetes mellitus without complication, with long-term current use of insulin (Primary)  Assessment & Plan:  Patient says that Jardiance will not be covered anymore this year.  Advised to increase Lantus back to 20 units daily and compliance with Humalog.  Continue glimepiride 4 mg once a day, metformin 1000 mg twice a day.  And start Januvia 50 mg daily.  If A1c still is not at goal and if patient is still noncompliant will refer to endocrinology    Orders:  -     SITagliptin (JANUVIA) 50 MG tablet; Take 1 tablet by mouth Daily.  Dispense: 90 tablet; Refill: 1    2. Paroxysmal atrial fibrillation  Assessment & Plan:  Follows with cardiology on Eliquis and metoprolol      3. Mixed hyperlipidemia  Assessment & Plan:  Stable on current medication and dosage. Will continue current management. Refill medication as necessary.  atorvastatin       4. Gastroesophageal reflux disease without esophagitis  Assessment & Plan:  Stable on current medication and dosage. Will continue current management. Refill medication as necessary.  Pepcid      5. Other diabetic neurological complication associated with type 2 diabetes mellitus  Assessment & Plan:  advised that we have to manage diabetes as this is worsening his polyneuropathy.  Gabapentin 600 twice a day  Stable on current medication and dosage. Will continue current management. Refill medication as necessary.  CSA up-to-date 9/2024, UDS 1/2025.  Giacomo patel      6. Moderate persistent asthma without complication  Assessment & Plan:  Follows  with pulmonology  On trelegy ellipta, prn albuterol           Counseling was given to patient for the following topics: instructions for management, risks and benefits of treatment options, and importance of treatment compliance.    Follow Up  Return in about 4 months (around 7/19/2025) for Recheck a1c.    MD CELESTINA Dsouza [facility] MR  [facility] PRIMARY [facility]ARE  [address] [place] [id number] [phone]

## 2025-03-25 NOTE — TELEPHONE ENCOUNTER
.   [facility] Dermatology Note  Encounter Date: Mar 25, 2025  Office Visit     Reviewed patients past medical history and pertinent chart review prior to patients visit today.     Dermatology Problem List:  # Multiple nevi    ____________________________________________    Assessment & Plan:     # Multiple nevi  - No concerning features on dermoscopy. We discussed the importance of self exams at home.   - ABCDEs: Counseled ABCDEs of melanoma: Asymmetry, Border (irregularity), Color (not uniform, changes in color), Diameter (greater than 6 mm which is about the size of a pencil eraser), and Evolving (any changes in preexisting moles).  - Sun protection: Counseled SPF 30+ sunscreen, UPF clothing, sun avoidance, tanning bed avoidance.    Follow up as needed.     All risks, benefits and alternatives were discussed with patient.  Patient is in agreement and understands the assessment and plan.  All questions were answered.  Dotty Ascencio PA-C  [facility] Dermatology  _______________________________________    CC: Skin Check (FBSE. Spots of concern on r leg and L collarbone. PT reports 'they are pretty old' and denies any bleeding, crusting, or pain. No family hx of skin cancer. No personal hx of skin cancer)    HPI:  Mr. Anastacio Plaza is a(n) 24 year old male who presents today as a new patient for two moles. The patient requests evaluation of moles on the left upper chest and right lateral thigh. The lesions have been present for years. No growth or changes over time. No pain, itching, or bleeding. No other specific cutaneous concerns today. The patient declines a full skin cancer screening today, instead requests evaluation of th two sites above.  Patient is otherwise feeling well, without additional skin concerns.       Physical Exam:  SKIN: Focused examination of left upper chest and right lateral thigh was performed.  - The left upper chest and right lateral thigh demonstrates homogenous dark  brown macules.     - No other lesions of concern on areas examined.     Medications:  Current Outpatient Medications   Medication Sig Dispense Refill    metFORMIN (GLUCOPHAGE XR) 500 MG 24 hr tablet Take 1 tablet (500 mg) by mouth daily (with dinner) 120 tablet 0    Semaglutide (RYBELSUS) 3 MG tablet Take 3 mg by mouth daily 90 tablet 0     No current facility-administered medications for this visit.      Past Medical History:   Patient Active Problem List   Diagnosis    Pre-diabetes    Screening for lipid disorders    Hyperlipidemia LDL goal <100    Morbid obesity (H)     Past Medical History:   Diagnosis Date    Obesity     Prediabetes        CC Referred Self, MD  No address on file on close of this encounter.    negative...

## 2025-03-27 ENCOUNTER — PRIOR AUTHORIZATION (OUTPATIENT)
Dept: INTERNAL MEDICINE | Facility: CLINIC | Age: 65
End: 2025-03-27
Payer: COMMERCIAL

## 2025-03-27 NOTE — TELEPHONE ENCOUNTER
YAHAIRA PADGETT (Key: [id number]) - [id number]  Invokana 300MG tablets  status: PA RequestCreated: March 24th, 2025 [id number]: March 27th, 2025

## 2025-04-09 DIAGNOSIS — E11.9 TYPE 2 DIABETES MELLITUS WITHOUT COMPLICATION, WITHOUT LONG-TERM CURRENT USE OF INSULIN: ICD-10-CM

## 2025-04-09 RX ORDER — GLIMEPIRIDE 4 MG/1
4 TABLET ORAL
Qty: 90 TABLET | Refills: 1 | Status: SHIPPED | OUTPATIENT
Start: 2025-04-09

## 2025-04-16 DIAGNOSIS — J45.909 UNCOMPLICATED ASTHMA, UNSPECIFIED ASTHMA SEVERITY, UNSPECIFIED WHETHER PERSISTENT: ICD-10-CM

## 2025-04-16 RX ORDER — MONTELUKAST SODIUM 10 MG/1
10 TABLET ORAL
Qty: 90 TABLET | Refills: 1 | Status: SHIPPED | OUTPATIENT
Start: 2025-04-16

## 2025-05-09 RX ORDER — FLUTICASONE PROPIONATE 50 MCG
SPRAY, SUSPENSION (ML) NASAL
Qty: 16 G | Refills: 0 | OUTPATIENT
Start: 2025-05-09

## 2025-05-21 DIAGNOSIS — I48.0 PAROXYSMAL ATRIAL FIBRILLATION: ICD-10-CM

## 2025-05-21 RX ORDER — APIXABAN 5 MG/1
5 TABLET, FILM COATED ORAL EVERY 12 HOURS SCHEDULED
Qty: 90 TABLET | Refills: 1 | Status: SHIPPED | OUTPATIENT
Start: 2025-05-21

## 2025-06-02 NOTE — OUTREACH NOTE
Prep Survey    Flowsheet Row Responses   [facility] facility patient discharged from? [facility]   Is LACE score < 7 ? No   Emergency Room discharge w/ pulse ox? No   Eligibility [facility]   Date of Admission 11/07/22   Date of Discharge 11/11/22   Discharge Disposition Home or Self Care   Discharge diagnosis laparoscopic, hand-assisted right hemicolectomy   Does the patient have one of the following disease processes/diagnoses(primary or secondary)? General Surgery   Does the patient have Home health ordered? No   Is there a DME ordered? No   Prep survey completed? Yes          ANTONIO FLOWER - Registered Nurse         ambulatory

## 2025-06-09 RX ORDER — PEN NEEDLE, DIABETIC, SAFETY 30 GX5/16"
1 NEEDLE, DISPOSABLE MISCELLANEOUS SEE ADMIN INSTRUCTIONS
Qty: 100 EACH | Refills: 12 | Status: SHIPPED | OUTPATIENT
Start: 2025-06-09

## 2025-06-09 NOTE — TELEPHONE ENCOUNTER
Caller: [facility] [address] - [phone] [facility] [phone] FX    Relationship: Pharmacy    Best call back number: [phone]     Requested Prescriptions:   Requested Prescriptions     Pending Prescriptions Disp Refills    Insulin Pen Needle (Unifine SafeControl Pen Needle) 30G X 8 MM misc 100 each 12     Sig: See Admin Instructions.        Pharmacy where request should be sent: [facility] [address] - [phone] [facility] [phone] FX     Last office visit with prescribing clinician: 3/19/2025   Last telemedicine visit with prescribing clinician: Visit date not found   Next office visit with prescribing clinician: 7/23/2025     Additional details provided by patient:     Does the patient have less than a 3 day supply:  [] Yes  [x] No    Would you like a call back once the refill request has been completed: [] Yes [x] No    If the office needs to give you a call back, can they leave a voicemail: [] Yes [x] No    Cadance Dunaway, RegSched Rep   06/09/25 09:52 EDT

## 2025-06-19 DIAGNOSIS — J45.40 MODERATE PERSISTENT ASTHMA WITHOUT COMPLICATION: ICD-10-CM

## 2025-06-19 RX ORDER — FLUTICASONE FUROATE, UMECLIDINIUM BROMIDE AND VILANTEROL TRIFENATATE 200; 62.5; 25 UG/1; UG/1; UG/1
1 POWDER RESPIRATORY (INHALATION) DAILY
Qty: 60 EACH | Refills: 5 | Status: SHIPPED | OUTPATIENT
Start: 2025-06-19

## 2025-06-20 DIAGNOSIS — E11.49 TYPE 2 DIABETES MELLITUS WITH OTHER DIABETIC NEUROLOGICAL COMPLICATION: ICD-10-CM

## 2025-06-20 NOTE — TELEPHONE ENCOUNTER
Rx Refill Note  Requested Prescriptions     Pending Prescriptions Disp Refills    gabapentin (NEURONTIN) 300 MG capsule [Pharmacy Med Name: gabapentin 300 mg capsule] 120 capsule 0     Sig: TAKE TWO CAPSULES BY MOUTH TWICE DAILY      Last office visit with prescribing clinician: 3/19/2025   Last telemedicine visit with prescribing clinician: Visit date not found   Next office visit with prescribing clinician: 7/23/2025                         Would you like a call back once the refill request has been completed: [] Yes [] No    If the office needs to give you a call back, can they leave a voicemail: [] Yes [] No    Richard Hanson MA  06/20/25, 16:53 EDT

## 2025-06-23 RX ORDER — GABAPENTIN 300 MG/1
600 CAPSULE ORAL 2 TIMES DAILY
Qty: 120 CAPSULE | Refills: 0 | Status: SHIPPED | OUTPATIENT
Start: 2025-06-23

## 2025-07-02 DIAGNOSIS — E11.00 TYPE 2 DIABETES MELLITUS WITH HYPEROSMOLARITY WITHOUT COMA, WITHOUT LONG-TERM CURRENT USE OF INSULIN: ICD-10-CM

## 2025-07-02 RX ORDER — INSULIN GLARGINE 100 [IU]/ML
INJECTION, SOLUTION SUBCUTANEOUS
Qty: 15 ML | Refills: 2 | Status: SHIPPED | OUTPATIENT
Start: 2025-07-02

## 2025-07-23 ENCOUNTER — OFFICE VISIT (OUTPATIENT)
Dept: INTERNAL MEDICINE | Facility: CLINIC | Age: 65
End: 2025-07-23
Payer: COMMERCIAL

## 2025-07-23 VITALS
HEART RATE: 73 BPM | BODY MASS INDEX: 27.9 KG/M2 | WEIGHT: 206 LBS | HEIGHT: 72 IN | RESPIRATION RATE: 20 BRPM | DIASTOLIC BLOOD PRESSURE: 60 MMHG | SYSTOLIC BLOOD PRESSURE: 133 MMHG | OXYGEN SATURATION: 96 % | TEMPERATURE: 97.9 F

## 2025-07-23 DIAGNOSIS — M67.449 DIGITAL MUCOUS CYST OF FINGER: ICD-10-CM

## 2025-07-23 DIAGNOSIS — E11.65 TYPE 2 DIABETES MELLITUS WITH HYPERGLYCEMIA, WITHOUT LONG-TERM CURRENT USE OF INSULIN: Primary | ICD-10-CM

## 2025-07-23 PROBLEM — Z79.4 TYPE 2 DIABETES MELLITUS WITHOUT COMPLICATION, WITH LONG-TERM CURRENT USE OF INSULIN: Status: RESOLVED | Noted: 2024-11-14 | Resolved: 2025-07-23

## 2025-07-23 PROBLEM — E11.9 TYPE 2 DIABETES MELLITUS WITHOUT COMPLICATION, WITH LONG-TERM CURRENT USE OF INSULIN: Status: RESOLVED | Noted: 2024-11-14 | Resolved: 2025-07-23

## 2025-07-23 LAB
EXPIRATION DATE: ABNORMAL
EXPIRATION DATE: NORMAL
HBA1C MFR BLD: 6.5 % (ref 4.5–5.7)
Lab: ABNORMAL
Lab: NORMAL
POC ALBUMIN, URINE: 30 MG/L
POC CREATININE, URINE: 100 MG/DL
POC URINE ALB/CREA RATIO: <30

## 2025-07-23 PROCEDURE — 83036 HEMOGLOBIN GLYCOSYLATED A1C: CPT | Performed by: STUDENT IN AN ORGANIZED HEALTH CARE EDUCATION/TRAINING PROGRAM

## 2025-07-23 PROCEDURE — 82044 UR ALBUMIN SEMIQUANTITATIVE: CPT | Performed by: STUDENT IN AN ORGANIZED HEALTH CARE EDUCATION/TRAINING PROGRAM

## 2025-07-23 PROCEDURE — 82570 ASSAY OF URINE CREATININE: CPT | Performed by: STUDENT IN AN ORGANIZED HEALTH CARE EDUCATION/TRAINING PROGRAM

## 2025-07-23 PROCEDURE — 99214 OFFICE O/P EST MOD 30 MIN: CPT | Performed by: STUDENT IN AN ORGANIZED HEALTH CARE EDUCATION/TRAINING PROGRAM

## 2025-07-23 RX ORDER — EMPAGLIFLOZIN 25 MG/1
25 TABLET, FILM COATED ORAL DAILY
Qty: 90 TABLET | Refills: 1 | Status: SHIPPED | OUTPATIENT
Start: 2025-07-23

## 2025-07-23 RX ORDER — GLIMEPIRIDE 4 MG/1
4 TABLET ORAL
Qty: 90 TABLET | Refills: 1 | Status: SHIPPED | OUTPATIENT
Start: 2025-07-23

## 2025-07-23 RX ORDER — MUPIROCIN 2 %
1 OINTMENT (GRAM) TOPICAL 3 TIMES DAILY
Qty: 30 G | Refills: 0 | Status: SHIPPED | OUTPATIENT
Start: 2025-07-23 | End: 2025-07-30

## 2025-07-23 NOTE — ASSESSMENT & PLAN NOTE
Has been taking all medications but metformin at night. Obtains meds as a packet from pharmacy. Advised to call and confirm.   Adjust regimen as follows:  AM: metformin, jardiance, glimepiride, Lantus  Humalog 4 units tid hold If glucose <120  PM: januvia and metformin

## 2025-07-23 NOTE — PROGRESS NOTES
Office Note     Name: Darnell Garcia    : 1960     MRN: [id number]     Chief Complaint  Diabetes (4 month follow up) and Cyst (finger)    Subjective     History of Present Illness:  Darnell Garcia is a 65 y.o. male who presents today for diabetes follow up    Regimen includes lantus 20 units in AM and Humalog 4units tid, glimepiride 4mg daily, metformin 1000 bid, januvia 50 daily, jardiance 25 at night  DM Neuropathy on bilateral, lower and upper extremities at tips of fingers and toes. On gabapentin 600mg BID  A1c today improved from 8.1 to 6.5%, has been having lows of 60-70 in the morning when he wakes up, he does take all medications except metformin at night    Also complains of a skin lesion x 4 months ago, wife lanced it and noted gel like discharge. Still with a lump and central ulceration    Family History:   Family History   Problem Relation Age of Onset    Breast cancer Mother     Osteoarthritis Mother     Arthritis Mother     Cancer Mother         Breast    Coronary artery disease Father     Heart attack Father         late 60's    Cancer Father         Skin    Lymphoma Son     Leukemia Son     Cancer Son         Lymphoblastic Leukemia    Colon cancer Neg Hx        Social History:   Social History     Socioeconomic History    Marital status:    Tobacco Use    Smoking status: Never     Passive exposure: Never    Smokeless tobacco: Never   Vaping Use    Vaping status: Never Used   Substance and Sexual Activity    Alcohol use: Not Currently     Alcohol/week: 4.0 standard drinks of alcohol     Types: 4 Cans of beer per week     Comment: on weekends    Drug use: No    Sexual activity: Not Currently     Partners: Female     Birth control/protection: Vasectomy       Health Maintenance   Topic Date Due    DIABETIC EYE EXAM  2023    DIABETIC FOOT EXAM  2024    COVID-19 Vaccine ( season) 2025 (Originally 2024)    ZOSTER VACCINE (1 of 2) 2025 (Originally  "4/14/2010)    Pneumococcal Vaccine 50+ (2 of 2 - PCV) 01/19/2026 (Originally 6/15/2018)    ANNUAL PHYSICAL  09/19/2025    INFLUENZA VACCINE  10/01/2025    HEMOGLOBIN A1C  01/23/2026    LIPID PANEL  01/27/2026    URINE MICROALBUMIN-CREATININE RATIO (uACR)  07/23/2026    TDAP/TD VACCINES (2 - Td or Tdap) 02/16/2027    COLORECTAL CANCER SCREENING  11/28/2033    HEPATITIS C SCREENING  Completed       Patient Care Team:  Irina Cam MD as PCP - General (Family Medicine)    Objective     Vital Signs  /60   Pulse 73   Temp 97.9 °F (36.6 °C)   Resp 20   Ht 182.9 cm (72.01\")   Wt 93.4 kg (206 lb)   SpO2 96%   BMI 27.93 kg/m²   Estimated body mass index is 27.93 kg/m² as calculated from the following:    Height as of this encounter: 182.9 cm (72.01\").    Weight as of this encounter: 93.4 kg (206 lb).        Physical Exam  Musculoskeletal:      Comments: Left middle finger DIP cyst with central erosion, no discharge or bleeding          Procedures     Assessment and Plan     Diagnoses and all orders for this visit:    1. Type 2 diabetes mellitus with hyperglycemia, without long-term current use of insulin (Primary)  Assessment & Plan:  Has been taking all medications but metformin at night. Obtains meds as a packet from pharmacy. Advised to call and confirm.   Adjust regimen as follows:  AM: metformin, jardiance, glimepiride, Lantus  Humalog 4 units tid hold If glucose <120  PM: januvia and metformin    Orders:  -     POC Glycosylated Hemoglobin (Hb A1C)  -     POC Albumin/Creatinine Ratio Urine  -     Jardiance 25 MG tablet tablet; Take 1 tablet by mouth Daily.  Dispense: 90 tablet; Refill: 1  -     SITagliptin (JANUVIA) 50 MG tablet; Take 1 tablet by mouth Every Night.  Dispense: 90 tablet; Refill: 1  -     metFORMIN (GLUCOPHAGE) 1000 MG tablet; Take 1 tablet by mouth 2 (Two) Times a Day With Meals.  Dispense: 180 tablet; Refill: 1  -     glimepiride (AMARYL) 4 MG tablet; Take 1 tablet by mouth Every " Morning Before Breakfast.  Dispense: 90 tablet; Refill: 1    2. Digital mucous cyst of finger  -     Ambulatory Referral to Dermatology  -     mupirocin (BACTROBAN) 2 % ointment; Apply 1 Application topically to the appropriate area as directed 3 (Three) Times a Day for 7 days.  Dispense: 30 g; Refill: 0         Counseling was given to patient for the following topics: instructions for management, impressions, and risks and benefits of treatment options.    Follow Up  Return in about 4 months (around 11/23/2025) for Annual.    MD CELESTINA Dsouza [facility] PRIMARY CARE  [address] [place] [id number] [phone]

## 2025-08-07 DIAGNOSIS — I48.0 PAROXYSMAL ATRIAL FIBRILLATION: ICD-10-CM

## 2025-08-07 RX ORDER — APIXABAN 5 MG/1
5 TABLET, FILM COATED ORAL EVERY 12 HOURS SCHEDULED
Qty: 90 TABLET | Refills: 1 | Status: SHIPPED | OUTPATIENT
Start: 2025-08-07

## 2025-08-08 ENCOUNTER — TELEPHONE (OUTPATIENT)
Dept: INTERNAL MEDICINE | Facility: CLINIC | Age: 65
End: 2025-08-08
Payer: COMMERCIAL

## 2025-08-11 RX ORDER — OMEGA-3S/DHA/EPA/FISH OIL/D3 300MG-1000
400 CAPSULE ORAL DAILY
Qty: 90 TABLET | Refills: 1 | Status: SHIPPED | OUTPATIENT
Start: 2025-08-11

## 2025-08-15 ENCOUNTER — OFFICE VISIT (OUTPATIENT)
Dept: INTERNAL MEDICINE | Facility: CLINIC | Age: 65
End: 2025-08-15
Payer: COMMERCIAL

## 2025-08-15 ENCOUNTER — TELEPHONE (OUTPATIENT)
Dept: INTERNAL MEDICINE | Facility: CLINIC | Age: 65
End: 2025-08-15
Payer: COMMERCIAL

## 2025-08-15 VITALS
HEART RATE: 58 BPM | DIASTOLIC BLOOD PRESSURE: 64 MMHG | TEMPERATURE: 97 F | WEIGHT: 205.8 LBS | HEIGHT: 72 IN | SYSTOLIC BLOOD PRESSURE: 126 MMHG | OXYGEN SATURATION: 98 % | BODY MASS INDEX: 27.87 KG/M2

## 2025-08-15 DIAGNOSIS — M25.572 CHRONIC PAIN OF BOTH ANKLES: ICD-10-CM

## 2025-08-15 DIAGNOSIS — M25.571 CHRONIC PAIN OF BOTH ANKLES: ICD-10-CM

## 2025-08-15 DIAGNOSIS — Z76.89 ENCOUNTER TO ESTABLISH CARE WITH NEW DOCTOR: Primary | ICD-10-CM

## 2025-08-15 DIAGNOSIS — E11.59 TYPE 2 DIABETES MELLITUS WITH OTHER CIRCULATORY COMPLICATION, WITH LONG-TERM CURRENT USE OF INSULIN: ICD-10-CM

## 2025-08-15 DIAGNOSIS — M54.12 CERVICAL RADICULOPATHY: ICD-10-CM

## 2025-08-15 DIAGNOSIS — G89.29 CHRONIC PAIN OF BOTH ANKLES: ICD-10-CM

## 2025-08-15 DIAGNOSIS — E11.65 TYPE 2 DIABETES MELLITUS WITH HYPERGLYCEMIA, WITHOUT LONG-TERM CURRENT USE OF INSULIN: ICD-10-CM

## 2025-08-15 DIAGNOSIS — Z79.4 TYPE 2 DIABETES MELLITUS WITH OTHER CIRCULATORY COMPLICATION, WITH LONG-TERM CURRENT USE OF INSULIN: ICD-10-CM

## 2025-08-15 DIAGNOSIS — J45.41 MODERATE PERSISTENT ASTHMA WITH ACUTE EXACERBATION: ICD-10-CM

## 2025-08-15 RX ORDER — ALBUTEROL SULFATE 90 UG/1
2 INHALANT RESPIRATORY (INHALATION) EVERY 4 HOURS PRN
Qty: 18 G | Refills: 1 | Status: SHIPPED | OUTPATIENT
Start: 2025-08-15

## 2025-08-15 RX ORDER — ACYCLOVIR 400 MG/1
TABLET ORAL SEE ADMIN INSTRUCTIONS
Qty: 3 EACH | Refills: 5 | Status: SHIPPED | OUTPATIENT
Start: 2025-08-15

## 2025-08-15 RX ORDER — PREDNISONE 20 MG/1
20 TABLET ORAL DAILY
Qty: 5 TABLET | Refills: 0 | Status: SHIPPED | OUTPATIENT
Start: 2025-08-15 | End: 2025-08-20

## 2025-08-20 DIAGNOSIS — E11.65 TYPE 2 DIABETES MELLITUS WITH HYPERGLYCEMIA, WITHOUT LONG-TERM CURRENT USE OF INSULIN: ICD-10-CM

## 2025-08-21 ENCOUNTER — OFFICE VISIT (OUTPATIENT)
Dept: PULMONOLOGY | Facility: CLINIC | Age: 65
End: 2025-08-21
Payer: COMMERCIAL

## 2025-08-21 VITALS
DIASTOLIC BLOOD PRESSURE: 70 MMHG | HEIGHT: 72 IN | HEART RATE: 68 BPM | WEIGHT: 205 LBS | BODY MASS INDEX: 27.77 KG/M2 | RESPIRATION RATE: 18 BRPM | OXYGEN SATURATION: 97 % | SYSTOLIC BLOOD PRESSURE: 124 MMHG

## 2025-08-21 DIAGNOSIS — J45.41 MODERATE PERSISTENT ASTHMA WITH ACUTE EXACERBATION: Primary | ICD-10-CM

## 2025-08-21 DIAGNOSIS — J30.89 OTHER ALLERGIC RHINITIS: ICD-10-CM

## 2025-08-21 PROCEDURE — 95012 NITRIC OXIDE EXP GAS DETER: CPT | Performed by: INTERNAL MEDICINE

## 2025-08-21 RX ORDER — METHYLPREDNISOLONE ACETATE 80 MG/ML
80 INJECTION, SUSPENSION INTRA-ARTICULAR; INTRALESIONAL; INTRAMUSCULAR; SOFT TISSUE ONCE
Status: COMPLETED | OUTPATIENT
Start: 2025-08-21 | End: 2025-08-21

## 2025-08-21 RX ADMIN — METHYLPREDNISOLONE ACETATE 80 MG: 80 INJECTION, SUSPENSION INTRA-ARTICULAR; INTRALESIONAL; INTRAMUSCULAR; SOFT TISSUE at 14:48

## 2025-08-22 DIAGNOSIS — J45.41 MODERATE PERSISTENT ASTHMA WITH ACUTE EXACERBATION: ICD-10-CM

## (undated) DEVICE — GLV SURG SENSICARE W/ALOE PF LF SZ6 STRL

## (undated) DEVICE — SINGLE-USE POLYPECTOMY SNARE: Brand: CAPTIVATOR II

## (undated) DEVICE — PROVIDES A STERILE INTERFACE BETWEEN THE OPERATING ROOM SURGICAL LAMPS (NON-STERILE) AND THE SURGEON OR NURSE (STERILE).: Brand: STERION®CLAMP COVER FABRIC

## (undated) DEVICE — TUBING, SUCTION, 1/4" X 12', STRAIGHT: Brand: MEDLINE

## (undated) DEVICE — KT SYR GEL ORISE SNGL PK 10ML

## (undated) DEVICE — BLD CLIP UNIV SURG GRY

## (undated) DEVICE — HYBRID TUBING/CAP SET FOR OLYMPUS® SCOPES: Brand: ERBE

## (undated) DEVICE — FRCP BX RADJAW4 NDL 2.8 240 STD OG

## (undated) DEVICE — UNDYED BRAIDED (POLYGLACTIN 910), SYNTHETIC ABSORBABLE SUTURE: Brand: COATED VICRYL

## (undated) DEVICE — COVER,MAYO STAND,STERILE: Brand: MEDLINE

## (undated) DEVICE — GLV SURG BIOGEL LTX PF 6

## (undated) DEVICE — ENDOSCOPY PORT CONNECTOR FOR OLYMPUS® SCOPES: Brand: ERBE

## (undated) DEVICE — Device

## (undated) DEVICE — TOWEL,OR,DSP,ST,BLUE,STD,4/PK,20PK/CS: Brand: MEDLINE

## (undated) DEVICE — HARMONIC 1100 SHEARS, 36CM SHAFT LENGTH: Brand: HARMONIC

## (undated) DEVICE — MEDI-VAC NON-CONDUCTIVE SUCTION TUBING: Brand: CARDINAL HEALTH

## (undated) DEVICE — SUT VIC 3/0 SH 27IN J416H

## (undated) DEVICE — VLV SXN AIR/H2O ORCAPOD3 1P/U STRL

## (undated) DEVICE — SUT SILK 0 TIES 30IN A306H

## (undated) DEVICE — ENDOPATH XCEL BLUNT TIP TROCARS WITH SMOOTH SLEEVES: Brand: ENDOPATH XCEL

## (undated) DEVICE — FLTR PLUMEPORT LAP W/CONN STRL

## (undated) DEVICE — 2, DISPOSABLE SUCTION/IRRIGATOR WITHOUT DISPOSABLE TIP: Brand: STRYKEFLOW

## (undated) DEVICE — SLV SCD CALF HEMOFORCE DVT THERP REPROC MD

## (undated) DEVICE — ACCESS PLATFORM FOR MINIMALLY INVASIVE SURGERY.: Brand: GELPORT® LAPAROSCOPIC  SYSTEM

## (undated) DEVICE — SUT SILK 2/0 SUTUPAK TIES 24IN SA75H

## (undated) DEVICE — ELECTRD BLD EZ CLN STD 2.5IN

## (undated) DEVICE — QUICK CATCH IN-LINE SUCTION POLYP TRAP IS USED FOR SUCTION RETRIEVAL OF ENDOSCOPICALLY REMOVED POLYPS.

## (undated) DEVICE — LUBE JELLY PK/2.75GM STRL BX/144

## (undated) DEVICE — TBG PENCL TELESCP MEGADYNE SMOKE EVAC 10FT

## (undated) DEVICE — SUT PROLN 0/0 CTX 30IN 8454H

## (undated) DEVICE — SUT SILK 2/0 SH 30IN K833H

## (undated) DEVICE — DEV LIGASURE LAP/SEALER/DIV MARYLAND JAW 23CM

## (undated) DEVICE — TOTAL TRAY, 16FR 10ML SIL FOLEY, URN: Brand: MEDLINE

## (undated) DEVICE — NDL SCLEROTHERAPY INTERJECT 25G 4 240CM

## (undated) DEVICE — GLV SURG SENSICARE PI LF PF 6.0

## (undated) DEVICE — TP SXN YANKR BULB STRL

## (undated) DEVICE — SPNG LAP 18X18IN LF STRL PK/5

## (undated) DEVICE — NET RESCUENET F/B RETRV

## (undated) DEVICE — GOWN,PREVENTION PLUS,XLARGE,STERILE: Brand: MEDLINE

## (undated) DEVICE — SUT SILK 3/0 SH CR8 18IN C013D

## (undated) DEVICE — SUT PROLN 3/0 8832H

## (undated) DEVICE — RICH GENERAL LAPAROSCOPY: Brand: MEDLINE INDUSTRIES, INC.

## (undated) DEVICE — ENDOPATH XCEL UNIVERSAL TROCAR STABLILITY SLEEVES: Brand: ENDOPATH XCEL

## (undated) DEVICE — SPNG GZ WOVN 4X4IN 12PLY 10/BX STRL

## (undated) DEVICE — CVR HNDL LIGHT RIGID

## (undated) DEVICE — SHEET,DRAPE,70X100,STERILE: Brand: MEDLINE